# Patient Record
Sex: FEMALE | Race: WHITE | NOT HISPANIC OR LATINO | Employment: OTHER | URBAN - METROPOLITAN AREA
[De-identification: names, ages, dates, MRNs, and addresses within clinical notes are randomized per-mention and may not be internally consistent; named-entity substitution may affect disease eponyms.]

---

## 2017-10-16 ENCOUNTER — OFFICE VISIT (OUTPATIENT)
Dept: FAMILY MEDICINE | Facility: CLINIC | Age: 62
End: 2017-10-16
Payer: COMMERCIAL

## 2017-10-16 VITALS
DIASTOLIC BLOOD PRESSURE: 80 MMHG | HEART RATE: 84 BPM | HEIGHT: 65 IN | BODY MASS INDEX: 39.15 KG/M2 | TEMPERATURE: 98 F | SYSTOLIC BLOOD PRESSURE: 180 MMHG | WEIGHT: 235 LBS

## 2017-10-16 DIAGNOSIS — R13.19 ESOPHAGEAL DYSPHAGIA: ICD-10-CM

## 2017-10-16 DIAGNOSIS — F17.200 TOBACCO USE DISORDER, MODERATE, DEPENDENCE: ICD-10-CM

## 2017-10-16 DIAGNOSIS — R53.83 FATIGUE, UNSPECIFIED TYPE: ICD-10-CM

## 2017-10-16 DIAGNOSIS — E04.9 ENLARGED THYROID GLAND: Primary | ICD-10-CM

## 2017-10-16 DIAGNOSIS — Z13.1 SCREENING FOR DIABETES MELLITUS: ICD-10-CM

## 2017-10-16 DIAGNOSIS — Z13.220 SCREENING, LIPID: ICD-10-CM

## 2017-10-16 DIAGNOSIS — J20.9 ACUTE BRONCHITIS, UNSPECIFIED ORGANISM: ICD-10-CM

## 2017-10-16 DIAGNOSIS — R05.9 COUGH: ICD-10-CM

## 2017-10-16 DIAGNOSIS — R06.2 WHEEZING: ICD-10-CM

## 2017-10-16 DIAGNOSIS — J30.9 ALLERGIC SINUSITIS: ICD-10-CM

## 2017-10-16 DIAGNOSIS — R12 HEARTBURN: ICD-10-CM

## 2017-10-16 DIAGNOSIS — I10 HYPERTENSION, UNSPECIFIED TYPE: ICD-10-CM

## 2017-10-16 PROCEDURE — 99999 PR PBB SHADOW E&M-NEW PATIENT-LVL IV: CPT | Mod: PBBFAC,,, | Performed by: NURSE PRACTITIONER

## 2017-10-16 PROCEDURE — 99205 OFFICE O/P NEW HI 60 MIN: CPT | Mod: S$GLB,,, | Performed by: NURSE PRACTITIONER

## 2017-10-16 RX ORDER — AMLODIPINE AND BENAZEPRIL HYDROCHLORIDE 5; 40 MG/1; MG/1
1 CAPSULE ORAL DAILY
Qty: 30 CAPSULE | Refills: 2 | Status: SHIPPED | OUTPATIENT
Start: 2017-10-16 | End: 2017-11-29 | Stop reason: SDUPTHER

## 2017-10-16 RX ORDER — ESOMEPRAZOLE MAGNESIUM 40 MG/1
40 CAPSULE, DELAYED RELEASE ORAL
Qty: 30 CAPSULE | Refills: 1 | Status: SHIPPED | OUTPATIENT
Start: 2017-10-16 | End: 2018-02-01 | Stop reason: HOSPADM

## 2017-10-16 RX ORDER — AMOXICILLIN AND CLAVULANATE POTASSIUM 875; 125 MG/1; MG/1
1 TABLET, FILM COATED ORAL 2 TIMES DAILY
Qty: 14 TABLET | Refills: 0 | Status: SHIPPED | OUTPATIENT
Start: 2017-10-16 | End: 2017-11-02 | Stop reason: ALTCHOICE

## 2017-10-16 NOTE — PROGRESS NOTES
Subjective:       Patient ID: Indira Chauhan is a 62 y.o. female.    Chief Complaint: Cough and Swallow Problem    HPI   Chief Complaint  Chief Complaint   Patient presents with    Cough    Swallow Problem       HPI  Indira Chauhan is a 62 y.o. female with multiple medical diagnoses as listed in the medical history and problem list, new patient to clinic, that presents to [facility] care and with c/o cough and a swallowing problem.  States 3 weeks of feeling sluggish and tired, swelling to left side of neck that is not painful, and difficulty swallowing, cough productive of light colored phlegm, stuffy nose and post nasal drip. Symptoms are constant with left neck swelling increased this morning.  No aggravating or relieving factors.   Has not tried any OTC medication.  Patient has not been to a doctor/health care provider for 40 years.  Today blood pressure is elevated.  BMI today is 39.11.      PAST MEDICAL HISTORY:  History reviewed. No pertinent past medical history.    PAST SURGICAL HISTORY:  History reviewed. No pertinent surgical history.    SOCIAL HISTORY:  Social History     Social History    Marital status:      Spouse name: N/A    Number of children: N/A    Years of education: N/A     Occupational History    retired      Social History Main Topics    Smoking status: Current Every Day Smoker     Packs/day: 0.50     Years: 40.00    Smokeless tobacco: Never Used    Alcohol use 1.8 oz/week     3 Shots of liquor per week      Comment: social     Drug use: No    Sexual activity: Yes     Partners: Male     Birth control/ protection: Post-menopausal     Other Topics Concern    Not on file     Social History Narrative    Retired hairdresser       FAMILY HISTORY:  Family History   Problem Relation Age of Onset    Heart disease Mother        ALLERGIES AND MEDICATIONS: updated and reviewed.  Review of patient's allergies indicates:  No Known Allergies  Current Outpatient Prescriptions    Medication Sig Dispense Refill    amlodipine-benazepril (LOTREL) 5-40 mg per capsule Take 1 capsule by mouth once daily. 30 capsule 2    amoxicillin-clavulanate 875-125mg (AUGMENTIN) 875-125 mg per tablet Take 1 tablet by mouth 2 (two) times daily. With food 14 tablet 0    esomeprazole (NEXIUM) 40 MG capsule Take 1 capsule (40 mg total) by mouth before breakfast. For heartburn, GERD 30 capsule 1     No current facility-administered medications for this visit.          Review of Systems   Constitutional: Positive for activity change and fatigue. Negative for appetite change, chills, fever and unexpected weight change.        3 weeks of fatigue and less active than normal   HENT: Positive for congestion, hearing loss and trouble swallowing. Negative for ear discharge, rhinorrhea, sinus pain and sore throat.    Eyes: Negative for visual disturbance.        Wears glasses for reading   Respiratory: Positive for cough. Negative for shortness of breath.         Cough productive of light colored phlegm   Cardiovascular: Positive for leg swelling. Negative for chest pain and palpitations.        Occasional swelling to ankles and feet   Gastrointestinal: Negative for abdominal pain, constipation, diarrhea, nausea and vomiting.        Has had heartburn and belching x 2 weeks   Genitourinary: Negative for difficulty urinating, dysuria, frequency and urgency.   Musculoskeletal: Negative for arthralgias and myalgias.   Skin: Negative for rash and wound.   Neurological: Negative for dizziness, tremors, weakness, numbness and headaches.   Hematological: Positive for adenopathy.        Swelling to left neck, not painful   Psychiatric/Behavioral: Positive for sleep disturbance. Negative for dysphoric mood and suicidal ideas. The patient is not nervous/anxious.         Some difficulty sleeping x past few weeks.        Objective:       Vitals:    10/16/17 1426 10/16/17 1431 10/16/17 1445   BP: (!) 190/95 (!) 206/98 (!) 180/80   BP  "Location: Right arm Left arm Right arm   Patient Position: Sitting Sitting Sitting   BP Method: X-Large (Automatic)  Large (Manual)   Pulse: 84     Temp: 98.3 °F (36.8 °C)     TempSrc: Oral     Weight: 106.6 kg (235 lb)     Height: 5' 5" (1.651 m)       Physical Exam   Constitutional: She is oriented to person, place, and time. She appears well-developed. No distress.   HENT:   Head: Normocephalic and atraumatic.   Right Ear: Tympanic membrane, external ear and ear canal normal.   Left Ear: Tympanic membrane, external ear and ear canal normal.   Nose: Mucosal edema present. Right sinus exhibits no maxillary sinus tenderness and no frontal sinus tenderness. Left sinus exhibits no maxillary sinus tenderness and no frontal sinus tenderness.   Mouth/Throat: Oropharynx is clear and moist and mucous membranes are normal.   Nasal mucosa edematous with white secretions noted, unable to visualize throat   Eyes: Conjunctivae and EOM are normal. Pupils are equal, round, and reactive to light. Right conjunctiva is not injected. Left conjunctiva is not injected.   Neck: Normal carotid pulses present. Carotid bruit is not present. Thyromegaly present.       Very enlarged and firm left side of thyroid and isthmus, no distinct nodules palpated, right lobe mildly enlarged, softer texture   Cardiovascular: Normal rate, regular rhythm, S1 normal, S2 normal and normal heart sounds.  Exam reveals no gallop.    No murmur heard.  Pulses:       Carotid pulses are 2+ on the right side, and 2+ on the left side.       Radial pulses are 2+ on the right side, and 2+ on the left side.        Dorsalis pedis pulses are 2+ on the right side, and 2+ on the left side.   No edema   Pulmonary/Chest: Effort normal. No respiratory distress. She has no decreased breath sounds. She has wheezes in the right lower field and the left lower field. She has no rhonchi. She has no rales.   Bilateral basilar expiratory wheezes   Abdominal: Soft. Normal appearance " and bowel sounds are normal. There is no hepatosplenomegaly. There is no tenderness. No hernia.   obese   Musculoskeletal: Normal range of motion.   Lymphadenopathy:     She has no cervical adenopathy.   Neurological: She is alert and oriented to person, place, and time. She has normal strength.   Skin: Skin is warm, dry and intact. She is not diaphoretic. No pallor.   Psychiatric: She has a normal mood and affect. Her speech is normal and behavior is normal. Judgment and thought content normal. Cognition and memory are normal.   Nursing note and vitals reviewed.      Assessment:       1. Enlarged thyroid gland    2. Fatigue, unspecified type    3. Allergic sinusitis    4. Heartburn    5. Cough    6. Hypertension, unspecified type    7. Esophageal dysphagia    8. BMI 39.0-39.9,adult    9. Acute bronchitis, unspecified organism    10. Wheezing    11. Screening, lipid    12. Screening for diabetes mellitus    13. Tobacco use disorder, moderate, dependence        Plan:       Indira was seen today for cough and swallow problem.  Patient has not been to a health care provider in 40 years and needs all health maintenance.  At this appointment, focus was on acute problems, labs were ordered.  Patient will follow up in 2 weeks, following completion of tests and after starting blood pressure medication and I will plan to begin health maintenance testing at that time.  Diagnoses and all orders for this visit:    Enlarged thyroid gland  -     US Soft Tissue Head Neck Thyroid; Future  -     TSH; Future  -     T4, free; Future  -     Thyroid peroxidase antibody; Future  -     T3; Future    Fatigue, unspecified type  -     CBC auto differential; Future  -     Comprehensive metabolic panel; Future  -     TSH; Future  -     T4, free; Future  -     Thyroid peroxidase antibody; Future  -     T3; Future    Allergic sinusitis   Discussed seasonal sinus allergies, recommended OTC antihistamine like claritin 10 mg daily as  needed    Heartburn  -     esomeprazole (NEXIUM) 40 MG capsule; Take 1 capsule (40 mg total) by mouth before breakfast. For heartburn, GERD    Cough   Cough x several weeks, possibly related to GERD/heartburn and treatment with nexium is prescribed.   Cough may be due to cigarette smoking, cessation briefly discussed, patient not interested at this time.    Hypertension, unspecified type  -     amlodipine-benazepril (LOTREL) 5-40 mg per capsule; Take 1 capsule by mouth once daily.  - Patient to check blood pressure at home and record  - Discussed low salt diet, weight loss to lower blood pressure  - Medication side effects reviewed, possible dizziness as blood pressure gets to normal range.  Goal blood pressure <140/<90.  Blood pressure not considered too low until <100/<60  - Return visit in 2 weeks    Esophageal dysphagia  -     US Soft Tissue Head Neck Thyroid; Future    BMI 39.0-39.9,adult  -     Lipid panel; Future  -     Hemoglobin A1c; Future  - Weight loss recommended with well balanced diet and portion controlled meals and increased activity.     Acute bronchitis, unspecified organism  -     amoxicillin-clavulanate 875-125mg (AUGMENTIN) 875-125 mg per tablet; Take 1 tablet by mouth 2 (two) times daily. With food    Wheezing  -     amoxicillin-clavulanate 875-125mg (AUGMENTIN) 875-125 mg per tablet; Take 1 tablet by mouth 2 (two) times daily. With food    Screening, lipid  -     Lipid panel; Future    Screening for diabetes mellitus  -     Hemoglobin A1c; Future    Tobacco use disorder, moderate, dependence    Cessation briefly discussed, patient not interested at this time.    Patient readiness: acceptance and barriers:readiness    During the course of the visit the patient was educated and counseled about the following:     Hypertension:   Medication: begin Lotrel 5/40 mg 1 daily.  Obesity:   General weight loss/lifestyle modification strategies discussed (elicit support from others; identify saboteurs;  non-food rewards, etc).  Informal exercise measures discussed, e.g. taking stairs instead of elevator.    Goals: Hypertension: Reduce Blood Pressure and Obesity: Reduce calorie intake and BMI    Did patient meet goals/outcomes: No    The following self management tools provided: blood pressure log    Patient Instructions (the written plan) was given to the patient/family.     Time spent with patient: 30 minutes

## 2017-10-16 NOTE — PATIENT INSTRUCTIONS
Controlling High Blood Pressure  High blood pressure (hypertension) is often called the silent killer. This is because many people who have it dont know it. High blood pressure is defined as 140/90 mm Hg or higher. Know your blood pressure and remember to check it regularly. Doing so can save your life. Here are some things you can do to help control your blood pressure.    Choose heart-healthy foods  · Select low-salt, low-fat foods. Limit sodium intake to 2,400 mg per day or the amount suggested by your healthcare provider.  · Limit canned, dried, cured, packaged, and fast foods. These can contain a lot of salt.  · Eat 8 to 10 servings of fruits and vegetables every day.  · Choose lean meats, fish, or chicken.  · Eat whole-grain pasta, brown rice, and beans.  · Eat 2 to 3 servings of low-fat or fat-free dairy products.  · Ask your doctor about the DASH eating plan. This plan helps reduce blood pressure.  · When you go to a restaurant, ask that your meal be prepared with no added salt.  Maintain a healthy weight  · Ask your healthcare provider how many calories to eat a day. Then stick to that number.  · Ask your healthcare provider what weight range is healthiest for you. If you are overweight, a weight loss of only 3% to 5% of your body weight can help lower blood pressure. Generally, a good weight loss goal is to lose 10% of your body weight in a year.  · Limit snacks and sweets.  · Get regular exercise.  Get up and get active  · Choose activities you enjoy. Find ones you can do with friends or family. This includes bicycling, dancing, walking, and jogging.  · Park farther away from building entrances.  · Use stairs instead of the elevator.  · When you can, walk or bike instead of driving.  · [place] leaves, garden, or do household repairs.  · Be active at a moderate to vigorous level of physical activity for at least 40 minutes for a minimum of 3 to 4 days a week.   Manage stress  · Make time to relax and enjoy  life. Find time to laugh.  · Communicate your concerns with your loved ones and your healthcare provider.  · Visit with family and friends, and keep up with hobbies.  Limit alcohol and quit smoking  · Men should have no more than 2 drinks per day.  · Women should have no more than 1 drink per day.  · Talk with your healthcare provider about quitting smoking. Smoking significantly increases your risk for heart disease and stroke. Ask your healthcare provider about community smoking cessation programs and other options.  Medicines  If lifestyle changes arent enough, your healthcare provider may prescribe high blood pressure medicine. Take all medicines as prescribed. If you have any questions about your medicines, ask your healthcare provider before stopping or changing them.   Date Last Reviewed: 4/27/2016  © 0390-0809 [employer]. [address] [place] [address], [place] [address]. All rights reserved. This information is not intended as a substitute for professional medical care. Always follow your healthcare professional's instructions.

## 2017-10-20 ENCOUNTER — LAB VISIT (OUTPATIENT)
Dept: LAB | Facility: HOSPITAL | Age: 62
End: 2017-10-20
Attending: NURSE PRACTITIONER
Payer: COMMERCIAL

## 2017-10-20 ENCOUNTER — HOSPITAL ENCOUNTER (OUTPATIENT)
Dept: RADIOLOGY | Facility: CLINIC | Age: 62
Discharge: HOME | End: 2017-10-20
Attending: NURSE PRACTITIONER
Payer: COMMERCIAL

## 2017-10-20 DIAGNOSIS — R53.83 FATIGUE, UNSPECIFIED TYPE: ICD-10-CM

## 2017-10-20 DIAGNOSIS — R13.19 ESOPHAGEAL DYSPHAGIA: ICD-10-CM

## 2017-10-20 DIAGNOSIS — E04.9 ENLARGED THYROID GLAND: ICD-10-CM

## 2017-10-20 DIAGNOSIS — Z13.1 SCREENING FOR DIABETES MELLITUS: ICD-10-CM

## 2017-10-20 DIAGNOSIS — E07.9 THYROID MASS: Primary | ICD-10-CM

## 2017-10-20 DIAGNOSIS — Z13.220 SCREENING, LIPID: ICD-10-CM

## 2017-10-20 LAB
ALBUMIN SERPL BCP-MCNC: 3.8 G/DL
ALP SERPL-CCNC: 97 U/L
ALT SERPL W/O P-5'-P-CCNC: 22 U/L
ANION GAP SERPL CALC-SCNC: 13 MMOL/L
AST SERPL-CCNC: 28 U/L
BASOPHILS # BLD AUTO: 0.08 K/UL
BASOPHILS NFR BLD: 1 %
BILIRUB SERPL-MCNC: 0.6 MG/DL
BUN SERPL-MCNC: 13 MG/DL
CALCIUM SERPL-MCNC: 10 MG/DL
CHLORIDE SERPL-SCNC: 99 MMOL/L
CHOLEST SERPL-MCNC: 237 MG/DL
CHOLEST/HDLC SERPL: 6.8 {RATIO}
CO2 SERPL-SCNC: 28 MMOL/L
CREAT SERPL-MCNC: 0.8 MG/DL
DIFFERENTIAL METHOD: ABNORMAL
EOSINOPHIL # BLD AUTO: 0.4 K/UL
EOSINOPHIL NFR BLD: 5.3 %
ERYTHROCYTE [DISTWIDTH] IN BLOOD BY AUTOMATED COUNT: 12.5 %
EST. GFR  (AFRICAN AMERICAN): >60 ML/MIN/1.73 M^2
EST. GFR  (NON AFRICAN AMERICAN): >60 ML/MIN/1.73 M^2
GLUCOSE SERPL-MCNC: 99 MG/DL
HCT VFR BLD AUTO: 46.2 %
HDLC SERPL-MCNC: 35 MG/DL
HDLC SERPL: 14.8 %
HGB BLD-MCNC: 15.3 G/DL
IMM GRANULOCYTES # BLD AUTO: 0.03 K/UL
IMM GRANULOCYTES NFR BLD AUTO: 0.4 %
LDLC SERPL CALC-MCNC: 142.8 MG/DL
LYMPHOCYTES # BLD AUTO: 1.5 K/UL
LYMPHOCYTES NFR BLD: 18.3 %
MCH RBC QN AUTO: 32 PG
MCHC RBC AUTO-ENTMCNC: 33.1 G/DL
MCV RBC AUTO: 97 FL
MONOCYTES # BLD AUTO: 0.7 K/UL
MONOCYTES NFR BLD: 8.7 %
NEUTROPHILS # BLD AUTO: 5.4 K/UL
NEUTROPHILS NFR BLD: 66.3 %
NONHDLC SERPL-MCNC: 202 MG/DL
NRBC BLD-RTO: 0 /100 WBC
PLATELET # BLD AUTO: 304 K/UL
PMV BLD AUTO: 9.7 FL
POTASSIUM SERPL-SCNC: 4.1 MMOL/L
PROT SERPL-MCNC: 8.2 G/DL
RBC # BLD AUTO: 4.78 M/UL
SODIUM SERPL-SCNC: 140 MMOL/L
T3 SERPL-MCNC: 78 NG/DL
T4 FREE SERPL-MCNC: 0.56 NG/DL
THYROPEROXIDASE IGG SERPL-ACNC: 1245.3 IU/ML
TRIGL SERPL-MCNC: 296 MG/DL
TSH SERPL DL<=0.005 MIU/L-ACNC: 32.45 UIU/ML
WBC # BLD AUTO: 8.13 K/UL

## 2017-10-20 PROCEDURE — 85025 COMPLETE CBC W/AUTO DIFF WBC: CPT

## 2017-10-20 PROCEDURE — 84480 ASSAY TRIIODOTHYRONINE (T3): CPT

## 2017-10-20 PROCEDURE — 84443 ASSAY THYROID STIM HORMONE: CPT

## 2017-10-20 PROCEDURE — 36415 COLL VENOUS BLD VENIPUNCTURE: CPT | Mod: PO

## 2017-10-20 PROCEDURE — 86376 MICROSOMAL ANTIBODY EACH: CPT

## 2017-10-20 PROCEDURE — 76536 US EXAM OF HEAD AND NECK: CPT | Mod: 26,,, | Performed by: RADIOLOGY

## 2017-10-20 PROCEDURE — 76536 US EXAM OF HEAD AND NECK: CPT | Mod: TC,PO

## 2017-10-20 PROCEDURE — 80053 COMPREHEN METABOLIC PANEL: CPT

## 2017-10-20 PROCEDURE — 80061 LIPID PANEL: CPT

## 2017-10-20 PROCEDURE — 84439 ASSAY OF FREE THYROXINE: CPT

## 2017-10-20 PROCEDURE — 83036 HEMOGLOBIN GLYCOSYLATED A1C: CPT

## 2017-10-21 LAB
ESTIMATED AVG GLUCOSE: 114 MG/DL
HBA1C MFR BLD HPLC: 5.6 %

## 2017-10-23 DIAGNOSIS — Z79.899 HIGH RISK MEDICATION USE: ICD-10-CM

## 2017-10-23 DIAGNOSIS — E03.9 ACQUIRED HYPOTHYROIDISM: ICD-10-CM

## 2017-10-23 DIAGNOSIS — E78.2 MIXED HYPERLIPIDEMIA: Primary | ICD-10-CM

## 2017-10-23 RX ORDER — ATORVASTATIN CALCIUM 40 MG/1
40 TABLET, FILM COATED ORAL DAILY
Qty: 30 TABLET | Refills: 3 | Status: SHIPPED | OUTPATIENT
Start: 2017-10-23 | End: 2018-01-16 | Stop reason: SDUPTHER

## 2017-10-23 RX ORDER — LEVOTHYROXINE SODIUM 100 UG/1
100 TABLET ORAL DAILY
Qty: 30 TABLET | Refills: 2 | Status: SHIPPED | OUTPATIENT
Start: 2017-10-23 | End: 2017-12-29 | Stop reason: DRUGHIGH

## 2017-10-31 ENCOUNTER — DOCUMENTATION ONLY (OUTPATIENT)
Dept: FAMILY MEDICINE | Facility: CLINIC | Age: 62
End: 2017-10-31

## 2017-10-31 ENCOUNTER — HOSPITAL ENCOUNTER (OUTPATIENT)
Dept: RADIOLOGY | Facility: HOSPITAL | Age: 62
Discharge: HOME | End: 2017-10-31
Attending: NURSE PRACTITIONER
Payer: COMMERCIAL

## 2017-10-31 DIAGNOSIS — E07.9 THYROID MASS: ICD-10-CM

## 2017-10-31 DIAGNOSIS — E07.9 THYROID MASS: Primary | ICD-10-CM

## 2017-10-31 PROCEDURE — 10022 US FINE NEEDLE ASPIRATION WITH IMAGING: CPT | Mod: ,,, | Performed by: RADIOLOGY

## 2017-10-31 PROCEDURE — 76942 ECHO GUIDE FOR BIOPSY: CPT | Mod: 26,,, | Performed by: RADIOLOGY

## 2017-10-31 PROCEDURE — 76942 ECHO GUIDE FOR BIOPSY: CPT | Mod: TC

## 2017-10-31 PROCEDURE — 88173 CYTOPATH EVAL FNA REPORT: CPT | Performed by: PATHOLOGY

## 2017-10-31 NOTE — PROGRESS NOTES
Pre-Visit Chart Review  For Appointment Scheduled on 11/2/2017    Health Maintenance Due   Topic Date Due    Hepatitis C Screening  1955    TETANUS VACCINE  06/20/1971    Pneumococcal PPSV23 (Medium Risk) (1) 06/20/1971    Pap Smear with HPV Cotest  06/20/1974    Mammogram  06/20/1993    Colonoscopy  06/20/2003    Zoster Vaccine  06/20/2013    Influenza Vaccine  08/01/2017

## 2017-11-02 ENCOUNTER — OFFICE VISIT (OUTPATIENT)
Dept: FAMILY MEDICINE | Facility: CLINIC | Age: 62
End: 2017-11-02
Payer: COMMERCIAL

## 2017-11-02 VITALS
BODY MASS INDEX: 39.15 KG/M2 | HEIGHT: 65 IN | TEMPERATURE: 99 F | OXYGEN SATURATION: 95 % | HEART RATE: 76 BPM | SYSTOLIC BLOOD PRESSURE: 154 MMHG | WEIGHT: 235 LBS | DIASTOLIC BLOOD PRESSURE: 86 MMHG

## 2017-11-02 DIAGNOSIS — Z72.0 TOBACCO USE: ICD-10-CM

## 2017-11-02 DIAGNOSIS — E03.9 ACQUIRED HYPOTHYROIDISM: ICD-10-CM

## 2017-11-02 DIAGNOSIS — E07.9 THYROID MASS: ICD-10-CM

## 2017-11-02 DIAGNOSIS — K30 INDIGESTION: ICD-10-CM

## 2017-11-02 DIAGNOSIS — I10 ESSENTIAL HYPERTENSION: Primary | ICD-10-CM

## 2017-11-02 DIAGNOSIS — E78.2 MIXED HYPERLIPIDEMIA: ICD-10-CM

## 2017-11-02 PROCEDURE — 99999 PR PBB SHADOW E&M-EST. PATIENT-LVL IV: CPT | Mod: PBBFAC,,, | Performed by: NURSE PRACTITIONER

## 2017-11-02 PROCEDURE — 99213 OFFICE O/P EST LOW 20 MIN: CPT | Mod: S$GLB,,, | Performed by: NURSE PRACTITIONER

## 2017-11-02 RX ORDER — HYDROCHLOROTHIAZIDE 25 MG/1
25 TABLET ORAL DAILY
Qty: 30 TABLET | Refills: 2 | Status: SHIPPED | OUTPATIENT
Start: 2017-11-02 | End: 2018-01-16 | Stop reason: SDUPTHER

## 2017-11-02 NOTE — PATIENT INSTRUCTIONS
Controlling High Blood Pressure  High blood pressure (hypertension) is often called the silent killer. This is because many people who have it dont know it. High blood pressure is defined as 140/90 mm Hg or higher. Know your blood pressure and remember to check it regularly. Doing so can save your life. Here are some things you can do to help control your blood pressure.    Choose heart-healthy foods  · Select low-salt, low-fat foods. Limit sodium intake to 2,400 mg per day or the amount suggested by your healthcare provider.  · Limit canned, dried, cured, packaged, and fast foods. These can contain a lot of salt.  · Eat 8 to 10 servings of fruits and vegetables every day.  · Choose lean meats, fish, or chicken.  · Eat whole-grain pasta, brown rice, and beans.  · Eat 2 to 3 servings of low-fat or fat-free dairy products.  · Ask your doctor about the DASH eating plan. This plan helps reduce blood pressure.  · When you go to a restaurant, ask that your meal be prepared with no added salt.  Maintain a healthy weight  · Ask your healthcare provider how many calories to eat a day. Then stick to that number.  · Ask your healthcare provider what weight range is healthiest for you. If you are overweight, a weight loss of only 3% to 5% of your body weight can help lower blood pressure. Generally, a good weight loss goal is to lose 10% of your body weight in a year.  · Limit snacks and sweets.  · Get regular exercise.  Get up and get active  · Choose activities you enjoy. Find ones you can do with friends or family. This includes bicycling, dancing, walking, and jogging.  · Park farther away from building entrances.  · Use stairs instead of the elevator.  · When you can, walk or bike instead of driving.  · [place] leaves, garden, or do household repairs.  · Be active at a moderate to vigorous level of physical activity for at least 40 minutes for a minimum of 3 to 4 days a week.   Manage stress  · Make time to relax and enjoy  life. Find time to laugh.  · Communicate your concerns with your loved ones and your healthcare provider.  · Visit with family and friends, and keep up with hobbies.  Limit alcohol and quit smoking  · Men should have no more than 2 drinks per day.  · Women should have no more than 1 drink per day.  · Talk with your healthcare provider about quitting smoking. Smoking significantly increases your risk for heart disease and stroke. Ask your healthcare provider about community smoking cessation programs and other options.  Medicines  If lifestyle changes arent enough, your healthcare provider may prescribe high blood pressure medicine. Take all medicines as prescribed. If you have any questions about your medicines, ask your healthcare provider before stopping or changing them.   Date Last Reviewed: 4/27/2016  © 7796-5483 [employer], [employer]. [address] [place] [address], [place] [address]. All rights reserved. This information is not intended as a substitute for professional medical care. Always follow your healthcare professional's instructions.
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
## 2017-11-07 ENCOUNTER — HOSPITAL ENCOUNTER (OUTPATIENT)
Dept: RADIOLOGY | Facility: HOSPITAL | Age: 62
Discharge: HOME | End: 2017-11-07
Attending: NURSE PRACTITIONER
Payer: COMMERCIAL

## 2017-11-07 DIAGNOSIS — E07.9 THYROID MASS: ICD-10-CM

## 2017-11-07 PROCEDURE — 25500020 PHARM REV CODE 255

## 2017-11-07 PROCEDURE — 70491 CT SOFT TISSUE NECK W/DYE: CPT | Mod: 26,,, | Performed by: RADIOLOGY

## 2017-11-07 PROCEDURE — 70491 CT SOFT TISSUE NECK W/DYE: CPT | Mod: TC

## 2017-11-07 RX ADMIN — IOHEXOL 75 ML: 350 INJECTION, SOLUTION INTRAVENOUS at 01:11

## 2017-11-08 ENCOUNTER — TELEPHONE (OUTPATIENT)
Dept: FAMILY MEDICINE | Facility: CLINIC | Age: 62
End: 2017-11-08

## 2017-11-08 ENCOUNTER — TELEPHONE (OUTPATIENT)
Dept: SURGERY | Facility: CLINIC | Age: 62
End: 2017-11-08

## 2017-11-08 DIAGNOSIS — E07.9 THYROID MASS: Primary | ICD-10-CM

## 2017-11-08 NOTE — TELEPHONE ENCOUNTER
Patient given ctscan results. Message to Dr. Johnson staff to call patient to make appt for consult. Patient will be leaving to go out of town 11/15/17 and returning 12/2/17. Referral generated.

## 2017-11-08 NOTE — TELEPHONE ENCOUNTER
I received a message back from [facility], Dr. Christ Linder who is part of the endocrine/surgery/oncology department.  They can see the patient tomorrow and have offered her an appointment which she has declined.  Dr. Linder and his staff are willing to do any additional workup that is required prior to surgery to remove the thyroid/mass and then have her see a Dr. Brady when she gets back in the office. I know this is scary and I am certainly willing to call her when I am at work tomorrow to answer any questions she may have.  I know she is going to be going to [place] to visit her daughter but I really think it would be a good idea to start the process as soon as possible.  I'm sure that the actual surgery can be planned around her trip, but getting established with the proper surgeon is most important right now.  I really think she should try to make the appointment at [facility] tomorrow if at al possible.  The  for the appointment tomorrow is Tika Gómez RN and her number is [phone]. They said Friday is even doable for an appointment but a bit more difficult due to the surgery schedule.  The appointment offered is with Puja Isaac Np.    Thanks for helping me with this.  Montse

## 2017-11-08 NOTE — TELEPHONE ENCOUNTER
Spoke to the pt and advised that Dr. Brady is on maternity leave and will come back on 12/4. Advised that I will call her approx 11/20 to have her scheduled to see DR. Brady for after she comes back from out of town. Pt v/u.     Pt placed on wait list.

## 2017-11-08 NOTE — TELEPHONE ENCOUNTER
----- Message from Montse Sarmiento NP sent at 11/7/2017  6:53 PM CST -----  Please call the patient and let her know that I received the results of her CT scan of the neck and thyroid.  Although the fine needle biopsies of the masses were negative for malignancy, the CT scan shows that the mass to the thyroid/neck appears to be a malignant primary thyroid neoplasm, tumor.  There is lymph node involvement.  The mass is encasing the left internal jugular vein. I am going to send a copy of her information to a specialist at [facility] named Dr. Danna Johnson. We will get back to her as soon as possible.   Thanks.   Montse

## 2017-11-08 NOTE — TELEPHONE ENCOUNTER
----- Message from Galilea Isaac NP sent at 11/8/2017  1:50 PM CST -----  Regarding: RE: Patient of [facility] with malignant thyroid tumor  See messages below.  Please get her in tomorrow if possible.    Puja  ----- Message -----  From: Christ Linder MD  Sent: 11/8/2017  12:41 PM  To: Danna Johnson MD, Galilea Isaac NP, #  Subject: RE: Patient of [facility] with malignant#    No problem.      Puja--please get this pt in this week, looking at records it looks like they will need a percutaneous core biopsy and some staging scans.      Danna will be back week of 11/20 and could probably see her after that workup.      ----- Message -----  From: Montse Sarmiento NP  Sent: 11/7/2017   6:53 PM  To: Danna Johnson MD, Montse Sarmiento NP  Subject: Patient of [facility] with malignant thy#    Dear Dr. Johnson/staff/covering physician,   I would appreciate it if you would look at this patient's records, thyroid ultrasound, fine needle biopsy results, and CT scan of the thyroid and neck soft tissues.  The patient needs to have surgery and I was referred to you by Dr. Cassy Warren as someone who would be best suited to help in this patient's care. I am interested in getting her scheduled with someone ASA due to the involvement of the left internal jugular vein.  Please let me know if this patient is appropriate for your service, and if so, how I can go about scheduling her appointment.  Thank you.  Sincerely,   Montse Sarmiento [facility]  [phone]

## 2017-11-08 NOTE — TELEPHONE ENCOUNTER
----- Message from Vj Cordon sent at 11/8/2017 10:10 AM CST -----  Contact: self   [phone]  Patient returning the nurse phone call. Thanks!

## 2017-11-09 ENCOUNTER — CLINICAL SUPPORT (OUTPATIENT)
Dept: FAMILY MEDICINE | Facility: CLINIC | Age: 62
End: 2017-11-09
Payer: COMMERCIAL

## 2017-11-09 ENCOUNTER — TELEPHONE (OUTPATIENT)
Dept: FAMILY MEDICINE | Facility: CLINIC | Age: 62
End: 2017-11-09

## 2017-11-09 VITALS — HEART RATE: 88 BPM | DIASTOLIC BLOOD PRESSURE: 66 MMHG | SYSTOLIC BLOOD PRESSURE: 150 MMHG

## 2017-11-09 DIAGNOSIS — I10 ESSENTIAL HYPERTENSION: Primary | ICD-10-CM

## 2017-11-09 PROCEDURE — 99499 UNLISTED E&M SERVICE: CPT | Mod: S$GLB,,, | Performed by: FAMILY MEDICINE

## 2017-11-13 ENCOUNTER — TELEPHONE (OUTPATIENT)
Dept: FAMILY MEDICINE | Facility: CLINIC | Age: 62
End: 2017-11-13

## 2017-11-13 NOTE — TELEPHONE ENCOUNTER
----- Message from Patricia Pickering sent at 11/13/2017 10:04 AM CST -----  Contact: self [phone]  Please call her regarding the appt that is to be scheduled @ [facility] for a surgeon.  Thank you!

## 2017-11-14 ENCOUNTER — TELEPHONE (OUTPATIENT)
Dept: FAMILY MEDICINE | Facility: CLINIC | Age: 62
End: 2017-11-14

## 2017-11-14 NOTE — TELEPHONE ENCOUNTER
Advised patient [facility] staff will be contacting her after 11/20 to set up office visit.( Provider is out on maternity leave.) Family Medicine Staff and [facility] staff made several attempts to get patient in office due to a cancellation patient refused appointment now patient will have to wait until first available office visit.     ----- Message from Marco Antonio John sent at 11/14/2017 10:09 AM CST -----  Contact: patient  Patient states that she had left a previous message about scheduling a procedure at [facility].  Can you please call the patient back at [phone].  Thank you

## 2017-11-15 NOTE — TELEPHONE ENCOUNTER
Called pt again.  Advised pt that Dr. Brady has booked back in for 11/20/17 and can see the pt on this day. She says she is going on vacay tomorrow and it will come back on 12/4/17.  Says she can see Dr. CHOI after this date. Advised that I will see Dr. CHOI this Monday and will finalize her clinic schedule.      Pt confirmed that I can call her while on vacay on 11/20 to schedule her the wk of 12/4/17.  Will discuss then...

## 2017-11-21 ENCOUNTER — TELEPHONE (OUTPATIENT)
Dept: PLASTIC SURGERY | Facility: CLINIC | Age: 62
End: 2017-11-21

## 2017-11-21 NOTE — TELEPHONE ENCOUNTER
Confirmed the dates that Dr. Brady will be coming back.    Called to schedule the pt on either a Mon or Thurs starting w/ 12/4/17.  She did not answer in which I did LVM w/ direct call back #.

## 2017-11-29 DIAGNOSIS — I10 HYPERTENSION, UNSPECIFIED TYPE: ICD-10-CM

## 2017-11-29 RX ORDER — AMLODIPINE AND BENAZEPRIL HYDROCHLORIDE 5; 40 MG/1; MG/1
1 CAPSULE ORAL DAILY
Qty: 90 CAPSULE | Refills: 0 | Status: SHIPPED | OUTPATIENT
Start: 2017-11-29 | End: 2018-01-16 | Stop reason: SDUPTHER

## 2017-12-01 NOTE — TELEPHONE ENCOUNTER
Called pt again and scheduled the appt w/ the pt. Appt/date/time/loc discussed w/ the pt who v/u.    appt reminder mailed ot her home.

## 2017-12-04 ENCOUNTER — TELEPHONE (OUTPATIENT)
Dept: FAMILY MEDICINE | Facility: CLINIC | Age: 62
End: 2017-12-04

## 2017-12-04 NOTE — TELEPHONE ENCOUNTER
Please call the patient and see how she is doing.  Be sure she is aware of her appointment with the endocrinologist at [facility] [place] on Thursday.  Thanks.  Montse

## 2017-12-07 ENCOUNTER — INITIAL CONSULT (OUTPATIENT)
Dept: SURGERY | Facility: CLINIC | Age: 62
End: 2017-12-07
Attending: SURGERY
Payer: COMMERCIAL

## 2017-12-07 VITALS
HEART RATE: 89 BPM | TEMPERATURE: 98 F | BODY MASS INDEX: 37.44 KG/M2 | SYSTOLIC BLOOD PRESSURE: 147 MMHG | WEIGHT: 224.75 LBS | HEIGHT: 65 IN | DIASTOLIC BLOOD PRESSURE: 71 MMHG

## 2017-12-07 DIAGNOSIS — R59.0 LYMPHADENOPATHY OF HEAD AND NECK REGION: ICD-10-CM

## 2017-12-07 DIAGNOSIS — E07.9 THYROID MASS: Primary | ICD-10-CM

## 2017-12-07 PROCEDURE — 99999 PR PBB SHADOW E&M-EST. PATIENT-LVL III: CPT | Mod: PBBFAC,,, | Performed by: SURGERY

## 2017-12-07 PROCEDURE — 99204 OFFICE O/P NEW MOD 45 MIN: CPT | Mod: S$GLB,,, | Performed by: SURGERY

## 2017-12-07 NOTE — LETTER
Endocrine/General Surgery  [address] [place] [address]  Phone: [phone]  Fax: [phone] December 17, 2017         Montse Sarmiento, CRESENCIO  [address] [address]    Patient: Indira Chauhan   YOB: 1955   Date of Visit: 12/7/2017     Dear Ms. Sarmiento:    I saw Ms. Chauhan in clinic. Attached you will find a copy of my note.    As you know, she is a 62 y.o. female who presented with a thyroid mass that is highly suspicious for thyroid malignancy. I was able to discuss the need for surgery and the question of extent of resection. The current plan includes thyroid surgery once the lateral neck FNA is completed.    If you have any questions or concerns, please don't hesitate to contact my office. Again, thank you kindly for this referral.    Regards,      Danna Johnson MD

## 2017-12-07 NOTE — Clinical Note
December 11, 2017      Montse Sarmiento, CRESENCIO  [address]  [place] [address]           [facility]  [address], [address]  [place] [address]  Phone: [phone]  Fax: [phone]          Patient: Indira Chauhan   MR Number: [id number]   YOB: 1955   Date of Visit: 12/7/2017       Dear Montse Sarmiento:    Thank you for referring Indira Chauhan to me for evaluation. Attached you will find relevant portions of my assessment and plan of care.    If you have questions, please do not hesitate to call me. I look forward to following Indira Chauhan along with you.    Sincerely,    Danna Johnson MD    Enclosure  CC:  No Recipients    If you would like to receive this communication electronically, please contact [email or web address] or [phone] to request more information on [employer] Link access.    For providers and/or their staff who would like to refer a patient to [facility], please contact us through our one-stop-shop provider referral line, [facility], at [phone].    If you feel you have received this communication in error or would no longer like to receive these types of communications, please e-mail [email or web address]

## 2017-12-07 NOTE — PROGRESS NOTES
Consult Note  Endocrine Surgery    Visit Diagnosis: Thyroid mass [E07.9]    SUBJECTIVE:     Indira Chauhan is a 62 y.o. female seen at the request of Montse Sarmiento today in the Endocrine Surgery Clinic for evaluation of thyroid nodule(s). Her history dates back a few months when patient self-identified a thyroid mass prompting evaluation with PCP. Subsequent evaluation included referral to an endocrinologist, neck ultrasound, fine needle aspiration and CT scan. Indira Chauhan complains of palpable neck mass. The patient denies hot/cold intolerance, fatigue, unexplained weight changes and change in voice quality. She does not have a history of head and neck radiation therapy. She is not taking thyroid hormone supplementation. She has not undergone radioactive iodine treatment.      Review of patient's allergies indicates:  No Known Allergies    Current Outpatient Prescriptions   Medication Sig Dispense Refill    amlodipine-benazepril (LOTREL) 5-40 mg per capsule Take 1 capsule by mouth once daily. 90 capsule 0    atorvastatin (LIPITOR) 40 MG tablet Take 1 tablet (40 mg total) by mouth once daily. 30 tablet 3    esomeprazole (NEXIUM) 40 MG capsule Take 1 capsule (40 mg total) by mouth before breakfast. For heartburn, GERD 30 capsule 1    hydroCHLOROthiazide (HYDRODIURIL) 25 MG tablet Take 1 tablet (25 mg total) by mouth once daily. 30 tablet 2    SYNTHROID 100 mcg tablet Take 1 tablet (100 mcg total) by mouth once daily. 30 tablet 2     No current facility-administered medications for this visit.        Past Medical History:   Diagnosis Date    GERD (gastroesophageal reflux disease)     Hyperlipidemia     Hypertension     Hypothyroidism     Thyroid mass 11/02/2017     History reviewed. No pertinent surgical history.  Social History   Substance Use Topics    Smoking status: Current Every Day Smoker     Packs/day: 0.50     Years: 40.00    Smokeless tobacco: Never Used    Alcohol use 1.8 oz/week     3  "Shots of liquor per week      Comment: social           Review of Systems:    Review of Systems      OBJECTIVE:     Vital Signs:  BP (!) 147/71 (BP Location: Right arm, Patient Position: Sitting, BP Method: Large (Automatic))   Pulse 89   Temp 97.9 °F (36.6 °C) (Oral)   Ht 5' 5" (1.651 m)   Wt 101.9 kg (224 lb 12.1 oz)   BMI 37.40 kg/m²    Body mass index is 37.4 kg/m².      Physical Exam:    General:  no distress, see vitals for BMI    Eyes:  conjunctivae/corneas clear   Neck: Trachea is deviated and there is palpable adenopathy.   Thyroid:  thyroid enlarged with large mass noted. It is firm to the touch. There palpable firm, enlarged latera neck nodes noted (left)   Lung: clear to auscultation bilaterally   Heart:  regular rate and rhythm   Abdomen: soft, non-tender; bowel sounds normal; no masses,  no organomegaly   Skin/Extremities: warm and well-perfused   Pulses: 2+ and symmetric   Neuro: normal without focal findings and mental status, speech normal, alert and oriented x3       Laboratory/Radiologic Studies    Studies:  Date:       Results:    Ultrasound: 10/20/2017 The thyroid gland is diffusely heterogeneous and enlarged.  The right lobe measures 5.3 x 2.2 x 2.8 cm with volume of 17 cc.  The left lobe measures approximately 8.7 x 4.5 x 4.3 cm with calculated volume of 89 cc for total thyroid volume of 106 cc.  The isthmus measures 1.2 cm thick.    There is a single solid 1.3 x 1.0 x 1.0 cm nodule in the inferior pole of the right lobe.  This does not meet criteria for biopsy.    There is, however, a large solid heterogeneous hyperemic mass in the left lobe measuring at least 5.2 x 5.1 x 3.8 cm.  There is a 2nd solid large exophytic nodule projecting laterally from the left lobe just superior lateral to the common carotid artery.  This large nodule measures 5.7 x 4.1 x 4.3 cm.  There are no definite microcalcifications but both of these large nodules meet criteria or for fine-needle aspiration/biopsy. "  Additionally, there are numerous left neck lymph nodes.  There are at least 2 index lymph nodes which aren't enlarged and appear morphologically abnormal.  There is a 3 x 1.7 x 2.7 cm lymph node in the inferior neck with abnormal sonographic characteristics with loss of normal fatty hilum.  There is a 2nd 3.1 x 1.7 x 3.2 cm abnormal appearing vascular lymph node in the inferior lateral left neck.   CT Head/Neck:  11/7/2017 The visualized brain parenchyma is unremarkable. The visualized cerebral vasculature is grossly unremarkable. No acute findings involving the orbital soft tissues are identified. There is mild mucosal thickening in the ethmoid sinuses. Paranasal sinuses are otherwise clear. The visualized mastoid air cells are clear.    The pharyngeal mucosal space of the nasopharynx and oropharynx is unremarkable other than the presence of a small possible stone in the right palatine tonsil. The larynx and subglottic airway are deviated toward the right side secondary to the patient's large left thyroid/left neck mass. The epiglottis, aryepiglottic folds, true and false vocal cords show no acute abnormality.    There is a tiny nodule within the right upper lobe measuring 3 mm on axial image 62. This is indeterminate. The lung apices are otherwise clear.    There is upper mediastinal lymphadenopathy with a node measuring 1.3 cm in short axis noted.    There is a large, heterogeneously enhancing mass involving the left thyroid lobe and extending into the lateral soft tissues of the neck. This measures up to approximately 7 x 7.9 cm transaxially and approximately 6.9 cm cephalocaudal. This abuts and likely invades the posterior margin of the left sternocleidomastoid muscle on image 45. There is also lymphadenopathy within the neck involving levels 2, 3, 4 and 5 with the largest level II node measuring 2.3 cm in short axis on axial image 27. Many of these demonstrate low density centers consistent with necrosis.  Left supraclavicular and subclavicular lymphadenopathy is also noted. There is also right jugulodigastric chain lymphadenopathy with a level II node on image 33 measuring 1.1 cm in short axis. Right supraclavicular lymph nodes measuring up to 1.2 cm short axis on image 46 are also noted.    The maxilla is edentulous. Poor dentition within the mandible is noted with multiple missing and carious teeth noted. Multilevel degenerative disc disease and facet arthrosis is seen in the cervical spine.    The left internal jugular vein is narrowed and encased by the patient's left-sided neck mass but appears patent. No additional acute vascular abnormality is seen.   Impression       1.  Large, heterogeneously enhancing left thyroid mass extending into the lateral aspect of the neck and into the superior mediastinum. This appears malignant and most likely represents primary thyroid neoplasm, possibly follicular carcinoma given recent biopsy results (follicular lesion of undetermined significance). The mass invades the posterior margin of the left sternocleidomastoid. There is malignant appearing bilateral lymphadenopathy as well with bilateral jugulodigastric chain, left posterior cervical, bilateral supraclavicular and superior mediastinal pranav involvement. The left-sided neck mass encases the left internal jugular vein with associated narrowing of the same but no thrombosis identified.    2. 3 mm right upper lobe pulmonary nodule which is indeterminate. Attention on followup recommended.        Pathology::  10/31/2017 FINAL PATHOLOGIC DIAGNOSIS  1. Nodule, left thyroid, FNA:  [facility] Thyroid Cytology Category: Follicular Lesion of Undetermined Significance (FLUS).  Colloid present.  2. Nodule, left neck, FNA:  Negative for malignant cells.              Component Value Date   TSH 32.450 (H) 10/20/2017   Free T4 0.56 (L) 10/20/2017   Sodium 140 10/20/2017   Potassium 4.1 10/20/2017   Chloride 99 10/20/2017   CO2 28  10/20/2017   Glucose 99 10/20/2017   BUN, Bld 13 10/20/2017   Creatinine 0.8 10/20/2017   Calcium 10.0 10/20/2017   Anion Gap 13 10/20/2017   eGFR if African American >60.0 10/20/2017   eGFR if non African American >60.0 10/20/2017       ASSESSMENT/PLAN:       Assessment    Ms. Chauhan is a 62 y.o. female who presents with evidence ofThyroid mass [E07.9].    Plan    During this visit, lab and imaging results were reviewed with the patient and her spouse. Thyroid anatomy and physiology were reviewed and she was given a copy of our patient education booklet, Understanding Thyroid Disease. The above testing and examination support the diagnosis of multinodular thyroid goiter very suspicious for malignancy though pathology negative to this point despite discordant findings.     I have recommended that at the very least the patient requires a total thyroidectomy.  Based on imaging a left lateral neck dissection will also be required despite negative imaging.  There is also a question of the necessity of a right lateral dissection. Potential complications of this operation were reviewed with the patient and her .  We will see her back in the clinic soon to determine what else is needed.     I would like to have this biopsied prior to surgery in order to determine the extent of disease and resection necessary.  We will also ensure that the patient is medically optimized prior to procedure.

## 2017-12-12 ENCOUNTER — TELEPHONE (OUTPATIENT)
Dept: SURGERY | Facility: CLINIC | Age: 62
End: 2017-12-12

## 2017-12-13 ENCOUNTER — TELEPHONE (OUTPATIENT)
Dept: ENDOCRINOLOGY | Facility: CLINIC | Age: 62
End: 2017-12-13

## 2017-12-13 DIAGNOSIS — E04.2 MULTINODULAR GOITER: Primary | ICD-10-CM

## 2017-12-13 NOTE — TELEPHONE ENCOUNTER
----- Message from Danna Johnson MD sent at 12/11/2017 12:01 PM CST -----  Morning,    This is the patient that I spoke to you both about this morning.  She needs a thyroid US (lateral neck) and LN bx with TG washout.  Is it possible to get this done ASAP for her.  I think you would have to overbook to make it done.  Let me know.  If not,  I'll try to get her set up with radiology.    Thanks,    aob

## 2017-12-13 NOTE — TELEPHONE ENCOUNTER
Please book this patient for 12/29/17 at 1PM for US thyroid and 1:45 Pm for FNA of LN   I will do both   Puja is aware   Thank you

## 2017-12-13 NOTE — TELEPHONE ENCOUNTER
MD Giuliano Jeong MD; Danna Johnson MD   Cc: MARCELINO Clay Staff             Sure will do that    Previous Messages      ----- Message -----   From: Giuliano Kate MD   Sent: 12/12/2017  10:00 AM   To: Danna Johnson MD, Rose Dalton MD, *     Janice, you will need to coordinate with Samira to see if we can accommodate this Friday.

## 2017-12-13 NOTE — TELEPHONE ENCOUNTER
Left pt a message to give me a call back in regards to the following message below. Call back number was provided.

## 2017-12-18 ENCOUNTER — TELEPHONE (OUTPATIENT)
Dept: ENDOCRINOLOGY | Facility: CLINIC | Age: 62
End: 2017-12-18

## 2017-12-18 NOTE — TELEPHONE ENCOUNTER
Pt called to inform that she will not be able to make the bx appt on 12/29/17 due to transportation issues and also has a lot of family in town.    Pt reports she can for sure make the biopsy appt if done on 1/3 or 1/3/18 bc her  is off on both days.  Informed the pt that I will let Dr. Dalton know.

## 2017-12-22 ENCOUNTER — LAB VISIT (OUTPATIENT)
Dept: LAB | Facility: HOSPITAL | Age: 62
End: 2017-12-22
Attending: NURSE PRACTITIONER
Payer: COMMERCIAL

## 2017-12-22 DIAGNOSIS — E03.9 ACQUIRED HYPOTHYROIDISM: ICD-10-CM

## 2017-12-22 DIAGNOSIS — Z79.899 HIGH RISK MEDICATION USE: ICD-10-CM

## 2017-12-22 DIAGNOSIS — E78.2 MIXED HYPERLIPIDEMIA: ICD-10-CM

## 2017-12-22 LAB
ALBUMIN SERPL BCP-MCNC: 3.6 G/DL
ALP SERPL-CCNC: 103 U/L
ALT SERPL W/O P-5'-P-CCNC: 16 U/L
AST SERPL-CCNC: 17 U/L
BILIRUB DIRECT SERPL-MCNC: 0.2 MG/DL
BILIRUB SERPL-MCNC: 0.5 MG/DL
CHOLEST SERPL-MCNC: 184 MG/DL
CHOLEST/HDLC SERPL: 5.1 {RATIO}
HDLC SERPL-MCNC: 36 MG/DL
HDLC SERPL: 19.6 %
LDLC SERPL CALC-MCNC: 103 MG/DL
NONHDLC SERPL-MCNC: 148 MG/DL
PROT SERPL-MCNC: 7.5 G/DL
T4 FREE SERPL-MCNC: 1 NG/DL
TRIGL SERPL-MCNC: 225 MG/DL
TSH SERPL DL<=0.005 MIU/L-ACNC: 4.82 UIU/ML

## 2017-12-22 PROCEDURE — 84443 ASSAY THYROID STIM HORMONE: CPT

## 2017-12-22 PROCEDURE — 80061 LIPID PANEL: CPT

## 2017-12-22 PROCEDURE — 36415 COLL VENOUS BLD VENIPUNCTURE: CPT | Mod: PO

## 2017-12-22 PROCEDURE — 84439 ASSAY OF FREE THYROXINE: CPT

## 2017-12-22 PROCEDURE — 80076 HEPATIC FUNCTION PANEL: CPT

## 2017-12-28 ENCOUNTER — TELEPHONE (OUTPATIENT)
Dept: SURGERY | Facility: CLINIC | Age: 62
End: 2017-12-28

## 2017-12-28 DIAGNOSIS — E04.1 THYROID NODULE: ICD-10-CM

## 2017-12-28 DIAGNOSIS — R59.1 LYMPHADENOPATHY OF HEAD AND NECK: Primary | ICD-10-CM

## 2017-12-28 NOTE — PROGRESS NOTES
Patient with thyroid mass(FLUS) and abnormal lymph nodes bilaterally.  Need to perform bilateral cervical lymph node FNA and send tissue for thyroglobulin washout.    This was discussed with IR who will perform next week.

## 2017-12-29 RX ORDER — LEVOTHYROXINE SODIUM 150 UG/1
150 TABLET ORAL DAILY
Qty: 90 TABLET | Refills: 1 | Status: SHIPPED | OUTPATIENT
Start: 2017-12-29 | End: 2018-04-30 | Stop reason: SDUPTHER

## 2017-12-29 NOTE — TELEPHONE ENCOUNTER
Called pt to discuss appt for 1/12 no answer.    LVM for pt requesting for a call back.  Provided my direct call back number. Awaiting call...

## 2018-01-10 ENCOUNTER — TELEPHONE (OUTPATIENT)
Dept: SURGERY | Facility: CLINIC | Age: 63
End: 2018-01-10

## 2018-01-10 NOTE — TELEPHONE ENCOUNTER
"Evie from US called and advised that there are no more openings on this coming up Friday and that pt has to choose another day.     Called pt back and advised of the above information.  Asked pt if there was any other way or anyone else at all can take her in which she replied sarcastically and said that If I had anyone else to take me, I would have gone already.    RN Asked the pt if she would like to call the US dept herself and schedule due to her and husbands difficult schedule. She then became agitated and asked why does she have to do it.  Reminded her that we have been trying to schedule this appt for the last month in which has been hard to do bc of the difficult schedule of hers and husbands. She then calmed down and says "yeah."  Provided her w/ Evie in US's number for the pt to schedule herself.   She then schedule's herself on 1/23/18.    Spoke to Evie and asked if this was the first avail opening or did the pt choose this date herself.  Evie says that she chose her own date bc her  was off this wk.     Notified Dr. Brady regarding this matter.        "

## 2018-01-10 NOTE — TELEPHONE ENCOUNTER
PT CALLED to say that she did not receive called from us regarding bx appt.    She says she can have the bx done on this froday but cannot make it at 0900 am bc her  works at  in [place] and unable to come home to [place], pick her up and take her back to [facility].      She reports that her  needs atleast 3 hrs of sleep.     Called US dept and LVM asking if they can sched the pt later in the day. Awaiting call back from them.

## 2018-01-12 DIAGNOSIS — Z01.818 PRE-OP TESTING: Primary | ICD-10-CM

## 2018-01-15 ENCOUNTER — PATIENT MESSAGE (OUTPATIENT)
Dept: SURGERY | Facility: CLINIC | Age: 63
End: 2018-01-15

## 2018-01-15 NOTE — TELEPHONE ENCOUNTER
Colleague called the pt on Friday to schedule her coag labs prior to biopsy but pt did not answer. A message was left but she has not called back yet.    Will send [facility] message.

## 2018-01-16 DIAGNOSIS — I10 ESSENTIAL HYPERTENSION: ICD-10-CM

## 2018-01-16 DIAGNOSIS — I10 HYPERTENSION, UNSPECIFIED TYPE: ICD-10-CM

## 2018-01-16 DIAGNOSIS — E78.2 MIXED HYPERLIPIDEMIA: ICD-10-CM

## 2018-01-16 RX ORDER — AMLODIPINE AND BENAZEPRIL HYDROCHLORIDE 5; 40 MG/1; MG/1
1 CAPSULE ORAL DAILY
Qty: 90 CAPSULE | Refills: 0 | Status: SHIPPED | OUTPATIENT
Start: 2018-01-16 | End: 2018-04-30 | Stop reason: SDUPTHER

## 2018-01-16 RX ORDER — HYDROCHLOROTHIAZIDE 25 MG/1
25 TABLET ORAL DAILY
Qty: 30 TABLET | Refills: 2 | Status: SHIPPED | OUTPATIENT
Start: 2018-01-16 | End: 2018-04-30 | Stop reason: SDUPTHER

## 2018-01-16 RX ORDER — ATORVASTATIN CALCIUM 40 MG/1
40 TABLET, FILM COATED ORAL DAILY
Qty: 30 TABLET | Refills: 3 | Status: SHIPPED | OUTPATIENT
Start: 2018-01-16 | End: 2018-04-30 | Stop reason: SDUPTHER

## 2018-01-16 NOTE — TELEPHONE ENCOUNTER
Pt has not called back and not responded to the [facility] message that was sent to pt.    Called pt back and lvm requesting for her either to call me back or answer to the [facility] message w/ date/ time/ loc she would like her labs to be drawn.

## 2018-01-22 ENCOUNTER — LAB VISIT (OUTPATIENT)
Dept: LAB | Facility: HOSPITAL | Age: 63
End: 2018-01-22
Attending: SURGERY
Payer: COMMERCIAL

## 2018-01-22 DIAGNOSIS — Z01.818 PRE-OP TESTING: ICD-10-CM

## 2018-01-22 LAB
APTT BLDCRRT: 22.7 SEC
INR PPP: 0.9
PROTHROMBIN TIME: 9.6 SEC

## 2018-01-22 PROCEDURE — 85730 THROMBOPLASTIN TIME PARTIAL: CPT

## 2018-01-22 PROCEDURE — 36415 COLL VENOUS BLD VENIPUNCTURE: CPT | Mod: PO

## 2018-01-22 PROCEDURE — 85610 PROTHROMBIN TIME: CPT

## 2018-01-23 ENCOUNTER — HOSPITAL ENCOUNTER (OUTPATIENT)
Dept: PREADMISSION TESTING | Facility: HOSPITAL | Age: 63
Discharge: HOME | End: 2018-01-23
Attending: ANESTHESIOLOGY
Payer: COMMERCIAL

## 2018-01-23 ENCOUNTER — HOSPITAL ENCOUNTER (OUTPATIENT)
Dept: RADIOLOGY | Facility: HOSPITAL | Age: 63
Discharge: HOME | End: 2018-01-23
Attending: SURGERY
Payer: COMMERCIAL

## 2018-01-23 ENCOUNTER — OFFICE VISIT (OUTPATIENT)
Dept: SURGERY | Facility: CLINIC | Age: 63
End: 2018-01-23
Payer: COMMERCIAL

## 2018-01-23 ENCOUNTER — ANESTHESIA EVENT (OUTPATIENT)
Dept: SURGERY | Facility: HOSPITAL | Age: 63
End: 2018-01-23

## 2018-01-23 VITALS
HEIGHT: 65 IN | DIASTOLIC BLOOD PRESSURE: 67 MMHG | BODY MASS INDEX: 36.96 KG/M2 | HEART RATE: 86 BPM | WEIGHT: 221.81 LBS | SYSTOLIC BLOOD PRESSURE: 137 MMHG | TEMPERATURE: 99 F

## 2018-01-23 DIAGNOSIS — E04.1 THYROID NODULE: ICD-10-CM

## 2018-01-23 DIAGNOSIS — E07.9 THYROID MASS: Primary | ICD-10-CM

## 2018-01-23 DIAGNOSIS — R22.1 MASS OF LEFT SIDE OF NECK: Primary | ICD-10-CM

## 2018-01-23 DIAGNOSIS — R59.1 LYMPHADENOPATHY OF HEAD AND NECK: ICD-10-CM

## 2018-01-23 PROCEDURE — 88305 TISSUE EXAM BY PATHOLOGIST: CPT | Mod: 26,,, | Performed by: PATHOLOGY

## 2018-01-23 PROCEDURE — 88189 FLOWCYTOMETRY/READ 16 & >: CPT | Mod: ,,, | Performed by: PATHOLOGY

## 2018-01-23 PROCEDURE — 88342 IMHCHEM/IMCYTCHM 1ST ANTB: CPT | Mod: 26,,, | Performed by: PATHOLOGY

## 2018-01-23 PROCEDURE — 30000890 MAYO MISCELLANEOUS TEST (REFLEX)

## 2018-01-23 PROCEDURE — 38505 NEEDLE BIOPSY LYMPH NODES: CPT | Mod: ,,, | Performed by: RADIOLOGY

## 2018-01-23 PROCEDURE — 27200937 US BIOPSY LYMPH NODES (XPD)

## 2018-01-23 PROCEDURE — 88184 FLOWCYTOMETRY/ TC 1 MARKER: CPT | Performed by: PATHOLOGY

## 2018-01-23 PROCEDURE — 76942 ECHO GUIDE FOR BIOPSY: CPT | Mod: 26,,, | Performed by: RADIOLOGY

## 2018-01-23 PROCEDURE — 99213 OFFICE O/P EST LOW 20 MIN: CPT | Mod: S$GLB,,, | Performed by: SURGERY

## 2018-01-23 PROCEDURE — 88341 IMHCHEM/IMCYTCHM EA ADD ANTB: CPT | Mod: 26,,, | Performed by: PATHOLOGY

## 2018-01-23 PROCEDURE — 88333 PATH CONSLTJ SURG CYTO XM 1: CPT | Mod: 26,,, | Performed by: PATHOLOGY

## 2018-01-23 PROCEDURE — 99999 PR PBB SHADOW E&M-EST. PATIENT-LVL III: CPT | Mod: PBBFAC,,, | Performed by: SURGERY

## 2018-01-23 PROCEDURE — 88173 CYTOPATH EVAL FNA REPORT: CPT | Mod: 26,,, | Performed by: PATHOLOGY

## 2018-01-23 PROCEDURE — 84432 ASSAY OF THYROGLOBULIN: CPT

## 2018-01-23 PROCEDURE — 10022 US FINE NEEDLE ASPIRATION WITH IMAGING: CPT

## 2018-01-23 PROCEDURE — 88172 CYTP DX EVAL FNA 1ST EA SITE: CPT | Performed by: PATHOLOGY

## 2018-01-23 PROCEDURE — 88365 INSITU HYBRIDIZATION (FISH): CPT | Mod: 26,,, | Performed by: PATHOLOGY

## 2018-01-23 PROCEDURE — 3008F BODY MASS INDEX DOCD: CPT | Mod: S$GLB,,, | Performed by: SURGERY

## 2018-01-23 PROCEDURE — 10022 US FINE NEEDLE ASPIRATION WITH IMAGING: CPT | Mod: ,,, | Performed by: RADIOLOGY

## 2018-01-23 PROCEDURE — 88172 CYTP DX EVAL FNA 1ST EA SITE: CPT | Mod: 26,,, | Performed by: PATHOLOGY

## 2018-01-23 PROCEDURE — 88305 TISSUE EXAM BY PATHOLOGIST: CPT | Performed by: PATHOLOGY

## 2018-01-23 PROCEDURE — 88342 IMHCHEM/IMCYTCHM 1ST ANTB: CPT | Performed by: PATHOLOGY

## 2018-01-23 PROCEDURE — 88185 FLOWCYTOMETRY/TC ADD-ON: CPT | Mod: 59 | Performed by: PATHOLOGY

## 2018-01-23 RX ORDER — SODIUM CHLORIDE 9 MG/ML
INJECTION, SOLUTION INTRAVENOUS CONTINUOUS
Status: CANCELLED | OUTPATIENT
Start: 2018-01-23

## 2018-01-23 NOTE — PROCEDURES
Radiology Post-Procedure Note    Pre Op Diagnosis: Neck mass, bilateral cervical lymphadenopathy    Post Op Diagnosis: same    Procedure: neck mass core biopsy/ FNA, left cervical lymph node core biopsy, right cervical ymph node FNA    Procedure performed by: Ele Malhotra MD    Written Informed Consent Obtained: Yes    Specimen Removed: YES     Estimated Blood Loss: Minimal    Findings: Local anesthesia was used.    Core biopsy for evaluation of left neck mass and left abnormal cervical lymph node and FNA of right abnormal cervical lymph node using ultrasound guidance.  Pathology was present during the examination to evaluate adequacy of the specimen which was sent to the laboratory for further analysis.    2 core biopsies and fine needle aspirate of left neck mass, 2 core biopsies of abnormal left cervical lymph node, and 2 fine needle aspirates of abnormal right cervical lymph nodes were obtained.    There were no complications and the patient tolerated the procedure well.  Please see Imaging report for further details.    Tyrel Birch  Radiology PGY-4

## 2018-01-23 NOTE — ANESTHESIA PREPROCEDURE EVALUATION
01/23/2018  Indira Chauhan is a 62 y.o., female with enlarged thyroid secondary to a large malignant mass that invades the SCM and moves into the superior mediastinum.  She otherwise has long history of tobacco abuse, HTN, obesity and peridontal disease.  She is scheduled for thyroidectomy with Dr. Brady  We discussed the need for an awake fiberoptic intubation and the technique involved.  Her questions were answered and she voiced understanding.          Past Medical History:   Diagnosis Date    GERD (gastroesophageal reflux disease)     Hyperlipidemia     Hypertension     Hypothyroidism     Thyroid mass 11/02/2017   Obesity    No past surgical history on file.  CT scan  11/7/17      1.  Large, heterogeneously enhancing left thyroid mass extending into the lateral aspect of the neck and into the superior mediastinum. This appears malignant and most likely represents primary thyroid neoplasm, possibly follicular carcinoma given recent biopsy results (follicular lesion of undetermined significance). The mass invades the posterior margin of the left sternocleidomastoid. There is malignant appearing bilateral lymphadenopathy as well with bilateral jugulodigastric chain, left posterior cervical, bilateral supraclavicular and superior mediastinal pranav involvement. The left-sided neck mass encases the left internal jugular vein with associated narrowing of the same but no thrombosis identified.    2. 3 mm right upper lobe pulmonary nodule which is indeterminate. Attention on followup recommended.        Pre-op Assessment         Review of Systems      Physical Exam  General:  Obesity    Airway/Jaw/Neck:  Airway Findings: Mouth Opening: < 3 cm Tongue: Large  General Airway Assessment: Adult  Mallampati: IV  Improves to IV with phonation.  TM Distance: < 4 cm     Eyes/Ears/Nose:  Eyes/Ears/Nose Findings:     Dental:  Dental Findings: (very loose lower teeth - discussed w pt likelyhood of those teeth being lost with intubation. She voiced understanding) Periodontal disease, Severe   Chest/Lungs:  Chest/Lungs Findings: Clear to auscultation, Normal Respiratory Rate      Abdomen:  Abdomen Findings:

## 2018-01-23 NOTE — H&P
Radiology History & Physical      SUBJECTIVE:     Chief Complaint: neck      History of Present Illness:  Indira Chauhan is a 62 y.o. female with left neck mass and bilateral cervical lymphadenopathy who presents for core biopsy of neck mass/ left cervical lymph node and right cervical lymph node.  Past Medical History:   Diagnosis Date    GERD (gastroesophageal reflux disease)     Hyperlipidemia     Hypertension     Hypothyroidism     Thyroid mass 11/02/2017     No past surgical history on file.    Home Meds:   Prior to Admission medications    Medication Sig Start Date End Date Taking? Authorizing Provider   amlodipine-benazepril (LOTREL) 5-40 mg per capsule Take 1 capsule by mouth once daily. 1/16/18   Montse Sarmiento NP   atorvastatin (LIPITOR) 40 MG tablet Take 1 tablet (40 mg total) by mouth once daily. 1/16/18   Montse Sarmiento NP   esomeprazole (NEXIUM) 40 MG capsule Take 1 capsule (40 mg total) by mouth before breakfast. For heartburn, GERD 10/16/17   Montse Sarmiento NP   hydroCHLOROthiazide (HYDRODIURIL) 25 MG tablet Take 1 tablet (25 mg total) by mouth once daily. 1/16/18 1/16/19  Montse Sarmiento NP   levothyroxine (SYNTHROID) 150 MCG tablet Take 1 tablet (150 mcg total) by mouth once daily. 12/29/17 12/29/18  Montse Sarmiento NP     Anticoagulants/Antiplatelets: no anticoagulation    Allergies: Review of patient's allergies indicates:  No Known Allergies  Sedation History:  no adverse reactions    Review of Systems:   Hematological: no known coagulopathies  Respiratory: no shortness of breath  Cardiovascular: no chest pain  Gastrointestinal: no abdominal pain  Genito-Urinary: no dysuria  Musculoskeletal: negative  Neurological: no TIA or stroke symptoms         OBJECTIVE:     Vital Signs (Most Recent)       Physical Exam:  ASA: 2  Mallampati: n/a    General: no acute distress  Mental Status: alert and oriented to person, place and time  HEENT: normocephalic, atraumatic  Chest:  unlabored breathing  Heart: regular heart rate  Abdomen: nondistended  Extremity: moves all extremities    Laboratory  Lab Results   Component Value Date    INR 0.9 01/22/2018       Lab Results   Component Value Date    WBC 8.54 01/23/2018    HGB 13.1 01/23/2018    HCT 38.6 01/23/2018    MCV 93 01/23/2018     01/23/2018      Lab Results   Component Value Date    GLU 99 10/20/2017     10/20/2017    K 4.1 10/20/2017    CL 99 10/20/2017    CO2 28 10/20/2017    BUN 13 10/20/2017    CREATININE 0.8 10/20/2017    CALCIUM 10.0 10/20/2017    ALT 16 12/22/2017    AST 17 12/22/2017    ALBUMIN 3.6 12/22/2017    BILITOT 0.5 12/22/2017    BILIDIR 0.2 12/22/2017       ASSESSMENT/PLAN:     Sedation Plan: local only  Patient will undergo US guided core biopsy of left neck mass and left cervical lymph node. FNA vs core biopsy of right cervical lymph node. Will send cervical lymph nod samples for thyroglobulin assay.    [facility]  Radiology PGY-4

## 2018-01-24 LAB
FLOW CYTOMETRY ANTIBODIES ANALYZED - LYMPH NODE: NORMAL
FLOW CYTOMETRY COMMENT - LYMPH NODE: NORMAL
FLOW CYTOMETRY INTERPRETATION - LYMPH NODE: NORMAL

## 2018-01-25 LAB — MAYO MISCELLANEOUS RESULT (REF): NORMAL

## 2018-01-26 ENCOUNTER — TELEPHONE (OUTPATIENT)
Dept: SURGERY | Facility: CLINIC | Age: 63
End: 2018-01-26

## 2018-01-26 NOTE — TELEPHONE ENCOUNTER
----- Message from Danna Johnson MD sent at 1/26/2018  4:23 PM CST -----  Regarding: FW: prelim path   Can you let Ms. Jacobson know the path is still pending?    Thanks,    aob  ----- Message -----  From: Therese Xiong MD  Sent: 1/26/2018   8:39 AM  To: Danna Johnson MD, Bandar Malhotra MD, #  Subject: prelim path                                          The left lymph node core biopsy is getting a lymphoma work up     Dr. Perez is taking over the case    Thanks!     gg

## 2018-01-26 NOTE — TELEPHONE ENCOUNTER
LVM for the informing that her path results are still pending. Advised her to call for any questions.

## 2018-01-30 ENCOUNTER — DOCUMENTATION ONLY (OUTPATIENT)
Dept: FAMILY MEDICINE | Facility: CLINIC | Age: 63
End: 2018-01-30

## 2018-01-30 NOTE — PROGRESS NOTES
Pre-Visit Chart Review  For Appointment Scheduled on 02/01/2018      Health Maintenance Due   Topic Date Due    Hepatitis C Screening  1955    TETANUS VACCINE  06/20/1971    Pneumococcal PPSV23 (Medium Risk) (1) 06/20/1971    Pap Smear with HPV Cotest  06/20/1974    Mammogram  06/20/1993    Colonoscopy  06/20/2003    Zoster Vaccine  06/20/2013    Influenza Vaccine  08/01/2017

## 2018-02-01 ENCOUNTER — OFFICE VISIT (OUTPATIENT)
Dept: FAMILY MEDICINE | Facility: CLINIC | Age: 63
End: 2018-02-01
Payer: COMMERCIAL

## 2018-02-01 ENCOUNTER — HOSPITAL ENCOUNTER (OUTPATIENT)
Dept: RADIOLOGY | Facility: CLINIC | Age: 63
Discharge: HOME | End: 2018-02-01
Attending: NURSE PRACTITIONER
Payer: COMMERCIAL

## 2018-02-01 VITALS
BODY MASS INDEX: 36.99 KG/M2 | DIASTOLIC BLOOD PRESSURE: 75 MMHG | SYSTOLIC BLOOD PRESSURE: 130 MMHG | HEART RATE: 87 BPM | TEMPERATURE: 98 F | WEIGHT: 222 LBS | HEIGHT: 65 IN

## 2018-02-01 DIAGNOSIS — E78.2 MIXED HYPERLIPIDEMIA: ICD-10-CM

## 2018-02-01 DIAGNOSIS — I10 ESSENTIAL HYPERTENSION: Primary | ICD-10-CM

## 2018-02-01 DIAGNOSIS — Z01.818 PRE-OP EVALUATION: ICD-10-CM

## 2018-02-01 DIAGNOSIS — R05.9 COUGH: ICD-10-CM

## 2018-02-01 DIAGNOSIS — E66.01 SEVERE OBESITY (BMI 35.0-39.9) WITH COMORBIDITY: ICD-10-CM

## 2018-02-01 DIAGNOSIS — E03.9 ACQUIRED HYPOTHYROIDISM: ICD-10-CM

## 2018-02-01 DIAGNOSIS — E07.9 THYROID MASS: ICD-10-CM

## 2018-02-01 PROCEDURE — 71046 X-RAY EXAM CHEST 2 VIEWS: CPT | Mod: TC,FY,PO

## 2018-02-01 PROCEDURE — 93010 ELECTROCARDIOGRAM REPORT: CPT | Mod: S$GLB,,, | Performed by: INTERNAL MEDICINE

## 2018-02-01 PROCEDURE — 3008F BODY MASS INDEX DOCD: CPT | Mod: S$GLB,,, | Performed by: NURSE PRACTITIONER

## 2018-02-01 PROCEDURE — 93005 ELECTROCARDIOGRAM TRACING: CPT | Mod: S$GLB,,, | Performed by: NURSE PRACTITIONER

## 2018-02-01 PROCEDURE — 99999 PR PBB SHADOW E&M-EST. PATIENT-LVL IV: CPT | Mod: PBBFAC,,, | Performed by: NURSE PRACTITIONER

## 2018-02-01 PROCEDURE — 71046 X-RAY EXAM CHEST 2 VIEWS: CPT | Mod: 26,,, | Performed by: RADIOLOGY

## 2018-02-01 PROCEDURE — 99214 OFFICE O/P EST MOD 30 MIN: CPT | Mod: S$GLB,,, | Performed by: NURSE PRACTITIONER

## 2018-02-01 NOTE — PROGRESS NOTES
Subjective:       Patient ID: Indira Chauhan is a 62 y.o. female.    Chief Complaint: Hypertension    HPI   Chief Complaint  Chief Complaint   Patient presents with    Hypertension       HPI  Indira Chauhan is a 62 y.o. female with medical diagnoses as listed in the medical history and problem list that presents for follow up of hypertension, blood pressure 130/75 today, controlled on current medication.  Patient also treated for hyperlipidemia and was started on atorvastatin in October with  on 12/22/17.  Patient has a large thyroid mass and has been seeing a specialist and is scheduled for a thyroidectomy on 2/21/2018 with Dr. Brady. Most recent biopsy is inconclusive.  Thyroid under active and receiving thyroid hormone replacement. TSH 4.818 on 12/22/17 and medication dose was increased.  BMI today is 36.94 and patient has gained one pound since last visit.  Established patient with last clinic appointment 11/9/2017.        PAST MEDICAL HISTORY:  Past Medical History:   Diagnosis Date    GERD (gastroesophageal reflux disease)     Hyperlipidemia     Hypertension     Hypothyroidism     Thyroid mass 11/02/2017       PAST SURGICAL HISTORY:  History reviewed. No pertinent surgical history.    SOCIAL HISTORY:  Social History     Social History    Marital status:      Spouse name: N/A    Number of children: N/A    Years of education: N/A     Occupational History    retired      Social History Main Topics    Smoking status: Current Every Day Smoker     Packs/day: 0.50     Years: 40.00    Smokeless tobacco: Never Used    Alcohol use 1.8 oz/week     3 Shots of liquor per week      Comment: social     Drug use: No    Sexual activity: Yes     Partners: Male     Birth control/ protection: Post-menopausal     Other Topics Concern    Not on file     Social History Narrative    Retired hairdresser       FAMILY HISTORY:  Family History   Problem Relation Age of Onset    Heart disease Mother      "COPD Mother     No Known Problems Father        ALLERGIES AND MEDICATIONS: updated and reviewed.  Review of patient's allergies indicates:  No Known Allergies  Current Outpatient Prescriptions   Medication Sig Dispense Refill    amlodipine-benazepril (LOTREL) 5-40 mg per capsule Take 1 capsule by mouth once daily. 90 capsule 0    atorvastatin (LIPITOR) 40 MG tablet Take 1 tablet (40 mg total) by mouth once daily. 30 tablet 3    hydroCHLOROthiazide (HYDRODIURIL) 25 MG tablet Take 1 tablet (25 mg total) by mouth once daily. 30 tablet 2    levothyroxine (SYNTHROID) 150 MCG tablet Take 1 tablet (150 mcg total) by mouth once daily. 90 tablet 1     No current facility-administered medications for this visit.      Review of Systems   Constitutional: Negative for appetite change, chills, fatigue and fever.   HENT: Negative for trouble swallowing.    Respiratory: Positive for cough. Negative for shortness of breath and wheezing.         Cough productive clear phlegm, still smoking, trying to quit, < 1/2 PPD   Cardiovascular: Negative for chest pain and palpitations.   Gastrointestinal: Positive for constipation. Negative for abdominal pain, diarrhea, nausea and vomiting.        Constipation that is chronic, takes a stool softener or laxative with relief   Genitourinary: Negative for difficulty urinating.   Musculoskeletal: Negative for arthralgias and myalgias.   Skin: Negative for rash and wound.   Neurological: Negative for headaches.   Hematological: Positive for adenopathy.        Lymphadenopathy to neck   Psychiatric/Behavioral: Negative for dysphoric mood, sleep disturbance and suicidal ideas. The patient is not nervous/anxious.        Objective:       Vitals:    02/01/18 1310   BP: 130/75   BP Location: Right arm   Patient Position: Sitting   BP Method: Large (Automatic)   Pulse: 87   Temp: 98.4 °F (36.9 °C)   TempSrc: Oral   Weight: 100.7 kg (222 lb)   Height: 5' 5" (1.651 m)     Physical Exam   Constitutional: " She is oriented to person, place, and time. Vital signs are normal. She appears well-developed. No distress.   HENT:   Head: Normocephalic and atraumatic.   Right Ear: Tympanic membrane, external ear and ear canal normal.   Left Ear: Tympanic membrane, external ear and ear canal normal.   Nose: Nose normal. No mucosal edema. Right sinus exhibits no maxillary sinus tenderness and no frontal sinus tenderness. Left sinus exhibits no maxillary sinus tenderness and no frontal sinus tenderness.   Mouth/Throat: Oropharynx is clear and moist and mucous membranes are normal.   Eyes: Conjunctivae and lids are normal. Right conjunctiva is not injected. Left conjunctiva is not injected.   Neck: Normal carotid pulses present. Carotid bruit is not present. Thyroid mass and thyromegaly present.   Cardiovascular: Normal rate, regular rhythm, S1 normal, S2 normal, normal heart sounds, intact distal pulses and normal pulses.    No murmur heard.  Pulses:       Carotid pulses are 2+ on the right side, and 2+ on the left side.       Radial pulses are 2+ on the right side, and 2+ on the left side.        Posterior tibial pulses are 2+ on the right side, and 2+ on the left side.   No edema   Pulmonary/Chest: Effort normal and breath sounds normal. No respiratory distress. She has no decreased breath sounds. She has no wheezes. She has no rhonchi. She has no rales.   Musculoskeletal: Normal range of motion.   Lymphadenopathy:     She has cervical adenopathy.   Neurological: She is alert and oriented to person, place, and time. She has normal strength.   Skin: Skin is warm, dry and intact. Capillary refill takes less than 2 seconds. She is not diaphoretic. No pallor.   Psychiatric: She has a normal mood and affect. Her speech is normal and behavior is normal. Judgment and thought content normal. Cognition and memory are normal.   Nursing note and vitals reviewed.      Assessment:       1. Essential hypertension    2. Mixed hyperlipidemia     3. Thyroid mass    4. Pre-op evaluation    5. Acquired hypothyroidism    6. Severe obesity (BMI 35.0-39.9) with comorbidity    7. Cough        Plan:   Indira was seen today for hypertension.  Patient refuses health maintenance at this time pending thyroidectomy.   Diagnoses and all orders for this visit:    Essential hypertension  -     IN OFFICE EKG 12-LEAD (to Muse)  - Blood pressure well controlled today, 130/75, continue current medication, no changes needed    Mixed hyperlipidemia    on 12/22/17, continue lipitor 40 mg daily  Thyroid mass   Thyroidectomy scheduled 2/21/18  Pre-op evaluation  -     X-Ray Chest PA And Lateral; Future  -     IN OFFICE EKG 12-LEAD (to Muse)    Acquired hypothyroidism   TSH 4.818 on 12/22/17 with synthroid dose increased, will plan to recheck and adjust dose following surgery  Severe obesity (BMI 35.0-39.9) with comorbidity   Weight loss recommended with well balanced diet and portion controlled meals and increased activity.   Cough  -     X-Ray Chest PA And Lateral; Future  - Patient is a smoker, cessation encouraged    Follow-up in about 3 months (around 5/1/2018).     Patient readiness: acceptance and barriers:none and readiness    During the course of the visit the patient was educated and counseled about the following:     Hypertension:   Medication: no change.  Dietary sodium restriction.  Regular aerobic exercise.  Obesity:   General weight loss/lifestyle modification strategies discussed (elicit support from others; identify saboteurs; non-food rewards, etc).  Informal exercise measures discussed, e.g. taking stairs instead of elevator.  Regular aerobic exercise program discussed.    Goals: Hypertension: Reduce Blood Pressure and Obesity: Reduce calorie intake and BMI    Did patient meet goals/outcomes: Yes for hypertension, No for obesity    The following self management tools provided: declined    Patient Instructions (the written plan) was given to the  patient/family.     Time spent with patient: 45 minutes    Barriers to medications present (no )    Adverse reactions to current medications (no)    Over the counter medications reviewed (Yes)

## 2018-02-02 ENCOUNTER — TELEPHONE (OUTPATIENT)
Dept: FAMILY MEDICINE | Facility: CLINIC | Age: 63
End: 2018-02-02

## 2018-02-02 DIAGNOSIS — Z01.818 PRE-OP EVALUATION: ICD-10-CM

## 2018-02-02 DIAGNOSIS — R94.31 ABNORMAL EKG: Primary | ICD-10-CM

## 2018-02-02 NOTE — TELEPHONE ENCOUNTER
Please sign referral. Appointment scheduled for 2/5/2018       Patient came to office for follow up appointment. An in office EKG was preform. The cardiologist resulted as the  followed :The EKG done in the office shows normal sinus rhythm, however there is a possibility of an old heart attack based on the EKG tracing.   Appointment scheduled for patient to follow up with cardiology for a consult for pre op.

## 2018-02-05 ENCOUNTER — OFFICE VISIT (OUTPATIENT)
Dept: CARDIOLOGY | Facility: CLINIC | Age: 63
End: 2018-02-05
Payer: COMMERCIAL

## 2018-02-05 VITALS
WEIGHT: 222.69 LBS | DIASTOLIC BLOOD PRESSURE: 74 MMHG | HEIGHT: 65 IN | BODY MASS INDEX: 37.1 KG/M2 | HEART RATE: 94 BPM | SYSTOLIC BLOOD PRESSURE: 137 MMHG

## 2018-02-05 DIAGNOSIS — I10 ESSENTIAL HYPERTENSION: ICD-10-CM

## 2018-02-05 DIAGNOSIS — Z01.810 PREOP CARDIOVASCULAR EXAM: ICD-10-CM

## 2018-02-05 DIAGNOSIS — R94.31 ABNORMAL ECG: ICD-10-CM

## 2018-02-05 DIAGNOSIS — E07.9 THYROID MASS: ICD-10-CM

## 2018-02-05 DIAGNOSIS — R07.9 CHEST PAIN, UNSPECIFIED TYPE: ICD-10-CM

## 2018-02-05 DIAGNOSIS — E78.2 MIXED HYPERLIPIDEMIA: ICD-10-CM

## 2018-02-05 DIAGNOSIS — Z72.0 TOBACCO USE: ICD-10-CM

## 2018-02-05 PROCEDURE — 99999 PR PBB SHADOW E&M-EST. PATIENT-LVL II: CPT | Mod: PBBFAC,,, | Performed by: INTERNAL MEDICINE

## 2018-02-05 PROCEDURE — 3008F BODY MASS INDEX DOCD: CPT | Mod: S$GLB,,, | Performed by: INTERNAL MEDICINE

## 2018-02-05 PROCEDURE — 93000 ELECTROCARDIOGRAM COMPLETE: CPT | Mod: S$GLB,,, | Performed by: INTERNAL MEDICINE

## 2018-02-05 PROCEDURE — 99204 OFFICE O/P NEW MOD 45 MIN: CPT | Mod: S$GLB,,, | Performed by: INTERNAL MEDICINE

## 2018-02-05 NOTE — LETTER
February 5, 2018      Montse Sarmiento, NP  [address] [address]           [facility]  [address] [facility] [address] [place] [address]  Phone: [phone]          Patient: Indira Chauhan   MR Number: [id number]   YOB: 1955   Date of Visit: 2/5/2018       Dear Montse Sarmiento:    Thank you for referring Indira Chauhan to me for evaluation. Attached you will find relevant portions of my assessment and plan of care.    If you have questions, please do not hesitate to call me. I look forward to following Indira Chauhan along with you.    Sincerely,    Rigo Khanna MD    Enclosure  CC:  No Recipients    If you would like to receive this communication electronically, please contact [email or web address] or [phone] to request more information on [facility] Link access.    For providers and/or their staff who would like to refer a patient to [facility], please contact us through our one-stop-shop provider referral line, [facility] , at [phone].    If you feel you have received this communication in error or would no longer like to receive these types of communications, please e-mail [email or web address]

## 2018-02-05 NOTE — PROGRESS NOTES
Subjective:    Patient ID:  Indira Chauhan is a 62 y.o. female who presents for evaluation of Hyperlipidemia (cardoac clearance thyroidectomy- abnormal Ekg); Hypertension; and thyoid nodule      Pt with a large thyroid mass referred for pre-op assessment. She has no previous cardiac issues and reports no symptoms. She denies any chest pain, SOB, PND, orthopnea. She denies any palpitations, dizziness, syncope or pre-syncope. She denies claudication, lower extremity edema. She denies exertional symptoms. She had a pre-op ECG with Q waves in V1, V2 and read as possible old septal infarct.         Review of Systems   Constitution: Negative for weight gain and weight loss.   HENT: Negative.    Eyes: Negative.    Cardiovascular: Negative for chest pain, claudication, cyanosis, dyspnea on exertion, irregular heartbeat, leg swelling, near-syncope, orthopnea (no PND), palpitations and syncope.   Respiratory: Negative for cough, hemoptysis, shortness of breath and snoring.    Endocrine: Negative.    Skin: Negative.    Musculoskeletal: Negative for joint pain, muscle cramps, muscle weakness and myalgias.   Gastrointestinal: Negative for diarrhea, hematemesis, nausea and vomiting.   Genitourinary: Negative.    Neurological: Negative for dizziness, focal weakness, light-headedness, loss of balance, numbness, paresthesias and seizures.   Psychiatric/Behavioral: Negative.         Objective:    Physical Exam   Constitutional: She is oriented to person, place, and time. She appears well-developed and well-nourished.   Eyes: Pupils are equal, round, and reactive to light.   Neck: Normal range of motion. No thyromegaly present.   Cardiovascular: Normal rate, regular rhythm, S1 normal, S2 normal, normal heart sounds, intact distal pulses and normal pulses.   No extrasystoles are present. PMI is not displaced.  Exam reveals no friction rub.    No murmur heard.  Pulmonary/Chest: Effort normal and breath sounds normal. She has no wheezes.  She has no rales. She exhibits no tenderness.   Abdominal: Soft. Bowel sounds are normal. She exhibits no distension and no mass. There is no tenderness.   Musculoskeletal: Normal range of motion. She exhibits no edema.   Neurological: She is alert and oriented to person, place, and time.   Skin: Skin is warm and dry.   Vitals reviewed.      Test(s) Reviewed  I have reviewed the following in detail:  [] Stress test   [] Angiography   [] Echocardiogram   [x] Labs   [x] Other:  ECG       Assessment:       1. Essential hypertension    2. Mixed hyperlipidemia    3. Thyroid mass    4. Tobacco use    5. Preop cardiovascular exam    6. Abnormal ECG         Plan:       By history, previous MI less likely.  Will do Echo  If no wall motion abnormalities seen and LV function normal, see no contraindication to needed thyroidectomy

## 2018-02-08 DIAGNOSIS — R07.9 CHEST PAIN, UNSPECIFIED TYPE: Primary | ICD-10-CM

## 2018-02-09 ENCOUNTER — CLINICAL SUPPORT (OUTPATIENT)
Dept: CARDIOLOGY | Facility: CLINIC | Age: 63
End: 2018-02-09
Attending: INTERNAL MEDICINE
Payer: COMMERCIAL

## 2018-02-09 DIAGNOSIS — Z72.0 TOBACCO USE: ICD-10-CM

## 2018-02-09 DIAGNOSIS — E78.2 MIXED HYPERLIPIDEMIA: ICD-10-CM

## 2018-02-09 DIAGNOSIS — E07.9 THYROID MASS: ICD-10-CM

## 2018-02-09 DIAGNOSIS — I10 ESSENTIAL HYPERTENSION: ICD-10-CM

## 2018-02-09 PROCEDURE — 93306 TTE W/DOPPLER COMPLETE: CPT | Mod: S$GLB,,, | Performed by: INTERNAL MEDICINE

## 2018-02-12 ENCOUNTER — TELEPHONE (OUTPATIENT)
Dept: FAMILY MEDICINE | Facility: CLINIC | Age: 63
End: 2018-02-12

## 2018-02-12 ENCOUNTER — TELEPHONE (OUTPATIENT)
Dept: SURGERY | Facility: CLINIC | Age: 63
End: 2018-02-12

## 2018-02-12 DIAGNOSIS — C83.30 LYMPHOMA, LARGE-CELL, DIFFUSE: Primary | ICD-10-CM

## 2018-02-12 LAB
ESTIMATED PA SYSTOLIC PRESSURE: 23.25
MITRAL VALVE MOBILITY: NORMAL
RETIRED EF AND QEF - SEE NOTES: 60 (ref 55–65)

## 2018-02-12 NOTE — PROGRESS NOTES
The patient presents for follow-up.  She underwent a LN biopsy today core and FNA.  We discussed the likely surgical procedure.  She will likely need a thyroidectomy and possible bilateral MRND depending on the findings.    I would like to have the results back prior to scheduling surgery as there is still a possilibilty that this could represent a thyroid lymphoma.    The risks and benefits of my recommendations, as well as other treatment options were discussed with the patient today. I discussed the nature of the disease process and the risk of surgery including, temporary or permanent hypoparathyroidism resulting in low blood calcium levels that require extensive medication to avoid serious degenerative conditions such as cataracts, brittle bones, muscle weakness and muscle irritability.  Other risks include bleeding, infection, injury to the recurrent laryngeal nerves resulting in hoarseness or impairment of speech and the risks of general anesthetic including MI, CVA, sudden death or even reaction to anesthetic medications.  In addition, the risk specifically related to neck dissection including injury to nerves in the shoulder resulting in numbness, pain, loss of function or weakness, some loss of fullness of the neck, and temporary edema of the the face.  Other risks include Warren's syndrome, loss of movement of the diaphragm with possible breathing impairment, injury to the hypoglossal nerve resulting in decreased sensation or weakness of the tongue, loss or alteration of sense of taste difficulty swallowing, loss of function of lip or cheek, or chyle fistula.    Will await the final results.

## 2018-02-12 NOTE — TELEPHONE ENCOUNTER
Called to discuss pathology results with patient.  Final path reveals lymphoma(see below).    FINAL PATHOLOGIC DIAGNOSIS  1. LEFT LYMPH NODE, CORE BIOPSY- DIFFUSE LARGE B-CELL LYMPHOMA, GERMINAL CENTER ORIGIN.  SEE COMMENT.    The patient should be referred to oncology for evaluation and treatment.  Will likely need port as well.  Patient Family medicine team notified this morning.    Will refer to heme/onc.  Will place port for patient if she desires.

## 2018-02-14 ENCOUNTER — PATIENT MESSAGE (OUTPATIENT)
Dept: CARDIOLOGY | Facility: CLINIC | Age: 63
End: 2018-02-14

## 2018-02-14 ENCOUNTER — TELEPHONE (OUTPATIENT)
Dept: CARDIOLOGY | Facility: CLINIC | Age: 63
End: 2018-02-14

## 2018-02-14 NOTE — TELEPHONE ENCOUNTER
Test(s) Reviewed  I have reviewed the following in detail:  [] Stress test   [] Angiography   [x] Echocardiogram   [] Labs   [] Other:       Call Pt and tell her tests are ok - normal heart function  Some changes seen due to high BP  No cardiac contraindication to needed surgery. Continue all meds leonidas-op.

## 2018-02-16 ENCOUNTER — TELEPHONE (OUTPATIENT)
Dept: SURGERY | Facility: CLINIC | Age: 63
End: 2018-02-16

## 2018-02-16 NOTE — TELEPHONE ENCOUNTER
LVM for pt informing that I wanted to call to check up on her and to make sure she was feeling ok and if there was anything that I can do for her.  Also requested her to call me back.  Provided my direct call back # and clinic office # and requested call back.  Awaiting call back.

## 2018-02-19 NOTE — TELEPHONE ENCOUNTER
"Called pt to f/u on how she was feeling in which pt reports she is "ok." she says that she has not thought about where she would like to have port placement she says that she will know after her appt w/ dr. Garcia and will call me  "

## 2018-02-21 ENCOUNTER — INITIAL CONSULT (OUTPATIENT)
Dept: HEMATOLOGY/ONCOLOGY | Facility: CLINIC | Age: 63
End: 2018-02-21
Payer: COMMERCIAL

## 2018-02-21 ENCOUNTER — TELEPHONE (OUTPATIENT)
Dept: SURGERY | Facility: CLINIC | Age: 63
End: 2018-02-21

## 2018-02-21 ENCOUNTER — ANESTHESIA (OUTPATIENT)
Dept: SURGERY | Facility: HOSPITAL | Age: 63
End: 2018-02-21

## 2018-02-21 VITALS
SYSTOLIC BLOOD PRESSURE: 155 MMHG | WEIGHT: 225.06 LBS | HEART RATE: 103 BPM | TEMPERATURE: 98 F | HEIGHT: 65 IN | RESPIRATION RATE: 18 BRPM | BODY MASS INDEX: 37.5 KG/M2 | DIASTOLIC BLOOD PRESSURE: 72 MMHG

## 2018-02-21 DIAGNOSIS — F43.21 SITUATIONAL DEPRESSION: ICD-10-CM

## 2018-02-21 DIAGNOSIS — C83.31 DIFFUSE LARGE B-CELL LYMPHOMA OF LYMPH NODES OF NECK: Primary | ICD-10-CM

## 2018-02-21 DIAGNOSIS — E01.0 THYROMEGALY: ICD-10-CM

## 2018-02-21 PROCEDURE — 99205 OFFICE O/P NEW HI 60 MIN: CPT | Mod: S$GLB,,, | Performed by: INTERNAL MEDICINE

## 2018-02-21 PROCEDURE — 99999 PR PBB SHADOW E&M-EST. PATIENT-LVL III: CPT | Mod: PBBFAC,,, | Performed by: INTERNAL MEDICINE

## 2018-02-21 PROCEDURE — 3008F BODY MASS INDEX DOCD: CPT | Mod: S$GLB,,, | Performed by: INTERNAL MEDICINE

## 2018-02-21 RX ORDER — ESCITALOPRAM OXALATE 10 MG/1
10 TABLET ORAL DAILY
Qty: 30 TABLET | Refills: 2 | Status: SHIPPED | OUTPATIENT
Start: 2018-02-21 | End: 2018-06-18 | Stop reason: SDUPTHER

## 2018-02-21 NOTE — TELEPHONE ENCOUNTER
I spoke with patient, advised I scheduled her for a consultation appointment on 2-26-18 at 220 PM with Dr Coffman to discuss port placement.  Address given

## 2018-02-21 NOTE — TELEPHONE ENCOUNTER
----- Message from Sade Nolasco RN sent at 2/21/2018  2:09 PM CST -----  Can you please schedule for chest port for chemotherapy. Patient has a surgeon on the  but would rather stay on the [place]. She will have easy access for a ride on 2/26/18 if you can see her for a consult that day. Thanks

## 2018-02-21 NOTE — PROGRESS NOTES
Indira Chauhan is a 62 y.o.  Pt found a lump in her neck and underwent a biopsy of such. Testing revealed lymphoma subtype Diffuse large B cell. SHe has not had fever  , no weight loss, no night sweats     Past Medical History:   Diagnosis Date    GERD (gastroesophageal reflux disease)     Hyperlipidemia     Hypertension     Hypothyroidism     Thyroid mass 11/02/2017     History reviewed. No pertinent surgical history.    Current Outpatient Prescriptions:     amlodipine-benazepril (LOTREL) 5-40 mg per capsule, Take 1 capsule by mouth once daily., Disp: 90 capsule, Rfl: 0    atorvastatin (LIPITOR) 40 MG tablet, Take 1 tablet (40 mg total) by mouth once daily., Disp: 30 tablet, Rfl: 3    hydroCHLOROthiazide (HYDRODIURIL) 25 MG tablet, Take 1 tablet (25 mg total) by mouth once daily., Disp: 30 tablet, Rfl: 2    levothyroxine (SYNTHROID) 150 MCG tablet, Take 1 tablet (150 mcg total) by mouth once daily., Disp: 90 tablet, Rfl: 1  Review of patient's allergies indicates:  No Known Allergies  Social History   Substance Use Topics    Smoking status: Current Every Day Smoker     Packs/day: 0.50     Years: 40.00    Smokeless tobacco: Never Used    Alcohol use 1.8 oz/week     3 Shots of liquor per week      Comment: social      Family History   Problem Relation Age of Onset    Heart disease Mother     COPD Mother     No Known Problems Father      PreOperative Diagnosis  1. Lymphadenopathy.  2. Left neck mass.  PostOperative Diagnosis  1. Pass 1: Atypical lymphoid cells. GG  2. Pass 1: Not diagnostic. GG  2. Pass 2: Not diagnostic. GG  SPECIMEN  1) Left lymph node.  2) Left neck mass.  Supplemental Diagnosis  See attached [facility] report:  ([address] [place] [address])  FISH studies: Normal result for the MYC gene region.  CD20,ASR  (Electronically Signed: 2018-02-06 15:34:01 )  Diagnosed by: Елена Ghosh M.D.  FINAL PATHOLOGIC DIAGNOSIS  1. LEFT LYMPH NODE, CORE BIOPSY- DIFFUSE LARGE B-CELL LYMPHOMA,  "GERMINAL CENTER ORIGIN.  SEE COMMENT.  COMMENT: Fragments of tissue show diffusely infiltrated by large atypical lymphocytes .  Flow cytometric analysis of lymph node shows T lymphocytes that are immunophenotypically unremarkable . B  lymphocytes are essentially absent. The blast gate is not increased.  CD20,ASR  Report continued on next page Page 1 of  CONSTITUTIONAL: No fevers, chills, night sweats, wt. loss, appetite changes  SKIN: no rashes or itching  ENT: No headaches, head trauma, vision changes, or eye pain  CV: No chest pain, palpitations.   RESP: No dyspnea on exertion, cough, wheezing, or hemoptysis  GI: No nausea, emesis, diarrhea, constipation, melena, hematochezia, pain.   : No dysuria, hematuria, urgency, or frequency   HEME: No easy bruising, bleeding problems  PSYCHIATRIC: No depression, anxiety, psychosis, hallucinations.  NEURO: No seizures, memory loss, dizziness or difficulty sleeping  MSK: No arthralgias or joint swelling         BP (!) 155/72   Pulse 103   Temp 98.3 °F (36.8 °C) (Oral)   Resp 18   Ht 5' 5" (1.651 m)   Wt 102.1 kg (225 lb 1.4 oz)   BMI 37.46 kg/m²   Gen: NAD, A and O x3,   Psych: pleasant affect, normal thought process  Eyes: Pupils round and non dilated, EOM intact  Nose: Nares patent  OP clear, mucosa patent  Lungs: CTAB, no wheezes, no use of accessory muscles  CV: S1S2 with RRR, No mrg  Abd: soft, NTND, + BS, No HSM, no ascites  Extr: No CCE, ALDAIR, strength normal, good capillary refill  Neuro: steady gait, CNs grossly intact  Skin: intact, no lesions noted  Rheum: No joint swelling    Lab Results   Component Value Date    WBC 8.54 01/23/2018    HGB 13.1 01/23/2018    HCT 38.6 01/23/2018    MCV 93 01/23/2018     01/23/2018     CMP  Sodium   Date Value Ref Range Status   10/20/2017 140 136 - 145 mmol/L Final     Potassium   Date Value Ref Range Status   10/20/2017 4.1 3.5 - 5.1 mmol/L Final     Chloride   Date Value Ref Range Status   10/20/2017 99 95 - 110 " mmol/L Final     CO2   Date Value Ref Range Status   10/20/2017 28 23 - 29 mmol/L Final     Glucose   Date Value Ref Range Status   10/20/2017 99 70 - 110 mg/dL Final     BUN, Bld   Date Value Ref Range Status   10/20/2017 13 8 - 23 mg/dL Final     Creatinine   Date Value Ref Range Status   10/20/2017 0.8 0.5 - 1.4 mg/dL Final     Calcium   Date Value Ref Range Status   10/20/2017 10.0 8.7 - 10.5 mg/dL Final     Total Protein   Date Value Ref Range Status   12/22/2017 7.5 6.0 - 8.4 g/dL Final     Albumin   Date Value Ref Range Status   12/22/2017 3.6 3.5 - 5.2 g/dL Final     Total Bilirubin   Date Value Ref Range Status   12/22/2017 0.5 0.1 - 1.0 mg/dL Final     Comment:     For infants and newborns, interpretation of results should be based  on gestational age, weight and in agreement with clinical  observations.  Premature Infant recommended reference ranges:  Up to 24 hours.............<8.0 mg/dL  Up to 48 hours............<12.0 mg/dL  3-5 days..................<15.0 mg/dL  6-29 days.................<15.0 mg/dL       Alkaline Phosphatase   Date Value Ref Range Status   12/22/2017 103 55 - 135 U/L Final     AST   Date Value Ref Range Status   12/22/2017 17 10 - 40 U/L Final     ALT   Date Value Ref Range Status   12/22/2017 16 10 - 44 U/L Final     Anion Gap   Date Value Ref Range Status   10/20/2017 13 8 - 16 mmol/L Final     eGFR if    Date Value Ref Range Status   10/20/2017 >60.0 >60 mL/min/1.73 m^2 Final     eGFR if non    Date Value Ref Range Status   10/20/2017 >60.0 >60 mL/min/1.73 m^2 Final     Comment:     Calculation used to obtain the estimated glomerular filtration  rate (eGFR) is the CKD-EPI equation. Since race is unknown   in our information system, the eGFR values for   -American and Non--American patients are given   for each creatinine result.         Diffuse large B-cell lymphoma of lymph nodes of neck  -     HEPATITIS PANEL; Future; Expected date:  02/21/2018  -     2D Echo w/ Color Flow Doppler; Future; Expected date: 02/22/2018  -     Lactate dehydrogenase; Future; Expected date: 02/21/2018  -     URIC ACID; Future; Expected date: 02/21/2018  -     CT Biopsy Bone Deep (xpd); Future; Expected date: 02/21/2018  -     Leukemia/Lymphoma Screen - Bone Marrow Right Posterior Iliac Crest; Future; Expected date: 02/21/2018  -     Chromosome Analysis, Bone Marrow; Future; Expected date: 02/21/2018  -     Iron Stain, Bone Marrow; Future; Expected date: 02/21/2018  -     TSH; Future; Expected date: 02/21/2018  -     THYROGLOBULIN; Future; Expected date: 02/21/2018  -     NM PET CT Routine Skull to Mid Thigh; Future; Expected date: 02/21/2018  -     Ambulatory Referral to General Surgery    Thyromegaly  -     TSH; Future; Expected date: 02/21/2018  -     THYROGLOBULIN; Future; Expected date: 02/21/2018  -     NM PET CT Routine Skull to Mid Thigh; Future; Expected date: 02/21/2018    Situational depression  -     escitalopram oxalate (LEXAPRO) 10 MG tablet; Take 1 tablet (10 mg total) by mouth once daily.  Dispense: 30 tablet; Refill: 2      Needs staging workup for DLBCL  Needs chest port   Needs bone marrow biopsy and aspirate  Labs due before starting therapy   Will be offered [facility]  Echo needed as well   Start lexapro 10 mg daily to help her cope   RTC after the above tests are performed    Given the size of her thyroid mass and findings involving the lymph nodes , I am concerned that she has lymphomatous involvement of her thyroid  Thank you for allowing me to evaluate and participate in the care of this pleasant patient. Please fell free to call me with any questions or concerns.    Samira Mauro MD    This note was dictated with Dragon and briefly proofread. Please excuse any sentences that may be unclear or words misspelled

## 2018-02-22 ENCOUNTER — TELEPHONE (OUTPATIENT)
Dept: HEMATOLOGY/ONCOLOGY | Facility: CLINIC | Age: 63
End: 2018-02-22

## 2018-02-22 NOTE — TELEPHONE ENCOUNTER
----- Message from Stefanie Eckert sent at 2/22/2018  8:38 AM CST -----    Calling   About  Scheduled  [place] ,  Please call  Pt  For  Details//[phone]

## 2018-02-23 ENCOUNTER — TELEPHONE (OUTPATIENT)
Dept: HEMATOLOGY/ONCOLOGY | Facility: CLINIC | Age: 63
End: 2018-02-23

## 2018-02-23 NOTE — TELEPHONE ENCOUNTER
----- Message from Khadijah Cedillo sent at 2/23/2018 11:54 AM CST -----  Per our rad/ RN Danelle Hayes the md Dr. Garcia needs to complete the current H & P in epic by signing it . Any further questions may message the nurse or call her at [phone].    Thanking you in advance,     Khadijah     [phone]

## 2018-02-26 ENCOUNTER — OFFICE VISIT (OUTPATIENT)
Dept: SURGERY | Facility: CLINIC | Age: 63
End: 2018-02-26
Payer: COMMERCIAL

## 2018-02-26 ENCOUNTER — TELEPHONE (OUTPATIENT)
Dept: HEMATOLOGY/ONCOLOGY | Facility: CLINIC | Age: 63
End: 2018-02-26

## 2018-02-26 VITALS
SYSTOLIC BLOOD PRESSURE: 165 MMHG | TEMPERATURE: 98 F | HEART RATE: 97 BPM | WEIGHT: 226 LBS | HEIGHT: 65 IN | BODY MASS INDEX: 37.65 KG/M2 | DIASTOLIC BLOOD PRESSURE: 96 MMHG | RESPIRATION RATE: 16 BRPM

## 2018-02-26 DIAGNOSIS — I87.2 VENOUS INSUFFICIENCY, PERIPHERAL: Primary | ICD-10-CM

## 2018-02-26 PROCEDURE — 99999 PR PBB SHADOW E&M-EST. PATIENT-LVL IV: CPT | Mod: PBBFAC,,, | Performed by: SURGERY

## 2018-02-26 PROCEDURE — 99244 OFF/OP CNSLTJ NEW/EST MOD 40: CPT | Mod: S$GLB,,, | Performed by: SURGERY

## 2018-02-26 RX ORDER — SODIUM CHLORIDE 9 MG/ML
INJECTION, SOLUTION INTRAVENOUS CONTINUOUS
Status: CANCELLED | OUTPATIENT
Start: 2018-03-05

## 2018-02-26 NOTE — LETTER
February 26, 2018      Samira Garcia MD  [address]  [facility] [address]           [facility]  [address]  [place] [address]  Phone: [phone]          Patient: Indira Chauhan   MR Number: [id number]   YOB: 1955   Date of Visit: 2/26/2018       Dear Dr. Samira Garcia:    Thank you for referring Indira Chauhan to me for evaluation. Attached you will find relevant portions of my assessment and plan of care.    If you have questions, please do not hesitate to call me. I look forward to following Indira Chauhan along with you.    Sincerely,    Trevor Coffman MD    Enclosure  CC:  No Recipients    If you would like to receive this communication electronically, please contact [email or web address] or [phone] to request more information on [email or web address] Link access.    For providers and/or their staff who would like to refer a patient to [facility], please contact us through our one-stop-shop provider referral line, [facility], [place], at [phone].    If you feel you have received this communication in error or would no longer like to receive these types of communications, please e-mail [email or web address]

## 2018-02-26 NOTE — NURSING
Contacted patient to go over instructions for CT Bone bx scheduled for Thursday  @ 11:00. Pt instructed to arrive no later than 9:30 am to outpatient registration. Patient is required AM labs and is aware, Instructed patient on what meds to hold and what meds can be taken the morning of the procedure as well as all additional pre-op instructions.Pt verbalized understanding.

## 2018-02-26 NOTE — PROGRESS NOTES
Subjective:       Patient ID: Indira Chauhan is a 62 y.o. female.    Chief Complaint: Consult (needs port placement)    Referred by Dr. Garcia for port placement.    Recently diagnosed with lymphoma and is in need of access for chemotherapy.      Review of Systems   Constitutional: Negative for appetite change, chills, diaphoresis, fatigue, fever and unexpected weight change.   HENT: Negative for hearing loss, sore throat, trouble swallowing and voice change.    Eyes: Negative for visual disturbance.   Respiratory: Positive for cough. Negative for shortness of breath and wheezing.    Cardiovascular: Negative for chest pain, palpitations and leg swelling.   Gastrointestinal: Negative for abdominal distention, abdominal pain, anal bleeding, blood in stool, constipation, diarrhea, nausea, rectal pain and vomiting.   Genitourinary: Negative for difficulty urinating, dysuria, flank pain, frequency, hematuria, menstrual problem and urgency.   Musculoskeletal: Negative for arthralgias, back pain, joint swelling, myalgias and neck pain.   Skin: Negative for pallor and rash.   Neurological: Negative for dizziness, syncope, weakness and headaches.   Hematological: Negative for adenopathy. Does not bruise/bleed easily.   Psychiatric/Behavioral: Negative for suicidal ideas. The patient is not nervous/anxious.        Objective:      Physical Exam   Constitutional: She is oriented to person, place, and time. She appears well-developed and well-nourished. No distress.   HENT:   Head: Normocephalic and atraumatic.   Right Ear: External ear normal.   Left Ear: External ear normal.   Eyes: Conjunctivae are normal. Pupils are equal, round, and reactive to light. Right eye exhibits no discharge. Left eye exhibits no discharge.   Neck: No tracheal deviation present. Thyromegaly present.   Cardiovascular: Normal rate and regular rhythm.    Pulmonary/Chest: Effort normal. No respiratory distress.   Abdominal: Soft. She exhibits no  distension. There is no guarding.   Musculoskeletal: She exhibits no edema or tenderness.   Lymphadenopathy:     She has no cervical adenopathy.   Neurological: She is alert and oriented to person, place, and time. No cranial nerve deficit.   Skin: Skin is warm and dry. No rash noted. She is not diaphoretic. No pallor.   Psychiatric: She has a normal mood and affect. Her behavior is normal. Judgment and thought content normal.   Nursing note and vitals reviewed.      Assessment:       1. Venous insufficiency, peripheral        Plan:       For port placment on 3/5/18.  All aspects of procedure including risks and possible complications were discussed in detail with the patient who agrees to proceed with procedure.  All questions were answered and consent was obtained. Preop orders were written.

## 2018-02-27 ENCOUNTER — TELEPHONE (OUTPATIENT)
Dept: HEMATOLOGY/ONCOLOGY | Facility: CLINIC | Age: 63
End: 2018-02-27

## 2018-02-27 NOTE — TELEPHONE ENCOUNTER
Called patient to schedule chemotherapy class.  A person answered then hung up.  Tried to call back but got no answer.

## 2018-02-28 LAB — GLUCOSE SERPL-MCNC: 110 MG/DL (ref 70–99)

## 2018-03-01 ENCOUNTER — ANESTHESIA EVENT (OUTPATIENT)
Dept: SURGERY | Facility: HOSPITAL | Age: 63
End: 2018-03-01
Payer: COMMERCIAL

## 2018-03-01 ENCOUNTER — HOSPITAL ENCOUNTER (OUTPATIENT)
Dept: RADIOLOGY | Facility: HOSPITAL | Age: 63
Discharge: HOME | End: 2018-03-01
Attending: INTERNAL MEDICINE
Payer: COMMERCIAL

## 2018-03-01 NOTE — OR NURSING
Pt. Stated she wouldn't be able to make it in to pre-op before surgery.  Did phone pre-op.  Vera Triplett at Dr. Coffman's office stated ok to do labs morning of surgery.  Bouchra Villatoro\

## 2018-03-02 ENCOUNTER — PATIENT MESSAGE (OUTPATIENT)
Dept: HEMATOLOGY/ONCOLOGY | Facility: CLINIC | Age: 63
End: 2018-03-02

## 2018-03-05 ENCOUNTER — SURGERY (OUTPATIENT)
Age: 63
End: 2018-03-05

## 2018-03-05 ENCOUNTER — ANESTHESIA (OUTPATIENT)
Dept: SURGERY | Facility: HOSPITAL | Age: 63
End: 2018-03-05
Payer: COMMERCIAL

## 2018-03-05 ENCOUNTER — HOSPITAL ENCOUNTER (OUTPATIENT)
Facility: HOSPITAL | Age: 63
Discharge: HOME | End: 2018-03-05
Attending: SURGERY | Admitting: SURGERY
Payer: COMMERCIAL

## 2018-03-05 VITALS
WEIGHT: 220 LBS | DIASTOLIC BLOOD PRESSURE: 70 MMHG | BODY MASS INDEX: 36.65 KG/M2 | HEART RATE: 84 BPM | TEMPERATURE: 98 F | RESPIRATION RATE: 18 BRPM | SYSTOLIC BLOOD PRESSURE: 137 MMHG | OXYGEN SATURATION: 96 % | HEIGHT: 65 IN

## 2018-03-05 DIAGNOSIS — I87.2 VENOUS INSUFFICIENCY, PERIPHERAL: Primary | ICD-10-CM

## 2018-03-05 LAB
ALBUMIN SERPL BCP-MCNC: 3.7 G/DL
ALP SERPL-CCNC: 99 U/L
ALT SERPL W/O P-5'-P-CCNC: 14 U/L
ANION GAP SERPL CALC-SCNC: 11 MMOL/L
AST SERPL-CCNC: 21 U/L
BASOPHILS # BLD AUTO: 0 K/UL
BASOPHILS NFR BLD: 0 %
BILIRUB SERPL-MCNC: 0.7 MG/DL
BUN SERPL-MCNC: 11 MG/DL
CALCIUM SERPL-MCNC: 9.9 MG/DL
CHLORIDE SERPL-SCNC: 103 MMOL/L
CO2 SERPL-SCNC: 25 MMOL/L
CREAT SERPL-MCNC: 0.7 MG/DL
DIFFERENTIAL METHOD: ABNORMAL
EOSINOPHIL # BLD AUTO: 0.5 K/UL
EOSINOPHIL NFR BLD: 7.1 %
ERYTHROCYTE [DISTWIDTH] IN BLOOD BY AUTOMATED COUNT: 12.8 %
EST. GFR  (AFRICAN AMERICAN): >60 ML/MIN/1.73 M^2
EST. GFR  (NON AFRICAN AMERICAN): >60 ML/MIN/1.73 M^2
GLUCOSE SERPL-MCNC: 108 MG/DL
HCT VFR BLD AUTO: 41.7 %
HGB BLD-MCNC: 14.3 G/DL
LYMPHOCYTES # BLD AUTO: 1.3 K/UL
LYMPHOCYTES NFR BLD: 16.9 %
MCH RBC QN AUTO: 31 PG
MCHC RBC AUTO-ENTMCNC: 34.4 G/DL
MCV RBC AUTO: 90 FL
MONOCYTES # BLD AUTO: 0.7 K/UL
MONOCYTES NFR BLD: 9 %
NEUTROPHILS # BLD AUTO: 5.2 K/UL
NEUTROPHILS NFR BLD: 67 %
PLATELET # BLD AUTO: 358 K/UL
PMV BLD AUTO: 7.3 FL
POTASSIUM SERPL-SCNC: 4.2 MMOL/L
PROT SERPL-MCNC: 7.5 G/DL
RBC # BLD AUTO: 4.63 M/UL
SODIUM SERPL-SCNC: 139 MMOL/L
WBC # BLD AUTO: 7.7 K/UL

## 2018-03-05 PROCEDURE — 71000033 HC RECOVERY, INTIAL HOUR: Performed by: SURGERY

## 2018-03-05 PROCEDURE — 37000008 HC ANESTHESIA 1ST 15 MINUTES: Performed by: SURGERY

## 2018-03-05 PROCEDURE — 63600175 PHARM REV CODE 636 W HCPCS: Performed by: NURSE ANESTHETIST, CERTIFIED REGISTERED

## 2018-03-05 PROCEDURE — 25000003 PHARM REV CODE 250: Performed by: SURGERY

## 2018-03-05 PROCEDURE — 36415 COLL VENOUS BLD VENIPUNCTURE: CPT

## 2018-03-05 PROCEDURE — 37000009 HC ANESTHESIA EA ADD 15 MINS: Performed by: SURGERY

## 2018-03-05 PROCEDURE — 85025 COMPLETE CBC W/AUTO DIFF WBC: CPT

## 2018-03-05 PROCEDURE — 63600175 PHARM REV CODE 636 W HCPCS: Performed by: SURGERY

## 2018-03-05 PROCEDURE — 80053 COMPREHEN METABOLIC PANEL: CPT

## 2018-03-05 PROCEDURE — 99900104 DSU ONLY-NO CHARGE-EA ADD'L HR (STAT): Performed by: SURGERY

## 2018-03-05 PROCEDURE — 36000706: Performed by: SURGERY

## 2018-03-05 PROCEDURE — 25000003 PHARM REV CODE 250: Performed by: NURSE ANESTHETIST, CERTIFIED REGISTERED

## 2018-03-05 PROCEDURE — 71000015 HC POSTOP RECOV 1ST HR: Performed by: SURGERY

## 2018-03-05 PROCEDURE — 77001 FLUOROGUIDE FOR VEIN DEVICE: CPT | Mod: 26,,, | Performed by: SURGERY

## 2018-03-05 PROCEDURE — C1788 PORT, INDWELLING, IMP: HCPCS | Performed by: SURGERY

## 2018-03-05 PROCEDURE — 99900103 DSU ONLY-NO CHARGE-INITIAL HR (STAT): Performed by: SURGERY

## 2018-03-05 PROCEDURE — D9220A PRA ANESTHESIA: Mod: ANES,,, | Performed by: ANESTHESIOLOGY

## 2018-03-05 PROCEDURE — 36000707: Performed by: SURGERY

## 2018-03-05 PROCEDURE — D9220A PRA ANESTHESIA: Mod: CRNA,,, | Performed by: NURSE ANESTHETIST, CERTIFIED REGISTERED

## 2018-03-05 PROCEDURE — 25000003 PHARM REV CODE 250: Performed by: ANESTHESIOLOGY

## 2018-03-05 PROCEDURE — 71000039 HC RECOVERY, EACH ADD'L HOUR: Performed by: SURGERY

## 2018-03-05 PROCEDURE — 36561 INSERT TUNNELED CV CATH: CPT | Mod: RT,,, | Performed by: SURGERY

## 2018-03-05 DEVICE — KIT POWERPORT SINGLE 8FR: Type: IMPLANTABLE DEVICE | Site: SUBCLAVIAN | Status: FUNCTIONAL

## 2018-03-05 RX ORDER — LIDOCAINE HYDROCHLORIDE 10 MG/ML
1 INJECTION, SOLUTION EPIDURAL; INFILTRATION; INTRACAUDAL; PERINEURAL ONCE
Status: COMPLETED | OUTPATIENT
Start: 2018-03-05 | End: 2018-03-05

## 2018-03-05 RX ORDER — GLYCOPYRROLATE 0.2 MG/ML
INJECTION INTRAMUSCULAR; INTRAVENOUS
Status: DISCONTINUED | OUTPATIENT
Start: 2018-03-05 | End: 2018-03-05

## 2018-03-05 RX ORDER — SODIUM CHLORIDE 9 MG/ML
INJECTION, SOLUTION INTRAVENOUS CONTINUOUS
Status: CANCELLED | OUTPATIENT
Start: 2018-03-05

## 2018-03-05 RX ORDER — CEFAZOLIN SODIUM 1 G/3ML
2 INJECTION, POWDER, FOR SOLUTION INTRAMUSCULAR; INTRAVENOUS
Status: COMPLETED | OUTPATIENT
Start: 2018-03-05 | End: 2018-03-05

## 2018-03-05 RX ORDER — KETAMINE HYDROCHLORIDE 100 MG/ML
INJECTION, SOLUTION INTRAMUSCULAR; INTRAVENOUS
Status: DISCONTINUED | OUTPATIENT
Start: 2018-03-05 | End: 2018-03-05

## 2018-03-05 RX ORDER — LIDOCAINE HYDROCHLORIDE 10 MG/ML
INJECTION, SOLUTION EPIDURAL; INFILTRATION; INTRACAUDAL; PERINEURAL
Status: DISCONTINUED | OUTPATIENT
Start: 2018-03-05 | End: 2018-03-05 | Stop reason: HOSPADM

## 2018-03-05 RX ORDER — METOCLOPRAMIDE HYDROCHLORIDE 5 MG/ML
10 INJECTION INTRAMUSCULAR; INTRAVENOUS EVERY 10 MIN PRN
Status: DISCONTINUED | OUTPATIENT
Start: 2018-03-05 | End: 2018-03-05 | Stop reason: HOSPADM

## 2018-03-05 RX ORDER — SODIUM CHLORIDE, SODIUM LACTATE, POTASSIUM CHLORIDE, CALCIUM CHLORIDE 600; 310; 30; 20 MG/100ML; MG/100ML; MG/100ML; MG/100ML
10 INJECTION, SOLUTION INTRAVENOUS CONTINUOUS
Status: DISCONTINUED | OUTPATIENT
Start: 2018-03-06 | End: 2018-03-05 | Stop reason: HOSPADM

## 2018-03-05 RX ORDER — HEPARIN 100 UNIT/ML
SYRINGE INTRAVENOUS
Status: DISCONTINUED | OUTPATIENT
Start: 2018-03-05 | End: 2018-03-05 | Stop reason: HOSPADM

## 2018-03-05 RX ORDER — SODIUM CHLORIDE 0.9 % (FLUSH) 0.9 %
3 SYRINGE (ML) INJECTION EVERY 8 HOURS
Status: DISCONTINUED | OUTPATIENT
Start: 2018-03-05 | End: 2018-03-05 | Stop reason: HOSPADM

## 2018-03-05 RX ORDER — HYDROCODONE BITARTRATE AND ACETAMINOPHEN 5; 325 MG/1; MG/1
1 TABLET ORAL EVERY 4 HOURS PRN
Qty: 12 TABLET | Refills: 0 | Status: SHIPPED | OUTPATIENT
Start: 2018-03-05 | End: 2018-04-30 | Stop reason: ALTCHOICE

## 2018-03-05 RX ORDER — SODIUM CHLORIDE 0.9 % (FLUSH) 0.9 %
3 SYRINGE (ML) INJECTION
Status: DISCONTINUED | OUTPATIENT
Start: 2018-03-05 | End: 2018-03-05 | Stop reason: HOSPADM

## 2018-03-05 RX ORDER — DEXAMETHASONE SODIUM PHOSPHATE 4 MG/ML
INJECTION, SOLUTION INTRA-ARTICULAR; INTRALESIONAL; INTRAMUSCULAR; INTRAVENOUS; SOFT TISSUE
Status: DISCONTINUED | OUTPATIENT
Start: 2018-03-05 | End: 2018-03-05

## 2018-03-05 RX ORDER — OXYCODONE HYDROCHLORIDE 5 MG/1
5 TABLET ORAL
Status: DISCONTINUED | OUTPATIENT
Start: 2018-03-05 | End: 2018-03-05 | Stop reason: HOSPADM

## 2018-03-05 RX ORDER — SODIUM CHLORIDE 9 MG/ML
INJECTION, SOLUTION INTRAVENOUS CONTINUOUS
Status: DISCONTINUED | OUTPATIENT
Start: 2018-03-05 | End: 2018-03-05 | Stop reason: HOSPADM

## 2018-03-05 RX ORDER — PROPOFOL 10 MG/ML
VIAL (ML) INTRAVENOUS
Status: DISCONTINUED | OUTPATIENT
Start: 2018-03-05 | End: 2018-03-05

## 2018-03-05 RX ORDER — MIDAZOLAM HYDROCHLORIDE 1 MG/ML
INJECTION, SOLUTION INTRAMUSCULAR; INTRAVENOUS
Status: DISCONTINUED | OUTPATIENT
Start: 2018-03-05 | End: 2018-03-05

## 2018-03-05 RX ORDER — HYDROMORPHONE HYDROCHLORIDE 2 MG/ML
1 INJECTION, SOLUTION INTRAMUSCULAR; INTRAVENOUS; SUBCUTANEOUS EVERY 4 HOURS PRN
Status: CANCELLED | OUTPATIENT
Start: 2018-03-05

## 2018-03-05 RX ORDER — BUPIVACAINE HYDROCHLORIDE AND EPINEPHRINE 5; 5 MG/ML; UG/ML
INJECTION, SOLUTION EPIDURAL; INTRACAUDAL; PERINEURAL
Status: DISCONTINUED | OUTPATIENT
Start: 2018-03-05 | End: 2018-03-05 | Stop reason: HOSPADM

## 2018-03-05 RX ORDER — LIDOCAINE HYDROCHLORIDE 10 MG/ML
1 INJECTION, SOLUTION EPIDURAL; INFILTRATION; INTRACAUDAL; PERINEURAL ONCE
Status: DISCONTINUED | OUTPATIENT
Start: 2018-03-05 | End: 2018-03-05 | Stop reason: HOSPADM

## 2018-03-05 RX ORDER — ONDANSETRON 2 MG/ML
INJECTION INTRAMUSCULAR; INTRAVENOUS
Status: DISCONTINUED | OUTPATIENT
Start: 2018-03-05 | End: 2018-03-05

## 2018-03-05 RX ORDER — LIDOCAINE HCL/PF 100 MG/5ML
SYRINGE (ML) INTRAVENOUS
Status: DISCONTINUED | OUTPATIENT
Start: 2018-03-05 | End: 2018-03-05

## 2018-03-05 RX ORDER — FENTANYL CITRATE 50 UG/ML
INJECTION, SOLUTION INTRAMUSCULAR; INTRAVENOUS
Status: DISCONTINUED | OUTPATIENT
Start: 2018-03-05 | End: 2018-03-05

## 2018-03-05 RX ADMIN — MIDAZOLAM 2 MG: 1 INJECTION INTRAMUSCULAR; INTRAVENOUS at 01:03

## 2018-03-05 RX ADMIN — PROPOFOL 20 MG: 10 INJECTION, EMULSION INTRAVENOUS at 02:03

## 2018-03-05 RX ADMIN — OXYCODONE HYDROCHLORIDE 5 MG: 5 TABLET ORAL at 02:03

## 2018-03-05 RX ADMIN — PROPOFOL 20 MG: 10 INJECTION, EMULSION INTRAVENOUS at 01:03

## 2018-03-05 RX ADMIN — FENTANYL CITRATE 25 MCG: 50 INJECTION, SOLUTION INTRAMUSCULAR; INTRAVENOUS at 01:03

## 2018-03-05 RX ADMIN — KETAMINE HYDROCHLORIDE 20 MG: 100 INJECTION, SOLUTION, CONCENTRATE INTRAMUSCULAR; INTRAVENOUS at 01:03

## 2018-03-05 RX ADMIN — ONDANSETRON 4 MG: 2 INJECTION, SOLUTION INTRAMUSCULAR; INTRAVENOUS at 01:03

## 2018-03-05 RX ADMIN — HEPARIN SODIUM (PORCINE) LOCK FLUSH IV SOLN 100 UNIT/ML 10 ML: 100 SOLUTION at 02:03

## 2018-03-05 RX ADMIN — BUPIVACAINE HYDROCHLORIDE AND EPINEPHRINE BITARTRATE 30 ML: 5; .0091 INJECTION, SOLUTION EPIDURAL; INTRACAUDAL; PERINEURAL at 02:03

## 2018-03-05 RX ADMIN — CEFAZOLIN 2 G: 1 INJECTION, POWDER, FOR SOLUTION INTRAVENOUS at 01:03

## 2018-03-05 RX ADMIN — DEXAMETHASONE SODIUM PHOSPHATE 8 MG: 4 INJECTION, SOLUTION INTRAMUSCULAR; INTRAVENOUS at 01:03

## 2018-03-05 RX ADMIN — LIDOCAINE HYDROCHLORIDE 10 MG: 10 INJECTION, SOLUTION EPIDURAL; INFILTRATION; INTRACAUDAL; PERINEURAL at 11:03

## 2018-03-05 RX ADMIN — LIDOCAINE HYDROCHLORIDE 50 ML: 10 INJECTION, SOLUTION EPIDURAL; INFILTRATION; INTRACAUDAL; PERINEURAL at 02:03

## 2018-03-05 RX ADMIN — SODIUM CHLORIDE, SODIUM LACTATE, POTASSIUM CHLORIDE, AND CALCIUM CHLORIDE 10 ML/HR: .6; .31; .03; .02 INJECTION, SOLUTION INTRAVENOUS at 11:03

## 2018-03-05 RX ADMIN — LIDOCAINE HYDROCHLORIDE 100 MG: 20 INJECTION, SOLUTION INTRAVENOUS at 01:03

## 2018-03-05 RX ADMIN — GLYCOPYRROLATE 0.2 MG: 0.2 INJECTION, SOLUTION INTRAMUSCULAR; INTRAVENOUS at 01:03

## 2018-03-05 NOTE — PLAN OF CARE
Dr lennon called made aware of x ray report no new orders  Pt ok to be d/c in phase 2 [facility] made aware

## 2018-03-05 NOTE — PLAN OF CARE
Released to phase 2 from dr Lopez pt vs stable no co pain port a cath x ray report no pneumo noted dsg dry and intact on port a cath report to [facility]guire

## 2018-03-05 NOTE — ANESTHESIA PREPROCEDURE EVALUATION
03/05/2018  Indira Chauhan is a 62 y.o., female.    Anesthesia Evaluation    I have reviewed the Patient Summary Reports.    I have reviewed the Nursing Notes.   I have reviewed the Medications.     Review of Systems  Anesthesia Hx:  Denies Family Hx of Anesthesia complications.   Denies Personal Hx of Anesthesia complications.   Hematology/Oncology:        Current/Recent Cancer. bilateral Oncology Comments: Large Lymphoma involving neck and anterior mediastinum with deviated larynx but no stridor   Cardiovascular:   Hypertension    Pulmonary:  Pulmonary Normal    Hepatic/GI:   GERD    Neurological:  Neurology Normal    Endocrine:   Hypothyroidism        Physical Exam  General:  Obesity    Airway/Jaw/Neck:  Airway Findings: Mouth Opening: Normal Tongue: Normal  General Airway Assessment: Adult, Possible difficult intubation, Possible difficult mask airway  Mallampati: IV  Jaw/Neck Findings:  Neck ROM: Extension Decreased, Mild  Neck Findings:  Girth Increased, Deviated Trachea  Large neck mass, deviated larynx, no stridor even when supine.      Dental:  Dental Findings: Upper Dentures, Periodontal disease, Severe    Chest/Lungs:  Chest/Lungs Clear    Heart/Vascular:  Heart Findings: Normal            Anesthesia Plan  Type of Anesthesia, risks & benefits discussed:  Anesthesia Type:  MAC  Patient's Preference:   Intra-op Monitoring Plan: standard ASA monitors  Intra-op Monitoring Plan Comments:   Post Op Pain Control Plan:   Post Op Pain Control Plan Comments:   Induction:   IV  Beta Blocker:  Patient is not currently on a Beta-Blocker (No further documentation required).       Informed Consent: Patient understands risks and agrees with Anesthesia plan.  Questions answered. Anesthesia consent signed with patient.  ASA Score: 3     Day of Surgery Review of History & Physical:    H&P update referred to the  surgeon.     Anesthesia Plan Notes: Conscious sedation with preservation of airway reflexes, motor tone and spontaneous ventilation          Ready For Surgery From Anesthesia Perspective.

## 2018-03-05 NOTE — PLAN OF CARE
Pt is calm, her partner is at her bedside  Pt is calm jasmyne received the test text explained to him that the OR nurse will update him during the surgery.

## 2018-03-05 NOTE — ANESTHESIA POSTPROCEDURE EVALUATION
"Anesthesia Post Evaluation    Patient: Indira Chauhan    Procedure(s) Performed: Procedure(s) (LRB):  Jottzwuvv-Soje-D-Cath (Right)    Final Anesthesia Type: MAC  Patient location during evaluation: PACU  Patient participation: Yes- Able to Participate  Level of consciousness: awake and alert  Post-procedure vital signs: reviewed and stable  Pain management: adequate  Airway patency: patent  PONV status at discharge: No PONV  Anesthetic complications: no      Cardiovascular status: blood pressure returned to baseline  Respiratory status: unassisted  Hydration status: euvolemic  Follow-up not needed.        Visit Vitals  /70   Pulse 84   Temp 36.7 °C (98.1 °F) (Temporal)   Resp 18   Ht 5' 5" (1.651 m)   Wt 99.8 kg (220 lb)   SpO2 96%   Breastfeeding? No   BMI 36.61 kg/m²       Pain/Parish Score: Pain Assessment Performed: Yes (3/5/2018 10:55 AM)  Presence of Pain: denies (3/5/2018  3:15 PM)  Parish Score: 10 (3/5/2018  3:15 PM)      "

## 2018-03-05 NOTE — DISCHARGE INSTRUCTIONS
"Discharge Instructions: After Your Surgery/Procedure  Youve just had surgery. During surgery you were given medicine called anesthesia to keep you relaxed and free of pain. After surgery you may have some pain or nausea. This is common. Here are some tips for feeling better and getting well after surgery.     Stay on schedule with your medication.   Going home  Your doctor or nurse will show you how to take care of yourself when you go home. He or she will also answer your questions. Have an adult family member or friend drive you home.      For your safety we recommend these precaution for the first 24 hours after your procedure:  · Do not drive or use heavy equipment.  · Do not make important decisions or sign legal papers.  · Do not drink alcohol.  · Have someone stay with you, if needed. He or she can watch for problems and help keep you safe.  · Your concentration, balance, coordination, and judgement may be impaired for many hours after anesthesia.  Use caution when ambulating or standing up.     · You may feel weak and "washed out" after anesthesia and surgery.      Subtle residual effects of general anesthesia or sedation with regional / local anesthesia can last more than 24 hours.  Rest for the remainder of the day or longer if your Doctor/Surgeon has advised you to do so.  Although you may feel normal within the first 24 hours, your reflexes and mental ability may be impaired without you realizing it.  You may feel dizzy, lightheaded or sleepy for 24 hours or longer.      Be sure to go to all follow-up visits with your doctor. And rest after your surgery for as long as your doctor tells you to.  Coping with pain  If you have pain after surgery, pain medicine will help you feel better. Take it as told, before pain becomes severe. Also, ask your doctor or pharmacist about other ways to control pain. This might be with heat, ice, or relaxation. And follow any other instructions your surgeon or nurse gives " you.  Tips for taking pain medicine  To get the best relief possible, remember these points:  · Pain medicines can upset your stomach. Taking them with a little food may help.  · Most pain relievers taken by mouth need at least 20 to 30 minutes to start to work.  · Taking medicine on a schedule can help you remember to take it. Try to time your medicine so that you can take it before starting an activity. This might be before you get dressed, go for a walk, or sit down for dinner.  · Constipation is a common side effect of pain medicines. Call your doctor before taking any medicines such as laxatives or stool softeners to help ease constipation. Also ask if you should skip any foods. Drinking lots of fluids and eating foods such as fruits and vegetables that are high in fiber can also help. Remember, do not take laxatives unless your surgeon has prescribed them.  · Drinking alcohol and taking pain medicine can cause dizziness and slow your breathing. It can even be deadly. Do not drink alcohol while taking pain medicine.  · Pain medicine can make you react more slowly to things. Do not drive or run machinery while taking pain medicine.  Your health care provider may tell you to take acetaminophen to help ease your pain. Ask him or her how much you are supposed to take each day. Acetaminophen or other pain relievers may interact with your prescription medicines or other over-the-counter (OTC) drugs. Some prescription medicines have acetaminophen and other ingredients. Using both prescription and OTC acetaminophen for pain can cause you to overdose. Read the labels on your OTC medicines with care. This will help you to clearly know the list of ingredients, how much to take, and any warnings. It may also help you not take too much acetaminophen. If you have questions or do not understand the information, ask your pharmacist or health care provider to explain it to you before you take the OTC medicine.  Managing  nausea  Some people have an upset stomach after surgery. This is often because of anesthesia, pain, or pain medicine, or the stress of surgery. These tips will help you handle nausea and eat healthy foods as you get better. If you were on a special food plan before surgery, ask your doctor if you should follow it while you get better. These tips may help:  · Do not push yourself to eat. Your body will tell you when to eat and how much.  · Start off with clear liquids and soup. They are easier to digest.  · Next try semi-solid foods, such as mashed potatoes, applesauce, and gelatin, as you feel ready.  · Slowly move to solid foods. Dont eat fatty, rich, or spicy foods at first.  · Do not force yourself to have 3 large meals a day. Instead eat smaller amounts more often.  · Take pain medicines with a small amount of solid food, such as crackers or toast, to avoid nausea.     Call your surgeon if  · You still have pain an hour after taking medicine. The medicine may not be strong enough.  · You feel too sleepy, dizzy, or groggy. The medicine may be too strong.  · You have side effects like nausea, vomiting, or skin changes, such as rash, itching, or hives.       If you have obstructive sleep apnea  You were given anesthesia medicine during surgery to keep you comfortable and free of pain. After surgery, you may have more apnea spells because of this medicine and other medicines you were given. The spells may last longer than usual.   At home:  · Keep using the continuous positive airway pressure (CPAP) device when you sleep. Unless your health care provider tells you not to, use it when you sleep, day or night. CPAP is a common device used to treat obstructive sleep apnea.  · Talk with your provider before taking any pain medicine, muscle relaxants, or sedatives. Your provider will tell you about the possible dangers of taking these medicines.  © 5795-6896 [employer]. [address] [place] [address]  [place] [address]. All rights reserved. This information is not intended as a substitute for professional medical care. Always follow your healthcare professional's instructions.    General Information:    1.  Do not drink alcoholic beverages including beer for 24 hours or as long as you are on pain medication..  2.  Do not drive a motor vehicle, operate machinery or power tools, or signs legal papers for 24 hours or as long as you are on pain medication.   3.  You may experience light-headedness, dizziness, and sleepiness following surgery. Please do not stay alone. A responsible adult should be with you for this 24 hour period.  4.  Go home and rest.    5. Progress slowly to a normal diet unless instructed.  Otherwise, begin with liquids such as soft drinks, then soup and crackers working up to solid foods. Drink plenty of nonalcoholic fluids.  6.  Certain anesthetics and pain medications produce nausea and vomiting in certain       individuals. If nausea becomes a problem at home, call you doctor.    7. A nurse will be calling you sometime after surgery. Do not be alarmed. This is our way of finding out how you are doing.    8. Several times every hour while you are awake, take 2-3 deep breaths and cough. If you had stomach surgery hold a pillow or rolled towel firmly against your stomach before you cough. This will help with any pain the cough might cause.  9. Several times every hour while you are awake, pump and flex your feet 5-6 times and do foot circles. This will help prevent blood clots.    10.Call your doctor for severe pain, bleeding, fever, or signs or symptoms of infection (pain, swelling, redness, foul odor, drainage).    Post op instructions for prevention of DVT  What is deep vein thrombosis?  Deep vein thrombosis (DVT) is the medical term for blood clots in the deep veins of the leg.  These blood clots can be dangerous.  A DVT can block a blood vessel and keep blood from getting where it needs to go.   Another problem is that the clot can travel to other parts of the body such as the lungs.  A clot that travels to the lungs is called a pulmonary embolus (PE) and can cause serious problems with breathing which can lead to death.  Am I at risk for DVT/PE?  If you are not very active, you are at risk of DVT.  Anyone confined to bed, sitting for long periods of time, recovering from surgery, etc. increases the risk of DVT.  Other risk factors are cancer diagnosis, certain medications, estrogen replacement in any form,older age, obesity, pregnancy, smoking, history of clotting disorders, and dehydration.  How will I know if I have a DVT?   Swelling in the lower leg   Pain   Warmth, redness, hardness or bulging of the vein  If you have any of these symptoms, call your doctors office right away.  Some people will not have any symptoms until the clot moves to the lungs.  What are the symptoms of a PE?   Panting, shortness of breath, or trouble breathing   Sharp, knife-like chest pain when you breathe   Coughing or coughing up blood   Rapid heartbeat  If you have any of these symptoms or get worse quickly, call 911 for emergency treatment.  How can I prevent a DVT?   Avoid long periods of inactivity and dont cross your legs--get up and walk around every hour or so.   Stay active--walking after surgery is highly encouraged.  This means you should get out of the house and walk in the neighborhood.  Going up and down stairs will not impair healing (unless advised against such activity by your doctor).     Drink plenty of noncaffeinated, nonalcoholic fluids each day to prevent dehydration.   Wear special support stockings, if they have been advised by your doctor.   If you travel, stop at least once an hour and walk around.   Avoid smoking (assistance with stopping is available through your healthcare provider)  Always notify your doctor if you are not able to follow the post operative instructions that are  given to you at the time of discharge.  It may be necessary to prescribe one of the medications available to prevent DVT.    Using Opioids for Pain Management     Your doctor has given instructions for you to take an opioid.  This is a drug for bad pain.  It helps control pain without causing bleeding and kidney problems.  Common opioid names are morphine, hydromorphone, oxycodone, and methadone. These drugs are called narcotics.    There are several safety concerns you need to know.     · It is against the law to give or sell this drug to another person.  You must keep this medicine safely locked.    · You may have side effects from taking this medication.  These include nausea, itching, sweating, sleepiness, a change in your ability to breathe, and depression.  · Do not take alcohol or sleeping pills opioids.    · Long-term opoid use may no longer giver you relief from pain.  It can cause you stomach pain, mental anxiety, and headaches.  Long-term opoid use can potentially lead to unlawful street drug abuse and reduce your ability to stay employed.    · Your body may become opioid tolerant if you need to take more to get relief.    · You must stop taking opioids if you begin having more pain as a result of the medicine.    · Opioid withdrawal occurs when you have to stop taking the drug.  It can cause you to have nausea, vomiting, diarrhea, stomach pain, anxiety, and dilated pupils in your eyes. This condition means you are opioid dependent.    · Addiction is a drug induced brain disease. It means there are changes in how your brain is working.  Children, teens, and young adults under 25 years old are more likely to get addicted to opioids.      · Addiction can happen with repeated opioid use.  It does not happen with short-term use of two weeks or less.       For more information, please speak with your doctor or pharmacist.      Vascular Access Port Implantation   Port implantation is surgery to place (implant)  a port under the skin. For vascular access, it is placed into a vein. The port allows medicines or nutrition to be sent right into your bloodstream. Blood can also be taken or given through the port. During the procedure, a long, thin tube called a catheter is threaded into one of your large veins. The tube is then attached to the port. This usually sits under the skin of your chest and causes a small bump. To use the port, a special needle is passed through your skin and into the port. The needle can stay in your skin for up to 7 days, if needed. A port can stay in place for weeks or months or longer.    Why is a vascular access port needed?  A vascular access port may allow healthcare providers to give you:  · Chemotherapy or other cancer-fighting drugs  · IV treatments, such as antibiotics or nutrition  · Hemodialysis (for kidney failure)  The port may also be used to draw blood.  Before the procedure  Follow any instructions you are given on how to prepare.  Tell your provider about any medicines you are taking. This includes:  · All prescription medicines  · Over-the-counter medicines such as aspirin or ibuprofen  · Herbs, vitamins, and other supplements  Also be sure your provider knows:  · If you are pregnant or think you may be pregnant  · If you are allergic to any medicines or substances, especially local anesthetics or iodine  · Your full medical history, including why you will need the port  · If you plan on doing any contact sports  During the procedure  · Before the procedure, an IV may be put into a vein in your arm or hand. This gives you fluids and medicines. You may be given medicine through the IV to help you relax during the procedure. This is called sedation. But some surgeons place ports using general anesthesia.  · The chest is used most often for the port. In some cases, your belly (abdomen) or arm will be used instead.  · The skin over the insertion area is numbed with local  anesthetic.  · Ultrasound or X-rays are used to help the healthcare provider guide the catheter into the proper location during the procedure.  · A cut (incision) is made in the skin where the port will be placed. A small pocket for the port is formed under the skin.  · A second small incision is made in the skin near the first incision. A tunnel under the skin is created. The catheter is put through the tunnel and into the blood vessel.  · The skin is closed over the port. It is held shut with stitches (sutures) or surgical glue or tape. The second small incision is also closed.  · A chest X-ray may be done to make sure the port is placed properly.  After the procedure  You may be taken to a recovery room where youll recover from the sedation. Nurses will check on you as you rest. If you have pain, nurses can give you medicine. If you are not staying in the hospital overnight, you will be sent home a few hours after the procedure is done. A healthcare provider will tell you when you can go home. An adult family member or friend will need to drive you home.  Recovering at home  · Take pain medicine as directed by your healthcare provider.  · Take it easy for 24 hours after the procedure. Avoid physical activity and heavy lifting until your healthcare provider says its OK.  · Keep the port clean and dry. Ask when you can shower again. You will need to keep the port dry by covering it when you shower.  · Care for the insertion site as you are directed.  · Dont swim, bathe, or do other activities that cause water to cover the insertion site.  · To keep the port from getting blocked with blood clots, flush it as often as directed. You should be shown the proper way to flush the port before you go home. It is important to follow these directions.     Risks and possible complications of implantation  · Bleeding  · Infection of the insertion site  · Damage to a blood vessel  · Nerve injury or irritation  · Collapsed  lung (for chest port placements)  · Skin breakdown over the port  Risks and possible complications of having a port  · Blocked  port or catheter  · Leakage or breakage of the port or catheter  · The port moves out of position  · Blood clot  · Skin or bloodstream infection  · Skin breakdown over the port      When to seek medical care  Call your healthcare provider right away if you have any of the following:  · A fever of 100.4°F (38.0°C) or higher  · You can't access or use the port properly  · You can't flush the port or get a blood return  · The skin near the port is red, warm, swollen, or broken  · You have shoulder pain on the side where the port is located  · You feel a heart flutter or racing heart   · Swollen arm, if the port is placed in your arm   Date Last Reviewed: 7/1/2016  © 8608-5858 The [employer]. [address] [place] [address]. All rights reserved. This information is not intended as a substitute for professional medical care. Always follow your healthcare professional's instructions.          We hope your stay was comfortable as you heal now, mend and rest.    For we have enjoyed taking care of you by giving your our best.    And as you get better, by regaining your health and strength;   We count it as a privilege to have served you and hope your time at [facility] was well spent.      Thank  You!!!

## 2018-03-05 NOTE — OP NOTE
DATE: 3/5/2018    PRE OP DX: Insufficient venous access for chemotherapy    POST OF DX: Same    PROCEDURE: Insertion of right Subclavian PowerPort    SURGEON: Trevor Coffman M.D.    ANESTHESIA: General    PROCEDURE AND FINDINGS:  With the patient in the supine trendelenburg position under satisfactory anesthesia the chest and neck were prepped and draped in the usual manner for port insertion.  The skin and subcutaneous tissue in the right subclavicular area was infiltrated with 0.25% Marcaine w/epinephrine.  Venepuncture was performed into the right subclavian vein and guide wire was advanced under fluoroscopic control into the SVC.    Skin incision was made and pocked created for the Power port.  The catheter was cut to 16 cm in length and connected to the port in the standard manner.  It was flushed with heparinized saline.  The catheter was introduced using the supplied peel-away sheath introducer and advanced to the SVC-RA junction using fluoroscopy..  Good flow was noted in both directions.  The port was sutured to the pectoralis fascia using 2-0 vicryl sutures.  Hemostasis was secure and the subcutaneous tissue was approximated with 3-0 vicryl.  The skin incision was closed with a 4-0 monocryl subcuticular stitch.  Steri strips and sterile dressing was applied.    Patient tolerated the procedure well and was sent to recovery room in satisfactory condition.    EBL: 10 cc's    Complications: none    Drains: none    Specimens: none

## 2018-03-05 NOTE — PLAN OF CARE
Pt appears calm but states that she is very nervous  Dr Coffman did come and speak with the pt  Patient requested that he puts the port in on the left side  I discussed with the pt that Dr Coffman will make that decision and he does try to do what the patient wants will  give the information to the OR nurse during report

## 2018-03-06 NOTE — PLAN OF CARE
Received from PACU alert and in no distress, chest xray confirmed, discharge instructions provided to both the patient and sig. Other, all questions were answered, and both stated full understanding of all discharge instructions. IV removed and prescription provided

## 2018-03-09 ENCOUNTER — OFFICE VISIT (OUTPATIENT)
Dept: HEMATOLOGY/ONCOLOGY | Facility: CLINIC | Age: 63
End: 2018-03-09
Payer: COMMERCIAL

## 2018-03-09 ENCOUNTER — TELEPHONE (OUTPATIENT)
Dept: HEMATOLOGY/ONCOLOGY | Facility: CLINIC | Age: 63
End: 2018-03-09

## 2018-03-09 VITALS
BODY MASS INDEX: 37.47 KG/M2 | RESPIRATION RATE: 20 BRPM | SYSTOLIC BLOOD PRESSURE: 176 MMHG | DIASTOLIC BLOOD PRESSURE: 77 MMHG | TEMPERATURE: 98 F | HEART RATE: 88 BPM | WEIGHT: 224.88 LBS | HEIGHT: 65 IN

## 2018-03-09 DIAGNOSIS — R11.0 CHEMOTHERAPY-INDUCED NAUSEA: ICD-10-CM

## 2018-03-09 DIAGNOSIS — M89.8X5 LYTIC BONE LESION OF HIP: ICD-10-CM

## 2018-03-09 DIAGNOSIS — J39.8 TRACHEAL DEVIATION: ICD-10-CM

## 2018-03-09 DIAGNOSIS — T45.1X5A CHEMOTHERAPY-INDUCED NAUSEA: ICD-10-CM

## 2018-03-09 DIAGNOSIS — C83.38 DIFFUSE LARGE B-CELL LYMPHOMA OF LYMPH NODES OF MULTIPLE REGIONS: Primary | ICD-10-CM

## 2018-03-09 DIAGNOSIS — C83.31 DIFFUSE LARGE B-CELL LYMPHOMA OF LYMPH NODES OF NECK: ICD-10-CM

## 2018-03-09 PROCEDURE — 3078F DIAST BP <80 MM HG: CPT | Mod: S$GLB,,, | Performed by: INTERNAL MEDICINE

## 2018-03-09 PROCEDURE — 99215 OFFICE O/P EST HI 40 MIN: CPT | Mod: S$GLB,,, | Performed by: INTERNAL MEDICINE

## 2018-03-09 PROCEDURE — 3077F SYST BP >= 140 MM HG: CPT | Mod: S$GLB,,, | Performed by: INTERNAL MEDICINE

## 2018-03-09 PROCEDURE — 99999 PR PBB SHADOW E&M-EST. PATIENT-LVL III: CPT | Mod: PBBFAC,,, | Performed by: INTERNAL MEDICINE

## 2018-03-09 RX ORDER — DOXORUBICIN HYDROCHLORIDE 2 MG/ML
50 INJECTION, SOLUTION INTRAVENOUS
Status: CANCELLED | OUTPATIENT
Start: 2018-04-16

## 2018-03-09 RX ORDER — SODIUM CHLORIDE 0.9 % (FLUSH) 0.9 %
10 SYRINGE (ML) INJECTION
Status: CANCELLED | OUTPATIENT
Start: 2018-04-16

## 2018-03-09 RX ORDER — HEPARIN 100 UNIT/ML
500 SYRINGE INTRAVENOUS
Status: CANCELLED | OUTPATIENT
Start: 2018-04-16

## 2018-03-09 RX ORDER — FAMOTIDINE 10 MG/ML
20 INJECTION INTRAVENOUS
Status: CANCELLED | OUTPATIENT
Start: 2018-04-16

## 2018-03-09 RX ORDER — ACETAMINOPHEN 325 MG/1
650 TABLET ORAL
Status: CANCELLED | OUTPATIENT
Start: 2018-04-16

## 2018-03-09 RX ORDER — ONDANSETRON HYDROCHLORIDE 8 MG/1
8 TABLET, FILM COATED ORAL EVERY 12 HOURS PRN
Qty: 30 TABLET | Refills: 2 | Status: SHIPPED | OUTPATIENT
Start: 2018-03-09 | End: 2019-02-25 | Stop reason: CLARIF

## 2018-03-09 RX ORDER — LIDOCAINE AND PRILOCAINE 25; 25 MG/G; MG/G
CREAM TOPICAL
Qty: 5 G | Refills: 2 | Status: SHIPPED | OUTPATIENT
Start: 2018-03-09 | End: 2019-02-25 | Stop reason: CLARIF

## 2018-03-09 NOTE — PROGRESS NOTES
Pt is crying    Indira Chauhan is a 62 y.o.  Pt found a lump in her neck and underwent a biopsy of such. Testing revealed lymphoma subtype Diffuse large B cell. SHe has not had fever  , no weight loss, no night sweats  She has had a chest port placed Monday. She did not start lexapro. She is scared   She refused a bone marrow biopsy      PET CT reviewed  Left sacral area appears to have a lytic lesion as well as disease above and below the diaphragm, trachea shifted to the right due to left adenopathy  No HSM    Echo reviewed    Past Medical History:   Diagnosis Date    GERD (gastroesophageal reflux disease)     Hyperlipidemia     Hypertension     Hypothyroidism     Thyroid mass 11/02/2017         Current Outpatient Prescriptions:     amlodipine-benazepril (LOTREL) 5-40 mg per capsule, Take 1 capsule by mouth once daily., Disp: 90 capsule, Rfl: 0    atorvastatin (LIPITOR) 40 MG tablet, Take 1 tablet (40 mg total) by mouth once daily., Disp: 30 tablet, Rfl: 3    escitalopram oxalate (LEXAPRO) 10 MG tablet, Take 1 tablet (10 mg total) by mouth once daily., Disp: 30 tablet, Rfl: 2    hydroCHLOROthiazide (HYDRODIURIL) 25 MG tablet, Take 1 tablet (25 mg total) by mouth once daily., Disp: 30 tablet, Rfl: 2    hydrocodone-acetaminophen 5-325mg (NORCO) 5-325 mg per tablet, Take 1 tablet by mouth every 4 (four) hours as needed for Pain., Disp: 12 tablet, Rfl: 0    levothyroxine (SYNTHROID) 150 MCG tablet, Take 1 tablet (150 mcg total) by mouth once daily., Disp: 90 tablet, Rfl: 1  Review of patient's allergies indicates:  No Known Allergies  Social History   Substance Use Topics    Smoking status: Current Every Day Smoker     Packs/day: 0.50     Years: 40.00    Smokeless tobacco: Never Used    Alcohol use 1.8 oz/week     3 Shots of liquor per week      Comment: social 3/3/2018       PreOperative Diagnosis  1. Lymphadenopathy.  2. Left neck mass.    SPECIMEN  1) Left lymph node.  2) Left neck mass.    FINAL  "PATHOLOGIC DIAGNOSIS  1. LEFT LYMPH NODE, CORE BIOPSY- DIFFUSE LARGE B-CELL LYMPHOMA, GERMINAL CENTER ORIGIN.  SEE COMMENT.  COMMENT: Fragments of tissue show diffusely infiltrated by large atypical lymphocytes .  Flow cytometric analysis of lymph node shows T lymphocytes that are immunophenotypically unremarkable . B  lymphocytes are essentially absent. The blast gate is not increased.            CONSTITUTIONAL: No fevers, chills, night sweats, wt. loss, appetite changes  SKIN: no rashes or itching  ENT: No headaches, head trauma, vision changes, or eye pain  CV: No chest pain, palpitations.   RESP: No dyspnea on exertion, cough, wheezing, or hemoptysis  GI: No nausea, emesis, diarrhea, constipation, melena, hematochezia, pain.   : No dysuria, hematuria, urgency, or frequency   HEME: No easy bruising, bleeding problems  PSYCHIATRIC: ++ depression,++ anxiety, no psychosis, hallucinations.  NEURO: No seizures, memory loss, dizziness or difficulty sleeping  MSK: No arthralgias or joint swelling         BP (!) 176/77 (BP Location: Left arm, Patient Position: Sitting, BP Method: Medium (Automatic))   Pulse 88   Temp 98.3 °F (36.8 °C) (Oral)   Resp 20   Ht 5' 5" (1.651 m)   Wt 102 kg (224 lb 13.9 oz)   BMI 37.42 kg/m²   Gen: NAD, A and O x3,   Psych: pleasant affect, normal thought process  Eyes: Pupils round and non dilated, EOM intact  Nose: Nares patent  OP clear, mucosa patent  Neck with bilateral nodes palpable some fixed  Lungs: CTAB, no wheezes, no use of accessory muscles  CV: S1S2 with RRR, No mrg  Abd: soft,  no ascites  Extr: No CCE, ALDAIR, strength normal, good capillary refill  Neuro: steady gait, CNs grossly intact  Skin: intact, no lesions noted  Rheum: No joint swelling  CHEST port on right clean site, no erythema, no tenderness, no surrounded swelling    Lab Results   Component Value Date    WBC 7.70 03/05/2018    HGB 14.3 03/05/2018    HCT 41.7 03/05/2018    MCV 90 03/05/2018     (H) " 03/05/2018     CMP  Sodium   Date Value Ref Range Status   03/05/2018 139 136 - 145 mmol/L Final     Potassium   Date Value Ref Range Status   03/05/2018 4.2 3.5 - 5.1 mmol/L Final     Chloride   Date Value Ref Range Status   03/05/2018 103 95 - 110 mmol/L Final     CO2   Date Value Ref Range Status   03/05/2018 25 23 - 29 mmol/L Final     Glucose   Date Value Ref Range Status   03/05/2018 108 70 - 110 mg/dL Final     BUN, Bld   Date Value Ref Range Status   03/05/2018 11 8 - 23 mg/dL Final     Creatinine   Date Value Ref Range Status   03/05/2018 0.7 0.5 - 1.4 mg/dL Final     Calcium   Date Value Ref Range Status   03/05/2018 9.9 8.7 - 10.5 mg/dL Final     Total Protein   Date Value Ref Range Status   03/05/2018 7.5 6.0 - 8.4 g/dL Final     Albumin   Date Value Ref Range Status   03/05/2018 3.7 3.5 - 5.2 g/dL Final     Total Bilirubin   Date Value Ref Range Status   03/05/2018 0.7 0.1 - 1.0 mg/dL Final     Comment:     For infants and newborns, interpretation of results should be based  on gestational age, weight and in agreement with clinical  observations.  Premature Infant recommended reference ranges:  Up to 24 hours.............<8.0 mg/dL  Up to 48 hours............<12.0 mg/dL  3-5 days..................<15.0 mg/dL  6-29 days.................<15.0 mg/dL       Alkaline Phosphatase   Date Value Ref Range Status   03/05/2018 99 55 - 135 U/L Final     AST   Date Value Ref Range Status   03/05/2018 21 10 - 40 U/L Final     ALT   Date Value Ref Range Status   03/05/2018 14 10 - 44 U/L Final     Anion Gap   Date Value Ref Range Status   03/05/2018 11 8 - 16 mmol/L Final     eGFR if    Date Value Ref Range Status   03/05/2018 >60 >60 mL/min/1.73 m^2 Final     eGFR if non    Date Value Ref Range Status   03/05/2018 >60 >60 mL/min/1.73 m^2 Final     Comment:     Calculation used to obtain the estimated glomerular filtration  rate (eGFR) is the CKD-EPI equation.          Diffuse large  B-cell lymphoma of lymph nodes of multiple regions    Chemotherapy-induced nausea  -     ondansetron (ZOFRAN) 8 MG tablet; Take 1 tablet (8 mg total) by mouth every 12 (twelve) hours as needed for Nausea.  Dispense: 30 tablet; Refill: 2  -     lidocaine-prilocaine (EMLA) cream; Apply topically as needed. Before chemo and before labs  Dispense: 5 g; Refill: 2  -     CBC auto differential; Future; Expected date: 03/09/2018  -     CMP; Future; Expected date: 03/09/2018    Tracheal deviation    Lytic bone lesion of hip    Diffuse large B-cell lymphoma of lymph nodes of neck    Other orders  -     acetaminophen tablet 650 mg; Take 2 tablets (650 mg total) by mouth one time.  -     diphenhydrAMINE (BENADRYL) 50 mg in sodium chloride 0.9% 50 mL IVPB; Inject 50 mg into the vein one time.  -     famotidine (PF) injection 20 mg; Inject 2 mLs (20 mg total) into the vein one time.  -     meperidine (PF) injection 25 mg; Inject 0.5 mLs (25 mg total) into the vein as needed (Rigors).  -     riTUXimab (RITUXAN) 375 mg/m2 = 810 mg in sodium chloride 0.9% 810 mL infusion (conc: 1 mg/mL); Inject 810 mLs (810 mg total) into the vein one time.  -     palonosetron (ALOXI) 0.25 mg, dexamethasone (DECADRON) 20 mg in sodium chloride 0.9% 50 mL; Inject into the vein one time.  -     vinCRIStine (ONCOVIN) 2 mg in sodium chloride 0.9% 50 mL chemo infusion; Inject 2 mg into the vein one time.  -     DOXOrubicin chemo injection 108 mg; Inject 54 mLs (108 mg total) into the vein one time.  -     cyclophosphamide (CYTOXAN) 750 mg/m2 = 1,620 mg in sodium chloride 0.9% 250 mL chemo infusion; Inject 1,620 mg into the vein one time.  -     sodium chloride 0.9% flush 10 mL; Inject 10 mLs into the vein as needed for Line Care (Flush lines as needed.).  -     heparin, porcine (PF) 100 unit/mL injection flush 500 Units; Inject 5 mLs (500 Units total) into the vein as needed (Flush lines as needed).  -     alteplase injection 2 mg; 2 mg by Intra-Catheter  route as needed (To restore central venous catheter function).      Needs staging workup for DLBCL with bone marrow eval  She now states she will proceed with such  POrt looks good   Proceed with chemo school  Stage 2  Or if this is a primary thyroid lymphoma then her stage is 3As  In any event the primary treatment is the same chemo upfront as [facility]  Needs bone marrow biopsy and aspirate  Labs due before starting therapy   Will be offered [facility]  Start lexapro 10 mg daily to help her cope   Start chemo   Given the size of her thyroid mass and findings involving the lymph nodes , I am concerned that she has lymphomatous involvement of her thyroid  Thank you for allowing me to evaluate and participate in the care of this pleasant patient. Please fell free to call me with any questions or concerns.    Warmly,  Samira Garcia MD    This note was dictated with Dragon and briefly proofread. Please excuse any sentences that may be unclear or words misspelled

## 2018-03-14 DIAGNOSIS — C83.38 DIFFUSE LARGE B-CELL LYMPHOMA OF LYMPH NODES OF MULTIPLE REGIONS: Primary | ICD-10-CM

## 2018-03-26 ENCOUNTER — TELEPHONE (OUTPATIENT)
Dept: RADIOLOGY | Facility: HOSPITAL | Age: 63
End: 2018-03-26

## 2018-03-26 NOTE — NURSING
Contacted patient to go over instructions for BM biopsy scheduled for Wednesday April 28th  @ 11 am.  Pt instructed to arrive no later than 9:30 am to outpatient registration. Patient WILL require AM labs and is aware, Instructed patient on what meds to hold and what meds can be taken the morning of the procedure as well as all additional pre-op instructions.Pt verbalized understanding.

## 2018-03-27 ENCOUNTER — CLINICAL SUPPORT (OUTPATIENT)
Dept: HEMATOLOGY/ONCOLOGY | Facility: CLINIC | Age: 63
End: 2018-03-27
Payer: COMMERCIAL

## 2018-03-27 ENCOUNTER — DOCUMENTATION ONLY (OUTPATIENT)
Dept: RADIATION ONCOLOGY | Facility: CLINIC | Age: 63
End: 2018-03-27

## 2018-03-27 NOTE — PROGRESS NOTES
NUTRITION NOTE - CHEMO SCHOOL    Indira is a 67 year old female with Lymphoma getting RCHOP.  Weight: 224#.  She denies any weight loss, does have constipation.  Has no difficulty chewing or swallowing.    Plan: Discussed importance of nutrition and fluids during cancer treatment. Discouraged intentional weight loss.  2. Educated on the Fiber One and Senokot-S protocol for diarrhea.  3. Gave eating tips with nausea and diet for nausea.  Gave sample of powdered pedialyte.  4. Discouraged use of herb and vitamin supplements and discussed food safety.  5. Gave eating tips with taste changes.  6. Samples of nutrition supplements given.

## 2018-03-27 NOTE — PROGRESS NOTES
Indira Mattmattisaycookie  [id number]    [facility]    TITLE: PLAN OF CARE FOR THE CHEMOTHERAPY PATIENT / TEACHING PROTOCOL    PURPOSE: To involve the patient / significant other in the plan of care and to provide teaching to the significant other & patient receiving chemotherapy.    LEVEL: Independent.    CONTENT: The Plan of Care for the chemotherapy patient is individualized and appropriate to the patients needs, strengths, limitations, & goals.  Education includes information regarding chemotherapy side effects, the treatment itself, and self-care  Activities.    GOAL / OUTCOME STANDARDS    PHYSIOLOGIC: The client will remain free or experience minimal side effects or toxicities throughout the chemotherapy treatment period.     PSYCHOLOGIC: The client/significant others will demonstrate positive coping mechanisms in relation to chemotherapy and its side effects.      COGINITIVE: The client/significant others will verbalize understanding of self-care measure to avoid/minimize side effects of the chemotherapy regime.    EVALUATION / COMMENT KEY:    V = Audiovisual/Video  S = Successfully meets outcome  N = Needs further instruction  NA = Not applicable to the patient  P = Previous knowledge  U = Unable to comprehend  * = See progress notes          PLAN OF CARE  INFORMATION TO BE DELIVERED / NURSING INTERVENTIONS DATE EVALUATION   1. Assessment of client/caregiver,         knowledge of cancer diagnosis,         and chemotherapy as a treatment. 1a. Evaluate patient/caregiver learning ability    b. Plan teaching sessions with patient/caregiver according to needs and present anxiety level/ability to learn.    c. Provide Chemotherapy Education Packet,        Mouth Care Protocol,         Specific Patient Education Sheets. 03/27/2018 S   2. Individual chemotherapy treatment         plan. 2a. Review of Chemotherapy Education handout from [employer]            03/27/2018   S   3. Knowledge Deficit &  Self-Management of general side effects common to all chemotherapy:  a. Nausea/Vomiting  b.   Diarrhea  c. Mouth Care  d. Dental care  e. Constipation  f. Hair Loss  g. Potential for infection  h. Fatigue   3a. Reinforce that the majority of side effects from chemotherapy are reversible and are  controlled both in the hospital and at home        (blood counts recover, hair grows back).   b.  Refer to the following for reinforcement of         information post-treatment:  1. Mouth Care Protocol.  2. Bowel Protocol for constipation or diarrhea.  3.  Drug Specific Chemotherapy Information Sheets for each medication patient receiving.    03/27/2018     S     PLAN OF CARE  INFORMATION TO BE DELIVERED / NURSING INTERVENTIONS DATE EVALUATION   h. Potential for bleeding         i. Potential anemia/fatigue         j. Potential sunburn         k. Birth control measures  l. Safety measures post treatment 4.  Chemotherapy Home Care Instruction  and Safety Information Sheet.  A. patient/caregivers to thoroughly cook shellfish (shrimp, crab, etc) to decrease the chance of infection.    B.  Use sunscreen and protective clothing while in the sun.   03/27/2018      4. Knowledge deficit & Self Management of Drug Specific  Side Effects.    a. BLADDER EFFECTS        (Hemorrhagic Cystitis)                  Preventable with adequate hydration; occurs 2-3 days or more post treatment.   1.  Instruct patient to:  a.   Void at least every 2 hours; increase intake.  b.   DO NOT hold urine; go when urge is felt.  c.    Empty bladder at bedtime and on         awakening.  d.   Observe for color changes (red to tea           colored), amount and frequency changes.  e.   Notify oncologist of any abnormalities           in urine or voiding or if you cannot               drink adequate fluids.   03/27/2018   S   b.   CHANGES IN URINE        COLOR:      1.   Instruct patient:  a.   Most evident in first 2-3 voidings after            administration.  b. Lasts less than 24 hours.  c. If urine is discolored 2 or more days post- treatment, notify oncologist.      03/27/2018 S   c.    KIDNEY EFFECTS           (Nephrotoxicity)   1.  Instruct patient to:  a.   Drink 8-16 glasses of fluid/day the day   pre-treatment and 3-4 days post-treatment to maintain hydration; the best way to minimize kidney problems.  b.   Notify oncologist immediately if unable to drink fluids or if changes are noted in urinary elimination.     03/27/2018   S   a. PULMONARY TOXICITY    1. Instruct patient to report symptoms such as shortness of breath, chest pain, shallow breathing, or chest wall discomfort to physician.  2. Reinforce preventative measures used by the health care team.  a. Baseline and periodic PFT and chest x-ray.   03/27/2018   S     PLAN OF CARE INFORMATION TO BE DELIVERED / NURSING INTERVENTIONS DATE EVALUATION   b. NERVE & MUSCLE EFFECTS (neurotoxocity; neuropathy, possible visual/hearing changes)        3. Instruct patient to:    a. Report numbness or tingling of the hands/feet, loss of fine motor movement (buttoning shirt, tying shoelaces), or gait changes to your oncologist.  b. If numbness/tingling are present:  1. protect feet with shoes at all times.  2. Use gloves for washing dishes/gardening & potholders in kitchen.       03/27/2018   S   c. CARDIOTOXICITY  Decreased effectiveness of             cardiac function. Effective are                  cumulative and irreversible.                                    CARDIAC ARRYTHMIAS              4   Instruct:  a. Heart function may be tested before treatment and perdiocally during treatment.  b. Notify oncologist of irregular pulse, palpitations, shortness of breath, or swelling in lower extremities/feet.          Taxol and Taxotere can cause arrhythmias on infusion that resolve once infusion discontinued. Instruct nurse if any irregularity felt.    03/27/2018   S   d. EXTRAVASTION  Occurs when vesicants  leak outside of vein and cause damage to the skin and underlying tissues.   1. Reinforce preventive measures used to avoid complications.  a. Fresh IV site or central line monitored continuously with vesicant IVP.  b. Continuous infusion via central line site and blood return monitored periodically around the clock.  2. Instruct to:  a. Notify nurse of any discomfort, burning, stinging, etc. at IV site during chemotherapy administration.  b. Notify oncologist of any redness, pain, or swelling at IV site after discharge from hospital.   03/27/2018   S   e. HYPERSENSITIVITY can happen with any medication.   1. Instruct patient:  a. Nurse is with them during the initial part of treatment and will be close by to monitor.  b. Pre-medication ordered by the oncologist must be taken on time. If doses are missed, treatment will need to be re-scheduled.  c. Skin redness, itching, or hives appearing after discharge should be reported to oncologist. 03/27/2018   S       PLAN OF CARE INFORMATION TO BE DELIVERED / NURSING INTERVENTIONS DATE EVALUATION   f. FLU-LIKE SYNDROME      1. Instruct patient symptoms are hard to prevent and may include fever, shaking chills, muscle and body aches.  a. Taking prescribed medications from physician if needed.  b. Adequate fluids are important.    2. Reinforce the need to call if temperature is         elevated to 100.4 or more  03/27/2018   S   g. HAND-FOOT SYNDROME  causes painful, symmetric swelling and redness of palms and soles                  5. Instruct patient to report any numbness or tingling in the hands or feet.  6. Explain prevention techniques, such as     a. Use heavy moisturizers to lessen skin dryness and itching, but to avoid if skin is cracked or broken  b. Bathe in tepid water, use non-perfumed soap, and wash gently. Baths with oatmeal or diluted baking soda may be soothing.  c. Avoid tight fitting shoes and repetitive actions, such as rubbing hands or applying pressure to  hands/feet.  7. Review measures to take should syndrome occur:  a. Cold compresses and elevation for          edema  b. Pain medications and other measures as ordered by oncologist.   4.   Syndrome resolves few weeks after therapy. 03/27/2018   S   5. DISCHARGE PLANNING /        EDUCATION 1.    Explain importance of compliance with follow- up  tests (CBC, CMP).  2.    Verify patient/caregiver know:  a.    Oncologists office phone number.  b.    Dates of follow-up appointments.  c.    Prescriptions given for nausea  3.   Review side effects to monitor and notify          oncologist about.  4.   Reinforce the need for patient and caregivers to:  a.    Review information given.  b.    Call oncologists office with questions          or symptoms  5.   Provide [facility] [place] Brochure make referrals if needed for financial or .   03/27/2018   S     PROGRESS NOTES: I met with the patient and her  today for chemotherapy education. she will be starting treatment with Rituxan, Cytoxan, Vincristine, Adriamycin, Prednisone, Neulasta . We discussed the mechanism of action, potential side effects of this treatment as well as ways she can manage them at home. Some of these side effects include but or not limited to fever, nausea, vomiting, decreased appetite, fatigue, weakness, cytopenias, myalgia/arthralgia, constipation, diarrhea, bleeding, headache, shortness of breath, nail changes, taste change, hair thinning/loss, mood disturbances, or edema. We also discussed dietary modifications she should make although this will be discussed in more detail with the dietician. she was provided with a copy of all of the information we discussed today as well as our contact information. she will be provided a schedule on his first day of treatment. We will obtain labs as directed by Dr. Garcia's office and the patient will follow-up with the physician for toxicity monitoring throughout treatment. All questions  were answered and an informed consent was obtained. she was reminded to certainly contact Dr. Garcia sooner if needed.

## 2018-03-28 ENCOUNTER — HOSPITAL ENCOUNTER (OUTPATIENT)
Dept: RADIOLOGY | Facility: HOSPITAL | Age: 63
Discharge: HOME | End: 2018-03-28
Attending: INTERNAL MEDICINE
Payer: COMMERCIAL

## 2018-03-28 VITALS
OXYGEN SATURATION: 96 % | HEART RATE: 62 BPM | DIASTOLIC BLOOD PRESSURE: 59 MMHG | WEIGHT: 220 LBS | SYSTOLIC BLOOD PRESSURE: 119 MMHG | HEIGHT: 65 IN | RESPIRATION RATE: 16 BRPM | BODY MASS INDEX: 36.65 KG/M2

## 2018-03-28 DIAGNOSIS — C83.38 DIFFUSE LARGE B-CELL LYMPHOMA OF LYMPH NODES OF MULTIPLE REGIONS: ICD-10-CM

## 2018-03-28 DIAGNOSIS — E04.2 MULTINODULAR GOITER: ICD-10-CM

## 2018-03-28 DIAGNOSIS — C83.31 DIFFUSE LARGE B-CELL LYMPHOMA OF LYMPH NODES OF NECK: Primary | ICD-10-CM

## 2018-03-28 PROCEDURE — 88342 IMHCHEM/IMCYTCHM 1ST ANTB: CPT | Mod: 26,59,, | Performed by: PATHOLOGY

## 2018-03-28 PROCEDURE — 88341 IMHCHEM/IMCYTCHM EA ADD ANTB: CPT | Mod: 26,59,, | Performed by: PATHOLOGY

## 2018-03-28 PROCEDURE — 38222 DX BONE MARROW BX & ASPIR: CPT

## 2018-03-28 PROCEDURE — 99152 MOD SED SAME PHYS/QHP 5/>YRS: CPT

## 2018-03-28 PROCEDURE — 88342 IMHCHEM/IMCYTCHM 1ST ANTB: CPT | Performed by: PATHOLOGY

## 2018-03-28 PROCEDURE — 99153 MOD SED SAME PHYS/QHP EA: CPT

## 2018-03-28 PROCEDURE — 88189 FLOWCYTOMETRY/READ 16 & >: CPT | Mod: ,,, | Performed by: PATHOLOGY

## 2018-03-28 PROCEDURE — 25000003 PHARM REV CODE 250

## 2018-03-28 PROCEDURE — 88311 DECALCIFY TISSUE: CPT | Mod: 26,,, | Performed by: PATHOLOGY

## 2018-03-28 PROCEDURE — 99900104 DSU ONLY-NO CHARGE-EA ADD'L HR (STAT): Performed by: INTERNAL MEDICINE

## 2018-03-28 PROCEDURE — 99900103 DSU ONLY-NO CHARGE-INITIAL HR (STAT): Performed by: INTERNAL MEDICINE

## 2018-03-28 PROCEDURE — 88313 SPECIAL STAINS GROUP 2: CPT | Mod: 26,,, | Performed by: PATHOLOGY

## 2018-03-28 PROCEDURE — 88264 CHROMOSOME ANALYSIS 20-25: CPT

## 2018-03-28 PROCEDURE — 38221 DX BONE MARROW BIOPSIES: CPT | Mod: RT,,, | Performed by: RADIOLOGY

## 2018-03-28 PROCEDURE — 88313 SPECIAL STAINS GROUP 2: CPT

## 2018-03-28 PROCEDURE — 88184 FLOWCYTOMETRY/ TC 1 MARKER: CPT | Performed by: PATHOLOGY

## 2018-03-28 PROCEDURE — 63600175 PHARM REV CODE 636 W HCPCS

## 2018-03-28 PROCEDURE — 85097 BONE MARROW INTERPRETATION: CPT | Mod: ,,, | Performed by: PATHOLOGY

## 2018-03-28 PROCEDURE — 88305 TISSUE EXAM BY PATHOLOGIST: CPT | Performed by: PATHOLOGY

## 2018-03-28 PROCEDURE — 77012 CT SCAN FOR NEEDLE BIOPSY: CPT | Mod: 26,RT,, | Performed by: RADIOLOGY

## 2018-03-28 PROCEDURE — 88185 FLOWCYTOMETRY/TC ADD-ON: CPT | Mod: 59 | Performed by: PATHOLOGY

## 2018-03-28 PROCEDURE — 88237 TISSUE CULTURE BONE MARROW: CPT

## 2018-03-28 PROCEDURE — 88305 TISSUE EXAM BY PATHOLOGIST: CPT | Mod: 26,,, | Performed by: PATHOLOGY

## 2018-03-28 PROCEDURE — 77012 CT SCAN FOR NEEDLE BIOPSY: CPT | Mod: TC

## 2018-03-28 RX ORDER — LIDOCAINE HYDROCHLORIDE 10 MG/ML
INJECTION, SOLUTION EPIDURAL; INFILTRATION; INTRACAUDAL; PERINEURAL
Status: COMPLETED
Start: 2018-03-28 | End: 2018-03-28

## 2018-03-28 RX ORDER — FENTANYL CITRATE 50 UG/ML
25 INJECTION, SOLUTION INTRAMUSCULAR; INTRAVENOUS
Status: DISCONTINUED | OUTPATIENT
Start: 2018-03-28 | End: 2018-03-29 | Stop reason: HOSPADM

## 2018-03-28 RX ORDER — FENTANYL CITRATE 50 UG/ML
INJECTION, SOLUTION INTRAMUSCULAR; INTRAVENOUS
Status: COMPLETED
Start: 2018-03-28 | End: 2018-03-28

## 2018-03-28 RX ORDER — HYDROCODONE BITARTRATE AND ACETAMINOPHEN 5; 325 MG/1; MG/1
1 TABLET ORAL EVERY 4 HOURS PRN
Status: DISCONTINUED | OUTPATIENT
Start: 2018-03-28 | End: 2018-03-29 | Stop reason: HOSPADM

## 2018-03-28 RX ORDER — MIDAZOLAM HYDROCHLORIDE 2 MG/2ML
1 INJECTION, SOLUTION INTRAMUSCULAR; INTRAVENOUS
Status: DISCONTINUED | OUTPATIENT
Start: 2018-03-28 | End: 2018-03-29 | Stop reason: HOSPADM

## 2018-03-28 RX ORDER — MIDAZOLAM HYDROCHLORIDE 1 MG/ML
INJECTION, SOLUTION INTRAMUSCULAR; INTRAVENOUS
Status: COMPLETED
Start: 2018-03-28 | End: 2018-03-28

## 2018-03-28 RX ORDER — LIDOCAINE HYDROCHLORIDE 10 MG/ML
30 INJECTION, SOLUTION EPIDURAL; INFILTRATION; INTRACAUDAL; PERINEURAL ONCE
Status: COMPLETED | OUTPATIENT
Start: 2018-03-28 | End: 2018-03-28

## 2018-03-28 RX ADMIN — LIDOCAINE HYDROCHLORIDE: 10 INJECTION, SOLUTION EPIDURAL; INFILTRATION; INTRACAUDAL; PERINEURAL at 11:03

## 2018-03-28 RX ADMIN — FENTANYL CITRATE 50 MCG: 50 INJECTION INTRAMUSCULAR; INTRAVENOUS at 11:03

## 2018-03-28 RX ADMIN — FENTANYL CITRATE 50 MCG: 50 INJECTION, SOLUTION INTRAMUSCULAR; INTRAVENOUS at 11:03

## 2018-03-28 RX ADMIN — MIDAZOLAM HYDROCHLORIDE 2 MG: 1 INJECTION INTRAMUSCULAR; INTRAVENOUS at 11:03

## 2018-03-28 RX ADMIN — MIDAZOLAM HYDROCHLORIDE 2 MG: 2 INJECTION, SOLUTION INTRAMUSCULAR; INTRAVENOUS at 11:03

## 2018-03-28 NOTE — OR NURSING
Discharge instructions discussed with patient and spouse.  No rx given.  Copy of avs given to spouse.  Verbalized understanding.

## 2018-03-28 NOTE — NURSING
Labs reviewed with Radiologist. BM biopsy performed by Dr Mares.   Versed- 2 mg Fentanyl 50 mcg were administered IV- Vital signs were monitored and remained stable for duration of procedure- Bandaid applied to Right lower back CDI- Pathology [facility] present for collection of specimens. Report called to post op nurse- ARTEM Hurtado. pt transported via stretcher to post op recovery.

## 2018-03-28 NOTE — PLAN OF CARE
Pt is a current smoker.  Lungs with slight inspiratory wheeze.   Oxygen saturation = 96% on room air.  PAC in place to right upper chest with no dressing.  Site WNL.

## 2018-03-28 NOTE — H&P (VIEW-ONLY)
Pt is crying    Indira Chauhan is a 62 y.o.  Pt found a lump in her neck and underwent a biopsy of such. Testing revealed lymphoma subtype Diffuse large B cell. SHe has not had fever  , no weight loss, no night sweats  She has had a chest port placed Monday. She did not start lexapro. She is scared   She refused a bone marrow biopsy      PET CT reviewed  Left sacral area appears to have a lytic lesion as well as disease above and below the diaphragm, trachea shifted to the right due to left adenopathy  No HSM    Echo reviewed    Past Medical History:   Diagnosis Date    GERD (gastroesophageal reflux disease)     Hyperlipidemia     Hypertension     Hypothyroidism     Thyroid mass 11/02/2017         Current Outpatient Prescriptions:     amlodipine-benazepril (LOTREL) 5-40 mg per capsule, Take 1 capsule by mouth once daily., Disp: 90 capsule, Rfl: 0    atorvastatin (LIPITOR) 40 MG tablet, Take 1 tablet (40 mg total) by mouth once daily., Disp: 30 tablet, Rfl: 3    escitalopram oxalate (LEXAPRO) 10 MG tablet, Take 1 tablet (10 mg total) by mouth once daily., Disp: 30 tablet, Rfl: 2    hydroCHLOROthiazide (HYDRODIURIL) 25 MG tablet, Take 1 tablet (25 mg total) by mouth once daily., Disp: 30 tablet, Rfl: 2    hydrocodone-acetaminophen 5-325mg (NORCO) 5-325 mg per tablet, Take 1 tablet by mouth every 4 (four) hours as needed for Pain., Disp: 12 tablet, Rfl: 0    levothyroxine (SYNTHROID) 150 MCG tablet, Take 1 tablet (150 mcg total) by mouth once daily., Disp: 90 tablet, Rfl: 1  Review of patient's allergies indicates:  No Known Allergies  Social History   Substance Use Topics    Smoking status: Current Every Day Smoker     Packs/day: 0.50     Years: 40.00    Smokeless tobacco: Never Used    Alcohol use 1.8 oz/week     3 Shots of liquor per week      Comment: social 3/3/2018       PreOperative Diagnosis  1. Lymphadenopathy.  2. Left neck mass.    SPECIMEN  1) Left lymph node.  2) Left neck mass.    FINAL  "PATHOLOGIC DIAGNOSIS  1. LEFT LYMPH NODE, CORE BIOPSY- DIFFUSE LARGE B-CELL LYMPHOMA, GERMINAL CENTER ORIGIN.  SEE COMMENT.  COMMENT: Fragments of tissue show diffusely infiltrated by large atypical lymphocytes .  Flow cytometric analysis of lymph node shows T lymphocytes that are immunophenotypically unremarkable . B  lymphocytes are essentially absent. The blast gate is not increased.            CONSTITUTIONAL: No fevers, chills, night sweats, wt. loss, appetite changes  SKIN: no rashes or itching  ENT: No headaches, head trauma, vision changes, or eye pain  CV: No chest pain, palpitations.   RESP: No dyspnea on exertion, cough, wheezing, or hemoptysis  GI: No nausea, emesis, diarrhea, constipation, melena, hematochezia, pain.   : No dysuria, hematuria, urgency, or frequency   HEME: No easy bruising, bleeding problems  PSYCHIATRIC: ++ depression,++ anxiety, no psychosis, hallucinations.  NEURO: No seizures, memory loss, dizziness or difficulty sleeping  MSK: No arthralgias or joint swelling         BP (!) 176/77 (BP Location: Left arm, Patient Position: Sitting, BP Method: Medium (Automatic))   Pulse 88   Temp 98.3 °F (36.8 °C) (Oral)   Resp 20   Ht 5' 5" (1.651 m)   Wt 102 kg (224 lb 13.9 oz)   BMI 37.42 kg/m²   Gen: NAD, A and O x3,   Psych: pleasant affect, normal thought process  Eyes: Pupils round and non dilated, EOM intact  Nose: Nares patent  OP clear, mucosa patent  Neck with bilateral nodes palpable some fixed  Lungs: CTAB, no wheezes, no use of accessory muscles  CV: S1S2 with RRR, No mrg  Abd: soft,  no ascites  Extr: No CCE, ALDAIR, strength normal, good capillary refill  Neuro: steady gait, CNs grossly intact  Skin: intact, no lesions noted  Rheum: No joint swelling  CHEST port on right clean site, no erythema, no tenderness, no surrounded swelling    Lab Results   Component Value Date    WBC 7.70 03/05/2018    HGB 14.3 03/05/2018    HCT 41.7 03/05/2018    MCV 90 03/05/2018     (H) " 03/05/2018     CMP  Sodium   Date Value Ref Range Status   03/05/2018 139 136 - 145 mmol/L Final     Potassium   Date Value Ref Range Status   03/05/2018 4.2 3.5 - 5.1 mmol/L Final     Chloride   Date Value Ref Range Status   03/05/2018 103 95 - 110 mmol/L Final     CO2   Date Value Ref Range Status   03/05/2018 25 23 - 29 mmol/L Final     Glucose   Date Value Ref Range Status   03/05/2018 108 70 - 110 mg/dL Final     BUN, Bld   Date Value Ref Range Status   03/05/2018 11 8 - 23 mg/dL Final     Creatinine   Date Value Ref Range Status   03/05/2018 0.7 0.5 - 1.4 mg/dL Final     Calcium   Date Value Ref Range Status   03/05/2018 9.9 8.7 - 10.5 mg/dL Final     Total Protein   Date Value Ref Range Status   03/05/2018 7.5 6.0 - 8.4 g/dL Final     Albumin   Date Value Ref Range Status   03/05/2018 3.7 3.5 - 5.2 g/dL Final     Total Bilirubin   Date Value Ref Range Status   03/05/2018 0.7 0.1 - 1.0 mg/dL Final     Comment:     For infants and newborns, interpretation of results should be based  on gestational age, weight and in agreement with clinical  observations.  Premature Infant recommended reference ranges:  Up to 24 hours.............<8.0 mg/dL  Up to 48 hours............<12.0 mg/dL  3-5 days..................<15.0 mg/dL  6-29 days.................<15.0 mg/dL       Alkaline Phosphatase   Date Value Ref Range Status   03/05/2018 99 55 - 135 U/L Final     AST   Date Value Ref Range Status   03/05/2018 21 10 - 40 U/L Final     ALT   Date Value Ref Range Status   03/05/2018 14 10 - 44 U/L Final     Anion Gap   Date Value Ref Range Status   03/05/2018 11 8 - 16 mmol/L Final     eGFR if    Date Value Ref Range Status   03/05/2018 >60 >60 mL/min/1.73 m^2 Final     eGFR if non    Date Value Ref Range Status   03/05/2018 >60 >60 mL/min/1.73 m^2 Final     Comment:     Calculation used to obtain the estimated glomerular filtration  rate (eGFR) is the CKD-EPI equation.          Diffuse large  B-cell lymphoma of lymph nodes of multiple regions    Chemotherapy-induced nausea  -     ondansetron (ZOFRAN) 8 MG tablet; Take 1 tablet (8 mg total) by mouth every 12 (twelve) hours as needed for Nausea.  Dispense: 30 tablet; Refill: 2  -     lidocaine-prilocaine (EMLA) cream; Apply topically as needed. Before chemo and before labs  Dispense: 5 g; Refill: 2  -     CBC auto differential; Future; Expected date: 03/09/2018  -     CMP; Future; Expected date: 03/09/2018    Tracheal deviation    Lytic bone lesion of hip    Diffuse large B-cell lymphoma of lymph nodes of neck    Other orders  -     acetaminophen tablet 650 mg; Take 2 tablets (650 mg total) by mouth one time.  -     diphenhydrAMINE (BENADRYL) 50 mg in sodium chloride 0.9% 50 mL IVPB; Inject 50 mg into the vein one time.  -     famotidine (PF) injection 20 mg; Inject 2 mLs (20 mg total) into the vein one time.  -     meperidine (PF) injection 25 mg; Inject 0.5 mLs (25 mg total) into the vein as needed (Rigors).  -     riTUXimab (RITUXAN) 375 mg/m2 = 810 mg in sodium chloride 0.9% 810 mL infusion (conc: 1 mg/mL); Inject 810 mLs (810 mg total) into the vein one time.  -     palonosetron (ALOXI) 0.25 mg, dexamethasone (DECADRON) 20 mg in sodium chloride 0.9% 50 mL; Inject into the vein one time.  -     vinCRIStine (ONCOVIN) 2 mg in sodium chloride 0.9% 50 mL chemo infusion; Inject 2 mg into the vein one time.  -     DOXOrubicin chemo injection 108 mg; Inject 54 mLs (108 mg total) into the vein one time.  -     cyclophosphamide (CYTOXAN) 750 mg/m2 = 1,620 mg in sodium chloride 0.9% 250 mL chemo infusion; Inject 1,620 mg into the vein one time.  -     sodium chloride 0.9% flush 10 mL; Inject 10 mLs into the vein as needed for Line Care (Flush lines as needed.).  -     heparin, porcine (PF) 100 unit/mL injection flush 500 Units; Inject 5 mLs (500 Units total) into the vein as needed (Flush lines as needed).  -     alteplase injection 2 mg; 2 mg by Intra-Catheter  route as needed (To restore central venous catheter function).      Needs staging workup for DLBCL with bone marrow eval  She now states she will proceed with such  POrt looks good   Proceed with chemo school  Stage 2  Or if this is a primary thyroid lymphoma then her stage is 3As  In any event the primary treatment is the same chemo upfront as [facility]  Needs bone marrow biopsy and aspirate  Labs due before starting therapy   Will be offered [facility]  Start lexapro 10 mg daily to help her cope   Start chemo   Given the size of her thyroid mass and findings involving the lymph nodes , I am concerned that she has lymphomatous involvement of her thyroid  Thank you for allowing me to evaluate and participate in the care of this pleasant patient. Please fell free to call me with any questions or concerns.    Warmly,  Samira Garcia MD    This note was dictated with Dragon and briefly proofread. Please excuse any sentences that may be unclear or words misspelled

## 2018-03-28 NOTE — DISCHARGE INSTRUCTIONS
General Information:    1.  Do not drink alcoholic beverages including beer for 24 hours or as long as you are on pain medication..  2.  Do not drive a motor vehicle, operate machinery or power tools, or signs legal papers for 24 hours or as long as you are on pain medication.   3.  You may experience light-headedness, dizziness, and sleepiness following surgery. Please do not stay alone. A responsible adult should be with you for this 24 hour period.  4.  Go home and rest.    5. Progress slowly to a normal diet unless instructed.  Otherwise, begin with liquids such as soft drinks, then soup and crackers working up to solid foods. Drink plenty of nonalcoholic fluids.  6.  Certain anesthetics and pain medications produce nausea and vomiting in certain       individuals. If nausea becomes a problem at home, call you doctor.    7. A nurse will be calling you sometime after surgery. Do not be alarmed. This is our way of finding out how you are doing.    8. Several times every hour while you are awake, take 2-3 deep breaths and cough. If you had stomach surgery hold a pillow or rolled towel firmly against your stomach before you cough. This will help with any pain the cough might cause.  9. Several times every hour while you are awake, pump and flex your feet 5-6 times and do foot circles. This will help prevent blood clots.    10.Call your doctor for severe pain, bleeding, fever, or signs or symptoms of infection (pain, swelling, redness, foul odor, drainage).    Discharge Instructions: After Your Surgery/Procedure  Youve just had surgery. During surgery you were given medicine called anesthesia to keep you relaxed and free of pain. After surgery you may have some pain or nausea. This is common. Here are some tips for feeling better and getting well after surgery.     Stay on schedule with your medication.   Going home  Your doctor or nurse will show you how to take care of yourself when you go home. He or she will  "also answer your questions. Have an adult family member or friend drive you home.      For your safety we recommend these precaution for the first 24 hours after your procedure:  · Do not drive or use heavy equipment.  · Do not make important decisions or sign legal papers.  · Do not drink alcohol.  · Have someone stay with you, if needed. He or she can watch for problems and help keep you safe.  · Your concentration, balance, coordination, and judgement may be impaired for many hours after anesthesia.  Use caution when ambulating or standing up.     · You may feel weak and "washed out" after anesthesia and surgery.      Subtle residual effects of general anesthesia or sedation with regional / local anesthesia can last more than 24 hours.  Rest for the remainder of the day or longer if your Doctor/Surgeon has advised you to do so.  Although you may feel normal within the first 24 hours, your reflexes and mental ability may be impaired without you realizing it.  You may feel dizzy, lightheaded or sleepy for 24 hours or longer.      Be sure to go to all follow-up visits with your doctor. And rest after your surgery for as long as your doctor tells you to.  Coping with pain  If you have pain after surgery, pain medicine will help you feel better. Take it as told, before pain becomes severe. Also, ask your doctor or pharmacist about other ways to control pain. This might be with heat, ice, or relaxation. And follow any other instructions your surgeon or nurse gives you.  Tips for taking pain medicine  To get the best relief possible, remember these points:  · Pain medicines can upset your stomach. Taking them with a little food may help.  · Most pain relievers taken by mouth need at least 20 to 30 minutes to start to work.  · Taking medicine on a schedule can help you remember to take it. Try to time your medicine so that you can take it before starting an activity. This might be before you get dressed, go for a walk, " or sit down for dinner.  · Constipation is a common side effect of pain medicines. Call your doctor before taking any medicines such as laxatives or stool softeners to help ease constipation. Also ask if you should skip any foods. Drinking lots of fluids and eating foods such as fruits and vegetables that are high in fiber can also help. Remember, do not take laxatives unless your surgeon has prescribed them.  · Drinking alcohol and taking pain medicine can cause dizziness and slow your breathing. It can even be deadly. Do not drink alcohol while taking pain medicine.  · Pain medicine can make you react more slowly to things. Do not drive or run machinery while taking pain medicine.  Your health care provider may tell you to take acetaminophen to help ease your pain. Ask him or her how much you are supposed to take each day. Acetaminophen or other pain relievers may interact with your prescription medicines or other over-the-counter (OTC) drugs. Some prescription medicines have acetaminophen and other ingredients. Using both prescription and OTC acetaminophen for pain can cause you to overdose. Read the labels on your OTC medicines with care. This will help you to clearly know the list of ingredients, how much to take, and any warnings. It may also help you not take too much acetaminophen. If you have questions or do not understand the information, ask your pharmacist or health care provider to explain it to you before you take the OTC medicine.  Managing nausea  Some people have an upset stomach after surgery. This is often because of anesthesia, pain, or pain medicine, or the stress of surgery. These tips will help you handle nausea and eat healthy foods as you get better. If you were on a special food plan before surgery, ask your doctor if you should follow it while you get better. These tips may help:  · Do not push yourself to eat. Your body will tell you when to eat and how much.  · Start off with clear  liquids and soup. They are easier to digest.  · Next try semi-solid foods, such as mashed potatoes, applesauce, and gelatin, as you feel ready.  · Slowly move to solid foods. Dont eat fatty, rich, or spicy foods at first.  · Do not force yourself to have 3 large meals a day. Instead eat smaller amounts more often.  · Take pain medicines with a small amount of solid food, such as crackers or toast, to avoid nausea.     Call your surgeon if  · You still have pain an hour after taking medicine. The medicine may not be strong enough.  · You feel too sleepy, dizzy, or groggy. The medicine may be too strong.  · You have side effects like nausea, vomiting, or skin changes, such as rash, itching, or hives.       If you have obstructive sleep apnea  You were given anesthesia medicine during surgery to keep you comfortable and free of pain. After surgery, you may have more apnea spells because of this medicine and other medicines you were given. The spells may last longer than usual.   At home:  · Keep using the continuous positive airway pressure (CPAP) device when you sleep. Unless your health care provider tells you not to, use it when you sleep, day or night. CPAP is a common device used to treat obstructive sleep apnea.  · Talk with your provider before taking any pain medicine, muscle relaxants, or sedatives. Your provider will tell you about the possible dangers of taking these medicines.  © 3725-0276 [employer]. [address] [place] [address]. All rights reserved. This information is not intended as a substitute for professional medical care. Always follow your healthcare professional's instructions.  Post op instructions for prevention of DVT  What is deep vein thrombosis?  Deep vein thrombosis (DVT) is the medical term for blood clots in the deep veins of the leg.  These blood clots can be dangerous.  A DVT can block a blood vessel and keep blood from getting where it needs to go.   Another problem is that the clot can travel to other parts of the body such as the lungs.  A clot that travels to the lungs is called a pulmonary embolus (PE) and can cause serious problems with breathing which can lead to death.  Am I at risk for DVT/PE?  If you are not very active, you are at risk of DVT.  Anyone confined to bed, sitting for long periods of time, recovering from surgery, etc. increases the risk of DVT.  Other risk factors are cancer diagnosis, certain medications, estrogen replacement in any form,older age, obesity, pregnancy, smoking, history of clotting disorders, and dehydration.  How will I know if I have a DVT?   Swelling in the lower leg   Pain   Warmth, redness, hardness or bulging of the vein  If you have any of these symptoms, call your doctors office right away.  Some people will not have any symptoms until the clot moves to the lungs.  What are the symptoms of a PE?   Panting, shortness of breath, or trouble breathing   Sharp, knife-like chest pain when you breathe   Coughing or coughing up blood   Rapid heartbeat  If you have any of these symptoms or get worse quickly, call 911 for emergency treatment.  How can I prevent a DVT?   Avoid long periods of inactivity and dont cross your legs--get up and walk around every hour or so.   Stay active--walking after surgery is highly encouraged.  This means you should get out of the house and walk in the neighborhood.  Going up and down stairs will not impair healing (unless advised against such activity by your doctor).     Drink plenty of noncaffeinated, nonalcoholic fluids each day to prevent dehydration.   Wear special support stockings, if they have been advised by your doctor.   If you travel, stop at least once an hour and walk around.   Avoid smoking (assistance with stopping is available through your healthcare provider)  Always notify your doctor if you are not able to follow the post operative instructions that are  given to you at the time of discharge.  It may be necessary to prescribe one of the medications available to prevent DVT.      We hope your stay was comfortable as you heal now, mend and rest.    For we have enjoyed taking care of you by giving your our best.    And as you get better, by regaining your health and strength;   We count it as a privilege to have served you and hope your time at [facility] was well spent.      Thank  You!!!

## 2018-03-28 NOTE — OR NURSING
Patient discharged home per wheelchair per personal vehicle with spouse.  aao x 4. No complaints voiced at discharge.

## 2018-03-28 NOTE — INTERVAL H&P NOTE
The patient has been examined and the H&P has been reviewed:    I concur with the findings and no changes have occurred since H&P was written. plan bone marrow aspirate and biopsy with moderate sedation.         Active Hospital Problems    Diagnosis  POA    Diffuse large B-cell lymphoma of lymph nodes of multiple regions [C83.38]  Yes      Resolved Hospital Problems    Diagnosis Date Resolved POA   No resolved problems to display.

## 2018-03-29 LAB — BONE MARROW IRON STAIN COMMENT: NORMAL

## 2018-04-02 LAB
BODY SITE - BONE MARROW: NORMAL
BONE MARROW WRIGHT STAIN COMMENT: NORMAL
CLINICAL DIAGNOSIS - BONE MARROW: NORMAL
FLOW CYTOMETRY ANTIBODIES ANALYZED - BONE MARROW: NORMAL
FLOW CYTOMETRY COMMENT - BONE MARROW: NORMAL
FLOW CYTOMETRY INTERPRETATION - BONE MARROW: NORMAL

## 2018-04-04 ENCOUNTER — TELEPHONE (OUTPATIENT)
Dept: VASCULAR SURGERY | Facility: CLINIC | Age: 63
End: 2018-04-04

## 2018-04-04 DIAGNOSIS — Z79.899 CHEMOPROPHYLAXIS: Primary | ICD-10-CM

## 2018-04-04 LAB
CHROM BANDING METHOD: NORMAL
CHROMOSOME ANALYSIS BM ADDITIONAL INFORMATION: NORMAL
CHROMOSOME ANALYSIS BM RELEASED BY: NORMAL
CHROMOSOME ANALYSIS BM RESULT SUMMARY: NORMAL
CLINICAL CYTOGENETICIST REVIEW: NORMAL
KARYOTYP MAR: NORMAL
REASON FOR REFERRAL (NARRATIVE): NORMAL
REF LAB TEST METHOD: NORMAL
SPECIMEN SOURCE: NORMAL
SPECIMEN: NORMAL

## 2018-04-04 RX ORDER — PREDNISONE 50 MG/1
50 TABLET ORAL DAILY
Qty: 60 TABLET | Refills: 0 | Status: SHIPPED | OUTPATIENT
Start: 2018-04-04 | End: 2019-02-25 | Stop reason: CLARIF

## 2018-04-04 RX ORDER — PROMETHAZINE HYDROCHLORIDE 25 MG/1
25 SUPPOSITORY RECTAL EVERY 6 HOURS PRN
Qty: 12 SUPPOSITORY | Refills: 1 | Status: SHIPPED | OUTPATIENT
Start: 2018-04-04 | End: 2018-04-30 | Stop reason: ALTCHOICE

## 2018-04-04 NOTE — TELEPHONE ENCOUNTER
----- Message from Yelitza Leon sent at 4/4/2018  4:22 PM CDT -----  Contact: [facility] called regarding patient's compound received, is it equal parts of all 3? Did not state how much of each drug. Please contact [id number] - [facility] - [address] AT [facility] [address] [facility] [address]  [address] [address]  Phone: [phone] Fax: [phone]

## 2018-04-06 ENCOUNTER — TELEPHONE (OUTPATIENT)
Dept: HEMATOLOGY/ONCOLOGY | Facility: CLINIC | Age: 63
End: 2018-04-06

## 2018-04-06 NOTE — TELEPHONE ENCOUNTER
Called patient to rescheduled appointment due to provider being out. Patient told that Dr Garcia will review her labs to see if she is ok for Chemo on Monday. Patient stated the only other day she can come to office for an appointment or chemo is on 4/16/18. Patient stated that she can only come when  is off. I explained to patient that her chemo may fall on a day when her  is working because it will be every 21 days, and that she will still need to come in, she stated that won't be possible. I offered the patient help with transportation on the days her  is working and she stated she only wants to come  especially with first treatment. Dr Garcia and [facility] infusion notified. Chemo start date will be changed to 4/16/18 and assistance for transportation will be offered again.

## 2018-04-16 ENCOUNTER — LAB VISIT (OUTPATIENT)
Dept: LAB | Facility: HOSPITAL | Age: 63
End: 2018-04-16
Attending: INTERNAL MEDICINE
Payer: COMMERCIAL

## 2018-04-16 ENCOUNTER — OFFICE VISIT (OUTPATIENT)
Dept: HEMATOLOGY/ONCOLOGY | Facility: CLINIC | Age: 63
End: 2018-04-16
Payer: COMMERCIAL

## 2018-04-16 VITALS
WEIGHT: 222.44 LBS | TEMPERATURE: 99 F | HEART RATE: 92 BPM | BODY MASS INDEX: 37.06 KG/M2 | HEIGHT: 65 IN | RESPIRATION RATE: 20 BRPM | DIASTOLIC BLOOD PRESSURE: 65 MMHG | SYSTOLIC BLOOD PRESSURE: 146 MMHG

## 2018-04-16 DIAGNOSIS — C83.38 DIFFUSE LARGE B-CELL LYMPHOMA OF LYMPH NODES OF MULTIPLE REGIONS: ICD-10-CM

## 2018-04-16 DIAGNOSIS — C83.31 DIFFUSE LARGE B-CELL LYMPHOMA OF LYMPH NODES OF NECK: Primary | ICD-10-CM

## 2018-04-16 DIAGNOSIS — C85.90 LYMPHOMA, UNSPECIFIED BODY REGION, UNSPECIFIED LYMPHOMA TYPE: Primary | ICD-10-CM

## 2018-04-16 DIAGNOSIS — Z09 CHEMOTHERAPY FOLLOW-UP EXAMINATION: ICD-10-CM

## 2018-04-16 DIAGNOSIS — E03.9 ACQUIRED HYPOTHYROIDISM: ICD-10-CM

## 2018-04-16 DIAGNOSIS — Z72.0 TOBACCO USE: ICD-10-CM

## 2018-04-16 DIAGNOSIS — T45.1X5A CHEMOTHERAPY-INDUCED NAUSEA: ICD-10-CM

## 2018-04-16 DIAGNOSIS — R11.0 CHEMOTHERAPY-INDUCED NAUSEA: ICD-10-CM

## 2018-04-16 LAB
BASOPHILS # BLD AUTO: 0 K/UL
BASOPHILS NFR BLD: 0.6 %
DIFFERENTIAL METHOD: ABNORMAL
EOSINOPHIL # BLD AUTO: 0.5 K/UL
EOSINOPHIL NFR BLD: 7.2 %
ERYTHROCYTE [DISTWIDTH] IN BLOOD BY AUTOMATED COUNT: 13.5 %
HCT VFR BLD AUTO: 43.4 %
HGB BLD-MCNC: 14.7 G/DL
LYMPHOCYTES # BLD AUTO: 1.4 K/UL
LYMPHOCYTES NFR BLD: 18.9 %
MCH RBC QN AUTO: 30.7 PG
MCHC RBC AUTO-ENTMCNC: 34 G/DL
MCV RBC AUTO: 91 FL
MONOCYTES # BLD AUTO: 0.7 K/UL
MONOCYTES NFR BLD: 10.1 %
NEUTROPHILS # BLD AUTO: 4.6 K/UL
NEUTROPHILS NFR BLD: 63.2 %
PLATELET # BLD AUTO: 369 K/UL
PMV BLD AUTO: 7.5 FL
RBC # BLD AUTO: 4.79 M/UL
WBC # BLD AUTO: 7.2 K/UL

## 2018-04-16 PROCEDURE — 99999 PR PBB SHADOW E&M-EST. PATIENT-LVL III: CPT | Mod: PBBFAC,,, | Performed by: INTERNAL MEDICINE

## 2018-04-16 PROCEDURE — 85025 COMPLETE CBC W/AUTO DIFF WBC: CPT

## 2018-04-16 PROCEDURE — 36415 COLL VENOUS BLD VENIPUNCTURE: CPT

## 2018-04-16 PROCEDURE — 99215 OFFICE O/P EST HI 40 MIN: CPT | Mod: S$GLB,,, | Performed by: INTERNAL MEDICINE

## 2018-04-16 RX ORDER — ALLOPURINOL 300 MG/1
300 TABLET ORAL DAILY
Qty: 30 TABLET | Refills: 2 | Status: SHIPPED | OUTPATIENT
Start: 2018-04-16 | End: 2019-02-25 | Stop reason: CLARIF

## 2018-04-16 NOTE — PROGRESS NOTES
Pt is scared    Here for [facility] cycle 1 every 21 days   Prednsione 100 mg po Days 1-5     Indira Chauhan is a 62 y.o.  Pt found a lump in her neck and underwent a biopsy of such. Testing revealed lymphoma subtype Diffuse large B cell. SHe has not had fever  , no weight loss, no night sweats  She has had a chest port placed Monday. She did not start lexapro. She is afraid  She refused a bone marrow biopsy      PET CT reviewed  Left sacral area appears to have a lytic lesion as well as disease above and below the diaphragm, trachea shifted to the right due to left adenopathy  No HSM    Here for marrow results   FINAL PATHOLOGIC DIAGNOSIS  BONE MARROW, RIGHT ILIAC CREST, ASPIRATE, CLOT, AND CORE BIOPSY:  --Hypercellular marrow for age, approximately 70% overall, with trilineage hematopoiesis, see comment  -No definitive morphologic evidence of metastatic lymphoma, see comment  --No increase in blasts  --Increased megakaryocytes  --Stainable iron is focally present    Past Medical History:   Diagnosis Date    Cancer     GERD (gastroesophageal reflux disease)     Hyperlipidemia     Hypertension     Hypothyroidism     Thyroid mass 11/02/2017         Current Outpatient Prescriptions:     amlodipine-benazepril (LOTREL) 5-40 mg per capsule, Take 1 capsule by mouth once daily., Disp: 90 capsule, Rfl: 0    atorvastatin (LIPITOR) 40 MG tablet, Take 1 tablet (40 mg total) by mouth once daily., Disp: 30 tablet, Rfl: 3    diphenhydrAMINE-aluminum-magnesium hydroxide-simethicone-lidocaine HCl 2%, Swish and spit 15 mLs every 4 (four) hours as needed., Disp: 250 mL, Rfl: 1    escitalopram oxalate (LEXAPRO) 10 MG tablet, Take 1 tablet (10 mg total) by mouth once daily., Disp: 30 tablet, Rfl: 2    hydroCHLOROthiazide (HYDRODIURIL) 25 MG tablet, Take 1 tablet (25 mg total) by mouth once daily., Disp: 30 tablet, Rfl: 2    hydrocodone-acetaminophen 5-325mg (NORCO) 5-325 mg per tablet, Take 1 tablet by mouth every 4 (four) hours  as needed for Pain., Disp: 12 tablet, Rfl: 0    levothyroxine (SYNTHROID) 150 MCG tablet, Take 1 tablet (150 mcg total) by mouth once daily., Disp: 90 tablet, Rfl: 1    lidocaine-prilocaine (EMLA) cream, Apply topically as needed. Before chemo and before labs, Disp: 5 g, Rfl: 2    ondansetron (ZOFRAN) 8 MG tablet, Take 1 tablet (8 mg total) by mouth every 12 (twelve) hours as needed for Nausea., Disp: 30 tablet, Rfl: 2    predniSONE (DELTASONE) 50 MG Tab, Take 1 tablet (50 mg total) by mouth once daily. On days 2-54 of chemotherapy treatment., Disp: 60 tablet, Rfl: 0    promethazine (PHENERGAN) 25 MG suppository, Place 1 suppository (25 mg total) rectally every 6 (six) hours as needed for Nausea., Disp: 12 suppository, Rfl: 1  Review of patient's allergies indicates:  No Known Allergies  Social History   Substance Use Topics    Smoking status: Current Every Day Smoker     Packs/day: 0.50     Years: 40.00    Smokeless tobacco: Never Used    Alcohol use 1.8 oz/week     3 Shots of liquor per week      Comment: social 3/3/2018       PreOperative Diagnosis  1. Lymphadenopathy.  2. Left neck mass.    SPECIMEN  1) Left lymph node.  2) Left neck mass.    FINAL PATHOLOGIC DIAGNOSIS  1. LEFT LYMPH NODE, CORE BIOPSY- DIFFUSE LARGE B-CELL LYMPHOMA, GERMINAL CENTER ORIGIN.  SEE COMMENT.  COMMENT: Fragments of tissue show diffusely infiltrated by large atypical lymphocytes .  Flow cytometric analysis of lymph node shows T lymphocytes that are immunophenotypically unremarkable . B  lymphocytes are essentially absent. The blast gate is not increased.      CONSTITUTIONAL: No fevers, chills, night sweats, wt. loss, appetite changes  SKIN: no rashes or itching  ENT: No headaches, head trauma, vision changes, or eye pain  CV: No chest pain, palpitations.   RESP: No dyspnea on exertion, cough, wheezing, or hemoptysis  GI: No nausea, emesis, diarrhea, constipation, melena, hematochezia, pain.   : No dysuria, hematuria, urgency,  "or frequency   HEME: No easy bruising, bleeding problems  PSYCHIATRIC: ++ depression,++ anxiety, no psychosis, hallucinations.  NEURO: No seizures, memory loss, dizziness or difficulty sleeping  MSK: No arthralgias or joint swelling         BP (!) 146/65 (BP Location: Left arm, Patient Position: Sitting, BP Method: Medium (Automatic))   Pulse 92   Temp 98.7 °F (37.1 °C) (Oral)   Resp 20   Ht 5' 5" (1.651 m)   Wt 100.9 kg (222 lb 7.1 oz)   BMI 37.02 kg/m²    Gen: NAD, A and O x3,   Psych: pleasant affect, normal thought process  Eyes: Pupils round and non dilated, EOM intact  Nose: Nares patent  OP clear, mucosa patent  Neck with bilateral nodes palpable some fixed  Lungs: CTAB, no wheezes, no use of accessory muscles  CV: S1S2 with RRR, No mrg  Abd: soft,  no ascites  Extr: No CCE, ALDAIR, strength normal, good capillary refill  Neuro: steady gait, CNs grossly intact  Skin: intact, no lesions noted  Rheum: No joint swelling  CHEST port on right clean site, no erythema, no tenderness, no surrounded swelling    Lab Results   Component Value Date    WBC 8.50 03/28/2018    HGB 14.4 03/28/2018    HCT 42.3 03/28/2018    MCV 92 03/28/2018     03/28/2018     CMP  Sodium   Date Value Ref Range Status   03/28/2018 140 136 - 145 mmol/L Final     Potassium   Date Value Ref Range Status   03/28/2018 4.1 3.5 - 5.1 mmol/L Final     Chloride   Date Value Ref Range Status   03/28/2018 101 95 - 110 mmol/L Final     CO2   Date Value Ref Range Status   03/28/2018 29 23 - 29 mmol/L Final     Glucose   Date Value Ref Range Status   03/28/2018 104 70 - 110 mg/dL Final     BUN, Bld   Date Value Ref Range Status   03/28/2018 11 8 - 23 mg/dL Final     Creatinine   Date Value Ref Range Status   03/28/2018 0.7 0.5 - 1.4 mg/dL Final     Calcium   Date Value Ref Range Status   03/28/2018 9.8 8.7 - 10.5 mg/dL Final     Total Protein   Date Value Ref Range Status   03/28/2018 7.4 6.0 - 8.4 g/dL Final     Albumin   Date Value Ref Range " Status   03/28/2018 3.7 3.5 - 5.2 g/dL Final     Total Bilirubin   Date Value Ref Range Status   03/28/2018 0.6 0.1 - 1.0 mg/dL Final     Comment:     For infants and newborns, interpretation of results should be based  on gestational age, weight and in agreement with clinical  observations.  Premature Infant recommended reference ranges:  Up to 24 hours.............<8.0 mg/dL  Up to 48 hours............<12.0 mg/dL  3-5 days..................<15.0 mg/dL  6-29 days.................<15.0 mg/dL       Alkaline Phosphatase   Date Value Ref Range Status   03/28/2018 101 55 - 135 U/L Final     AST   Date Value Ref Range Status   03/28/2018 18 10 - 40 U/L Final     ALT   Date Value Ref Range Status   03/28/2018 13 10 - 44 U/L Final     Anion Gap   Date Value Ref Range Status   03/28/2018 10 8 - 16 mmol/L Final     eGFR if    Date Value Ref Range Status   03/28/2018 >60 >60 mL/min/1.73 m^2 Final     eGFR if non    Date Value Ref Range Status   03/28/2018 >60 >60 mL/min/1.73 m^2 Final     Comment:     Calculation used to obtain the estimated glomerular filtration  rate (eGFR) is the CKD-EPI equation.          Diffuse large B-cell lymphoma of lymph nodes of neck  -     allopurinol (ZYLOPRIM) 300 MG tablet; Take 1 tablet (300 mg total) by mouth once daily.  Dispense: 30 tablet; Refill: 2    Diffuse large B-cell lymphoma of lymph nodes of multiple regions    Acquired hypothyroidism    Tobacco use    Class 2 obesity due to excess calories with serious comorbidity and body mass index (BMI) of 39.0 to 39.9 in adult    Chemotherapy follow-up examination  -     allopurinol (ZYLOPRIM) 300 MG tablet; Take 1 tablet (300 mg total) by mouth once daily.  Dispense: 30 tablet; Refill: 2          POrt looks good   Osseous mets possible left sacrum  Start xgeva soon   CLEARED for cycle 1 [facility]  No evidence of lymphoma in marrow   Start allopurinol  Take prednisone 100 mg po Days 1-5   Start lexapro 10 mg daily  to help her cope   Reviewed meds and dosages  Thank you for allowing me to evaluate and participate in the care of this pleasant patient. Please fell free to call me with any questions or concerns.    Warmly,  Samira Garcia MD    This note was dictated with Dragon and briefly proofread. Please excuse any sentences that may be unclear or words misspelled

## 2018-04-18 ENCOUNTER — TELEPHONE (OUTPATIENT)
Dept: HEMATOLOGY/ONCOLOGY | Facility: CLINIC | Age: 63
End: 2018-04-18

## 2018-04-18 NOTE — TELEPHONE ENCOUNTER
Patient notified that appointment has been changed and the correct appointment reminders were given to her in Chemo infusion on her side table while she was sleeping. Patient verbalized understanding.

## 2018-04-18 NOTE — TELEPHONE ENCOUNTER
----- Message from Nicole Mayfield sent at 4/18/2018  2:36 PM CDT -----  Reschedule appointment.  Please call patient at [phone].

## 2018-04-26 ENCOUNTER — TELEPHONE (OUTPATIENT)
Dept: HEMATOLOGY/ONCOLOGY | Facility: CLINIC | Age: 63
End: 2018-04-26

## 2018-04-26 NOTE — TELEPHONE ENCOUNTER
Spoke with patient after speaking with Sade MCGILL, paperwork ready and here. Patient coming in to  at office today. Sade MCGILL aware.

## 2018-04-30 ENCOUNTER — OFFICE VISIT (OUTPATIENT)
Dept: FAMILY MEDICINE | Facility: CLINIC | Age: 63
End: 2018-04-30
Payer: COMMERCIAL

## 2018-04-30 DIAGNOSIS — E78.2 MIXED HYPERLIPIDEMIA: ICD-10-CM

## 2018-04-30 DIAGNOSIS — I10 ESSENTIAL HYPERTENSION: Primary | ICD-10-CM

## 2018-04-30 DIAGNOSIS — Z72.0 TOBACCO USE: ICD-10-CM

## 2018-04-30 DIAGNOSIS — Z11.59 NEED FOR HEPATITIS C SCREENING TEST: ICD-10-CM

## 2018-04-30 DIAGNOSIS — E03.9 ACQUIRED HYPOTHYROIDISM: ICD-10-CM

## 2018-04-30 DIAGNOSIS — E66.01 SEVERE OBESITY (BMI 35.0-39.9) WITH COMORBIDITY: ICD-10-CM

## 2018-04-30 DIAGNOSIS — C83.31 DIFFUSE LARGE B-CELL LYMPHOMA OF LYMPH NODES OF NECK: ICD-10-CM

## 2018-04-30 PROCEDURE — 3075F SYST BP GE 130 - 139MM HG: CPT | Mod: CPTII,S$GLB,, | Performed by: NURSE PRACTITIONER

## 2018-04-30 PROCEDURE — 99214 OFFICE O/P EST MOD 30 MIN: CPT | Mod: S$GLB,,, | Performed by: NURSE PRACTITIONER

## 2018-04-30 PROCEDURE — 99999 PR PBB SHADOW E&M-EST. PATIENT-LVL IV: CPT | Mod: PBBFAC,,, | Performed by: NURSE PRACTITIONER

## 2018-04-30 PROCEDURE — 3078F DIAST BP <80 MM HG: CPT | Mod: CPTII,S$GLB,, | Performed by: NURSE PRACTITIONER

## 2018-04-30 RX ORDER — LEVOTHYROXINE SODIUM 150 UG/1
150 TABLET ORAL DAILY
Qty: 90 TABLET | Refills: 3 | Status: SHIPPED | OUTPATIENT
Start: 2018-04-30 | End: 2019-06-01 | Stop reason: SDUPTHER

## 2018-04-30 RX ORDER — AMLODIPINE AND BENAZEPRIL HYDROCHLORIDE 5; 40 MG/1; MG/1
1 CAPSULE ORAL DAILY
Qty: 90 CAPSULE | Refills: 3 | Status: SHIPPED | OUTPATIENT
Start: 2018-04-30 | End: 2019-06-01 | Stop reason: SDUPTHER

## 2018-04-30 RX ORDER — HYDROCHLOROTHIAZIDE 25 MG/1
25 TABLET ORAL DAILY
Qty: 90 TABLET | Refills: 3 | Status: SHIPPED | OUTPATIENT
Start: 2018-04-30 | End: 2019-02-25 | Stop reason: CLARIF

## 2018-04-30 RX ORDER — ATORVASTATIN CALCIUM 40 MG/1
40 TABLET, FILM COATED ORAL DAILY
Qty: 90 TABLET | Refills: 3 | Status: SHIPPED | OUTPATIENT
Start: 2018-04-30 | End: 2019-02-25 | Stop reason: CLARIF

## 2018-04-30 NOTE — PROGRESS NOTES
Subjective:       Patient ID: Indira Chauhan is a 62 y.o. female.    Chief Complaint: Hypertension    HPI   Chief Complaint  Chief Complaint   Patient presents with    Hypertension       HPI  Indira Chauhan is a 62 y.o. female with medical diagnoses as listed in the medical history and problem list that presents for follow up of hypertension, hyperlipidemia, and hypothyroidism.  Blood pressure is elevated today at 151/79, down to 136/68 with recheck at end of visit.  Last TSH on 12/22/17 was 4.818.   on 12/22/17.   Patient is currently under the care of Dr. Garcia oncology for B cell lymphoma. Has had her first chemotherapy treatment and it went well, scheduled for 3 more treatments. Mass to neck has visibly shrunk in size. BMI today is 36.61 and patient has lost 2 pounds since last visit.  Established patient with last clinic appointment 2/1/2018.        PAST MEDICAL HISTORY:  Past Medical History:   Diagnosis Date    Cancer     Diffuse large B cell lymphoma     GERD (gastroesophageal reflux disease)     Hyperlipidemia     Hypertension     Hypothyroidism     Thyroid mass 11/02/2017       PAST SURGICAL HISTORY:  Past Surgical History:   Procedure Laterality Date    PORTACATH PLACEMENT Right 03/2018       SOCIAL HISTORY:  Social History     Social History    Marital status:      Spouse name: N/A    Number of children: N/A    Years of education: N/A     Occupational History    retired      Social History Main Topics    Smoking status: Current Every Day Smoker     Packs/day: 0.50     Years: 40.00    Smokeless tobacco: Never Used    Alcohol use 1.8 oz/week     3 Shots of liquor per week      Comment: social 3/3/2018    Drug use: No    Sexual activity: Yes     Partners: Male     Birth control/ protection: Post-menopausal     Other Topics Concern    Not on file     Social History Narrative    Retired hairdresser       FAMILY HISTORY:  Family History   Problem Relation Age of Onset    Heart  disease Mother     COPD Mother     No Known Problems Father        ALLERGIES AND MEDICATIONS: updated and reviewed.  Review of patient's allergies indicates:  No Known Allergies  Current Outpatient Prescriptions   Medication Sig Dispense Refill    allopurinol (ZYLOPRIM) 300 MG tablet Take 1 tablet (300 mg total) by mouth once daily. 30 tablet 2    amlodipine-benazepril (LOTREL) 5-40 mg per capsule Take 1 capsule by mouth once daily. 90 capsule 3    atorvastatin (LIPITOR) 40 MG tablet Take 1 tablet (40 mg total) by mouth once daily. 90 tablet 3    diphenhydrAMINE-aluminum-magnesium hydroxide-simethicone-lidocaine HCl 2% Swish and spit 15 mLs every 4 (four) hours as needed. 250 mL 1    escitalopram oxalate (LEXAPRO) 10 MG tablet Take 1 tablet (10 mg total) by mouth once daily. 30 tablet 2    hydroCHLOROthiazide (HYDRODIURIL) 25 MG tablet Take 1 tablet (25 mg total) by mouth once daily. 90 tablet 3    levothyroxine (SYNTHROID) 150 MCG tablet Take 1 tablet (150 mcg total) by mouth once daily. 90 tablet 3    lidocaine-prilocaine (EMLA) cream Apply topically as needed. Before chemo and before labs 5 g 2    ondansetron (ZOFRAN) 8 MG tablet Take 1 tablet (8 mg total) by mouth every 12 (twelve) hours as needed for Nausea. 30 tablet 2    predniSONE (DELTASONE) 50 MG Tab Take 1 tablet (50 mg total) by mouth once daily. On days 2-54 of chemotherapy treatment. 60 tablet 0     No current facility-administered medications for this visit.      Review of Systems   Constitutional: Negative for appetite change, chills, fatigue and fever.   HENT: Negative for congestion, ear pain, postnasal drip, rhinorrhea, sinus pain, sinus pressure and sore throat.    Eyes: Negative for visual disturbance.   Respiratory: Negative for cough and shortness of breath.         Currently smoking 1/2 PPD. States coughing less since tumor to neck has shrunk.   Cardiovascular: Negative for chest pain and palpitations.   Gastrointestinal: Negative  "for abdominal pain, constipation, diarrhea, nausea and vomiting.   Genitourinary: Negative for difficulty urinating.   Musculoskeletal: Negative for arthralgias and myalgias.   Skin: Negative for rash and wound.   Neurological: Negative for dizziness, numbness and headaches.   Psychiatric/Behavioral: Negative for dysphoric mood and sleep disturbance. The patient is not nervous/anxious.        Objective:       Vitals:    04/30/18 1110 04/30/18 1153   BP: (!) 151/79 136/68   BP Location: Right arm Right arm   Patient Position: Sitting Sitting   BP Method: Large (Automatic) Large (Manual)   Pulse: 94    Temp: 98.2 °F (36.8 °C)    TempSrc: Oral    Weight: 99.8 kg (220 lb)    Height: 5' 5" (1.651 m)      Physical Exam   Constitutional: She is oriented to person, place, and time. Vital signs are normal. She appears well-developed. No distress.   Blood pressure measurement repeated 136/68   HENT:   Head: Normocephalic and atraumatic.   Eyes: Conjunctivae and lids are normal. Right conjunctiva is not injected. Left conjunctiva is not injected.   Neck: Normal carotid pulses present. Carotid bruit is not present.   Mass to left neck which has shrunk significantly from previous visit.   Cardiovascular: Normal rate, regular rhythm, S1 normal, S2 normal, normal heart sounds, intact distal pulses and normal pulses.    No murmur heard.  Pulses:       Carotid pulses are 2+ on the right side, and 2+ on the left side.       Radial pulses are 2+ on the right side, and 2+ on the left side.        Posterior tibial pulses are 2+ on the right side, and 2+ on the left side.   No edema   Pulmonary/Chest: Effort normal and breath sounds normal. No respiratory distress. She has no decreased breath sounds. She has no wheezes. She has no rhonchi. She has no rales.   Portacath in place to right upper chest, incision healed.   Musculoskeletal: Normal range of motion.   Lymphadenopathy:   Unable to assess for cervical lymphadenopathy due to large " neck mass   Neurological: She is alert and oriented to person, place, and time. She has normal strength.   Skin: Skin is warm, dry and intact. Capillary refill takes less than 2 seconds. She is not diaphoretic. No pallor.   Psychiatric: She has a normal mood and affect. Her speech is normal and behavior is normal. Judgment and thought content normal. Her mood appears not anxious. Cognition and memory are normal. She does not exhibit a depressed mood.   Nursing note and vitals reviewed.      Assessment:       1. Essential hypertension    2. Mixed hyperlipidemia    3. Acquired hypothyroidism    4. Diffuse large B-cell lymphoma of lymph nodes of neck    5. Tobacco use    6. Need for hepatitis C screening test        Plan:   Indira was seen today for hypertension.    Diagnoses and all orders for this visit:    Essential hypertension  -     hydroCHLOROthiazide (HYDRODIURIL) 25 MG tablet; Take 1 tablet (25 mg total) by mouth once daily.  -     amlodipine-benazepril (LOTREL) 5-40 mg per capsule; Take 1 capsule by mouth once daily.  -  Blood pressure initially elevated, down to 136/68 with recheck at end of visit  - Controlled with current medication, continue without change    Mixed hyperlipidemia  -     atorvastatin (LIPITOR) 40 MG tablet; Take 1 tablet (40 mg total) by mouth once daily.  -  on 12/22/17    Acquired hypothyroidism  -     levothyroxine (SYNTHROID) 150 MCG tablet; Take 1 tablet (150 mcg total) by mouth once daily.  -     TSH; Future, to be done Fri 5/4/18  - TSH 4.818 on 12/22/17 and synthroid dose was increased from 100 mcg to 150 mcg daily at that time, still believed that mas to neck was thyroid malignancy with intention to suppress thyroid as much as possible, now that thyroid malignancy has been ruled out, may have over treated hypothyroidism, dose change may be necessary when blood work results available.    Diffuse large B-cell lymphoma of lymph nodes of neck   Continue chemotherapy directed by  oncology  Tobacco use   Smoking cessation strongly encouraged, referral refused  Need for hepatitis C screening test  -     Hepatitis C antibody; Future    Severe obesity (BMI 35.0-39.9) with comorbidity   Obesity with BMI 36.61   Weight loss not addressed at this visit due to current chemotherapy treatment for cancer    Follow-up in about 6 months (around 10/30/2018) for hypertension follow up.     Patient readiness: acceptance and barriers:none and readiness    During the course of the visit the patient was educated and counseled about the following:     Hypertension:   Medication: no change.  Dietary sodium restriction.  Regular aerobic exercise.  Follow up: 6 months and as needed.  Obesity:   Not addressed, active chemo for cancer diagnosis    Goals: Hypertension: Reduce Blood Pressure and Obesity: Reduce calorie intake and BMI    Did patient meet goals/outcomes: Yes    The following self management tools provided: declined    Patient Instructions (the written plan) was given to the patient/family.     Time spent with patient: 45 minutes    Barriers to medications present (no )    Adverse reactions to current medications (no)    Over the counter medications reviewed (Yes)    A total of 45 minutes was spent with patient with more than 50% of time spent on counseling and coordination of care, health maintenance reviewed with appropriate recommendations made, previous lab results discussed, appropriate diet, exercise and lifestyle modification recommendations made.  Patient refuses mammogram, Pap smear, colonoscopy, and all recommended immunizations at this time.

## 2018-05-01 VITALS
HEART RATE: 94 BPM | HEIGHT: 65 IN | BODY MASS INDEX: 36.65 KG/M2 | SYSTOLIC BLOOD PRESSURE: 136 MMHG | WEIGHT: 220 LBS | DIASTOLIC BLOOD PRESSURE: 68 MMHG | TEMPERATURE: 98 F

## 2018-05-04 ENCOUNTER — OFFICE VISIT (OUTPATIENT)
Dept: HEMATOLOGY/ONCOLOGY | Facility: CLINIC | Age: 63
End: 2018-05-04
Payer: COMMERCIAL

## 2018-05-04 ENCOUNTER — LAB VISIT (OUTPATIENT)
Dept: LAB | Facility: HOSPITAL | Age: 63
End: 2018-05-04
Attending: INTERNAL MEDICINE
Payer: COMMERCIAL

## 2018-05-04 VITALS
HEIGHT: 65 IN | SYSTOLIC BLOOD PRESSURE: 141 MMHG | WEIGHT: 221.13 LBS | DIASTOLIC BLOOD PRESSURE: 64 MMHG | RESPIRATION RATE: 20 BRPM | HEART RATE: 105 BPM | BODY MASS INDEX: 36.84 KG/M2 | TEMPERATURE: 99 F

## 2018-05-04 DIAGNOSIS — E03.9 ACQUIRED HYPOTHYROIDISM: ICD-10-CM

## 2018-05-04 DIAGNOSIS — C83.38 DIFFUSE LARGE B-CELL LYMPHOMA OF LYMPH NODES OF MULTIPLE REGIONS: ICD-10-CM

## 2018-05-04 DIAGNOSIS — Z11.59 NEED FOR HEPATITIS C SCREENING TEST: ICD-10-CM

## 2018-05-04 DIAGNOSIS — M89.9 LYTIC BONE LESIONS ON XRAY: ICD-10-CM

## 2018-05-04 DIAGNOSIS — C83.31 DIFFUSE LARGE B-CELL LYMPHOMA OF LYMPH NODES OF NECK: Primary | ICD-10-CM

## 2018-05-04 DIAGNOSIS — Z09 CHEMOTHERAPY FOLLOW-UP EXAMINATION: ICD-10-CM

## 2018-05-04 DIAGNOSIS — C85.90 LYMPHOMA, UNSPECIFIED BODY REGION, UNSPECIFIED LYMPHOMA TYPE: ICD-10-CM

## 2018-05-04 DIAGNOSIS — C79.51 OSSEOUS METASTASIS: ICD-10-CM

## 2018-05-04 PROBLEM — M89.8X9 LYTIC BONE LESIONS ON XRAY: Status: ACTIVE | Noted: 2018-05-04

## 2018-05-04 LAB
ALBUMIN SERPL BCP-MCNC: 3.9 G/DL
ALP SERPL-CCNC: 104 U/L
ALT SERPL W/O P-5'-P-CCNC: 20 U/L
ANION GAP SERPL CALC-SCNC: 11 MMOL/L
AST SERPL-CCNC: 16 U/L
BASOPHILS # BLD AUTO: 0.1 K/UL
BASOPHILS NFR BLD: 1.4 %
BILIRUB SERPL-MCNC: 0.3 MG/DL
BUN SERPL-MCNC: 10 MG/DL
CALCIUM SERPL-MCNC: 9.9 MG/DL
CHLORIDE SERPL-SCNC: 102 MMOL/L
CO2 SERPL-SCNC: 28 MMOL/L
CREAT SERPL-MCNC: 0.7 MG/DL
DIFFERENTIAL METHOD: ABNORMAL
EOSINOPHIL # BLD AUTO: 0.3 K/UL
EOSINOPHIL NFR BLD: 5.2 %
ERYTHROCYTE [DISTWIDTH] IN BLOOD BY AUTOMATED COUNT: 13.9 %
EST. GFR  (AFRICAN AMERICAN): >60 ML/MIN/1.73 M^2
EST. GFR  (NON AFRICAN AMERICAN): >60 ML/MIN/1.73 M^2
GLUCOSE SERPL-MCNC: 99 MG/DL
HCT VFR BLD AUTO: 42.9 %
HGB BLD-MCNC: 14.9 G/DL
LYMPHOCYTES # BLD AUTO: 1.1 K/UL
LYMPHOCYTES NFR BLD: 21.4 %
MAGNESIUM SERPL-MCNC: 2.1 MG/DL
MCH RBC QN AUTO: 30.9 PG
MCHC RBC AUTO-ENTMCNC: 34.8 G/DL
MCV RBC AUTO: 89 FL
MONOCYTES # BLD AUTO: 1 K/UL
MONOCYTES NFR BLD: 19.2 %
NEUTROPHILS # BLD AUTO: 2.8 K/UL
NEUTROPHILS NFR BLD: 52.8 %
PHOSPHATE SERPL-MCNC: 4.3 MG/DL
PLATELET # BLD AUTO: 527 K/UL
PMV BLD AUTO: 7.3 FL
POTASSIUM SERPL-SCNC: 4.2 MMOL/L
PROT SERPL-MCNC: 7.5 G/DL
RBC # BLD AUTO: 4.83 M/UL
SODIUM SERPL-SCNC: 141 MMOL/L
TSH SERPL DL<=0.005 MIU/L-ACNC: 0.54 UIU/ML
URATE SERPL-MCNC: 5 MG/DL
WBC # BLD AUTO: 5.3 K/UL

## 2018-05-04 PROCEDURE — 3077F SYST BP >= 140 MM HG: CPT | Mod: CPTII,S$GLB,, | Performed by: INTERNAL MEDICINE

## 2018-05-04 PROCEDURE — 84550 ASSAY OF BLOOD/URIC ACID: CPT

## 2018-05-04 PROCEDURE — 84100 ASSAY OF PHOSPHORUS: CPT

## 2018-05-04 PROCEDURE — 80053 COMPREHEN METABOLIC PANEL: CPT

## 2018-05-04 PROCEDURE — 83735 ASSAY OF MAGNESIUM: CPT

## 2018-05-04 PROCEDURE — 99999 PR PBB SHADOW E&M-EST. PATIENT-LVL III: CPT | Mod: PBBFAC,,, | Performed by: INTERNAL MEDICINE

## 2018-05-04 PROCEDURE — 99215 OFFICE O/P EST HI 40 MIN: CPT | Mod: S$GLB,,, | Performed by: INTERNAL MEDICINE

## 2018-05-04 PROCEDURE — 3078F DIAST BP <80 MM HG: CPT | Mod: CPTII,S$GLB,, | Performed by: INTERNAL MEDICINE

## 2018-05-04 PROCEDURE — 86803 HEPATITIS C AB TEST: CPT

## 2018-05-04 PROCEDURE — 84443 ASSAY THYROID STIM HORMONE: CPT

## 2018-05-04 PROCEDURE — 3008F BODY MASS INDEX DOCD: CPT | Mod: CPTII,S$GLB,, | Performed by: INTERNAL MEDICINE

## 2018-05-04 PROCEDURE — 36415 COLL VENOUS BLD VENIPUNCTURE: CPT

## 2018-05-04 PROCEDURE — 85025 COMPLETE CBC W/AUTO DIFF WBC: CPT

## 2018-05-04 NOTE — PROGRESS NOTES
Pt is due for cycle 2     Here for HOP cycle 1 every 21 days   Prednsione 100 mg po Days 1-5     Indira Chauhan is a 62 y.o.  Pt found a lump in her neck and underwent a biopsy of such. Testing revealed lymphoma subtype Diffuse large B cell. SHe has not had fever  , no weight loss, no night sweats  She has had a chest port placed    tolerating lexapro, no further crying spells   PET CT reviewed  Left sacral area appears to have a lytic lesion as well as disease above and below the diaphragm, trachea shifted to the right due to left adenopathy  No HSM  Here for Clearance cycle 2    FINAL PATHOLOGIC DIAGNOSIS  BONE MARROW, RIGHT ILIAC CREST, ASPIRATE, CLOT, AND CORE BIOPSY:  --Hypercellular marrow for age, approximately 70% overall, with trilineage hematopoiesis, see comment  -No definitive morphologic evidence of metastatic lymphoma, see comment  --No increase in blasts  --Increased megakaryocytes  --Stainable iron is focally present    No nausea or vomiting     Past Medical History:   Diagnosis Date    Cancer     Diffuse large B cell lymphoma     GERD (gastroesophageal reflux disease)     Hyperlipidemia     Hypertension     Hypothyroidism     Thyroid mass 11/02/2017         Current Outpatient Prescriptions:     allopurinol (ZYLOPRIM) 300 MG tablet, Take 1 tablet (300 mg total) by mouth once daily., Disp: 30 tablet, Rfl: 2    amlodipine-benazepril (LOTREL) 5-40 mg per capsule, Take 1 capsule by mouth once daily., Disp: 90 capsule, Rfl: 3    atorvastatin (LIPITOR) 40 MG tablet, Take 1 tablet (40 mg total) by mouth once daily., Disp: 90 tablet, Rfl: 3    diphenhydrAMINE-aluminum-magnesium hydroxide-simethicone-lidocaine HCl 2%, Swish and spit 15 mLs every 4 (four) hours as needed., Disp: 250 mL, Rfl: 1    escitalopram oxalate (LEXAPRO) 10 MG tablet, Take 1 tablet (10 mg total) by mouth once daily., Disp: 30 tablet, Rfl: 2    hydroCHLOROthiazide (HYDRODIURIL) 25 MG tablet, Take 1 tablet (25 mg total) by  mouth once daily., Disp: 90 tablet, Rfl: 3    levothyroxine (SYNTHROID) 150 MCG tablet, Take 1 tablet (150 mcg total) by mouth once daily., Disp: 90 tablet, Rfl: 3    lidocaine-prilocaine (EMLA) cream, Apply topically as needed. Before chemo and before labs, Disp: 5 g, Rfl: 2    ondansetron (ZOFRAN) 8 MG tablet, Take 1 tablet (8 mg total) by mouth every 12 (twelve) hours as needed for Nausea., Disp: 30 tablet, Rfl: 2    predniSONE (DELTASONE) 50 MG Tab, Take 1 tablet (50 mg total) by mouth once daily. On days 2-54 of chemotherapy treatment., Disp: 60 tablet, Rfl: 0  Review of patient's allergies indicates:  No Known Allergies  Social History   Substance Use Topics    Smoking status: Current Every Day Smoker     Packs/day: 0.50     Years: 40.00    Smokeless tobacco: Never Used    Alcohol use 1.8 oz/week     3 Shots of liquor per week      Comment: social 3/3/2018       PreOperative Diagnosis  1. Lymphadenopathy.  2. Left neck mass.    SPECIMEN  1) Left lymph node.  2) Left neck mass.    FINAL PATHOLOGIC DIAGNOSIS  1. LEFT LYMPH NODE, CORE BIOPSY- DIFFUSE LARGE B-CELL LYMPHOMA, GERMINAL CENTER ORIGIN.  SEE COMMENT.  COMMENT: Fragments of tissue show diffusely infiltrated by large atypical lymphocytes .  Flow cytometric analysis of lymph node shows T lymphocytes that are immunophenotypically unremarkable . B  lymphocytes are essentially absent. The blast gate is not increased.      CONSTITUTIONAL: No fevers, chills, night sweats, wt. loss, appetite changes  SKIN: no rashes or itching  ENT: No headaches, head trauma, vision changes, or eye pain  CV: No chest pain, palpitations.   RESP: No dyspnea on exertion, cough, wheezing, or hemoptysis  GI: No nausea, emesis, diarrhea, constipation, melena, hematochezia, pain.   : No dysuria, hematuria, urgency, or frequency   HEME: No easy bruising, bleeding problems  PSYCHIATRIC: ++ depression,++ anxiety, no psychosis, hallucinations.  NEURO: No seizures, memory loss,  "dizziness or difficulty sleeping  MSK: No arthralgias or joint swelling         BP (!) 141/64   Pulse 105   Temp 98.8 °F (37.1 °C)   Resp 20   Ht 5' 5" (1.651 m)   Wt 100.3 kg (221 lb 1.9 oz)   BMI 36.80 kg/m²   Gen: NAD, A and O x3,   Psych: pleasant affect, normal thought process  Eyes: Pupils round and non dilated, EOM intact  Nose: Nares patent  OP clear, mucosa patent  Neck with bilateral nodes palpable some fixed  Lungs: CTAB, no wheezes, no use of accessory muscles  CV: S1S2 with RRR, No mrg  Abd: soft,  no ascites  Extr: No CCE, ALDAIR, strength normal, good capillary refill  Neuro: steady gait, CNs grossly intact  Skin: intact, no lesions noted  Rheum: No joint swelling  CHEST port on right clean site, no erythema, no tenderness, no surrounded swelling    Lab Results   Component Value Date    WBC 7.20 04/16/2018    HGB 14.7 04/16/2018    HCT 43.4 04/16/2018    MCV 91 04/16/2018     (H) 04/16/2018     CMP  Sodium   Date Value Ref Range Status   03/28/2018 140 136 - 145 mmol/L Final     Potassium   Date Value Ref Range Status   03/28/2018 4.1 3.5 - 5.1 mmol/L Final     Chloride   Date Value Ref Range Status   03/28/2018 101 95 - 110 mmol/L Final     CO2   Date Value Ref Range Status   03/28/2018 29 23 - 29 mmol/L Final     Glucose   Date Value Ref Range Status   03/28/2018 104 70 - 110 mg/dL Final     BUN, Bld   Date Value Ref Range Status   03/28/2018 11 8 - 23 mg/dL Final     Creatinine   Date Value Ref Range Status   03/28/2018 0.7 0.5 - 1.4 mg/dL Final     Calcium   Date Value Ref Range Status   03/28/2018 9.8 8.7 - 10.5 mg/dL Final     Total Protein   Date Value Ref Range Status   03/28/2018 7.4 6.0 - 8.4 g/dL Final     Albumin   Date Value Ref Range Status   03/28/2018 3.7 3.5 - 5.2 g/dL Final     Total Bilirubin   Date Value Ref Range Status   03/28/2018 0.6 0.1 - 1.0 mg/dL Final     Comment:     For infants and newborns, interpretation of results should be based  on gestational age, weight " and in agreement with clinical  observations.  Premature Infant recommended reference ranges:  Up to 24 hours.............<8.0 mg/dL  Up to 48 hours............<12.0 mg/dL  3-5 days..................<15.0 mg/dL  6-29 days.................<15.0 mg/dL       Alkaline Phosphatase   Date Value Ref Range Status   03/28/2018 101 55 - 135 U/L Final     AST   Date Value Ref Range Status   03/28/2018 18 10 - 40 U/L Final     ALT   Date Value Ref Range Status   03/28/2018 13 10 - 44 U/L Final     Anion Gap   Date Value Ref Range Status   03/28/2018 10 8 - 16 mmol/L Final     eGFR if    Date Value Ref Range Status   03/28/2018 >60 >60 mL/min/1.73 m^2 Final     eGFR if non    Date Value Ref Range Status   03/28/2018 >60 >60 mL/min/1.73 m^2 Final     Comment:     Calculation used to obtain the estimated glomerular filtration  rate (eGFR) is the CKD-EPI equation.          Diffuse large B-cell lymphoma of lymph nodes of neck    Lytic bone lesions on xray    Osseous metastasis    Diffuse large B-cell lymphoma of lymph nodes of multiple regions    Chemotherapy follow-up examination    Other orders  -     denosumab (XGEVA) solution 120 mg; Inject 1.7 mLs (120 mg total) into the skin one time.          POrt looks good   Osseous mets possible left sacrum  Start xgeva   CLEARED for cycle 2 RCHOP if labs stable  Will check Monday am for results   No evidence of lymphoma in marrow     Take prednisone 100 mg po Days 1-5   Start lexapro 10 mg daily to help her cope   Reviewed meds and dosages  Start calcium and vitamin D to prevent hypocalcemia while receiving Xgeva  Thank you for allowing me to evaluate and participate in the care of this pleasant patient. Please fell free to call me with any questions or concerns.    Samira Mauro MD    This note was dictated with Dragon and briefly proofread. Please excuse any sentences that may be unclear or words misspelled

## 2018-05-07 LAB — HCV AB SERPL QL IA: NEGATIVE

## 2018-05-07 RX ORDER — HEPARIN 100 UNIT/ML
500 SYRINGE INTRAVENOUS
Status: CANCELLED | OUTPATIENT
Start: 2018-05-29

## 2018-05-07 RX ORDER — FAMOTIDINE 10 MG/ML
20 INJECTION INTRAVENOUS
Status: CANCELLED | OUTPATIENT
Start: 2018-05-07

## 2018-05-07 RX ORDER — SODIUM CHLORIDE 0.9 % (FLUSH) 0.9 %
10 SYRINGE (ML) INJECTION
Status: CANCELLED | OUTPATIENT
Start: 2018-05-07

## 2018-05-07 RX ORDER — ACETAMINOPHEN 325 MG/1
650 TABLET ORAL
Status: CANCELLED | OUTPATIENT
Start: 2018-05-29

## 2018-05-07 RX ORDER — DOXORUBICIN HYDROCHLORIDE 2 MG/ML
50 INJECTION, SOLUTION INTRAVENOUS
Status: CANCELLED | OUTPATIENT
Start: 2018-05-07

## 2018-05-07 RX ORDER — HEPARIN 100 UNIT/ML
500 SYRINGE INTRAVENOUS
Status: CANCELLED | OUTPATIENT
Start: 2018-05-07

## 2018-05-07 RX ORDER — DOXORUBICIN HYDROCHLORIDE 2 MG/ML
50 INJECTION, SOLUTION INTRAVENOUS
Status: CANCELLED | OUTPATIENT
Start: 2018-05-29

## 2018-05-07 RX ORDER — ACETAMINOPHEN 325 MG/1
650 TABLET ORAL
Status: CANCELLED | OUTPATIENT
Start: 2018-05-07

## 2018-05-07 RX ORDER — SODIUM CHLORIDE 0.9 % (FLUSH) 0.9 %
10 SYRINGE (ML) INJECTION
Status: CANCELLED | OUTPATIENT
Start: 2018-05-29

## 2018-05-07 RX ORDER — FAMOTIDINE 10 MG/ML
20 INJECTION INTRAVENOUS
Status: CANCELLED | OUTPATIENT
Start: 2018-05-29

## 2018-05-09 DIAGNOSIS — C83.30 DIFFUSE LARGE B-CELL LYMPHOMA, UNSPECIFIED BODY REGION: Primary | ICD-10-CM

## 2018-05-15 DIAGNOSIS — E03.9 ACQUIRED HYPOTHYROIDISM: ICD-10-CM

## 2018-05-15 DIAGNOSIS — I10 ESSENTIAL HYPERTENSION: ICD-10-CM

## 2018-05-15 NOTE — TELEPHONE ENCOUNTER
Note sent to [facility] that 90 day supply of Amlodipine, Levothyroxine and HCTZ were sent 4/30/18.

## 2018-05-24 ENCOUNTER — OFFICE VISIT (OUTPATIENT)
Dept: HEMATOLOGY/ONCOLOGY | Facility: CLINIC | Age: 63
End: 2018-05-24
Payer: COMMERCIAL

## 2018-05-24 ENCOUNTER — LAB VISIT (OUTPATIENT)
Dept: LAB | Facility: HOSPITAL | Age: 63
End: 2018-05-24
Attending: INTERNAL MEDICINE
Payer: COMMERCIAL

## 2018-05-24 VITALS
RESPIRATION RATE: 20 BRPM | HEART RATE: 95 BPM | TEMPERATURE: 99 F | WEIGHT: 221.56 LBS | SYSTOLIC BLOOD PRESSURE: 148 MMHG | HEIGHT: 65 IN | DIASTOLIC BLOOD PRESSURE: 65 MMHG | BODY MASS INDEX: 36.91 KG/M2

## 2018-05-24 DIAGNOSIS — L65.9 HAIR THINNING: ICD-10-CM

## 2018-05-24 DIAGNOSIS — M89.9 LYTIC BONE LESIONS ON XRAY: ICD-10-CM

## 2018-05-24 DIAGNOSIS — C83.31 DIFFUSE LARGE B-CELL LYMPHOMA OF LYMPH NODES OF NECK: ICD-10-CM

## 2018-05-24 DIAGNOSIS — C79.51 OSSEOUS METASTASIS: ICD-10-CM

## 2018-05-24 DIAGNOSIS — G62.9 NEUROPATHY: ICD-10-CM

## 2018-05-24 DIAGNOSIS — D72.821 MONOCYTOSIS: ICD-10-CM

## 2018-05-24 DIAGNOSIS — M79.10 MYALGIA: Primary | ICD-10-CM

## 2018-05-24 DIAGNOSIS — Z09 CHEMOTHERAPY FOLLOW-UP EXAMINATION: ICD-10-CM

## 2018-05-24 DIAGNOSIS — C83.30 DIFFUSE LARGE B-CELL LYMPHOMA, UNSPECIFIED BODY REGION: ICD-10-CM

## 2018-05-24 LAB
ALBUMIN SERPL BCP-MCNC: 3.9 G/DL
ALP SERPL-CCNC: 94 U/L
ALT SERPL W/O P-5'-P-CCNC: 25 U/L
ANION GAP SERPL CALC-SCNC: 11 MMOL/L
ANISOCYTOSIS BLD QL SMEAR: SLIGHT
AST SERPL-CCNC: 19 U/L
BASOPHILS NFR BLD: 0 %
BILIRUB SERPL-MCNC: 0.4 MG/DL
BUN SERPL-MCNC: 8 MG/DL
CALCIUM SERPL-MCNC: 9.8 MG/DL
CHLORIDE SERPL-SCNC: 100 MMOL/L
CO2 SERPL-SCNC: 28 MMOL/L
CREAT SERPL-MCNC: 0.7 MG/DL
DIFFERENTIAL METHOD: ABNORMAL
EOSINOPHIL NFR BLD: 5 %
ERYTHROCYTE [DISTWIDTH] IN BLOOD BY AUTOMATED COUNT: 14.8 %
EST. GFR  (AFRICAN AMERICAN): >60 ML/MIN/1.73 M^2
EST. GFR  (NON AFRICAN AMERICAN): >60 ML/MIN/1.73 M^2
GLUCOSE SERPL-MCNC: 110 MG/DL
HCT VFR BLD AUTO: 40.3 %
HGB BLD-MCNC: 14.1 G/DL
LYMPHOCYTES NFR BLD: 38 %
MCH RBC QN AUTO: 31.1 PG
MCHC RBC AUTO-ENTMCNC: 34.9 G/DL
MCV RBC AUTO: 89 FL
MONOCYTES NFR BLD: 32 %
NEUTROPHILS NFR BLD: 24 %
NEUTS BAND NFR BLD MANUAL: 1 %
PLATELET # BLD AUTO: 336 K/UL
PLATELET BLD QL SMEAR: ABNORMAL
PMV BLD AUTO: 7.1 FL
POTASSIUM SERPL-SCNC: 4.2 MMOL/L
PROT SERPL-MCNC: 7.3 G/DL
RBC # BLD AUTO: 4.53 M/UL
SODIUM SERPL-SCNC: 139 MMOL/L
WBC # BLD AUTO: 3 K/UL

## 2018-05-24 PROCEDURE — 99215 OFFICE O/P EST HI 40 MIN: CPT | Mod: 25,S$GLB,, | Performed by: INTERNAL MEDICINE

## 2018-05-24 PROCEDURE — 80053 COMPREHEN METABOLIC PANEL: CPT

## 2018-05-24 PROCEDURE — 85027 COMPLETE CBC AUTOMATED: CPT

## 2018-05-24 PROCEDURE — 99999 PR PBB SHADOW E&M-EST. PATIENT-LVL III: CPT | Mod: PBBFAC,,, | Performed by: INTERNAL MEDICINE

## 2018-05-24 PROCEDURE — 36415 COLL VENOUS BLD VENIPUNCTURE: CPT

## 2018-05-24 PROCEDURE — 85007 BL SMEAR W/DIFF WBC COUNT: CPT

## 2018-05-24 PROCEDURE — 3008F BODY MASS INDEX DOCD: CPT | Mod: CPTII,S$GLB,, | Performed by: INTERNAL MEDICINE

## 2018-05-24 RX ORDER — MONTELUKAST SODIUM 10 MG/1
10 TABLET ORAL NIGHTLY
Qty: 30 TABLET | Refills: 2 | Status: SHIPPED | OUTPATIENT
Start: 2018-05-24 | End: 2018-08-18 | Stop reason: SDUPTHER

## 2018-05-24 RX ORDER — ACETAMINOPHEN 325 MG/1
650 TABLET ORAL
Status: CANCELLED | OUTPATIENT
Start: 2018-06-19

## 2018-05-24 RX ORDER — SODIUM CHLORIDE 0.9 % (FLUSH) 0.9 %
10 SYRINGE (ML) INJECTION
Status: CANCELLED | OUTPATIENT
Start: 2018-06-19

## 2018-05-24 RX ORDER — FAMOTIDINE 10 MG/ML
20 INJECTION INTRAVENOUS
Status: CANCELLED | OUTPATIENT
Start: 2018-06-19

## 2018-05-24 RX ORDER — PROMETHAZINE HYDROCHLORIDE 25 MG/1
SUPPOSITORY RECTAL
COMMUNITY
Start: 2018-05-12 | End: 2019-02-25 | Stop reason: CLARIF

## 2018-05-24 RX ORDER — DOXORUBICIN HYDROCHLORIDE 2 MG/ML
50 INJECTION, SOLUTION INTRAVENOUS
Status: CANCELLED | OUTPATIENT
Start: 2018-06-19

## 2018-05-24 RX ORDER — HEPARIN 100 UNIT/ML
500 SYRINGE INTRAVENOUS
Status: CANCELLED | OUTPATIENT
Start: 2018-06-19

## 2018-05-24 NOTE — PROGRESS NOTES
Pt is due for cycle 2     Here for HOP cycle 1 every 21 days   Prednsione 100 mg po Days 1-5     Indira Chauhan is a 62 y.o.  Pt found a lump in her neck and underwent a biopsy of such. Testing revealed lymphoma subtype Diffuse large B cell. SHe has not had fever  , no weight loss, no night sweats  She has had a chest port placed    tolerating lexapro, no further crying spells   PET CT reviewed  Left sacral area appears to have a lytic lesion as well as disease above and below the diaphragm, trachea shifted to the right due to left adenopathy  No HSM  Here for Clearance cycle 2    FINAL PATHOLOGIC DIAGNOSIS  BONE MARROW, RIGHT ILIAC CREST, ASPIRATE, CLOT, AND CORE BIOPSY:  --Hypercellular marrow for age, approximately 70% overall, with trilineage hematopoiesis, see comment  -No definitive morphologic evidence of metastatic lymphoma, see comment  --No increase in blasts  --Increased megakaryocytes  --Stainable iron is focally present    No nausea or vomiting     Past Medical History:   Diagnosis Date    Cancer     Diffuse large B cell lymphoma     GERD (gastroesophageal reflux disease)     Hyperlipidemia     Hypertension     Hypothyroidism     Thyroid mass 11/02/2017         Current Outpatient Prescriptions:     allopurinol (ZYLOPRIM) 300 MG tablet, Take 1 tablet (300 mg total) by mouth once daily., Disp: 30 tablet, Rfl: 2    amlodipine-benazepril (LOTREL) 5-40 mg per capsule, Take 1 capsule by mouth once daily., Disp: 90 capsule, Rfl: 3    atorvastatin (LIPITOR) 40 MG tablet, Take 1 tablet (40 mg total) by mouth once daily., Disp: 90 tablet, Rfl: 3    diphenhydrAMINE-aluminum-magnesium hydroxide-simethicone-lidocaine HCl 2%, Swish and spit 15 mLs every 4 (four) hours as needed., Disp: 250 mL, Rfl: 1    escitalopram oxalate (LEXAPRO) 10 MG tablet, Take 1 tablet (10 mg total) by mouth once daily., Disp: 30 tablet, Rfl: 2    hydroCHLOROthiazide (HYDRODIURIL) 25 MG tablet, Take 1 tablet (25 mg total) by  mouth once daily., Disp: 90 tablet, Rfl: 3    levothyroxine (SYNTHROID) 150 MCG tablet, Take 1 tablet (150 mcg total) by mouth once daily., Disp: 90 tablet, Rfl: 3    lidocaine-prilocaine (EMLA) cream, Apply topically as needed. Before chemo and before labs, Disp: 5 g, Rfl: 2    ondansetron (ZOFRAN) 8 MG tablet, Take 1 tablet (8 mg total) by mouth every 12 (twelve) hours as needed for Nausea., Disp: 30 tablet, Rfl: 2    predniSONE (DELTASONE) 50 MG Tab, Take 1 tablet (50 mg total) by mouth once daily. On days 2-54 of chemotherapy treatment., Disp: 60 tablet, Rfl: 0    promethazine (PHENERGAN) 25 MG suppository, , Disp: , Rfl:   Review of patient's allergies indicates:  No Known Allergies  Social History   Substance Use Topics    Smoking status: Current Every Day Smoker     Packs/day: 0.50     Years: 40.00    Smokeless tobacco: Never Used    Alcohol use 1.8 oz/week     3 Shots of liquor per week      Comment: social 3/3/2018       PreOperative Diagnosis  1. Lymphadenopathy.  2. Left neck mass.    SPECIMEN  1) Left lymph node.  2) Left neck mass.    FINAL PATHOLOGIC DIAGNOSIS  1. LEFT LYMPH NODE, CORE BIOPSY- DIFFUSE LARGE B-CELL LYMPHOMA, GERMINAL CENTER ORIGIN.  SEE COMMENT.  COMMENT: Fragments of tissue show diffusely infiltrated by large atypical lymphocytes .  Flow cytometric analysis of lymph node shows T lymphocytes that are immunophenotypically unremarkable . B  lymphocytes are essentially absent. The blast gate is not increased.      CONSTITUTIONAL: No fevers, chills, night sweats, wt. loss, appetite changes  SKIN: no rashes or itching  ENT: No headaches, head trauma, vision changes, or eye pain  CV: No chest pain, palpitations.   RESP: No dyspnea on exertion, cough, wheezing, or hemoptysis  GI: No nausea, emesis, diarrhea, constipation, melena, hematochezia, pain.   : No dysuria, hematuria, urgency, or frequency   HEME: No easy bruising, bleeding problems  PSYCHIATRIC: ++ depression,++ anxiety, no  "psychosis, hallucinations.  NEURO: No seizures, memory loss, dizziness or difficulty sleeping  MSK: No arthralgias or joint swelling         BP (!) 148/65 (BP Location: Left arm, Patient Position: Sitting, BP Method: Medium (Automatic))   Pulse 95   Temp 98.6 °F (37 °C) (Oral)   Resp 20   Ht 5' 5" (1.651 m)   Wt 100.5 kg (221 lb 9 oz)   BMI 36.87 kg/m²   Gen: NAD, A and O x3,   Psych: pleasant affect, normal thought process  Eyes: Pupils round and non dilated, EOM intact  Nose: Nares patent  OP clear, mucosa patent  Neck with bilateral nodes palpable some fixed  Lungs: CTAB, no wheezes, no use of accessory muscles  CV: S1S2 with RRR, No mrg  Abd: soft,  no ascites  Extr: No CCE, ALDAIR, strength normal, good capillary refill  Neuro: steady gait, CNs grossly intact  Skin: intact, no lesions noted  Rheum: No joint swelling  CHEST port on right clean site, no erythema, no tenderness, no surrounded swelling    Lab Results   Component Value Date    WBC 3.00 (L) 05/24/2018    HGB 14.1 05/24/2018    HCT 40.3 05/24/2018    MCV 89 05/24/2018     05/24/2018     CMP  Sodium   Date Value Ref Range Status   05/24/2018 139 136 - 145 mmol/L Final     Potassium   Date Value Ref Range Status   05/24/2018 4.2 3.5 - 5.1 mmol/L Final     Chloride   Date Value Ref Range Status   05/24/2018 100 95 - 110 mmol/L Final     CO2   Date Value Ref Range Status   05/24/2018 28 23 - 29 mmol/L Final     Glucose   Date Value Ref Range Status   05/24/2018 110 70 - 110 mg/dL Final     BUN, Bld   Date Value Ref Range Status   05/24/2018 8 8 - 23 mg/dL Final     Creatinine   Date Value Ref Range Status   05/24/2018 0.7 0.5 - 1.4 mg/dL Final     Calcium   Date Value Ref Range Status   05/24/2018 9.8 8.7 - 10.5 mg/dL Final     Total Protein   Date Value Ref Range Status   05/24/2018 7.3 6.0 - 8.4 g/dL Final     Albumin   Date Value Ref Range Status   05/24/2018 3.9 3.5 - 5.2 g/dL Final     Total Bilirubin   Date Value Ref Range Status "   05/24/2018 0.4 0.1 - 1.0 mg/dL Final     Comment:     For infants and newborns, interpretation of results should be based  on gestational age, weight and in agreement with clinical  observations.  Premature Infant recommended reference ranges:  Up to 24 hours.............<8.0 mg/dL  Up to 48 hours............<12.0 mg/dL  3-5 days..................<15.0 mg/dL  6-29 days.................<15.0 mg/dL       Alkaline Phosphatase   Date Value Ref Range Status   05/24/2018 94 55 - 135 U/L Final     AST   Date Value Ref Range Status   05/24/2018 19 10 - 40 U/L Final     ALT   Date Value Ref Range Status   05/24/2018 25 10 - 44 U/L Final     Anion Gap   Date Value Ref Range Status   05/24/2018 11 8 - 16 mmol/L Final     eGFR if    Date Value Ref Range Status   05/24/2018 >60 >60 mL/min/1.73 m^2 Final     eGFR if non    Date Value Ref Range Status   05/24/2018 >60 >60 mL/min/1.73 m^2 Final     Comment:     Calculation used to obtain the estimated glomerular filtration  rate (eGFR) is the CKD-EPI equation.          Myalgia    Neuropathy    Lytic bone lesions on xray  -     NM PET CT Routine Skull to Mid Thigh    Diffuse large B-cell lymphoma of lymph nodes of neck  -     NM PET CT Routine Skull to Mid Thigh    Osseous metastasis    Monocytosis  -     montelukast (SINGULAIR) 10 mg tablet; Take 1 tablet (10 mg total) by mouth every evening.  Dispense: 30 tablet; Refill: 2    Chemotherapy follow-up examination    Hair thinning    Other orders  -     acetaminophen tablet 650 mg; Take 2 tablets (650 mg total) by mouth one time.  -     diphenhydrAMINE (BENADRYL) 50 mg in sodium chloride 0.9% 50 mL IVPB; Inject 50 mg into the vein one time.  -     famotidine (PF) injection 20 mg; Inject 2 mLs (20 mg total) into the vein one time.  -     meperidine (PF) injection 25 mg; Inject 0.5 mLs (25 mg total) into the vein as needed (Rigors).  -     riTUXimab (RITUXAN) 375 mg/m2 = 810 mg in sodium chloride 0.9%  810 mL infusion (conc: 1 mg/mL); Inject 810 mLs (810 mg total) into the vein one time.  -     palonosetron (ALOXI) 0.25 mg, dexamethasone (DECADRON) 20 mg in sodium chloride 0.9% 50 mL; Inject into the vein one time.  -     vinCRIStine (ONCOVIN) 2 mg in sodium chloride 0.9% 50 mL chemo infusion; Inject 2 mg into the vein one time.  -     DOXOrubicin chemo injection 108 mg; Inject 54 mLs (108 mg total) into the vein one time.  -     cyclophosphamide (CYTOXAN) 750 mg/m2 = 1,620 mg in sodium chloride 0.9% 250 mL chemo infusion; Inject 1,620 mg into the vein one time.  -     sodium chloride 0.9% flush 10 mL; Inject 10 mLs into the vein as needed for Line Care (Flush lines as needed.).  -     heparin, porcine (PF) 100 unit/mL injection flush 500 Units; Inject 5 mLs (500 Units total) into the vein as needed (Flush lines as needed).  -     alteplase injection 2 mg; 2 mg by Intra-Catheter route as needed (To restore central venous catheter function).    CLEARED for cycle 3 chemo and monthly xgeva for bone mets  Osseous mets possible left sacrum  Continue xgeva   Start magnesium for myalgias and carmping in hands  Increase hydration for neuropathy   CLEARED for cycle 2 RCHOP if labs stable  Recheck PET CT in 2-3 weeks  Explained hair should come back after chemo   After chemo she can add biotin and gelatin to help with such   No evidence of lymphoma in marrow     Take prednisone 100 mg po Days 1-5   Continue lexapro 10 mg daily to help her cope   Diet discussed to help her get through chemo   Spent over 15 minutes discussing such   Increase  Protein intake   START  calcium and vitamin D to prevent hypocalcemia while receiving Xgeva  She forgot to start this medicine   RTC after PET CT     Thank you for allowing me to evaluate and participate in the care of this pleasant patient. Please fell free to call me with any questions or concerns.    [place]armly,  Samira Garcia MD    This note was dictated with Dragon and briefly proofread.  Please excuse any sentences that may be unclear or words misspelled

## 2018-06-18 ENCOUNTER — LAB VISIT (OUTPATIENT)
Dept: LAB | Facility: HOSPITAL | Age: 63
End: 2018-06-18
Attending: INTERNAL MEDICINE
Payer: COMMERCIAL

## 2018-06-18 ENCOUNTER — OFFICE VISIT (OUTPATIENT)
Dept: HEMATOLOGY/ONCOLOGY | Facility: CLINIC | Age: 63
End: 2018-06-18
Payer: COMMERCIAL

## 2018-06-18 VITALS
BODY MASS INDEX: 37.39 KG/M2 | WEIGHT: 224.44 LBS | SYSTOLIC BLOOD PRESSURE: 151 MMHG | TEMPERATURE: 99 F | HEART RATE: 88 BPM | HEIGHT: 65 IN | DIASTOLIC BLOOD PRESSURE: 71 MMHG | RESPIRATION RATE: 20 BRPM

## 2018-06-18 DIAGNOSIS — C83.30 DIFFUSE LARGE B-CELL LYMPHOMA, UNSPECIFIED BODY REGION: ICD-10-CM

## 2018-06-18 DIAGNOSIS — Z79.83 ENCOUNTER FOR MONITORING BISPHOSPHONATE THERAPY: ICD-10-CM

## 2018-06-18 DIAGNOSIS — E03.9 ACQUIRED HYPOTHYROIDISM: ICD-10-CM

## 2018-06-18 DIAGNOSIS — Z09 CHEMOTHERAPY FOLLOW-UP EXAMINATION: ICD-10-CM

## 2018-06-18 DIAGNOSIS — Z51.81 ENCOUNTER FOR MONITORING BISPHOSPHONATE THERAPY: ICD-10-CM

## 2018-06-18 DIAGNOSIS — C79.51 OSSEOUS METASTASIS: ICD-10-CM

## 2018-06-18 DIAGNOSIS — F43.21 SITUATIONAL DEPRESSION: ICD-10-CM

## 2018-06-18 DIAGNOSIS — Z72.0 TOBACCO USE: ICD-10-CM

## 2018-06-18 DIAGNOSIS — C83.31 DIFFUSE LARGE B-CELL LYMPHOMA OF LYMPH NODES OF NECK: Primary | ICD-10-CM

## 2018-06-18 LAB
ALBUMIN SERPL BCP-MCNC: 3.7 G/DL
ALP SERPL-CCNC: 85 U/L
ALT SERPL W/O P-5'-P-CCNC: 19 U/L
ANION GAP SERPL CALC-SCNC: 12 MMOL/L
AST SERPL-CCNC: 17 U/L
BASOPHILS # BLD AUTO: 0 K/UL
BASOPHILS NFR BLD: 0.4 %
BILIRUB SERPL-MCNC: 0.3 MG/DL
BUN SERPL-MCNC: 6 MG/DL
CALCIUM SERPL-MCNC: 9.1 MG/DL
CHLORIDE SERPL-SCNC: 103 MMOL/L
CO2 SERPL-SCNC: 25 MMOL/L
CREAT SERPL-MCNC: 0.7 MG/DL
DIFFERENTIAL METHOD: ABNORMAL
EOSINOPHIL # BLD AUTO: 0.2 K/UL
EOSINOPHIL NFR BLD: 2.8 %
ERYTHROCYTE [DISTWIDTH] IN BLOOD BY AUTOMATED COUNT: 18.3 %
EST. GFR  (AFRICAN AMERICAN): >60 ML/MIN/1.73 M^2
EST. GFR  (NON AFRICAN AMERICAN): >60 ML/MIN/1.73 M^2
GLUCOSE SERPL-MCNC: 120 MG/DL
HCT VFR BLD AUTO: 38 %
HGB BLD-MCNC: 13.4 G/DL
LYMPHOCYTES # BLD AUTO: 1 K/UL
LYMPHOCYTES NFR BLD: 15.7 %
MCH RBC QN AUTO: 32.4 PG
MCHC RBC AUTO-ENTMCNC: 35.3 G/DL
MCV RBC AUTO: 92 FL
MONOCYTES # BLD AUTO: 1 K/UL
MONOCYTES NFR BLD: 15.5 %
NEUTROPHILS # BLD AUTO: 4.2 K/UL
NEUTROPHILS NFR BLD: 65.6 %
PLATELET # BLD AUTO: 428 K/UL
PMV BLD AUTO: 6.7 FL
POTASSIUM SERPL-SCNC: 3.8 MMOL/L
PROT SERPL-MCNC: 7 G/DL
RBC # BLD AUTO: 4.14 M/UL
SODIUM SERPL-SCNC: 140 MMOL/L
WBC # BLD AUTO: 6.4 K/UL

## 2018-06-18 PROCEDURE — 3008F BODY MASS INDEX DOCD: CPT | Mod: CPTII,S$GLB,, | Performed by: INTERNAL MEDICINE

## 2018-06-18 PROCEDURE — 36415 COLL VENOUS BLD VENIPUNCTURE: CPT

## 2018-06-18 PROCEDURE — 99215 OFFICE O/P EST HI 40 MIN: CPT | Mod: S$GLB,,, | Performed by: INTERNAL MEDICINE

## 2018-06-18 PROCEDURE — 85025 COMPLETE CBC W/AUTO DIFF WBC: CPT

## 2018-06-18 PROCEDURE — 80053 COMPREHEN METABOLIC PANEL: CPT

## 2018-06-18 PROCEDURE — 99999 PR PBB SHADOW E&M-EST. PATIENT-LVL III: CPT | Mod: PBBFAC,,, | Performed by: INTERNAL MEDICINE

## 2018-06-18 RX ORDER — ESCITALOPRAM OXALATE 10 MG/1
10 TABLET ORAL DAILY
Qty: 30 TABLET | Refills: 2 | Status: SHIPPED | OUTPATIENT
Start: 2018-06-18 | End: 2018-09-18 | Stop reason: SDUPTHER

## 2018-06-18 NOTE — PROGRESS NOTES
Pt is due for cycle 4    Here for RCHOP cycle 1 every 21 days   Prednsione 100 mg po Days 1-5     Indira Chauhan is a 62 y.o.  Pt found a lump in her neck and underwent a biopsy of such. Testing revealed lymphoma subtype Diffuse large B cell. SHe has not had fever  , no weight loss, no night sweats  She has had a chest port placed    tolerating lexapro, no further crying spells   PET CT reviewed  Left sacral area appears to have a lytic lesion as well as disease above and below the diaphragm, trachea shifted to the right due to left adenopathy  No HSM  Here for Clearance cycle 4 and PET CT results which show marked improvement throughout her body     Procedure:  Exam Date: Reason for Exam:   [facility] UNKNOWN RAD [facility] 06/08/2018 [id number]             RESULTS:  INTEGRATED PET CT WITH IMAGE FUSION     HISTORY:  Diffuse non-Hodgkin's lymphoma, large cell (clinical) left neck  diagnosed 10 2017. Patient has had chemotherapy.     TECHNIQUE: Following the injection of 12.5 mCi of F-18 labeled FDG into a right  antecubital vein, PET CT was performed from the vertex of the skull through the  proximal thighs with an integrated full ring PET CT scanner with image fusion.  The patient's serum glucose at the time of the exam was 119 mg/dL.     COMPARISON: 02/28/2018     FINDINGS: There is marked improvement when compared to the prior study with  moderate decrease in size of the large conglomerate hypermetabolic lymph nodes  in the left neck. There is residual soft tissue density in the left neck at the  level of the hyoid bone and thyroid been deep to the sternocleidomastoid muscle.  This has max SUV of 3.5 and measures approximately 2.8 x 1.6 cm. There is  resolution of the right which is shift of the trachea. There is resolution of  the hypermetabolic retropharyngeal adenopathy and right internal jugular  adenopathy. There is a right IJ node measuring 7 mm which shows no FDG activity.     There is marked decrease in size and  resolution of the FDG activity within the  bilateral level 4 cervical lymph nodes, right IJ node and lymph nodes lateral  to the left pectoralis muscle. There is a 6 mm right level 4 cervical node and  an 8 mm left level 4 cervical node which shows no FDG activity. On the prior  study these measured 15 mm and 23 mm. The lymph node lateral to the left  pectoralis muscle measures 5 mm in short axis and on the prior study measured  10 mm. There is a 13 mm left posterior cervical lymph node with faint  calcification which has a max SUV of 1.9. On the prior study this measured 26  mm with a max SUV of 13.6     There is a right subclavian vein Port-A-Cath. There is no new cervical, hilar,  mediastinal or axillary adenopathy. There is stable 5 mm nodule in the right  middle lobe with no new nodules. There are no intra-axial lesions. There is  mild periventricular low attenuation compatible with small vessel disease.     CT of the abdomen and pelvis demonstrates physiologic activity in the GI tract  and urinary system. There are no thick-walled or dilated bowel loops there is  decrease in size of the mildly prominent lymph nodes in the periportal region  and peripancreatic region. The largest measures 11 mm and shows no FDG activity.  There is no new retroperitoneal or mesenteric adenopathy. There is stable  mildly prominent left external iliac node which shows no FDG activity.  There are degenerative changes of the spine..     Resolution of the FDG activity within the left side the sacrum. There is faint  permeative lucency present.     IMPRESSION: Marked improvement when compared to the prior study with moderate  decrease in size and FDG activity within the adenopathy within the neck and  supraclavicular region.     Mildly prominent lymph node in the gastrohepatic and periportal region which  showed no FDG activity. This is also decrease in size.     No new adenopathy     Resolution of the small bowel wall  thickening.     Resolution of the FDG activity within the left side of the sacrum as well as  the left iliac hypermetabolic adenopathy.     Stable 5 mm nodule in the right middle lobe.     Read and electronically signed by: Olivia Quan MD on 6/8/2018 12:41 PM CDT        OLIVIA QUAN MD                    Signed By:  Olivia Quan MD on 6/8/2018 12:44 PM               FINAL PATHOLOGIC DIAGNOSIS  BONE MARROW, RIGHT ILIAC CREST, ASPIRATE, CLOT, AND CORE BIOPSY:  --Hypercellular marrow for age, approximately 70% overall, with trilineage hematopoiesis, see comment  -No definitive morphologic evidence of metastatic lymphoma, see comment  --No increase in blasts  --Increased megakaryocytes  --Stainable iron is focally present      Past Medical History:   Diagnosis Date    Cancer     Diffuse large B cell lymphoma     GERD (gastroesophageal reflux disease)     Hyperlipidemia     Hypertension     Hypothyroidism     Thyroid mass 11/02/2017         Current Outpatient Prescriptions:     allopurinol (ZYLOPRIM) 300 MG tablet, Take 1 tablet (300 mg total) by mouth once daily., Disp: 30 tablet, Rfl: 2    amlodipine-benazepril (LOTREL) 5-40 mg per capsule, Take 1 capsule by mouth once daily., Disp: 90 capsule, Rfl: 3    atorvastatin (LIPITOR) 40 MG tablet, Take 1 tablet (40 mg total) by mouth once daily., Disp: 90 tablet, Rfl: 3    diphenhydrAMINE-aluminum-magnesium hydroxide-simethicone-lidocaine HCl 2%, Swish and spit 15 mLs every 4 (four) hours as needed., Disp: 250 mL, Rfl: 1    escitalopram oxalate (LEXAPRO) 10 MG tablet, Take 1 tablet (10 mg total) by mouth once daily., Disp: 30 tablet, Rfl: 2    hydroCHLOROthiazide (HYDRODIURIL) 25 MG tablet, Take 1 tablet (25 mg total) by mouth once daily., Disp: 90 tablet, Rfl: 3    levothyroxine (SYNTHROID) 150 MCG tablet, Take 1 tablet (150 mcg total) by mouth once daily., Disp: 90 tablet, Rfl: 3    lidocaine-prilocaine (EMLA) cream, Apply topically as  "needed. Before chemo and before labs, Disp: 5 g, Rfl: 2    montelukast (SINGULAIR) 10 mg tablet, Take 1 tablet (10 mg total) by mouth every evening., Disp: 30 tablet, Rfl: 2    ondansetron (ZOFRAN) 8 MG tablet, Take 1 tablet (8 mg total) by mouth every 12 (twelve) hours as needed for Nausea., Disp: 30 tablet, Rfl: 2    predniSONE (DELTASONE) 50 MG Tab, Take 1 tablet (50 mg total) by mouth once daily. On days 2-54 of chemotherapy treatment., Disp: 60 tablet, Rfl: 0    promethazine (PHENERGAN) 25 MG suppository, , Disp: , Rfl:   Review of patient's allergies indicates:  No Known Allergies  Social History   Substance Use Topics    Smoking status: Current Every Day Smoker     Packs/day: 0.50     Years: 40.00    Smokeless tobacco: Never Used    Alcohol use 1.8 oz/week     3 Shots of liquor per week      Comment: social 3/3/2018       CONSTITUTIONAL: No fevers, chills, night sweats, wt. loss, appetite changes  SKIN: no rashes or itching  ENT: No headaches, head trauma, vision changes, or eye pain  CV: No chest pain, palpitations.   RESP: No dyspnea on exertion, cough, wheezing, or hemoptysis  GI: No nausea, emesis, diarrhea, constipation, melena, hematochezia, pain.   : No dysuria, hematuria, urgency, or frequency   HEME: No easy bruising, bleeding problems  PSYCHIATRIC: ++ depression,++ anxiety, no psychosis, hallucinations.  NEURO: No seizures, memory loss, dizziness or difficulty sleeping  MSK: No arthralgias or joint swelling         BP (!) 151/71   Pulse 88   Temp 98.9 °F (37.2 °C)   Resp 20   Ht 5' 5" (1.651 m)   Wt 101.8 kg (224 lb 6.9 oz)   BMI 37.35 kg/m²   Gen: NAD, A and O x3,   Psych: pleasant affect, normal thought process  Eyes: Pupils round and non dilated, EOM intact  Nose: Nares patent  OP clear, mucosa patent  Neck with bilateral nodes palpable some fixed  Lungs: CTAB, no wheezes, no use of accessory muscles  CV: S1S2 with RRR, No mrg  Abd: soft,  no ascites  Extr: No CCE, ALDAIR, strength " normal, good capillary refill  Neuro: steady gait, CNs grossly intact  Skin: intact, no lesions noted  Rheum: No joint swelling  CHEST port on right clean site, no erythema, no tenderness, no surrounded swelling    Lab Results   Component Value Date    WBC 3.00 (L) 05/24/2018    HGB 14.1 05/24/2018    HCT 40.3 05/24/2018    MCV 89 05/24/2018     05/24/2018     CMP  Sodium   Date Value Ref Range Status   06/18/2018 140 136 - 145 mmol/L Final     Potassium   Date Value Ref Range Status   06/18/2018 3.8 3.5 - 5.1 mmol/L Final     Chloride   Date Value Ref Range Status   06/18/2018 103 95 - 110 mmol/L Final     CO2   Date Value Ref Range Status   06/18/2018 25 23 - 29 mmol/L Final     Glucose   Date Value Ref Range Status   06/18/2018 120 (H) 70 - 110 mg/dL Final     BUN, Bld   Date Value Ref Range Status   06/18/2018 6 (L) 8 - 23 mg/dL Final     Creatinine   Date Value Ref Range Status   06/18/2018 0.7 0.5 - 1.4 mg/dL Final     Calcium   Date Value Ref Range Status   06/18/2018 9.1 8.7 - 10.5 mg/dL Final     Total Protein   Date Value Ref Range Status   06/18/2018 7.0 6.0 - 8.4 g/dL Final     Albumin   Date Value Ref Range Status   06/18/2018 3.7 3.5 - 5.2 g/dL Final     Total Bilirubin   Date Value Ref Range Status   06/18/2018 0.3 0.1 - 1.0 mg/dL Final     Comment:     For infants and newborns, interpretation of results should be based  on gestational age, weight and in agreement with clinical  observations.  Premature Infant recommended reference ranges:  Up to 24 hours.............<8.0 mg/dL  Up to 48 hours............<12.0 mg/dL  3-5 days..................<15.0 mg/dL  6-29 days.................<15.0 mg/dL       Alkaline Phosphatase   Date Value Ref Range Status   06/18/2018 85 55 - 135 U/L Final     AST   Date Value Ref Range Status   06/18/2018 17 10 - 40 U/L Final     ALT   Date Value Ref Range Status   06/18/2018 19 10 - 44 U/L Final     Anion Gap   Date Value Ref Range Status   06/18/2018 12 8 - 16  mmol/L Final     eGFR if    Date Value Ref Range Status   06/18/2018 >60 >60 mL/min/1.73 m^2 Final     eGFR if non    Date Value Ref Range Status   06/18/2018 >60 >60 mL/min/1.73 m^2 Final     Comment:     Calculation used to obtain the estimated glomerular filtration  rate (eGFR) is the CKD-EPI equation.          Diffuse large B-cell lymphoma of lymph nodes of neck    Osseous metastasis  -     CMP; Future; Expected date: 06/18/2018    Chemotherapy follow-up examination    Acquired hypothyroidism    Class 2 obesity due to excess calories with serious comorbidity and body mass index (BMI) of 39.0 to 39.9 in adult    Tobacco use    Situational depression  -     escitalopram oxalate (LEXAPRO) 10 MG tablet; Take 1 tablet (10 mg total) by mouth once daily.  Dispense: 30 tablet; Refill: 2    Encounter for monitoring bisphosphonate therapy  -     CMP; Future; Expected date: 06/18/2018    CLEARED for cycle 4 chemo tomorrow and monthly xgeva for bone mets  Osseous mets  left sacrum  Continue xgeva  Next Tuesday   Double magnesium for myalgias and carmping in hands  Increase hydration for neuropathy   CLEARED for cycle 4    After chemo she can add biotin and gelatin to help with such   No evidence of lymphoma in marrow   Leukopenia still anc good for chemo     Take prednisone 100 mg po Days 1-5   Continue lexapro 10 mg daily to help her cope   Increase  Protein intake   Continue calcium and vitamin D to prevent hypocalcemia while receiving Xgeva  Orders signed for Xgeva and chemo  Will need another cmp before xgeva     Thank you for allowing me to evaluate and participate in the care of this pleasant patient. Please fell free to call me with any questions or concerns.    Samira Mauro MD    This note was dictated with Dragon and briefly proofread. Please excuse any sentences that may be unclear or words misspelled

## 2018-07-16 ENCOUNTER — OFFICE VISIT (OUTPATIENT)
Dept: HEMATOLOGY/ONCOLOGY | Facility: CLINIC | Age: 63
End: 2018-07-16
Payer: COMMERCIAL

## 2018-07-16 ENCOUNTER — LAB VISIT (OUTPATIENT)
Dept: LAB | Facility: HOSPITAL | Age: 63
End: 2018-07-16
Attending: INTERNAL MEDICINE
Payer: COMMERCIAL

## 2018-07-16 VITALS
WEIGHT: 222 LBS | TEMPERATURE: 99 F | BODY MASS INDEX: 36.99 KG/M2 | HEIGHT: 65 IN | HEART RATE: 77 BPM | SYSTOLIC BLOOD PRESSURE: 158 MMHG | RESPIRATION RATE: 19 BRPM | DIASTOLIC BLOOD PRESSURE: 66 MMHG

## 2018-07-16 DIAGNOSIS — C79.51 OSSEOUS METASTASIS: ICD-10-CM

## 2018-07-16 DIAGNOSIS — C83.31 DIFFUSE LARGE B-CELL LYMPHOMA OF LYMPH NODES OF NECK: Primary | ICD-10-CM

## 2018-07-16 DIAGNOSIS — C83.30 DIFFUSE LARGE B-CELL LYMPHOMA, UNSPECIFIED BODY REGION: ICD-10-CM

## 2018-07-16 LAB
ALBUMIN SERPL BCP-MCNC: 4 G/DL
ALP SERPL-CCNC: 80 U/L
ALT SERPL W/O P-5'-P-CCNC: 19 U/L
ANION GAP SERPL CALC-SCNC: 10 MMOL/L
AST SERPL-CCNC: 20 U/L
BASOPHILS # BLD AUTO: 0.1 K/UL
BASOPHILS NFR BLD: 0.5 %
BILIRUB SERPL-MCNC: 0.6 MG/DL
BUN SERPL-MCNC: 9 MG/DL
CALCIUM SERPL-MCNC: 9.8 MG/DL
CHLORIDE SERPL-SCNC: 101 MMOL/L
CO2 SERPL-SCNC: 27 MMOL/L
CREAT SERPL-MCNC: 0.7 MG/DL
DIFFERENTIAL METHOD: ABNORMAL
EOSINOPHIL # BLD AUTO: 0.3 K/UL
EOSINOPHIL NFR BLD: 3.2 %
ERYTHROCYTE [DISTWIDTH] IN BLOOD BY AUTOMATED COUNT: 18.6 %
EST. GFR  (AFRICAN AMERICAN): >60 ML/MIN/1.73 M^2
EST. GFR  (NON AFRICAN AMERICAN): >60 ML/MIN/1.73 M^2
GLUCOSE SERPL-MCNC: 108 MG/DL
HCT VFR BLD AUTO: 42.1 %
HGB BLD-MCNC: 14.1 G/DL
LYMPHOCYTES # BLD AUTO: 1 K/UL
LYMPHOCYTES NFR BLD: 9.6 %
MCH RBC QN AUTO: 32.3 PG
MCHC RBC AUTO-ENTMCNC: 33.5 G/DL
MCV RBC AUTO: 96 FL
MONOCYTES # BLD AUTO: 0.8 K/UL
MONOCYTES NFR BLD: 7.8 %
NEUTROPHILS # BLD AUTO: 7.9 K/UL
NEUTROPHILS NFR BLD: 78.9 %
PLATELET # BLD AUTO: 292 K/UL
PMV BLD AUTO: 7.8 FL
POTASSIUM SERPL-SCNC: 4 MMOL/L
PROT SERPL-MCNC: 7.4 G/DL
RBC # BLD AUTO: 4.37 M/UL
SODIUM SERPL-SCNC: 138 MMOL/L
WBC # BLD AUTO: 10 K/UL

## 2018-07-16 PROCEDURE — 99214 OFFICE O/P EST MOD 30 MIN: CPT | Mod: S$GLB,,, | Performed by: NURSE PRACTITIONER

## 2018-07-16 PROCEDURE — 85025 COMPLETE CBC W/AUTO DIFF WBC: CPT

## 2018-07-16 PROCEDURE — 80053 COMPREHEN METABOLIC PANEL: CPT

## 2018-07-16 PROCEDURE — 99999 PR PBB SHADOW E&M-EST. PATIENT-LVL III: CPT | Mod: PBBFAC,,, | Performed by: NURSE PRACTITIONER

## 2018-07-16 PROCEDURE — 99213 OFFICE O/P EST LOW 20 MIN: CPT | Mod: PBBFAC,PO | Performed by: NURSE PRACTITIONER

## 2018-07-16 PROCEDURE — 36415 COLL VENOUS BLD VENIPUNCTURE: CPT

## 2018-07-16 RX ORDER — FAMOTIDINE 10 MG/ML
20 INJECTION INTRAVENOUS
Status: CANCELLED | OUTPATIENT
Start: 2018-07-17

## 2018-07-16 RX ORDER — DOXORUBICIN HYDROCHLORIDE 2 MG/ML
50 INJECTION, SOLUTION INTRAVENOUS
Status: CANCELLED | OUTPATIENT
Start: 2018-07-17

## 2018-07-16 RX ORDER — HEPARIN 100 UNIT/ML
500 SYRINGE INTRAVENOUS
Status: CANCELLED | OUTPATIENT
Start: 2018-07-17

## 2018-07-16 RX ORDER — SODIUM CHLORIDE 0.9 % (FLUSH) 0.9 %
10 SYRINGE (ML) INJECTION
Status: CANCELLED | OUTPATIENT
Start: 2018-07-17

## 2018-07-16 RX ORDER — ACETAMINOPHEN 325 MG/1
650 TABLET ORAL
Status: CANCELLED | OUTPATIENT
Start: 2018-07-17

## 2018-07-16 NOTE — PROGRESS NOTES
HISTORY OF PRESENT ILLNESS:  This is a 63 year old  female known to   Dr. Garcia for Diffuse Large B Cell Lymphoma that arose in her left neck.  She was   found to have bone involvement.  She is receiving RCHOP & monthly XGEVA.  She   presents to the clinic today with her  for evaluation for Cycle 5 of treatment (due   on July 17th).  She reports mild mouth soreness.  She denies any fevers, chills, drenching   night sweats, unexplained weight loss, abdominal discomfort, nausea, vomiting, diarrhea,   constipation, etc.  No other complaints or pertinent findings on a 14 point review of systems.    RCHOP every 21 days   Prednsione 100 mg po Days 1-5     Past Medical History:   Diagnosis Date    Cancer     Diffuse large B cell lymphoma     GERD (gastroesophageal reflux disease)     Hyperlipidemia     Hypertension     Hypothyroidism     Thyroid mass 11/02/2017       PHYSICAL EXAMINATION:    GENERAL:  Well developed, well nourished, obese white female in NAD.  Alert &   Oriented x 3.  VITAL SIGNS:  Weight:  Loss of 2 1/2 pounds in 4 weeks  Wt Readings from Last 3 Encounters:   07/16/18 100.7 kg (222 lb 0.1 oz)   06/18/18 101.8 kg (224 lb 6.9 oz)   05/24/18 100.5 kg (221 lb 9 oz)     Temp Readings from Last 3 Encounters:   07/16/18 99 °F (37.2 °C) (Oral)   06/18/18 98.9 °F (37.2 °C)   05/24/18 98.6 °F (37 °C) (Oral)     BP Readings from Last 3 Encounters:   07/16/18 (!) 158/66   06/18/18 (!) 151/71   05/24/18 (!) 148/65     Pulse Readings from Last 3 Encounters:   07/16/18 77   06/18/18 88   05/24/18 95     HEENT:  Normocephalic, atraumatic.  Oral mucosa pink and moist.  Lips without   lesions.  Tongue midline.  Oropharynx clear.  Nonicteric sclerae. Alopecia.  NECK:  Supple, no adenopathy.  No carotid bruits, thyromegaly or thyroid nodule.  HEART:  Regular rate and rhythm without murmur, gallop or rub.                LUNGS:  Clear to auscultation bilaterally.  Normal respiratory effort.       ABDOMEN:   Soft, nontender, nondistended with positive normoactive bowel sounds,   no hepatosplenomegaly.   EXTREMITIES:  No cyanosis, clubbing or edema.  Distal pulses are intact.  AXILLAE AND GROIN:  No palpable pathologic lymphadenopathy is appreciated.        SKIN:  Intact/turgor normal. Erythematous, peeling to right foot.  NEUROLOGIC:  Cranial nerves II-XII grossly intact.  Motor:  Good muscle bulk and   tone.  Strength/sensory 5/5 throughout.  Gait stable.    LABORATORY:    Lab Results   Component Value Date    WBC 10.00 07/16/2018    HGB 14.1 07/16/2018    HCT 42.1 07/16/2018    MCV 96 07/16/2018     07/16/2018     Differential remarkable for 78.9% grans, 9.6% lymph  ANC = 7,890    CMP  Sodium   Date Value Ref Range Status   07/16/2018 138 136 - 145 mmol/L Final     Potassium   Date Value Ref Range Status   07/16/2018 4.0 3.5 - 5.1 mmol/L Final     Chloride   Date Value Ref Range Status   07/16/2018 101 95 - 110 mmol/L Final     CO2   Date Value Ref Range Status   07/16/2018 27 23 - 29 mmol/L Final     Glucose   Date Value Ref Range Status   07/16/2018 108 70 - 110 mg/dL Final     BUN, Bld   Date Value Ref Range Status   07/16/2018 9 8 - 23 mg/dL Final     Creatinine   Date Value Ref Range Status   07/16/2018 0.7 0.5 - 1.4 mg/dL Final     Calcium   Date Value Ref Range Status   07/16/2018 9.8 8.7 - 10.5 mg/dL Final     Total Protein   Date Value Ref Range Status   07/16/2018 7.4 6.0 - 8.4 g/dL Final     Albumin   Date Value Ref Range Status   07/16/2018 4.0 3.5 - 5.2 g/dL Final     Total Bilirubin   Date Value Ref Range Status   07/16/2018 0.6 0.1 - 1.0 mg/dL Final     Comment:     For infants and newborns, interpretation of results should be based  on gestational age, weight and in agreement with clinical  observations.  Premature Infant recommended reference ranges:  Up to 24 hours.............<8.0 mg/dL  Up to 48 hours............<12.0 mg/dL  3-5 days..................<15.0 mg/dL  6-29  days.................<15.0 mg/dL       Alkaline Phosphatase   Date Value Ref Range Status   07/16/2018 80 55 - 135 U/L Final     AST   Date Value Ref Range Status   07/16/2018 20 10 - 40 U/L Final     ALT   Date Value Ref Range Status   07/16/2018 19 10 - 44 U/L Final     Anion Gap   Date Value Ref Range Status   07/16/2018 10 8 - 16 mmol/L Final     eGFR if    Date Value Ref Range Status   07/16/2018 >60 >60 mL/min/1.73 m^2 Final     eGFR if non    Date Value Ref Range Status   07/16/2018 >60 >60 mL/min/1.73 m^2 Final     Comment:     Calculation used to obtain the estimated glomerular filtration  rate (eGFR) is the CKD-EPI equation.            Procedure:  Exam Date: Reason for Exam:   [facility] UNKNOWN RAD [facility] 06/08/2018 [id number]             RESULTS:  INTEGRATED PET CT WITH IMAGE FUSION     HISTORY:  Diffuse non-Hodgkin's lymphoma, large cell (clinical) left neck  diagnosed 10 2017. Patient has had chemotherapy.     TECHNIQUE: Following the injection of 12.5 mCi of F-18 labeled FDG into a right  antecubital vein, PET CT was performed from the vertex of the skull through the  proximal thighs with an integrated full ring PET CT scanner with image fusion.  The patient's serum glucose at the time of the exam was 119 mg/dL.     COMPARISON: 02/28/2018     FINDINGS: There is marked improvement when compared to the prior study with  moderate decrease in size of the large conglomerate hypermetabolic lymph nodes  in the left neck. There is residual soft tissue density in the left neck at the  level of the hyoid bone and thyroid been deep to the sternocleidomastoid muscle.  This has max SUV of 3.5 and measures approximately 2.8 x 1.6 cm. There is  resolution of the right which is shift of the trachea. There is resolution of  the hypermetabolic retropharyngeal adenopathy and right internal jugular  adenopathy. There is a right IJ node measuring 7 mm which shows no FDG activity.     There is marked  decrease in size and resolution of the FDG activity within the  bilateral level 4 cervical lymph nodes, right IJ node and lymph nodes lateral  to the left pectoralis muscle. There is a 6 mm right level 4 cervical node and  an 8 mm left level 4 cervical node which shows no FDG activity. On the prior  study these measured 15 mm and 23 mm. The lymph node lateral to the left  pectoralis muscle measures 5 mm in short axis and on the prior study measured  10 mm. There is a 13 mm left posterior cervical lymph node with faint  calcification which has a max SUV of 1.9. On the prior study this measured 26  mm with a max SUV of 13.6     There is a right subclavian vein Port-A-Cath. There is no new cervical, hilar,  mediastinal or axillary adenopathy. There is stable 5 mm nodule in the right  middle lobe with no new nodules. There are no intra-axial lesions. There is  mild periventricular low attenuation compatible with small vessel disease.     CT of the abdomen and pelvis demonstrates physiologic activity in the GI tract  and urinary system. There are no thick-walled or dilated bowel loops there is  decrease in size of the mildly prominent lymph nodes in the periportal region  and peripancreatic region. The largest measures 11 mm and shows no FDG activity.  There is no new retroperitoneal or mesenteric adenopathy. There is stable  mildly prominent left external iliac node which shows no FDG activity.  There are degenerative changes of the spine..     Resolution of the FDG activity within the left side the sacrum. There is faint  permeative lucency present.     IMPRESSION: Marked improvement when compared to the prior study with moderate  decrease in size and FDG activity within the adenopathy within the neck and  supraclavicular region.     Mildly prominent lymph node in the gastrohepatic and periportal region which  showed no FDG activity. This is also decrease in size.     No new adenopathy     Resolution of the small bowel  wall thickening.     Resolution of the FDG activity within the left side of the sacrum as well as  the left iliac hypermetabolic adenopathy.     Stable 5 mm nodule in the right middle lobe.     Read and electronically signed by: Olivia Quan MD on 6/8/2018 12:41 PM CDT        OLIVIA QUAN MD                    Signed By:  Olivia Quan MD on 6/8/2018 12:44 PM               Diffuse large B-cell lymphoma of lymph nodes of neck  -     CBC w/ DIFF; Future; Expected date: 07/16/2018  -     CMP; Future; Expected date: 07/16/2018    Osseous metastasis    1.  CLEARED for Cycle 5 chemo tomorrow (07/17/18) and monthly xgeva for bone mets;  RCHOP:  Rituxan dosed at 375 mg/ m2 (806 mg) with premedications of Tylenol 650, Benadryl 50 mg & Famotidine 20 mg.  Oncovin 2 mg,   Doxorubicin dosed at 50 mg/m2 (108 mg)  Cytoxan dosed at 750 mg/m2 (1615 mg) with premedications of D/P 20/0.25 mg.  2.  Osseous mets  left sacrum- XGEVA 120 mg SQ  3.  Return to clinic in 3 weeks with interval CBC, CMP prior for evaluation for Cycle 6 RCHOP/XGEVA.  4.  Take prednisone 100 mg po Days 1-5   5.  Continue lexapro 10 mg daily.  6.  Increase  Protein intake   7.  Continue calcium and vitamin D to prevent hypocalcemia while receiving Xgeva    Assessment/plan reviewed and approved by Dr. Sierra.

## 2018-08-06 ENCOUNTER — LAB VISIT (OUTPATIENT)
Dept: LAB | Facility: HOSPITAL | Age: 63
End: 2018-08-06
Attending: NURSE PRACTITIONER
Payer: COMMERCIAL

## 2018-08-06 ENCOUNTER — OFFICE VISIT (OUTPATIENT)
Dept: HEMATOLOGY/ONCOLOGY | Facility: CLINIC | Age: 63
End: 2018-08-06
Payer: COMMERCIAL

## 2018-08-06 VITALS
SYSTOLIC BLOOD PRESSURE: 132 MMHG | TEMPERATURE: 99 F | RESPIRATION RATE: 16 BRPM | DIASTOLIC BLOOD PRESSURE: 63 MMHG | BODY MASS INDEX: 37.54 KG/M2 | HEART RATE: 82 BPM | WEIGHT: 225.31 LBS | HEIGHT: 65 IN

## 2018-08-06 DIAGNOSIS — C83.31 DIFFUSE LARGE B-CELL LYMPHOMA OF LYMPH NODES OF NECK: ICD-10-CM

## 2018-08-06 DIAGNOSIS — Z72.0 TOBACCO USE: ICD-10-CM

## 2018-08-06 DIAGNOSIS — R20.2 PARESTHESIAS: ICD-10-CM

## 2018-08-06 DIAGNOSIS — C83.38 DIFFUSE LARGE B-CELL LYMPHOMA OF LYMPH NODES OF MULTIPLE REGIONS: ICD-10-CM

## 2018-08-06 DIAGNOSIS — C79.51 OSSEOUS METASTASIS: Primary | ICD-10-CM

## 2018-08-06 DIAGNOSIS — R25.2 MUSCLE CRAMPING: ICD-10-CM

## 2018-08-06 DIAGNOSIS — E07.9 THYROID MASS: ICD-10-CM

## 2018-08-06 DIAGNOSIS — Z09 CHEMOTHERAPY FOLLOW-UP EXAMINATION: ICD-10-CM

## 2018-08-06 LAB
ALBUMIN SERPL BCP-MCNC: 3.9 G/DL
ALP SERPL-CCNC: 83 U/L
ALT SERPL W/O P-5'-P-CCNC: 22 U/L
ANION GAP SERPL CALC-SCNC: 10 MMOL/L
AST SERPL-CCNC: 18 U/L
BASOPHILS # BLD AUTO: 0.1 K/UL
BASOPHILS NFR BLD: 1.4 %
BILIRUB SERPL-MCNC: 0.3 MG/DL
BUN SERPL-MCNC: 12 MG/DL
CALCIUM SERPL-MCNC: 9.7 MG/DL
CHLORIDE SERPL-SCNC: 102 MMOL/L
CO2 SERPL-SCNC: 27 MMOL/L
CREAT SERPL-MCNC: 0.7 MG/DL
DIFFERENTIAL METHOD: ABNORMAL
EOSINOPHIL # BLD AUTO: 0.2 K/UL
EOSINOPHIL NFR BLD: 2.4 %
ERYTHROCYTE [DISTWIDTH] IN BLOOD BY AUTOMATED COUNT: 15.5 %
EST. GFR  (AFRICAN AMERICAN): >60 ML/MIN/1.73 M^2
EST. GFR  (NON AFRICAN AMERICAN): >60 ML/MIN/1.73 M^2
GLUCOSE SERPL-MCNC: 109 MG/DL
HCT VFR BLD AUTO: 38.9 %
HGB BLD-MCNC: 13.2 G/DL
LYMPHOCYTES # BLD AUTO: 1 K/UL
LYMPHOCYTES NFR BLD: 15.5 %
MCH RBC QN AUTO: 32.9 PG
MCHC RBC AUTO-ENTMCNC: 33.8 G/DL
MCV RBC AUTO: 97 FL
MONOCYTES # BLD AUTO: 1.1 K/UL
MONOCYTES NFR BLD: 16.6 %
NEUTROPHILS # BLD AUTO: 4.2 K/UL
NEUTROPHILS NFR BLD: 64.1 %
PLATELET # BLD AUTO: 491 K/UL
PMV BLD AUTO: 7.4 FL
POTASSIUM SERPL-SCNC: 4.2 MMOL/L
PROT SERPL-MCNC: 7.3 G/DL
RBC # BLD AUTO: 4 M/UL
SODIUM SERPL-SCNC: 139 MMOL/L
WBC # BLD AUTO: 6.6 K/UL

## 2018-08-06 PROCEDURE — 99215 OFFICE O/P EST HI 40 MIN: CPT | Mod: S$GLB,,, | Performed by: INTERNAL MEDICINE

## 2018-08-06 PROCEDURE — 85025 COMPLETE CBC W/AUTO DIFF WBC: CPT

## 2018-08-06 PROCEDURE — 99213 OFFICE O/P EST LOW 20 MIN: CPT | Mod: PBBFAC,PO | Performed by: INTERNAL MEDICINE

## 2018-08-06 PROCEDURE — 99999 PR PBB SHADOW E&M-EST. PATIENT-LVL III: CPT | Mod: PBBFAC,,, | Performed by: INTERNAL MEDICINE

## 2018-08-06 PROCEDURE — 80053 COMPREHEN METABOLIC PANEL: CPT

## 2018-08-06 PROCEDURE — 36415 COLL VENOUS BLD VENIPUNCTURE: CPT

## 2018-08-06 RX ORDER — DOXORUBICIN HYDROCHLORIDE 2 MG/ML
50 INJECTION, SOLUTION INTRAVENOUS
Status: CANCELLED | OUTPATIENT
Start: 2018-08-07

## 2018-08-06 RX ORDER — FAMOTIDINE 10 MG/ML
20 INJECTION INTRAVENOUS
Status: CANCELLED | OUTPATIENT
Start: 2018-08-07

## 2018-08-06 RX ORDER — HEPARIN 100 UNIT/ML
500 SYRINGE INTRAVENOUS
Status: CANCELLED | OUTPATIENT
Start: 2018-08-07

## 2018-08-06 RX ORDER — ACETAMINOPHEN 325 MG/1
650 TABLET ORAL
Status: CANCELLED | OUTPATIENT
Start: 2018-08-07

## 2018-08-06 RX ORDER — SODIUM CHLORIDE 0.9 % (FLUSH) 0.9 %
10 SYRINGE (ML) INJECTION
Status: CANCELLED | OUTPATIENT
Start: 2018-08-07

## 2018-08-06 NOTE — PROGRESS NOTES
Pt is due for cycle 4    Here for RCHOP cycle 1 every 21 days   Prednsione 100 mg po Days 1-5     Indira Chauhan is a 63 y.o.  Pt found a lump in her neck and underwent a biopsy of such. Testing revealed lymphoma subtype Diffuse large B cell. SHe has not had fever  , no weight loss, no night sweats  She has chest port on right with no tenderness or erythema. SHe has completed 5 cycles RCHOP and is here to obtain clearance for her last cycle 6   OP R-CHOP  Treatment Goal: Curative  Status: Active  Start Date: 4/16/2018  End Date: 8/8/2018 (Planned)  Provider: Samira Garcia MD  Chemotherapy: DOXOrubicin chemo injection 108 mg, 50 mg/m2 = 108 mg, Intravenous, Clinic/HOD 1 time, 4 of 6 cycles    vinCRIStine (ONCOVIN) 2 mg in sodium chloride 0.9% 50 mL chemo infusion, 2 mg (100 % of original dose 2 mg), Intravenous, Clinic/HOD 1 time, 4 of 6 cycles  Dose modification: 2 mg (original dose 2 mg, Cycle 1)    cyclophosphamide (CYTOXAN) 750 mg/m2 = 1,620 mg in sodium chloride 0.9% 250 mL chemo infusion, 750 mg/m2 = 1,620 mg, Intravenous, Clinic/HOD 1 time, 4 of 6 cycles    riTUXimab (RITUXAN) 375 mg/m2 = 810 mg in sodium chloride 0.9% 810 mL infusion (conc: 1 mg/mL), 375 mg/m2 = 810 mg, Intravenous, Clinic/HOD 1 time, 4 of 6 cycles     tolerating lexapro, no further crying spells   PET CT reviewed  Left sacral area appears to have a lytic lesion as well as disease above and below the diaphragm, trachea shifted to the right due to left adenopathy  No HSM  SHe has developed increasing  paresthesias in her hands, muscle cramping on and off in her right leg, no associated swelling     Procedure:  Exam Date: Reason for Exam:   [facility] UNKNOWN RAD [facility] 06/08/2018 [id number]             RESULTS:  INTEGRATED PET CT WITH IMAGE FUSION     HISTORY:  Diffuse non-Hodgkin's lymphoma, large cell (clinical) left neck  diagnosed 10 2017. Patient has had chemotherapy.     TECHNIQUE: Following the injection of 12.5 mCi of F-18 labeled FDG into a  right  antecubital vein, PET CT was performed from the vertex of the skull through the  proximal thighs with an integrated full ring PET CT scanner with image fusion.  The patient's serum glucose at the time of the exam was 119 mg/dL.     COMPARISON: 02/28/2018     FINDINGS: There is marked improvement when compared to the prior study with  moderate decrease in size of the large conglomerate hypermetabolic lymph nodes  in the left neck. There is residual soft tissue density in the left neck at the  level of the hyoid bone and thyroid been deep to the sternocleidomastoid muscle.  This has max SUV of 3.5 and measures approximately 2.8 x 1.6 cm. There is  resolution of the right which is shift of the trachea. There is resolution of  the hypermetabolic retropharyngeal adenopathy and right internal jugular  adenopathy. There is a right IJ node measuring 7 mm which shows no FDG activity.     There is marked decrease in size and resolution of the FDG activity within the  bilateral level 4 cervical lymph nodes, right IJ node and lymph nodes lateral  to the left pectoralis muscle. There is a 6 mm right level 4 cervical node and  an 8 mm left level 4 cervical node which shows no FDG activity. On the prior  study these measured 15 mm and 23 mm. The lymph node lateral to the left  pectoralis muscle measures 5 mm in short axis and on the prior study measured  10 mm. There is a 13 mm left posterior cervical lymph node with faint  calcification which has a max SUV of 1.9. On the prior study this measured 26  mm with a max SUV of 13.6     There is a right subclavian vein Port-A-Cath. There is no new cervical, hilar,  mediastinal or axillary adenopathy. There is stable 5 mm nodule in the right  middle lobe with no new nodules. There are no intra-axial lesions. There is  mild periventricular low attenuation compatible with small vessel disease.     CT of the abdomen and pelvis demonstrates physiologic activity in the GI tract  and  urinary system. There are no thick-walled or dilated bowel loops there is  decrease in size of the mildly prominent lymph nodes in the periportal region  and peripancreatic region. The largest measures 11 mm and shows no FDG activity.  There is no new retroperitoneal or mesenteric adenopathy. There is stable  mildly prominent left external iliac node which shows no FDG activity.  There are degenerative changes of the spine..     Resolution of the FDG activity within the left side the sacrum. There is faint  permeative lucency present.     IMPRESSION: Marked improvement when compared to the prior study with moderate  decrease in size and FDG activity within the adenopathy within the neck and  supraclavicular region.     Mildly prominent lymph node in the gastrohepatic and periportal region which  showed no FDG activity. This is also decrease in size.     No new adenopathy     Resolution of the small bowel wall thickening.     Resolution of the FDG activity within the left side of the sacrum as well as  the left iliac hypermetabolic adenopathy.     Stable 5 mm nodule in the right middle lobe.     Read and electronically signed by: Olivia Quan MD on 6/8/2018 12:41 PM CDT        OLIVIA QUAN MD                    Signed By:  Olivia Quan MD on 6/8/2018 12:44 PM               FINAL PATHOLOGIC DIAGNOSIS  BONE MARROW, RIGHT ILIAC CREST, ASPIRATE, CLOT, AND CORE BIOPSY:  --Hypercellular marrow for age, approximately 70% overall, with trilineage hematopoiesis, see comment  -No definitive morphologic evidence of metastatic lymphoma, see comment  --No increase in blasts  --Increased megakaryocytes  --Stainable iron is focally present      Past Medical History:   Diagnosis Date    Cancer     Diffuse large B cell lymphoma     GERD (gastroesophageal reflux disease)     Hyperlipidemia     Hypertension     Hypothyroidism     Thyroid mass 11/02/2017         Current Outpatient Prescriptions:     allopurinol  (ZYLOPRIM) 300 MG tablet, Take 1 tablet (300 mg total) by mouth once daily., Disp: 30 tablet, Rfl: 2    amlodipine-benazepril (LOTREL) 5-40 mg per capsule, Take 1 capsule by mouth once daily., Disp: 90 capsule, Rfl: 3    atorvastatin (LIPITOR) 40 MG tablet, Take 1 tablet (40 mg total) by mouth once daily., Disp: 90 tablet, Rfl: 3    diphenhydrAMINE-aluminum-magnesium hydroxide-simethicone-lidocaine HCl 2%, Swish and spit 15 mLs every 4 (four) hours as needed., Disp: 250 mL, Rfl: 1    escitalopram oxalate (LEXAPRO) 10 MG tablet, Take 1 tablet (10 mg total) by mouth once daily., Disp: 30 tablet, Rfl: 2    hydroCHLOROthiazide (HYDRODIURIL) 25 MG tablet, Take 1 tablet (25 mg total) by mouth once daily., Disp: 90 tablet, Rfl: 3    levothyroxine (SYNTHROID) 150 MCG tablet, Take 1 tablet (150 mcg total) by mouth once daily., Disp: 90 tablet, Rfl: 3    lidocaine-prilocaine (EMLA) cream, Apply topically as needed. Before chemo and before labs, Disp: 5 g, Rfl: 2    ondansetron (ZOFRAN) 8 MG tablet, Take 1 tablet (8 mg total) by mouth every 12 (twelve) hours as needed for Nausea., Disp: 30 tablet, Rfl: 2    predniSONE (DELTASONE) 50 MG Tab, Take 1 tablet (50 mg total) by mouth once daily. On days 2-54 of chemotherapy treatment., Disp: 60 tablet, Rfl: 0    promethazine (PHENERGAN) 25 MG suppository, , Disp: , Rfl:   Review of patient's allergies indicates:  No Known Allergies  Social History   Substance Use Topics    Smoking status: Current Every Day Smoker     Packs/day: 0.50     Years: 40.00    Smokeless tobacco: Never Used    Alcohol use 1.8 oz/week     3 Shots of liquor per week      Comment: social 3/3/2018       CONSTITUTIONAL: No fevers, chills, night sweats, wt. loss, appetite changes  SKIN: no rashes or itching  ENT: No headaches, head trauma, vision changes, or eye pain  CV: No chest pain, palpitations.   RESP: No dyspnea on exertion, cough, wheezing, or hemoptysis  GI: No nausea, emesis, diarrhea,  "constipation, melena, hematochezia, pain.   : No dysuria, hematuria, urgency, or frequency   HEME: No easy bruising, bleeding problems  PSYCHIATRIC: ++ depression,++ anxiety, no psychosis, hallucinations.  NEURO: No seizures, memory loss, dizziness  + paresthesias in her hands only   MSK: cramping in right leg after walking distances         /63   Pulse 82   Temp 98.8 °F (37.1 °C)   Resp 16   Ht 5' 5" (1.651 m)   Wt 102.2 kg (225 lb 5 oz)   BMI 37.49 kg/m²   Gen: NAD, A and O x3,   Psych: pleasant affect, normal thought process  Eyes: Pupils round and non dilated, EOM intact  Nose: Nares patent  OP clear, mucosa patent  Neck with bilateral nodes palpable some fixed  Lungs: CTAB, no wheezes, no use of accessory muscles  CV: S1S2 with RRR, No mrg  Abd: soft,  no ascites  Extr: No CCE, ALDAIR, strength normal, good capillary refill  Neuro: steady gait, CNs grossly intact  Skin: intact, no lesions noted  Rheum: No joint swelling  CHEST port on right clean site, no erythema, no tenderness, no surrounded swelling    Lab Results   Component Value Date    WBC 6.60 08/06/2018    HGB 13.2 08/06/2018    HCT 38.9 08/06/2018    MCV 97 08/06/2018     (H) 08/06/2018     CMP  Sodium   Date Value Ref Range Status   08/06/2018 139 136 - 145 mmol/L Final     Potassium   Date Value Ref Range Status   08/06/2018 4.2 3.5 - 5.1 mmol/L Final     Chloride   Date Value Ref Range Status   08/06/2018 102 95 - 110 mmol/L Final     CO2   Date Value Ref Range Status   08/06/2018 27 23 - 29 mmol/L Final     Glucose   Date Value Ref Range Status   08/06/2018 109 70 - 110 mg/dL Final     BUN, Bld   Date Value Ref Range Status   08/06/2018 12 8 - 23 mg/dL Final     Creatinine   Date Value Ref Range Status   08/06/2018 0.7 0.5 - 1.4 mg/dL Final     Calcium   Date Value Ref Range Status   08/06/2018 9.7 8.7 - 10.5 mg/dL Final     Total Protein   Date Value Ref Range Status   08/06/2018 7.3 6.0 - 8.4 g/dL Final     Albumin   Date " Value Ref Range Status   08/06/2018 3.9 3.5 - 5.2 g/dL Final     Total Bilirubin   Date Value Ref Range Status   08/06/2018 0.3 0.1 - 1.0 mg/dL Final     Comment:     For infants and newborns, interpretation of results should be based  on gestational age, weight and in agreement with clinical  observations.  Premature Infant recommended reference ranges:  Up to 24 hours.............<8.0 mg/dL  Up to 48 hours............<12.0 mg/dL  3-5 days..................<15.0 mg/dL  6-29 days.................<15.0 mg/dL       Alkaline Phosphatase   Date Value Ref Range Status   08/06/2018 83 55 - 135 U/L Final     AST   Date Value Ref Range Status   08/06/2018 18 10 - 40 U/L Final     ALT   Date Value Ref Range Status   08/06/2018 22 10 - 44 U/L Final     Anion Gap   Date Value Ref Range Status   08/06/2018 10 8 - 16 mmol/L Final     eGFR if    Date Value Ref Range Status   08/06/2018 >60 >60 mL/min/1.73 m^2 Final     eGFR if non    Date Value Ref Range Status   08/06/2018 >60 >60 mL/min/1.73 m^2 Final     Comment:     Calculation used to obtain the estimated glomerular filtration  rate (eGFR) is the CKD-EPI equation.      [facility] UNKNOWN RAD [facility]     Signed by     Authorizing Physician: Patient Class: Diagnosis:   Samira Garcia       Procedure:  Exam Date: Reason for Exam:   [facility] UNKNOWN RAD [facility] 06/08/2018 [id number]             RESULTS:  INTEGRATED PET CT WITH IMAGE FUSION     HISTORY:  Diffuse non-Hodgkin's lymphoma, large cell (clinical) left neck  diagnosed 10 2017. Patient has had chemotherapy.     TECHNIQUE: Following the injection of 12.5 mCi of F-18 labeled FDG into a right  antecubital vein, PET CT was performed from the vertex of the skull through the  proximal thighs with an integrated full ring PET CT scanner with image fusion.  The patient's serum glucose at the time of the exam was 119 mg/dL.     COMPARISON: 02/28/2018     FINDINGS: There is marked improvement when compared to the prior  study with  moderate decrease in size of the large conglomerate hypermetabolic lymph nodes  in the left neck. There is residual soft tissue density in the left neck at the  level of the hyoid bone and thyroid been deep to the sternocleidomastoid muscle.  This has max SUV of 3.5 and measures approximately 2.8 x 1.6 cm. There is  resolution of the right which is shift of the trachea. There is resolution of  the hypermetabolic retropharyngeal adenopathy and right internal jugular  adenopathy. There is a right IJ node measuring 7 mm which shows no FDG activity.     There is marked decrease in size and resolution of the FDG activity within the  bilateral level 4 cervical lymph nodes, right IJ node and lymph nodes lateral  to the left pectoralis muscle. There is a 6 mm right level 4 cervical node and  an 8 mm left level 4 cervical node which shows no FDG activity. On the prior  study these measured 15 mm and 23 mm. The lymph node lateral to the left  pectoralis muscle measures 5 mm in short axis and on the prior study measured  10 mm. There is a 13 mm left posterior cervical lymph node with faint  calcification which has a max SUV of 1.9. On the prior study this measured 26  mm with a max SUV of 13.6     There is a right subclavian vein Port-A-Cath. There is no new cervical, hilar,  mediastinal or axillary adenopathy. There is stable 5 mm nodule in the right  middle lobe with no new nodules. There are no intra-axial lesions. There is  mild periventricular low attenuation compatible with small vessel disease.     CT of the abdomen and pelvis demonstrates physiologic activity in the GI tract  and urinary system. There are no thick-walled or dilated bowel loops there is  decrease in size of the mildly prominent lymph nodes in the periportal region  and peripancreatic region. The largest measures 11 mm and shows no FDG activity.  There is no new retroperitoneal or mesenteric adenopathy. There is stable  mildly prominent left  external iliac node which shows no FDG activity.  There are degenerative changes of the spine..     Resolution of the FDG activity within the left side the sacrum. There is faint  permeative lucency present.     IMPRESSION: Marked improvement when compared to the prior study with moderate  decrease in size and FDG activity within the adenopathy within the neck and  supraclavicular region.     Mildly prominent lymph node in the gastrohepatic and periportal region which  showed no FDG activity. This is also decrease in size.     No new adenopathy     Resolution of the small bowel wall thickening.     Resolution of the FDG activity within the left side of the sacrum as well as  the left iliac hypermetabolic adenopathy.     Stable 5 mm nodule in the right middle lobe.     Read and electronically signed by: Olivia Quan MD on 6/8/2018 12:41 PM CDT        OLIVIA QUAN MD                  Osseous metastasis  -     NM PET CT Routine Skull to Mid Thigh    Diffuse large B-cell lymphoma of lymph nodes of multiple regions  -     NM PET CT Routine Skull to Mid Thigh    Chemotherapy follow-up examination  -     NM PET CT Routine Skull to Mid Thigh    Thyroid mass    Class 2 obesity due to excess calories with serious comorbidity and body mass index (BMI) of 39.0 to 39.9 in adult    Tobacco use    Paresthesias    Muscle cramping    Other orders  -     acetaminophen tablet 650 mg; Take 2 tablets (650 mg total) by mouth one time.  -     diphenhydrAMINE (BENADRYL) 50 mg in sodium chloride 0.9% 50 mL IVPB; Inject 50 mg into the vein one time.  -     famotidine (PF) injection 20 mg; Inject 2 mLs (20 mg total) into the vein one time.  -     meperidine (PF) injection 25 mg; Inject 0.5 mLs (25 mg total) into the vein as needed ([place]ors).  -     riTUXimab (RITUXAN) 375 mg/m2 = 810 mg in sodium chloride 0.9% 810 mL infusion (conc: 1 mg/mL); Inject 810 mLs (810 mg total) into the vein one time.  -     palonosetron (ALOXI) 0.25 mg,  dexamethasone (DECADRON) 20 mg in sodium chloride 0.9% 50 mL; Inject into the vein one time.  -     vinCRIStine (ONCOVIN) 2 mg in sodium chloride 0.9% 50 mL chemo infusion; Inject 2 mg into the vein one time.  -     DOXOrubicin chemo injection 108 mg; Inject 54 mLs (108 mg total) into the vein one time.  -     cyclophosphamide (CYTOXAN) 750 mg/m2 = 1,620 mg in sodium chloride 0.9% 250 mL chemo infusion; Inject 1,620 mg into the vein one time.  -     sodium chloride 0.9% flush 10 mL; Inject 10 mLs into the vein as needed for Line Care (Flush lines as needed.).  -     heparin, porcine (PF) 100 unit/mL injection flush 500 Units; Inject 5 mLs (500 Units total) into the vein as needed (Flush lines as needed).  -     alteplase injection 2 mg; 2 mg by Intra-Catheter route as needed (To restore central venous catheter function).    thrombocytosis reactive and stable, no need for intervention at this time  Scan due in about 4-5 weeks for reassessment , no need for bone marrow biopsy as this was not involved    CLEARED for cycle 6  chemo tomorrow and monthly xgeva for bone mets no dose adjustment needed at this time   Osseous mets  left sacrum  Continue xgeva  Next Tuesday   Double magnesium for myalgias and carmping in hands  Increase hydration for neuropathy   Discussed that she can take her antinausea med before bed as well as in the morning to prevent the nauseous feeling   No evidence of lymphoma in marrow   Leukopenia still anc good for chemo   Take prednisone 100 mg po Days 1-5   Continue lexapro 10 mg daily to help her cope   Increase  Protein intake   Continue calcium and vitamin D to prevent hypocalcemia while receiving Xgeva  Orders signed for Xgeva and chemo  RTC after next scan to determine if further chemo is needed  Regardless she will need continual port flushes    Thank you for allowing me to evaluate and participate in the care of this pleasant patient. Please fell free to call me with any questions or  concerns.    Warmly,  Samira Garcia MD    This note was dictated with Dragon and briefly proofread. Please excuse any sentences that may be unclear or words misspelled

## 2018-08-07 ENCOUNTER — TELEPHONE (OUTPATIENT)
Dept: HEMATOLOGY/ONCOLOGY | Facility: CLINIC | Age: 63
End: 2018-08-07

## 2018-08-18 DIAGNOSIS — D72.821 MONOCYTOSIS: ICD-10-CM

## 2018-08-20 RX ORDER — MONTELUKAST SODIUM 10 MG/1
TABLET ORAL
Qty: 30 TABLET | Refills: 2 | Status: SHIPPED | OUTPATIENT
Start: 2018-08-20 | End: 2018-11-26 | Stop reason: SDUPTHER

## 2018-08-23 ENCOUNTER — PATIENT MESSAGE (OUTPATIENT)
Dept: HEMATOLOGY/ONCOLOGY | Facility: CLINIC | Age: 63
End: 2018-08-23

## 2018-08-24 ENCOUNTER — LAB VISIT (OUTPATIENT)
Dept: LAB | Facility: HOSPITAL | Age: 63
End: 2018-08-24
Attending: INTERNAL MEDICINE
Payer: COMMERCIAL

## 2018-08-24 DIAGNOSIS — C83.30 DIFFUSE LARGE B-CELL LYMPHOMA, UNSPECIFIED BODY REGION: ICD-10-CM

## 2018-08-24 LAB
ALBUMIN SERPL BCP-MCNC: 3.9 G/DL
ALP SERPL-CCNC: 72 U/L
ALT SERPL W/O P-5'-P-CCNC: 29 U/L
ANION GAP SERPL CALC-SCNC: 13 MMOL/L
ANISOCYTOSIS BLD QL SMEAR: SLIGHT
AST SERPL-CCNC: 27 U/L
BASOPHILS # BLD AUTO: ABNORMAL K/UL
BASOPHILS NFR BLD: 0 %
BILIRUB SERPL-MCNC: 0.4 MG/DL
BUN SERPL-MCNC: 7 MG/DL
CALCIUM SERPL-MCNC: 9.6 MG/DL
CHLORIDE SERPL-SCNC: 104 MMOL/L
CO2 SERPL-SCNC: 24 MMOL/L
CREAT SERPL-MCNC: 0.7 MG/DL
DIFFERENTIAL METHOD: ABNORMAL
EOSINOPHIL # BLD AUTO: ABNORMAL K/UL
EOSINOPHIL NFR BLD: 8 %
ERYTHROCYTE [DISTWIDTH] IN BLOOD BY AUTOMATED COUNT: 15.3 %
EST. GFR  (AFRICAN AMERICAN): >60 ML/MIN/1.73 M^2
EST. GFR  (NON AFRICAN AMERICAN): >60 ML/MIN/1.73 M^2
GLUCOSE SERPL-MCNC: 104 MG/DL
HCT VFR BLD AUTO: 40.8 %
HGB BLD-MCNC: 13.7 G/DL
LYMPHOCYTES # BLD AUTO: ABNORMAL K/UL
LYMPHOCYTES NFR BLD: 24 %
MCH RBC QN AUTO: 33.1 PG
MCHC RBC AUTO-ENTMCNC: 33.6 G/DL
MCV RBC AUTO: 99 FL
MONOCYTES # BLD AUTO: ABNORMAL K/UL
MONOCYTES NFR BLD: 27 %
NEUTROPHILS NFR BLD: 41 %
PLATELET # BLD AUTO: 391 K/UL
PLATELET BLD QL SMEAR: ABNORMAL
PMV BLD AUTO: 7.5 FL
POLYCHROMASIA BLD QL SMEAR: ABNORMAL
POTASSIUM SERPL-SCNC: 3.9 MMOL/L
PROT SERPL-MCNC: 7.2 G/DL
RBC # BLD AUTO: 4.14 M/UL
SODIUM SERPL-SCNC: 141 MMOL/L
WBC # BLD AUTO: 2.7 K/UL

## 2018-08-24 PROCEDURE — 85027 COMPLETE CBC AUTOMATED: CPT

## 2018-08-24 PROCEDURE — 85007 BL SMEAR W/DIFF WBC COUNT: CPT

## 2018-08-24 PROCEDURE — 36415 COLL VENOUS BLD VENIPUNCTURE: CPT

## 2018-08-24 PROCEDURE — 80053 COMPREHEN METABOLIC PANEL: CPT

## 2018-08-27 PROBLEM — Z09 CHEMOTHERAPY FOLLOW-UP EXAMINATION: Status: RESOLVED | Noted: 2018-05-24 | Resolved: 2018-08-27

## 2018-08-31 ENCOUNTER — OFFICE VISIT (OUTPATIENT)
Dept: HEMATOLOGY/ONCOLOGY | Facility: CLINIC | Age: 63
End: 2018-08-31
Payer: COMMERCIAL

## 2018-08-31 VITALS
BODY MASS INDEX: 37.47 KG/M2 | WEIGHT: 224.88 LBS | SYSTOLIC BLOOD PRESSURE: 144 MMHG | HEART RATE: 90 BPM | RESPIRATION RATE: 20 BRPM | TEMPERATURE: 99 F | DIASTOLIC BLOOD PRESSURE: 71 MMHG | HEIGHT: 65 IN

## 2018-08-31 DIAGNOSIS — Z72.0 TOBACCO USE: ICD-10-CM

## 2018-08-31 DIAGNOSIS — I10 ESSENTIAL HYPERTENSION: ICD-10-CM

## 2018-08-31 DIAGNOSIS — R11.0 NAUSEA: ICD-10-CM

## 2018-08-31 DIAGNOSIS — K30 INDIGESTION: ICD-10-CM

## 2018-08-31 DIAGNOSIS — K29.70 GASTRITIS, PRESENCE OF BLEEDING UNSPECIFIED, UNSPECIFIED CHRONICITY, UNSPECIFIED GASTRITIS TYPE: ICD-10-CM

## 2018-08-31 DIAGNOSIS — C83.31 DIFFUSE LARGE B-CELL LYMPHOMA OF LYMPH NODES OF NECK: Primary | ICD-10-CM

## 2018-08-31 DIAGNOSIS — E78.2 MIXED HYPERLIPIDEMIA: ICD-10-CM

## 2018-08-31 DIAGNOSIS — R94.8 ABNORMAL POSITRON EMISSION TOMOGRAPHY (PET) SCAN: ICD-10-CM

## 2018-08-31 DIAGNOSIS — E03.9 ACQUIRED HYPOTHYROIDISM: ICD-10-CM

## 2018-08-31 DIAGNOSIS — F32.A DEPRESSION, UNSPECIFIED DEPRESSION TYPE: ICD-10-CM

## 2018-08-31 DIAGNOSIS — C79.51 OSSEOUS METASTASIS: ICD-10-CM

## 2018-08-31 PROCEDURE — 3078F DIAST BP <80 MM HG: CPT | Mod: CPTII,S$GLB,, | Performed by: INTERNAL MEDICINE

## 2018-08-31 PROCEDURE — 3077F SYST BP >= 140 MM HG: CPT | Mod: CPTII,S$GLB,, | Performed by: INTERNAL MEDICINE

## 2018-08-31 PROCEDURE — 99215 OFFICE O/P EST HI 40 MIN: CPT | Mod: S$GLB,,, | Performed by: INTERNAL MEDICINE

## 2018-08-31 PROCEDURE — 3008F BODY MASS INDEX DOCD: CPT | Mod: CPTII,S$GLB,, | Performed by: INTERNAL MEDICINE

## 2018-08-31 PROCEDURE — 99999 PR PBB SHADOW E&M-EST. PATIENT-LVL III: CPT | Mod: PBBFAC,,, | Performed by: INTERNAL MEDICINE

## 2018-08-31 RX ORDER — OMEPRAZOLE 40 MG/1
40 CAPSULE, DELAYED RELEASE ORAL DAILY
Qty: 30 CAPSULE | Refills: 1 | Status: SHIPPED | OUTPATIENT
Start: 2018-08-31 | End: 2019-02-25 | Stop reason: CLARIF

## 2018-08-31 NOTE — PROGRESS NOTES
CC I feel ok     Here for PET CT after completing RCHOP cycle 6      Indira Chauhan is a 63 y.o.  Pt found a lump in her neck and underwent a biopsy of such. Testing revealed lymphoma subtype Diffuse large B cell. SHe has not had fever  , no weight loss, no night sweats  She has chest port on right with no tenderness or erythema. SHe has completed 6 cycles RCHOP . PET CT shows mottling of sacrum ? resolving osseous mets, RML nodule in lung  And rught chest port   OP R-CHOP      Treatment Goal:   Curative      Status:   Active      Start Date:   4/16/2018      End Date:   8/8/2018      Provider:   Samira Garcia MD      Chemotherapy:   DOXOrubicin chemo injection 108 mg, 50 mg/m2 = 108 mg, Intravenous, Clinic/HOD 1 time, 6 of 6 cycles        vinCRIStine (ONCOVIN) 2 mg in sodium chloride 0.9% 50 mL chemo infusion, 2 mg (100 % of original dose 2 mg), Intravenous, Clinic/HOD 1 time, 6 of 6 cycles    Dose modification: 2 mg (original dose 2 mg, Cycle 1)        cyclophosphamide (CYTOXAN) 750 mg/m2 = 1,620 mg in sodium chloride 0.9% 250 mL chemo infusion, 750 mg/m2 = 1,620 mg, Intravenous, Clinic/HOD 1 time, 6 of 6 cycles        riTUXimab (RITUXAN) 375 mg/m2 = 810 mg in sodium chloride 0.9% 810 mL infusion (conc: 1 mg/mL), 375 mg/m2 = 810 mg, Intravenous, Clinic/HOD 1 time, 6 of 6 cycles    Nausea meds not working  Every am she feels nauseated , no heartburn, sleeps on one pillow    tolerating lexapro  Paresthesias in fingers only, intermittent , no dropping of cold items, no discoloration  SHe has developed increasing  paresthesias in her hands, muscle cramping on and off in her right leg, no associated swelling       FINAL PATHOLOGIC DIAGNOSIS  BONE MARROW, RIGHT ILIAC CREST, ASPIRATE, CLOT, AND CORE BIOPSY:  --Hypercellular marrow for age, approximately 70% overall, with trilineage hematopoiesis, see comment  -No definitive morphologic evidence of metastatic lymphoma, see comment  --No increase in blasts  --Increased  megakaryocytes  --Stainable iron is focally present      Past Medical History:   Diagnosis Date    Cancer     Diffuse large B cell lymphoma     GERD (gastroesophageal reflux disease)     Hyperlipidemia     Hypertension     Hypothyroidism     Thyroid mass 11/02/2017         Current Outpatient Medications:     allopurinol (ZYLOPRIM) 300 MG tablet, Take 1 tablet (300 mg total) by mouth once daily., Disp: 30 tablet, Rfl: 2    amlodipine-benazepril (LOTREL) 5-40 mg per capsule, Take 1 capsule by mouth once daily., Disp: 90 capsule, Rfl: 3    atorvastatin (LIPITOR) 40 MG tablet, Take 1 tablet (40 mg total) by mouth once daily., Disp: 90 tablet, Rfl: 3    diphenhydrAMINE-aluminum-magnesium hydroxide-simethicone-lidocaine HCl 2%, Swish and spit 15 mLs every 4 (four) hours as needed., Disp: 250 mL, Rfl: 1    escitalopram oxalate (LEXAPRO) 10 MG tablet, Take 1 tablet (10 mg total) by mouth once daily., Disp: 30 tablet, Rfl: 2    hydroCHLOROthiazide (HYDRODIURIL) 25 MG tablet, Take 1 tablet (25 mg total) by mouth once daily., Disp: 90 tablet, Rfl: 3    levothyroxine (SYNTHROID) 150 MCG tablet, Take 1 tablet (150 mcg total) by mouth once daily., Disp: 90 tablet, Rfl: 3    lidocaine-prilocaine (EMLA) cream, Apply topically as needed. Before chemo and before labs, Disp: 5 g, Rfl: 2    montelukast (SINGULAIR) 10 mg tablet, TAKE 1 TABLET BY MOUTH EVERY DAY IN THE EVENING, Disp: 30 tablet, Rfl: 2    ondansetron (ZOFRAN) 8 MG tablet, Take 1 tablet (8 mg total) by mouth every 12 (twelve) hours as needed for Nausea., Disp: 30 tablet, Rfl: 2    predniSONE (DELTASONE) 50 MG Tab, Take 1 tablet (50 mg total) by mouth once daily. On days 2-54 of chemotherapy treatment., Disp: 60 tablet, Rfl: 0    promethazine (PHENERGAN) 25 MG suppository, , Disp: , Rfl:   Review of patient's allergies indicates:  No Known Allergies  Social History     Tobacco Use    Smoking status: Current Every Day Smoker     Packs/day: 0.50     Years:  "40.00     Pack years: 20.00    Smokeless tobacco: Never Used   Substance Use Topics    Alcohol use: Yes     Alcohol/week: 1.8 oz     Types: 3 Shots of liquor per week     Comment: social 3/3/2018    Drug use: No       CONSTITUTIONAL: No fevers, chills, night sweats, wt. loss, appetite changes  SKIN: no rashes or itching  ENT: No headaches, head trauma, vision changes, or eye pain  CV: No chest pain, palpitations.   RESP: No dyspnea on exertion, cough, wheezing, or hemoptysis  GI: No nausea, emesis, diarrhea, constipation, melena, hematochezia, pain.   : No dysuria, hematuria, urgency, or frequency   HEME: No easy bruising, bleeding problems  PSYCHIATRIC: + depression,+ anxiety, no psychosis, hallucinations.  NEURO: No seizures, memory loss, dizziness  + paresthesias in her  Fingers only   MSK: cramping in right leg after walking distances         BP (!) 144/71   Pulse 90   Temp 98.5 °F (36.9 °C)   Resp 20   Ht 5' 5" (1.651 m)   Wt 102 kg (224 lb 13.9 oz)   BMI 37.42 kg/m²   Gen: NAD, A and O x3,   Psych: pleasant affect,+ depression screening   Eyes: Pupils round and non dilated, EOM intact  Nose: Nares patent  OP clear, mucosa patent  Neck with bilateral palpable submental glands , thyroid mass palpable and positive thyromegaly  Lungs: CTAB, no wheezes, no use of accessory muscles  CV: S1S2 with RRR, No mrg  Abd: soft,  no ascites  Extr: No CCE, ALDAIR, strength normal, good capillary refill  Neuro: steady gait, CNs grossly intact  Skn: intact, no lesions noted  Rheum: No joint swelling  CHEST port on right clean site, no erythema, no tenderness, no surrounded swelling    Lab Results   Component Value Date    WBC 2.70 (L) 08/24/2018    HGB 13.7 08/24/2018    HCT 40.8 08/24/2018    MCV 99 (H) 08/24/2018     (H) 08/24/2018     CMP  Sodium   Date Value Ref Range Status   08/24/2018 141 136 - 145 mmol/L Final     Potassium   Date Value Ref Range Status   08/24/2018 3.9 3.5 - 5.1 mmol/L Final "     Chloride   Date Value Ref Range Status   08/24/2018 104 95 - 110 mmol/L Final     CO2   Date Value Ref Range Status   08/24/2018 24 23 - 29 mmol/L Final     Glucose   Date Value Ref Range Status   08/24/2018 104 70 - 110 mg/dL Final     BUN, Bld   Date Value Ref Range Status   08/24/2018 7 (L) 8 - 23 mg/dL Final     Creatinine   Date Value Ref Range Status   08/24/2018 0.7 0.5 - 1.4 mg/dL Final     Calcium   Date Value Ref Range Status   08/24/2018 9.6 8.7 - 10.5 mg/dL Final     Total Protein   Date Value Ref Range Status   08/24/2018 7.2 6.0 - 8.4 g/dL Final     Albumin   Date Value Ref Range Status   08/24/2018 3.9 3.5 - 5.2 g/dL Final     Total Bilirubin   Date Value Ref Range Status   08/24/2018 0.4 0.1 - 1.0 mg/dL Final     Comment:     For infants and newborns, interpretation of results should be based  on gestational age, weight and in agreement with clinical  observations.  Premature Infant recommended reference ranges:  Up to 24 hours.............<8.0 mg/dL  Up to 48 hours............<12.0 mg/dL  3-5 days..................<15.0 mg/dL  6-29 days.................<15.0 mg/dL       Alkaline Phosphatase   Date Value Ref Range Status   08/24/2018 72 55 - 135 U/L Final     AST   Date Value Ref Range Status   08/24/2018 27 10 - 40 U/L Final     ALT   Date Value Ref Range Status   08/24/2018 29 10 - 44 U/L Final     Anion Gap   Date Value Ref Range Status   08/24/2018 13 8 - 16 mmol/L Final     eGFR if    Date Value Ref Range Status   08/24/2018 >60 >60 mL/min/1.73 m^2 Final     eGFR if non    Date Value Ref Range Status   08/24/2018 >60 >60 mL/min/1.73 m^2 Final     Comment:     Calculation used to obtain the estimated glomerular filtration  rate (eGFR) is the CKD-EPI equation.      [facility] UNKNOWN RAD EAP     Signed by     Authorizing Physician: Patient Class: Diagnosis:   Samira Garcia       Procedure:  Exam Date: Reason for Exam:   [facility] UNKNOWN RAD EAP 06/08/2018 [id number]              RESULTS:  INTEGRATED PET CT WITH IMAGE FUSION     HISTORY:  Diffuse non-Hodgkin's lymphoma, large cell (clinical) left neck  diagnosed 10 2017. Patient has had chemotherapy.     TECHNIQUE: Following the injection of 12.5 mCi of F-18 labeled FDG into a right  antecubital vein, PET CT was performed from the vertex of the skull through the  proximal thighs with an integrated full ring PET CT scanner with image fusion.  The patient's serum glucose at the time of the exam was 119 mg/dL.     COMPARISON: 02/28/2018     FINDINGS: There is marked improvement when compared to the prior study with  moderate decrease in size of the large conglomerate hypermetabolic lymph nodes  in the left neck. There is residual soft tissue density in the left neck at the  level of the hyoid bone and thyroid been deep to the sternocleidomastoid muscle.  This has max SUV of 3.5 and measures approximately 2.8 x 1.6 cm. There is  resolution of the right which is shift of the trachea. There is resolution of  the hypermetabolic retropharyngeal adenopathy and right internal jugular  adenopathy. There is a right IJ node measuring 7 mm which shows no FDG activity.     There is marked decrease in size and resolution of the FDG activity within the  bilateral level 4 cervical lymph nodes, right IJ node and lymph nodes lateral  to the left pectoralis muscle. There is a 6 mm right level 4 cervical node and  an 8 mm left level 4 cervical node which shows no FDG activity. On the prior  study these measured 15 mm and 23 mm. The lymph node lateral to the left  pectoralis muscle measures 5 mm in short axis and on the prior study measured  10 mm. There is a 13 mm left posterior cervical lymph node with faint  calcification which has a max SUV of 1.9. On the prior study this measured 26  mm with a max SUV of 13.6     There is a right subclavian vein Port-A-Cath. There is no new cervical, hilar,  mediastinal or axillary adenopathy. There is stable 5 mm  nodule in the right  middle lobe with no new nodules. There are no intra-axial lesions. There is  mild periventricular low attenuation compatible with small vessel disease.     CT of the abdomen and pelvis demonstrates physiologic activity in the GI tract  and urinary system. There are no thick-walled or dilated bowel loops there is  decrease in size of the mildly prominent lymph nodes in the periportal region  and peripancreatic region. The largest measures 11 mm and shows no FDG activity.  There is no new retroperitoneal or mesenteric adenopathy. There is stable  mildly prominent left external iliac node which shows no FDG activity.  There are degenerative changes of the spine..     Resolution of the FDG activity within the left side the sacrum. There is faint  permeative lucency present.     IMPRESSION: Marked improvement when compared to the prior study with moderate  decrease in size and FDG activity within the adenopathy within the neck and  supraclavicular region.     Mildly prominent lymph node in the gastrohepatic and periportal region which  showed no FDG activity. This is also decrease in size.     No new adenopathy     Resolution of the small bowel wall thickening.     Resolution of the FDG activity within the left side of the sacrum as well as  the left iliac hypermetabolic adenopathy.     Stable 5 mm nodule in the right middle lobe.     Read and electronically signed by: Olivia Quan MD on 6/8/2018 12:41 PM CDT        OLIVIA QUAN MD              [facility] UNKNOWN [facility], [facility]   Order: [id number]   Status:  Final result   Visible to patient:  Yes (Patient Portal)   Next appt:  Today at 08:20 AM in Hematology and Oncology (Samira Garcia MD)   Details     Reading Physician Reading Date Result Priority   Jon Noguera MD 8/13/2018       Narrative     CLINICAL INFORMATION:  Female patient, 63 years old, with a history of diffuse large B-cell lymphoma  diagnosed in October 2017. Last chemotherapy  treatment was 2 weeks ago.  Patient presents for restaging and response assessment. Diffuse non-Hodgkin's  lymphoma, large cell (clinical)    COMPARISON:  06/08/2018 and 02/28/2018    TECHNIQUE:  The blood glucose prior to injection was 110 mg/dl. Images were acquired from  the vertex of the skull through the mid thighs. approximately 45-60 minutes  post injection of 13.4 mCi F-18 FDG into the right antecubital fossa. Dilute  oral contrast was given. Axial, coronal and sagittal PET reconstructions with  and without attenuation correction were interpreted.  Corresponding CT images  were acquired and reviewed alongside the PET images.  The low dose unenhanced  CT images were used for attenuation correction and anatomic correlation only.    FINDINGS:  There is a tiny triangular focus of soft tissue attenuation thickening behind  the left sternocleidomastoid muscle with SUV max 1.7. No discrete measurable  FDG avid lymphadenopathy or mass is identified.    There is a 5 mm diameter nodule in the right middle lobe, unchanged. No new  nodules are identified.    There is a mildly heterogeneous mottled attenuation pattern to the sacrum and  left iliac bone with no increased FDG activity in this region favored to  represent resolving osseous metastatic disease.    Additional findings:  - Right sided medical infusion port with tip the level of the SVC.  - Moderate diffuse cerebral and cerebellar atrophy for age with periventricular  deep cerebral white matter low attenuation, nonspecific findings which can be  seen in any diffuse white matter process but most commonly associated with  chronic microvascular ischemic disease. There are scattered atheromatous  calcifications in the intracranial internal carotid arteries.  - There are scattered carotid arterial calcifications seen bilaterally.  - Moderate volume of stool and gas is seen in the colon with a nonobstructive  bowel gas pattern. Consider correlation for history of  constipation.  - Uterus is heterogeneous and slightly enlarged with what appears to be a  dominant fundal fibroid (poorly evaluated on this exam).  - There are degenerative changes seen throughout the spine with no aggressive  appearing lytic or blastic lesion.  - Atheromatous calcifications are seen at the aortic arch and coronary arterial  calcifications are seen (3 vessel disease).    Tracer distribution is otherwise physiologic.    IMPRESSION:  1. No measurable FDG avid lymphadenopathy is seen as outlined above.    2. Heterogeneous mottled attenuation pattern to the sacrum with no focal  increased FDG activity from background.    3. No areas of FDG avid small bowel wall thickening.    4. Unchanged non-FDG avid 5 mm right middle lobe pulmonary nodule.    Read and electronically signed by: Bhavna Kerr MD on 8/13/2018 2:56 PM CDT      BHAVNA KERR MD               Diffuse large B-cell lymphoma of lymph nodes of neck  -     MRI Sacral Plexus W W/O Contrast; Future; Expected date: 08/31/2018  -     omeprazole (PRILOSEC) 40 MG capsule; Take 1 capsule (40 mg total) by mouth once daily.  Dispense: 30 capsule; Refill: 1    Essential hypertension    Mixed hyperlipidemia    Osseous metastasis  -     MRI Sacral Plexus W W/O Contrast; Future; Expected date: 08/31/2018    Acquired hypothyroidism    Class 2 obesity due to excess calories with serious comorbidity and body mass index (BMI) of 39.0 to 39.9 in adult    Indigestion    Tobacco use    Abnormal positron emission tomography (PET) scan    Gastritis, presence of bleeding unspecified, unspecified chronicity, unspecified gastritis type  -     omeprazole (PRILOSEC) 40 MG capsule; Take 1 capsule (40 mg total) by mouth once daily.  Dispense: 30 capsule; Refill: 1    Nausea  -     omeprazole (PRILOSEC) 40 MG capsule; Take 1 capsule (40 mg total) by mouth once daily.  Dispense: 30 capsule; Refill: 1    Depression, unspecified depression type          Osseous mets   left sacrum ? MRI needed for further assesment to decide whether to cont xgeva to eradixcat osseous mets    Increase hydration for neuropathy , may add B 6 and glutamine   Discussed that she can take her antinausea med before bed as well as in the morning to prevent the nauseous feeling   No evidence of lymphoma in marrow   Sleep at a 45 degree angle propped up  Discussed differential for findings on scan in sacrum  Continue lexapro 10 mg daily to help her cope   Continue calcium and vitamin D   she will need continual port flushes  Weight loss is encouregaedd   Thank you for allowing me to evaluate and participate in the care of this pleasant patient. Please fell free to call me with any questions or concerns.    [place],  Samira Garcia MD    This note was dictated with Dragon and briefly proofread. Please excuse any sentences that may be unclear or words misspelled

## 2018-09-06 ENCOUNTER — HOSPITAL ENCOUNTER (OUTPATIENT)
Dept: RADIOLOGY | Facility: HOSPITAL | Age: 63
Discharge: HOME | End: 2018-09-06
Attending: INTERNAL MEDICINE
Payer: COMMERCIAL

## 2018-09-06 DIAGNOSIS — C83.31 DIFFUSE LARGE B-CELL LYMPHOMA OF LYMPH NODES OF NECK: ICD-10-CM

## 2018-09-06 DIAGNOSIS — C79.51 OSSEOUS METASTASIS: ICD-10-CM

## 2018-09-06 PROCEDURE — 72197 MRI PELVIS W/O & W/DYE: CPT | Mod: 26,,, | Performed by: RADIOLOGY

## 2018-09-06 PROCEDURE — A9585 GADOBUTROL INJECTION: HCPCS | Performed by: INTERNAL MEDICINE

## 2018-09-06 PROCEDURE — 72197 MRI PELVIS W/O & W/DYE: CPT | Mod: TC

## 2018-09-06 PROCEDURE — 25500020 PHARM REV CODE 255: Performed by: INTERNAL MEDICINE

## 2018-09-06 RX ORDER — GADOBUTROL 604.72 MG/ML
INJECTION INTRAVENOUS
Status: DISPENSED
Start: 2018-09-06 | End: 2018-09-06

## 2018-09-06 RX ORDER — GADOBUTROL 604.72 MG/ML
10 INJECTION INTRAVENOUS
Status: COMPLETED | OUTPATIENT
Start: 2018-09-06 | End: 2018-09-06

## 2018-09-06 RX ADMIN — GADOBUTROL 10 ML: 604.72 INJECTION INTRAVENOUS at 09:09

## 2018-09-10 ENCOUNTER — PATIENT MESSAGE (OUTPATIENT)
Dept: HEMATOLOGY/ONCOLOGY | Facility: CLINIC | Age: 63
End: 2018-09-10

## 2018-09-18 DIAGNOSIS — F43.21 SITUATIONAL DEPRESSION: ICD-10-CM

## 2018-09-18 RX ORDER — ESCITALOPRAM OXALATE 10 MG/1
TABLET ORAL
Qty: 30 TABLET | Refills: 2 | Status: SHIPPED | OUTPATIENT
Start: 2018-09-18 | End: 2019-02-25 | Stop reason: CLARIF

## 2018-09-19 ENCOUNTER — OFFICE VISIT (OUTPATIENT)
Dept: HEMATOLOGY/ONCOLOGY | Facility: CLINIC | Age: 63
End: 2018-09-19
Payer: COMMERCIAL

## 2018-09-19 VITALS
SYSTOLIC BLOOD PRESSURE: 154 MMHG | RESPIRATION RATE: 17 BRPM | WEIGHT: 222.69 LBS | TEMPERATURE: 99 F | HEIGHT: 65 IN | DIASTOLIC BLOOD PRESSURE: 74 MMHG | BODY MASS INDEX: 37.1 KG/M2 | HEART RATE: 91 BPM

## 2018-09-19 DIAGNOSIS — E03.9 ACQUIRED HYPOTHYROIDISM: ICD-10-CM

## 2018-09-19 DIAGNOSIS — C79.51 OSSEOUS METASTASIS: ICD-10-CM

## 2018-09-19 DIAGNOSIS — C83.31 DIFFUSE LARGE B-CELL LYMPHOMA OF LYMPH NODES OF NECK: Primary | ICD-10-CM

## 2018-09-19 PROCEDURE — 99999 PR PBB SHADOW E&M-EST. PATIENT-LVL IV: CPT | Mod: PBBFAC,,, | Performed by: INTERNAL MEDICINE

## 2018-09-19 PROCEDURE — 99214 OFFICE O/P EST MOD 30 MIN: CPT | Mod: S$GLB,,, | Performed by: INTERNAL MEDICINE

## 2018-09-19 PROCEDURE — 99214 OFFICE O/P EST MOD 30 MIN: CPT | Mod: PBBFAC,PO | Performed by: INTERNAL MEDICINE

## 2018-09-19 NOTE — PROGRESS NOTES
CC I feel worried    Had PET-CT in August 2018 here for follow-up of MRI involving her sacral plexus since there was questionable lipomatous involvement      Indira Chauhan is a 63 y.o.  Pt found a lump in her neck and underwent a biopsy of such. Testing revealed lymphoma subtype Diffuse large B cell. SHe has not had fever  , no weight loss, no night sweats  She has chest port on right with no tenderness or erythema. SHe has completed 6 cycles RCHOP . PET CT shows mottling of sacrum ? resolving osseous mets, RML nodule in lung  And rught chest port      Nausea meds not working  Every am she feels nauseated , no heartburn   tolerating lexapro  Paresthesias in fingers only, intermittent , no dropping of cold items, no discoloration  SHe has developed muscle cramping on and off in her right leg, no associated swelling       FINAL PATHOLOGIC DIAGNOSIS  BONE MARROW, RIGHT ILIAC CREST, ASPIRATE, CLOT, AND CORE BIOPSY:  --Hypercellular marrow for age, approximately 70% overall, with trilineage hematopoiesis, see comment  -No definitive morphologic evidence of metastatic lymphoma, see comment  --No increase in blasts  --Increased megakaryocytes  --Stainable iron is focally present      Past Medical History:   Diagnosis Date    Cancer     Diffuse large B cell lymphoma     GERD (gastroesophageal reflux disease)     Hyperlipidemia     Hypertension     Hypothyroidism     Thyroid mass 11/02/2017         Current Outpatient Medications:     allopurinol (ZYLOPRIM) 300 MG tablet, Take 1 tablet (300 mg total) by mouth once daily., Disp: 30 tablet, Rfl: 2    amlodipine-benazepril (LOTREL) 5-40 mg per capsule, Take 1 capsule by mouth once daily., Disp: 90 capsule, Rfl: 3    atorvastatin (LIPITOR) 40 MG tablet, Take 1 tablet (40 mg total) by mouth once daily., Disp: 90 tablet, Rfl: 3    diphenhydrAMINE-aluminum-magnesium hydroxide-simethicone-lidocaine HCl 2%, Swish and spit 15 mLs every 4 (four) hours as needed., Disp: 250  mL, Rfl: 1    escitalopram oxalate (LEXAPRO) 10 MG tablet, TAKE 1 TABLET BY MOUTH EVERY DAY, Disp: 30 tablet, Rfl: 2    hydroCHLOROthiazide (HYDRODIURIL) 25 MG tablet, Take 1 tablet (25 mg total) by mouth once daily., Disp: 90 tablet, Rfl: 3    levothyroxine (SYNTHROID) 150 MCG tablet, Take 1 tablet (150 mcg total) by mouth once daily., Disp: 90 tablet, Rfl: 3    lidocaine-prilocaine (EMLA) cream, Apply topically as needed. Before chemo and before labs, Disp: 5 g, Rfl: 2    montelukast (SINGULAIR) 10 mg tablet, TAKE 1 TABLET BY MOUTH EVERY DAY IN THE EVENING, Disp: 30 tablet, Rfl: 2    omeprazole (PRILOSEC) 40 MG capsule, Take 1 capsule (40 mg total) by mouth once daily., Disp: 30 capsule, Rfl: 1    ondansetron (ZOFRAN) 8 MG tablet, Take 1 tablet (8 mg total) by mouth every 12 (twelve) hours as needed for Nausea., Disp: 30 tablet, Rfl: 2    predniSONE (DELTASONE) 50 MG Tab, Take 1 tablet (50 mg total) by mouth once daily. On days 2-54 of chemotherapy treatment., Disp: 60 tablet, Rfl: 0    promethazine (PHENERGAN) 25 MG suppository, , Disp: , Rfl:   Review of patient's allergies indicates:  No Known Allergies  Social History     Tobacco Use    Smoking status: Current Every Day Smoker     Packs/day: 0.50     Years: 40.00     Pack years: 20.00    Smokeless tobacco: Never Used   Substance Use Topics    Alcohol use: Yes     Alcohol/week: 1.8 oz     Types: 3 Shots of liquor per week     Comment: social 3/3/2018    Drug use: No       CONSTITUTIONAL: No fevers, chills, night sweats, wt. loss, appetite changes  SKIN: no rashes or itching  ENT: No headaches, head trauma, vision changes, or eye pain  CV: No chest pain, palpitations.   RESP: No dyspnea on exertion, cough, wheezing, or hemoptysis  GI: No nausea, emesis, diarrhea, constipation, melena, hematochezia, pain.   : No dysuria, hematuria, urgency, or frequency   HEME: No easy bruising, bleeding problems  PSYCHIATRIC: + depression,+ anxiety, no psychosis,  "hallucinations.  NEURO: No seizures, memory loss, dizziness  + paresthesias in her   MSK: cramping in right leg after walking distances         BP (!) 154/74   Pulse 91   Temp 98.6 °F (37 °C) (Oral)   Resp 17   Ht 5' 5" (1.651 m)   Wt 101 kg (222 lb 10.6 oz)   BMI 37.05 kg/m²   Gen: NAD, A and O x3,   Psych: pleasant affect,+ depression screening   Eyes: Pupils round and non dilated, EOM intact  Nose: Nares patent  OP clear, mucosa patent  Neck with bilateral palpable submental glands , thyroid mass palpable and positive thyromegaly  Lungs: CTAB, no wheezes, no use of accessory muscles  CV: S1S2 with RRR, No mrg  Abd: soft,  no ascites  Extr: No CCE, ALDAIR, strength normal, good capillary refill  Neuro: steady gait, CNs grossly intact  Skn: intact, no lesions noted  Rheum: No joint swelling  CHEST port on right clean site, no erythema, no tenderness, no surrounded swelling    Lab Results   Component Value Date    WBC 2.70 (L) 08/24/2018    HGB 13.7 08/24/2018    HCT 40.8 08/24/2018    MCV 99 (H) 08/24/2018     (H) 08/24/2018     CMP  Sodium   Date Value Ref Range Status   08/24/2018 141 136 - 145 mmol/L Final     Potassium   Date Value Ref Range Status   08/24/2018 3.9 3.5 - 5.1 mmol/L Final     Chloride   Date Value Ref Range Status   08/24/2018 104 95 - 110 mmol/L Final     CO2   Date Value Ref Range Status   08/24/2018 24 23 - 29 mmol/L Final     Glucose   Date Value Ref Range Status   08/24/2018 104 70 - 110 mg/dL Final     BUN, Bld   Date Value Ref Range Status   08/24/2018 7 (L) 8 - 23 mg/dL Final     Creatinine   Date Value Ref Range Status   08/24/2018 0.7 0.5 - 1.4 mg/dL Final     Calcium   Date Value Ref Range Status   08/24/2018 9.6 8.7 - 10.5 mg/dL Final     Total Protein   Date Value Ref Range Status   08/24/2018 7.2 6.0 - 8.4 g/dL Final     Albumin   Date Value Ref Range Status   08/24/2018 3.9 3.5 - 5.2 g/dL Final     Total Bilirubin   Date Value Ref Range Status   08/24/2018 0.4 0.1 - 1.0 " mg/dL Final     Comment:     For infants and newborns, interpretation of results should be based  on gestational age, weight and in agreement with clinical  observations.  Premature Infant recommended reference ranges:  Up to 24 hours.............<8.0 mg/dL  Up to 48 hours............<12.0 mg/dL  3-5 days..................<15.0 mg/dL  6-29 days.................<15.0 mg/dL       Alkaline Phosphatase   Date Value Ref Range Status   08/24/2018 72 55 - 135 U/L Final     AST   Date Value Ref Range Status   08/24/2018 27 10 - 40 U/L Final     ALT   Date Value Ref Range Status   08/24/2018 29 10 - 44 U/L Final     Anion Gap   Date Value Ref Range Status   08/24/2018 13 8 - 16 mmol/L Final     eGFR if    Date Value Ref Range Status   08/24/2018 >60 >60 mL/min/1.73 m^2 Final     eGFR if non    Date Value Ref Range Status   08/24/2018 >60 >60 mL/min/1.73 m^2 Final     Comment:     Calculation used to obtain the estimated glomerular filtration  rate (eGFR) is the CKD-EPI equation.      [facility] UNKNOWN RAD EAP     Signed by     Authorizing Physician: Patient Class: Diagnosis:   Samira Garcia       Procedure:  Exam Date: Reason for Exam:   [facility] UNKNOWN RAD EAP 06/08/2018 [id number]             RESULTS:  INTEGRATED PET CT WITH IMAGE FUSION     HISTORY:  Diffuse non-Hodgkin's lymphoma, large cell (clinical) left neck  diagnosed 10 2017. Patient has had chemotherapy.     TECHNIQUE: Following the injection of 12.5 mCi of F-18 labeled FDG into a right  antecubital vein, PET CT was performed from the vertex of the skull through the  proximal thighs with an integrated full ring PET CT scanner with image fusion.  The patient's serum glucose at the time of the exam was 119 mg/dL.     COMPARISON: 02/28/2018     FINDINGS: There is marked improvement when compared to the prior study with  moderate decrease in size of the large conglomerate hypermetabolic lymph nodes  in the left neck. There is residual soft  tissue density in the left neck at the  level of the hyoid bone and thyroid been deep to the sternocleidomastoid muscle.  This has max SUV of 3.5 and measures approximately 2.8 x 1.6 cm. There is  resolution of the right which is shift of the trachea. There is resolution of  the hypermetabolic retropharyngeal adenopathy and right internal jugular  adenopathy. There is a right IJ node measuring 7 mm which shows no FDG activity.     There is marked decrease in size and resolution of the FDG activity within the  bilateral level 4 cervical lymph nodes, right IJ node and lymph nodes lateral  to the left pectoralis muscle. There is a 6 mm right level 4 cervical node and  an 8 mm left level 4 cervical node which shows no FDG activity. On the prior  study these measured 15 mm and 23 mm. The lymph node lateral to the left  pectoralis muscle measures 5 mm in short axis and on the prior study measured  10 mm. There is a 13 mm left posterior cervical lymph node with faint  calcification which has a max SUV of 1.9. On the prior study this measured 26  mm with a max SUV of 13.6     There is a right subclavian vein Port-A-Cath. There is no new cervical, hilar,  mediastinal or axillary adenopathy. There is stable 5 mm nodule in the right  middle lobe with no new nodules. There are no intra-axial lesions. There is  mild periventricular low attenuation compatible with small vessel disease.     CT of the abdomen and pelvis demonstrates physiologic activity in the GI tract  and urinary system. There are no thick-walled or dilated bowel loops there is  decrease in size of the mildly prominent lymph nodes in the periportal region  and peripancreatic region. The largest measures 11 mm and shows no FDG activity.  There is no new retroperitoneal or mesenteric adenopathy. There is stable  mildly prominent left external iliac node which shows no FDG activity.  There are degenerative changes of the spine..     Resolution of the FDG activity  within the left side the sacrum. There is faint  permeative lucency present.     IMPRESSION: Marked improvement when compared to the prior study with moderate  decrease in size and FDG activity within the adenopathy within the neck and  supraclavicular region.     Mildly prominent lymph node in the gastrohepatic and periportal region which  showed no FDG activity. This is also decrease in size.     No new adenopathy     Resolution of the small bowel wall thickening.     Resolution of the FDG activity within the left side of the sacrum as well as  the left iliac hypermetabolic adenopathy.     Stable 5 mm nodule in the right middle lobe.     Read and electronically signed by: Olivia Quan MD on 6/8/2018 12:41 PM CDT        OLIVIA QUAN MD              [facility] UNKNOWN RAD [facility]   Order: [id number]   Status:  Final result   Visible to patient:  Yes (Patient Portal)   Next appt:  Today at 08:20 AM in Hematology and Oncology (Samira Garcia MD)   Details     Reading Physician Reading Date Result Priority   Jon Noguera MD 8/13/2018       Narrative     CLINICAL INFORMATION:  Female patient, 63 years old, with a history of diffuse large B-cell lymphoma  diagnosed in October 2017. Last chemotherapy treatment was 2 weeks ago.  Patient presents for restaging and response assessment. Diffuse non-Hodgkin's  lymphoma, large cell (clinical)    COMPARISON:  06/08/2018 and 02/28/2018    TECHNIQUE:  The blood glucose prior to injection was 110 mg/dl. Images were acquired from  the vertex of the skull through the mid thighs. approximately 45-60 minutes  post injection of 13.4 mCi F-18 FDG into the right antecubital fossa. Dilute  oral contrast was given. Axial, coronal and sagittal PET reconstructions with  and without attenuation correction were interpreted.  Corresponding CT images  were acquired and reviewed alongside the PET images.  The low dose unenhanced  CT images were used for attenuation correction and anatomic  correlation only.    FINDINGS:  There is a tiny triangular focus of soft tissue attenuation thickening behind  the left sternocleidomastoid muscle with SUV max 1.7. No discrete measurable  FDG avid lymphadenopathy or mass is identified.    There is a 5 mm diameter nodule in the right middle lobe, unchanged. No new  nodules are identified.    There is a mildly heterogeneous mottled attenuation pattern to the sacrum and  left iliac bone with no increased FDG activity in this region favored to  represent resolving osseous metastatic disease.    Additional findings:  - Right sided medical infusion port with tip the level of the SVC.  - Moderate diffuse cerebral and cerebellar atrophy for age with periventricular  deep cerebral white matter low attenuation, nonspecific findings which can be  seen in any diffuse white matter process but most commonly associated with  chronic microvascular ischemic disease. There are scattered atheromatous  calcifications in the intracranial internal carotid arteries.  - There are scattered carotid arterial calcifications seen bilaterally.  - Moderate volume of stool and gas is seen in the colon with a nonobstructive  bowel gas pattern. Consider correlation for history of constipation.  - Uterus is heterogeneous and slightly enlarged with what appears to be a  dominant fundal fibroid (poorly evaluated on this exam).  - There are degenerative changes seen throughout the spine with no aggressive  appearing lytic or blastic lesion.  - Atheromatous calcifications are seen at the aortic arch and coronary arterial  calcifications are seen (3 vessel disease).    Tracer distribution is otherwise physiologic.    IMPRESSION:  1. No measurable FDG avid lymphadenopathy is seen as outlined above.    2. Heterogeneous mottled attenuation pattern to the sacrum with no focal  increased FDG activity from background.    3. No areas of FDG avid small bowel wall thickening.    4. Unchanged non-FDG avid 5 mm  right middle lobe pulmonary nodule.    Read and electronically signed by: Bhavna Kerr MD on 8/13/2018 2:56 PM CDT      BHAVNA KERR MD           PET-CT August 13, 2018 showed diffuse cerebral and cerebellar atrophy, right-sided chest port, nodule was simulation to the sacrum, 5 mm nodule in the right middle lobe unchanged, scattered carotid arterial calcifications bilaterally, uterus slightly enlarged    MRI sacral plexus September 6, 2018 showed heterogeneous signal possibly related to lymphomatous involvement    Diffuse large B-cell lymphoma of lymph nodes of neck  -     CT Biopsy Bone Deep (xpd); Future; Expected date: 09/19/2018  -     Leukemia/Lymphoma Screen - Bone Marrow Right Posterior Iliac Crest; Future; Expected date: 09/19/2018  -     Chromosome Analysis, Bone Marrow; Future; Expected date: 09/19/2018  -     CBC auto differential; Standing  -     CMP; Future; Expected date: 09/19/2018  -     Beta 2 Microglobulin, Serum; Future; Expected date: 09/19/2018    Osseous metastasis    Acquired hypothyroidism    Class 2 obesity due to excess calories with serious comorbidity and body mass index (BMI) of 39.0 to 39.9 in adult         proceed with bone marrow biopsy after October 5 to discern if she has marrow involvement with lymphoma given mottled appearance of sacral region on imaging studies this is imperative agrees with decide whether she needs further chemotherapy or not.    Cbc and cmp and beta 2 DRAw labs via port     Differential diagnosis discussed  Continue lexapro 10 mg daily   Continue calcium and vitamin D   she will need continual port flushes  No xgeva for now until eval her marrow         Thank you for allowing me to evaluate and participate in the care of this pleasant patient. Please fell free to call me with any questions or concerns.    Warmly,  Samira Garcia MD    This note was dictated with Dragon and briefly proofread. Please excuse any sentences that may be unclear or words  [facility]

## 2018-09-26 ENCOUNTER — TELEPHONE (OUTPATIENT)
Dept: HEMATOLOGY/ONCOLOGY | Facility: CLINIC | Age: 63
End: 2018-09-26

## 2018-09-28 ENCOUNTER — TELEPHONE (OUTPATIENT)
Dept: HEMATOLOGY/ONCOLOGY | Facility: CLINIC | Age: 63
End: 2018-09-28

## 2018-09-28 ENCOUNTER — PATIENT MESSAGE (OUTPATIENT)
Dept: HEMATOLOGY/ONCOLOGY | Facility: CLINIC | Age: 63
End: 2018-09-28

## 2018-10-11 ENCOUNTER — TELEPHONE (OUTPATIENT)
Dept: RADIOLOGY | Facility: HOSPITAL | Age: 63
End: 2018-10-11

## 2018-10-11 DIAGNOSIS — C83.31 DIFFUSE LARGE B-CELL LYMPHOMA OF LYMPH NODES OF NECK: Primary | ICD-10-CM

## 2018-10-12 ENCOUNTER — TELEPHONE (OUTPATIENT)
Dept: RADIOLOGY | Facility: HOSPITAL | Age: 63
End: 2018-10-12

## 2018-10-26 ENCOUNTER — TELEPHONE (OUTPATIENT)
Dept: HEMATOLOGY/ONCOLOGY | Facility: CLINIC | Age: 63
End: 2018-10-26

## 2018-10-31 RX ORDER — HEPARIN 100 UNIT/ML
500 SYRINGE INTRAVENOUS
Status: CANCELLED | OUTPATIENT
Start: 2018-10-31

## 2018-10-31 RX ORDER — SODIUM CHLORIDE 0.9 % (FLUSH) 0.9 %
10 SYRINGE (ML) INJECTION
Status: CANCELLED | OUTPATIENT
Start: 2018-10-31

## 2018-11-08 ENCOUNTER — TELEPHONE (OUTPATIENT)
Dept: HEMATOLOGY/ONCOLOGY | Facility: CLINIC | Age: 63
End: 2018-11-08

## 2018-11-08 NOTE — TELEPHONE ENCOUNTER
Left message with pt regarding missed apt from 11/01/18 with Dr. Garcia.instructed pt to call  to reschedule.

## 2018-11-19 ENCOUNTER — TELEPHONE (OUTPATIENT)
Dept: RADIOLOGY | Facility: HOSPITAL | Age: 63
End: 2018-11-19

## 2018-11-19 DIAGNOSIS — C83.31 DIFFUSE LARGE B-CELL LYMPHOMA OF LYMPH NODES OF NECK: Primary | ICD-10-CM

## 2018-11-26 DIAGNOSIS — D72.821 MONOCYTOSIS: ICD-10-CM

## 2018-11-26 RX ORDER — MONTELUKAST SODIUM 10 MG/1
TABLET ORAL
Qty: 30 TABLET | Refills: 2 | Status: SHIPPED | OUTPATIENT
Start: 2018-11-26 | End: 2019-02-25 | Stop reason: CLARIF

## 2018-11-30 ENCOUNTER — TELEPHONE (OUTPATIENT)
Dept: RADIOLOGY | Facility: HOSPITAL | Age: 63
End: 2018-11-30

## 2018-11-30 NOTE — NURSING
Contacted patient to go over instructions for scheduled CT BM BX Scheduled 12.6 @ 11AM . Spoke to pts spouse Bill- Instructions were given for patient to arrive no later than 9:30 am to outpatient registration for required PT/INR and CBC labs, then upstairs for pre-op and IV insertion. Pt is not currently taking aspirin or any blood thinners. Instructed to have nothing to eat or drink after midnight the night before the procedure except, a sip of water with essential medications the morning of.  Patient instrusted to bathe the night before and morning of procedure with anti bacterial soap or Hibiclens. Arrangements for transportation must be made in advance by a responsible adult. Educated on all additional pre-op instructions per policy. Spouse verbalized understanding.

## 2018-12-04 DIAGNOSIS — F43.21 SITUATIONAL DEPRESSION: ICD-10-CM

## 2018-12-04 RX ORDER — ESCITALOPRAM OXALATE 10 MG/1
10 TABLET ORAL DAILY
Qty: 30 TABLET | Refills: 2 | OUTPATIENT
Start: 2018-12-04

## 2018-12-06 ENCOUNTER — HOSPITAL ENCOUNTER (OUTPATIENT)
Dept: RADIOLOGY | Facility: HOSPITAL | Age: 63
Discharge: HOME | End: 2018-12-06
Attending: INTERNAL MEDICINE
Payer: COMMERCIAL

## 2018-12-06 VITALS
RESPIRATION RATE: 16 BRPM | TEMPERATURE: 98 F | HEART RATE: 76 BPM | SYSTOLIC BLOOD PRESSURE: 140 MMHG | WEIGHT: 220 LBS | OXYGEN SATURATION: 98 % | DIASTOLIC BLOOD PRESSURE: 73 MMHG | HEIGHT: 65 IN | BODY MASS INDEX: 36.65 KG/M2

## 2018-12-06 DIAGNOSIS — C83.31 DIFFUSE LARGE B-CELL LYMPHOMA OF LYMPH NODES OF NECK: ICD-10-CM

## 2018-12-06 DIAGNOSIS — C83.38 DIFFUSE LARGE B-CELL LYMPHOMA OF LYMPH NODES OF MULTIPLE REGIONS: Primary | ICD-10-CM

## 2018-12-06 PROCEDURE — 88305 TISSUE EXAM BY PATHOLOGIST: CPT | Performed by: PATHOLOGY

## 2018-12-06 PROCEDURE — 88313 SPECIAL STAINS GROUP 2: CPT

## 2018-12-06 PROCEDURE — 63600175 PHARM REV CODE 636 W HCPCS: Performed by: INTERNAL MEDICINE

## 2018-12-06 PROCEDURE — 77012 CT SCAN FOR NEEDLE BIOPSY: CPT | Mod: TC

## 2018-12-06 PROCEDURE — 88341 IMHCHEM/IMCYTCHM EA ADD ANTB: CPT | Performed by: PATHOLOGY

## 2018-12-06 PROCEDURE — 88185 FLOWCYTOMETRY/TC ADD-ON: CPT | Performed by: PATHOLOGY

## 2018-12-06 PROCEDURE — 88264 CHROMOSOME ANALYSIS 20-25: CPT

## 2018-12-06 PROCEDURE — 88237 TISSUE CULTURE BONE MARROW: CPT

## 2018-12-06 PROCEDURE — 99152 MOD SED SAME PHYS/QHP 5/>YRS: CPT | Mod: ,,, | Performed by: RADIOLOGY

## 2018-12-06 PROCEDURE — 77012 CT SCAN FOR NEEDLE BIOPSY: CPT | Mod: 26,,, | Performed by: RADIOLOGY

## 2018-12-06 PROCEDURE — 88189 FLOWCYTOMETRY/READ 16 & >: CPT | Mod: ,,, | Performed by: PATHOLOGY

## 2018-12-06 PROCEDURE — 63600175 PHARM REV CODE 636 W HCPCS

## 2018-12-06 PROCEDURE — 38222 DX BONE MARROW BX & ASPIR: CPT | Mod: ,,, | Performed by: RADIOLOGY

## 2018-12-06 PROCEDURE — 88184 FLOWCYTOMETRY/ TC 1 MARKER: CPT | Performed by: PATHOLOGY

## 2018-12-06 PROCEDURE — 88311 DECALCIFY TISSUE: CPT | Performed by: PATHOLOGY

## 2018-12-06 RX ORDER — LIDOCAINE HYDROCHLORIDE 10 MG/ML
INJECTION, SOLUTION EPIDURAL; INFILTRATION; INTRACAUDAL; PERINEURAL
Status: DISPENSED
Start: 2018-12-06 | End: 2018-12-06

## 2018-12-06 RX ORDER — MIDAZOLAM HYDROCHLORIDE 1 MG/ML
1 INJECTION INTRAMUSCULAR; INTRAVENOUS
Status: DISCONTINUED | OUTPATIENT
Start: 2018-12-06 | End: 2018-12-07 | Stop reason: HOSPADM

## 2018-12-06 RX ORDER — LIDOCAINE HYDROCHLORIDE 10 MG/ML
30 INJECTION, SOLUTION EPIDURAL; INFILTRATION; INTRACAUDAL; PERINEURAL ONCE
Status: DISCONTINUED | OUTPATIENT
Start: 2018-12-06 | End: 2018-12-07 | Stop reason: HOSPADM

## 2018-12-06 RX ORDER — FENTANYL CITRATE 50 UG/ML
25 INJECTION, SOLUTION INTRAMUSCULAR; INTRAVENOUS
Status: DISCONTINUED | OUTPATIENT
Start: 2018-12-06 | End: 2018-12-07 | Stop reason: HOSPADM

## 2018-12-06 RX ORDER — FENTANYL CITRATE 50 UG/ML
INJECTION, SOLUTION INTRAMUSCULAR; INTRAVENOUS
Status: COMPLETED
Start: 2018-12-06 | End: 2018-12-06

## 2018-12-06 RX ORDER — MIDAZOLAM HYDROCHLORIDE 1 MG/ML
INJECTION, SOLUTION INTRAMUSCULAR; INTRAVENOUS
Status: COMPLETED
Start: 2018-12-06 | End: 2018-12-06

## 2018-12-06 RX ADMIN — MIDAZOLAM HYDROCHLORIDE 1 MG: 1 INJECTION, SOLUTION INTRAMUSCULAR; INTRAVENOUS at 11:12

## 2018-12-06 RX ADMIN — MIDAZOLAM HYDROCHLORIDE 1 MG: 1 INJECTION INTRAMUSCULAR; INTRAVENOUS at 11:12

## 2018-12-06 RX ADMIN — FENTANYL CITRATE 50 MCG: 50 INJECTION INTRAMUSCULAR; INTRAVENOUS at 11:12

## 2018-12-06 RX ADMIN — FENTANYL CITRATE 50 MCG: 50 INJECTION, SOLUTION INTRAMUSCULAR; INTRAVENOUS at 11:12

## 2018-12-06 NOTE — H&P
64 yo female in NAD. Has lymphoma, for bone marrow for staging.   Mallampati 2, ASA 2.   Resp 12/min, pulse 80/min  Hearts sounds ok, Resp sounds wnl  Skin warm.   Sincere out performed.   Plan: Bone marrow aspiration and core biopsy on left with moderate sedation.

## 2018-12-06 NOTE — NURSING
Patient transferred via stretcher from DSU to CT 2.  Consents obtained by Radiologist   and Time out complete. Bone Marrow biopsy performed by . Versed 2 mg and Fentanyl 50 mcg were administered IV- Pathology Cheryl dodd present, received specimens and verified acceptable sample  Vital signs were monitored and remained stable for duration of procedure- pt tolerated procedure well- Bandaid applied to lower back CDI- Report called to post op nurse Ny. pt transported via stretcher to post op recovery in stable condition.

## 2018-12-06 NOTE — DISCHARGE SUMMARY
Radiology Discharge Summary      Admit date: 12/6/2018  9:06 AM  Discharge date: December 6, 2018    Instructions Given to patient: YesVerbal    Diet: Regular    Activity:NO Restrictions    Medications on discharge (List): Refer to Discharge Medication List    Hospital Course: Pt brought to CT scan and following short PE, informed consent and timeout , received moderate sedation Fentanyl 50 mcg and versed 2 mg IV and was monitored by Dr. Mares and ARTEM Hayes from 11:35 to 11:55 for BP, pulse and pulse oximetry.  Following local prep and anesthesia 11 g Jamshidi needle was advanced into left iliac crest and aspiration performed. The specimen was considered adequate by Path tech.The needle was repositioned and core biopsy performed  X 2 to get good core in formalin. Pressure held for two minutes and bandage placed. Total blood loss 4 ml. . Pt sent to Day Surgery for two hours recovery and DC home.      Description of Condition on Discharge: stable    Discharge Disposition: Home    Discharge Diagnosis: Lymphoma    Patient to Follow up with Dr. Garcia.

## 2018-12-06 NOTE — DISCHARGE INSTRUCTIONS
Bone Marrow Aspiration and Biopsy    Bone marrow is the soft, spongy part inside bones. It makes most of the bodys blood cells. Aspiration and biopsy are procedures done to take a sample of bone marrow out of the body for examination. To perform either procedure, a needle is inserted into one of your bones, usually the back of the hip bone. Then a sample of bone marrow is removed.   If a sample of fluid and cells is taken, it is called bone marrow aspiration. If a solid sample of bone marrow tissue is removed, it is called bone marrow biopsy. In either case, the samples are sent to a lab and studied. The procedures can be done alone, but are most often done together. This sheet tells you more about what to expect.  Why the procedures are done  The procedures may be done for a number of reasons. They can help diagnose certain blood or bone marrow disorders or infections. They may help find certain cancers, such as leukemia. They can show if cancer in other areas of the body has spread to the bone marrow. They can be used during cancer treatment, such as chemotherapy, to monitor treatment progress. And they may be done before certain treatments, such as a stem cell transplant, which require a bone marrow sample. Your healthcare provider will explain why you need the procedure and answer any questions you have.  Preparing for the procedure  Prepare for the procedure as told. In addition:  · Tell your healthcare provider:  ¨ What medicines you take. This includes blood thinners, such as aspirin. This also includes over-the-counter medicines, herbs, and other supplements. You may need to stop taking some or all of them before the procedure.  ¨ If you are allergic to any medicines. Also mention if you have ever had a reaction to medicines used during other tests or procedures in the past.  ¨ If you have a history of bleeding problems.  ¨ If you are pregnant or may be pregnant.  · Follow any directions youre given for  not eating or drinking before the procedure.  The day of the procedure  The procedures can be done at a hospital, clinic, or healthcare providers office. They are performed by a healthcare provider or trained healthcare provider. Whether youre having one or both procedures, plan to be at the facility for 1 to 2 hours. Youll likely go home the same day.  Before the procedure begins  What to expect before the procedure:  · Youll change into a patient gown.  · An IV line may be put into a vein in your arm or hand. This line supplies fluids and medicines.  · Youll be given a sedative to help you relax, if needed. This medicine is given by pill, injection, or through the IV line.  During the procedure  What to expect during the procedure:  · Youll lie on your side or your stomach.  · The site to be used for the bone marrow samples is marked and cleaned. The most common site is the back of the hip bone. Less common sites include the front of the hip bone or breastbone.  · Numbing medicine (local anesthesia) is injected at the site.  · A small cut is made through the numbed skin.  · One or both procedures are then done. Be sure to lie still for each procedure. It is normal to feel some pressure or pain during each procedure.  ¨ For the bone marrow aspiration, a thin needle is put through the cut and into the bone. A syringe is then attached to the needle and used to remove a sample of bone marrow fluid and cells.  ¨ For the bone marrow biopsy, a different needle is put through the same cut and into the bone. A small amount of bone marrow tissue is then removed.  · The samples are sent to a lab to be evaluated.  · When the procedure is complete, pressure is applied to the site for 10 to 15 minutes to help stop bleeding. The site is then bandaged.  After the procedure  Most people can go home after a short period of observation. If you need it, youll be given medicine to manage pain. If you were given a sedative, you  may be taken to a recovery room to rest until the medicine wears off. An adult family member or friend must drive you home afterward.  Recovering at home  Once at home, follow any instructions youre given. Be sure to:  · Take all medicines as directed.  · Care for the procedure site as instructed.  · Check for signs of infection at the procedure site (see below).  · Avoid getting the procedure site wet. Do not bathe or shower until your healthcare providers say it is OK to do so. If you wish, you may wash with a sponge or washcloth.  · Avoid heavy lifting and other strenuous activities as directed.     Call the healthcare provider  Call your healthcare provider if you have any of the following:  · Fever of 100.4 ºF (38 ºC) or higher, or as directed by your healthcare provider  · Signs of infection at the procedure site, such as increased redness or swelling, warmth, worsening pain, bleeding, or foul-smelling drainage   Follow-up  Your healthcare provider will discuss the results with you when they are ready. This is usually within a week after the procedure.     Risks and possible complications of these procedures  These include:  · Severe bleeding or bruising at the procedure site  · Infection at the procedure site  · Bone fracture  · Bone infection   Date Last Reviewed: 10/8/2015  © 1717-1798 [employer]. [address] [place] [address]. All rights reserved. This information is not intended as a substitute for professional medical care. Always follow your healthcare professional's instructions.      Discharge Instructions: After Your Surgery/Procedure  Youve just had surgery. During surgery you were given medicine called anesthesia to keep you relaxed and free of pain. After surgery you may have some pain or nausea. This is common. Here are some tips for feeling better and getting well after surgery.     Stay on schedule with your medication.   Going home  Your doctor or nurse will show  "you how to take care of yourself when you go home. He or she will also answer your questions. Have an adult family member or friend drive you home.      For your safety we recommend these precaution for the first 24 hours after your procedure:  · Do not drive or use heavy equipment.  · Do not make important decisions or sign legal papers.  · Do not drink alcohol.  · Have someone stay with you, if needed. He or she can watch for problems and help keep you safe.  · Your concentration, balance, coordination, and judgement may be impaired for many hours after anesthesia.  Use caution when ambulating or standing up.     · You may feel weak and "washed out" after anesthesia and surgery.      Subtle residual effects of general anesthesia or sedation with regional / local anesthesia can last more than 24 hours.  Rest for the remainder of the day or longer if your Doctor/Surgeon has advised you to do so.  Although you may feel normal within the first 24 hours, your reflexes and mental ability may be impaired without you realizing it.  You may feel dizzy, lightheaded or sleepy for 24 hours or longer.      Be sure to go to all follow-up visits with your doctor. And rest after your surgery for as long as your doctor tells you to.  Coping with pain  If you have pain after surgery, pain medicine will help you feel better. Take it as told, before pain becomes severe. Also, ask your doctor or pharmacist about other ways to control pain. This might be with heat, ice, or relaxation. And follow any other instructions your surgeon or nurse gives you.  Tips for taking pain medicine  To get the best relief possible, remember these points:  · Pain medicines can upset your stomach. Taking them with a little food may help.  · Most pain relievers taken by mouth need at least 20 to 30 minutes to start to work.  · Taking medicine on a schedule can help you remember to take it. Try to time your medicine so that you can take it before starting " an activity. This might be before you get dressed, go for a walk, or sit down for dinner.  · Constipation is a common side effect of pain medicines. Call your doctor before taking any medicines such as laxatives or stool softeners to help ease constipation. Also ask if you should skip any foods. Drinking lots of fluids and eating foods such as fruits and vegetables that are high in fiber can also help. Remember, do not take laxatives unless your surgeon has prescribed them.  · Drinking alcohol and taking pain medicine can cause dizziness and slow your breathing. It can even be deadly. Do not drink alcohol while taking pain medicine.  · Pain medicine can make you react more slowly to things. Do not drive or run machinery while taking pain medicine.  Your health care provider may tell you to take acetaminophen to help ease your pain. Ask him or her how much you are supposed to take each day. Acetaminophen or other pain relievers may interact with your prescription medicines or other over-the-counter (OTC) drugs. Some prescription medicines have acetaminophen and other ingredients. Using both prescription and OTC acetaminophen for pain can cause you to overdose. Read the labels on your OTC medicines with care. This will help you to clearly know the list of ingredients, how much to take, and any warnings. It may also help you not take too much acetaminophen. If you have questions or do not understand the information, ask your pharmacist or health care provider to explain it to you before you take the OTC medicine.  Managing nausea  Some people have an upset stomach after surgery. This is often because of anesthesia, pain, or pain medicine, or the stress of surgery. These tips will help you handle nausea and eat healthy foods as you get better. If you were on a special food plan before surgery, ask your doctor if you should follow it while you get better. These tips may help:  · Do not push yourself to eat. Your body  will tell you when to eat and how much.  · Start off with clear liquids and soup. They are easier to digest.  · Next try semi-solid foods, such as mashed potatoes, applesauce, and gelatin, as you feel ready.  · Slowly move to solid foods. Dont eat fatty, rich, or spicy foods at first.  · Do not force yourself to have 3 large meals a day. Instead eat smaller amounts more often.  · Take pain medicines with a small amount of solid food, such as crackers or toast, to avoid nausea.     Call your surgeon if  · You still have pain an hour after taking medicine. The medicine may not be strong enough.  · You feel too sleepy, dizzy, or groggy. The medicine may be too strong.  · You have side effects like nausea, vomiting, or skin changes, such as rash, itching, or hives.       If you have obstructive sleep apnea  You were given anesthesia medicine during surgery to keep you comfortable and free of pain. After surgery, you may have more apnea spells because of this medicine and other medicines you were given. The spells may last longer than usual.   At home:  · Keep using the continuous positive airway pressure (CPAP) device when you sleep. Unless your health care provider tells you not to, use it when you sleep, day or night. CPAP is a common device used to treat obstructive sleep apnea.  · Talk with your provider before taking any pain medicine, muscle relaxants, or sedatives. Your provider will tell you about the possible dangers of taking these medicines.  © 9767-3887 [employer]. [address] [place] [address], [place] [address]. All rights reserved. This information is not intended as a substitute for professional medical care. Always follow your healthcare professional's instructions.

## 2018-12-06 NOTE — PROGRESS NOTES
Discharge instructions given to pt and spouse who voice understanding.  Taking po fluids without nausea.  Denies pain.  Band aid to lower back dry and intact

## 2018-12-07 LAB
BONE MARROW IRON STAIN COMMENT: NORMAL
BONE MARROW WRIGHT STAIN COMMENT: NORMAL

## 2018-12-10 LAB
BODY SITE - BONE MARROW: NORMAL
CLINICAL DIAGNOSIS - BONE MARROW: NORMAL
FLOW CYTOMETRY ANTIBODIES ANALYZED - BONE MARROW: NORMAL
FLOW CYTOMETRY COMMENT - BONE MARROW: NORMAL
FLOW CYTOMETRY INTERPRETATION - BONE MARROW: NORMAL

## 2019-01-23 RX ORDER — HEPARIN 100 UNIT/ML
500 SYRINGE INTRAVENOUS
Status: CANCELLED | OUTPATIENT
Start: 2019-01-23

## 2019-01-23 RX ORDER — SODIUM CHLORIDE 0.9 % (FLUSH) 0.9 %
10 SYRINGE (ML) INJECTION
Status: CANCELLED | OUTPATIENT
Start: 2019-01-23

## 2019-01-24 ENCOUNTER — TELEPHONE (OUTPATIENT)
Dept: HEMATOLOGY/ONCOLOGY | Facility: CLINIC | Age: 64
End: 2019-01-24

## 2019-01-24 NOTE — TELEPHONE ENCOUNTER
Message left instructing patient to call [phone] to r/s appointment to go over biopsy results.

## 2019-01-29 ENCOUNTER — TELEPHONE (OUTPATIENT)
Dept: ADMINISTRATIVE | Facility: HOSPITAL | Age: 64
End: 2019-01-29

## 2019-01-29 ENCOUNTER — TELEPHONE (OUTPATIENT)
Dept: HEMATOLOGY/ONCOLOGY | Facility: CLINIC | Age: 64
End: 2019-01-29

## 2019-01-29 NOTE — TELEPHONE ENCOUNTER
appointments r/s as requested. Patient concerned about refill of medication and I explained to her that she needs to be seen to get a refill.

## 2019-01-29 NOTE — TELEPHONE ENCOUNTER
----- Message from Samira Garcia MD sent at 1/29/2019  2:47 PM CST -----  No she has not been back , Have y'all called her   ----- Message -----  From: Ruby Hicks RN  Sent: 1/24/2019  10:57 AM  To: Sade Nolasco RN, Samira Garcia MD    Last MD visit in September says xgeva on hold for now until bm resulted, but I don't see where she's RTC. Either No Show or Cancelled appointments.  Just wanted to make sure since she's been having her port flushed.  Thank you

## 2019-01-30 ENCOUNTER — TELEPHONE (OUTPATIENT)
Dept: HEMATOLOGY/ONCOLOGY | Facility: CLINIC | Age: 64
End: 2019-01-30

## 2019-01-31 DIAGNOSIS — C83.31 DIFFUSE LARGE B-CELL LYMPHOMA OF LYMPH NODES OF NECK: ICD-10-CM

## 2019-01-31 DIAGNOSIS — R11.0 NAUSEA: ICD-10-CM

## 2019-01-31 DIAGNOSIS — K29.70 GASTRITIS, PRESENCE OF BLEEDING UNSPECIFIED, UNSPECIFIED CHRONICITY, UNSPECIFIED GASTRITIS TYPE: ICD-10-CM

## 2019-02-05 RX ORDER — OMEPRAZOLE 40 MG/1
CAPSULE, DELAYED RELEASE ORAL
Qty: 30 CAPSULE | Refills: 0 | OUTPATIENT
Start: 2019-02-05

## 2019-02-12 DIAGNOSIS — Z09 CHEMOTHERAPY FOLLOW-UP EXAMINATION: ICD-10-CM

## 2019-02-12 DIAGNOSIS — C83.31 DIFFUSE LARGE B-CELL LYMPHOMA OF LYMPH NODES OF NECK: ICD-10-CM

## 2019-02-14 RX ORDER — ALLOPURINOL 300 MG/1
300 TABLET ORAL DAILY
Qty: 90 TABLET | Refills: 2 | OUTPATIENT
Start: 2019-02-14 | End: 2020-02-14

## 2019-02-15 ENCOUNTER — TELEPHONE (OUTPATIENT)
Dept: HEMATOLOGY/ONCOLOGY | Facility: CLINIC | Age: 64
End: 2019-02-15

## 2019-02-25 ENCOUNTER — HOSPITAL ENCOUNTER (EMERGENCY)
Facility: HOSPITAL | Age: 64
Discharge: HOME | End: 2019-02-25
Attending: EMERGENCY MEDICINE
Payer: COMMERCIAL

## 2019-02-25 VITALS
HEART RATE: 79 BPM | OXYGEN SATURATION: 95 % | BODY MASS INDEX: 36.65 KG/M2 | SYSTOLIC BLOOD PRESSURE: 169 MMHG | TEMPERATURE: 99 F | HEIGHT: 65 IN | WEIGHT: 220 LBS | RESPIRATION RATE: 18 BRPM | DIASTOLIC BLOOD PRESSURE: 81 MMHG

## 2019-02-25 DIAGNOSIS — J40 BRONCHITIS: Primary | ICD-10-CM

## 2019-02-25 DIAGNOSIS — R06.2 WHEEZING: ICD-10-CM

## 2019-02-25 LAB
ALBUMIN SERPL BCP-MCNC: 4.4 G/DL
ALP SERPL-CCNC: 92 U/L
ALT SERPL W/O P-5'-P-CCNC: 30 U/L
ANION GAP SERPL CALC-SCNC: 13 MMOL/L
AST SERPL-CCNC: 24 U/L
BASOPHILS # BLD AUTO: 0 K/UL
BASOPHILS NFR BLD: 0.3 %
BILIRUB SERPL-MCNC: 0.7 MG/DL
BUN SERPL-MCNC: 10 MG/DL
CALCIUM SERPL-MCNC: 10.2 MG/DL
CHLORIDE SERPL-SCNC: 98 MMOL/L
CO2 SERPL-SCNC: 28 MMOL/L
CREAT SERPL-MCNC: 0.8 MG/DL
DIFFERENTIAL METHOD: ABNORMAL
EOSINOPHIL # BLD AUTO: 0.3 K/UL
EOSINOPHIL NFR BLD: 4.2 %
ERYTHROCYTE [DISTWIDTH] IN BLOOD BY AUTOMATED COUNT: 14.1 %
EST. GFR  (AFRICAN AMERICAN): >60 ML/MIN/1.73 M^2
EST. GFR  (NON AFRICAN AMERICAN): >60 ML/MIN/1.73 M^2
GLUCOSE SERPL-MCNC: 106 MG/DL
HCT VFR BLD AUTO: 44.9 %
HGB BLD-MCNC: 15.2 G/DL
LYMPHOCYTES # BLD AUTO: 0.9 K/UL
LYMPHOCYTES NFR BLD: 11.1 %
MCH RBC QN AUTO: 32.8 PG
MCHC RBC AUTO-ENTMCNC: 33.8 G/DL
MCV RBC AUTO: 97 FL
MONOCYTES # BLD AUTO: 0.5 K/UL
MONOCYTES NFR BLD: 6.3 %
NEUTROPHILS # BLD AUTO: 6.2 K/UL
NEUTROPHILS NFR BLD: 78.1 %
PLATELET # BLD AUTO: 341 K/UL
PMV BLD AUTO: 7.5 FL
POTASSIUM SERPL-SCNC: 4 MMOL/L
PROT SERPL-MCNC: 8.2 G/DL
RBC # BLD AUTO: 4.64 M/UL
SODIUM SERPL-SCNC: 139 MMOL/L
WBC # BLD AUTO: 8 K/UL

## 2019-02-25 PROCEDURE — 27000221 HC OXYGEN, UP TO 24 HOURS

## 2019-02-25 PROCEDURE — 36415 COLL VENOUS BLD VENIPUNCTURE: CPT

## 2019-02-25 PROCEDURE — 99284 EMERGENCY DEPT VISIT MOD MDM: CPT

## 2019-02-25 PROCEDURE — 25000242 PHARM REV CODE 250 ALT 637 W/ HCPCS: Performed by: EMERGENCY MEDICINE

## 2019-02-25 PROCEDURE — 80053 COMPREHEN METABOLIC PANEL: CPT

## 2019-02-25 PROCEDURE — 85025 COMPLETE CBC W/AUTO DIFF WBC: CPT

## 2019-02-25 PROCEDURE — 94640 AIRWAY INHALATION TREATMENT: CPT

## 2019-02-25 RX ORDER — DOXYCYCLINE 100 MG/1
100 CAPSULE ORAL 2 TIMES DAILY
Qty: 20 CAPSULE | Refills: 0 | Status: SHIPPED | OUTPATIENT
Start: 2019-02-25 | End: 2019-03-07

## 2019-02-25 RX ORDER — ALBUTEROL SULFATE 90 UG/1
1-2 AEROSOL, METERED RESPIRATORY (INHALATION) EVERY 6 HOURS PRN
Qty: 1 INHALER | Refills: 0 | Status: SHIPPED | OUTPATIENT
Start: 2019-02-25 | End: 2019-08-30 | Stop reason: ALTCHOICE

## 2019-02-25 RX ORDER — IPRATROPIUM BROMIDE AND ALBUTEROL SULFATE 2.5; .5 MG/3ML; MG/3ML
3 SOLUTION RESPIRATORY (INHALATION)
Status: COMPLETED | OUTPATIENT
Start: 2019-02-25 | End: 2019-02-25

## 2019-02-25 RX ADMIN — IPRATROPIUM BROMIDE AND ALBUTEROL SULFATE 3 ML: .5; 3 SOLUTION RESPIRATORY (INHALATION) at 04:02

## 2019-02-25 RX ADMIN — IPRATROPIUM BROMIDE AND ALBUTEROL SULFATE 3 ML: .5; 3 SOLUTION RESPIRATORY (INHALATION) at 03:02

## 2019-02-25 NOTE — ED NOTES
Pt presents to ED with c/o SOB onset today. Reports cough, congestion over the past week. Hypoxia noted. Pt placed on BP, pulse ox, cardiac monitor. VSS. No needs identified. Will monitor prn.

## 2019-02-25 NOTE — ED PROVIDER NOTES
Encounter Date: 2/25/2019    SCRIBE #1 NOTE: IErum, am scribing for, and in the presence of, Dr. Levy.       History     Chief Complaint   Patient presents with    Shortness of Breath     started yesterday     Cough     x 2 weeks      2/25/2019  2:41 PM     The patient is a 63 y.o. female  who presents with shortness of breath. Patient c/o gradual onset of constant shortness of breath which started yesterday. Her sob is worsened with ambulating. Pt states she becomes really short of breath when she takes a few steps. No hx of SOB or pulmonary disease. She reports having cold symptoms for the past 2 weeks. She complains of runny nose, sneezing, productive cough with yellowish green sputum, chills and mild wheezing. Denies any fevers, muscle aches, chest pain, sore throat or leg swelling. Pt has a decreased appetite due to no appetite. Smoker but has not smoked in a couple of days. PMHx of hypothyroidism, HTN, HLD, GERD, thyroid mass, cancer, diffuse large B cell lymphoma. SHx of right portacath placement.      The history is provided by the patient.     Review of patient's allergies indicates:  No Known Allergies  Past Medical History:   Diagnosis Date    Cancer     Diffuse large B cell lymphoma     GERD (gastroesophageal reflux disease)     Hyperlipidemia     Hypertension     Hypothyroidism     Thyroid mass 11/02/2017     Past Surgical History:   Procedure Laterality Date    Ekrvikxmn-Mlzn-T-Cath Right 3/5/2018    Performed [facility] at [facility]    PORTACATH PLACEMENT Right 03/2018     Family History   Problem Relation Age of Onset    Heart disease Mother     COPD Mother     No Known Problems Father      Social History     Tobacco Use    Smoking status: Current Every Day Smoker     Packs/day: 0.50     Years: 40.00     Pack years: 20.00    Smokeless tobacco: Never Used   Substance Use Topics    Alcohol use: Yes     Alcohol/week: 1.8 oz     Types: 3 Shots of liquor per week      Comment: social 3/3/2018    Drug use: No     Review of Systems   Constitutional: Positive for appetite change and chills. Negative for fever.   HENT: Positive for rhinorrhea and sneezing. Negative for sore throat.    Respiratory: Positive for cough and wheezing.    Cardiovascular: Negative for chest pain and leg swelling.   Musculoskeletal: Negative for myalgias.       Physical Exam     Initial Vitals [02/25/19 1431]   BP Pulse Resp Temp SpO2   (!) 169/81 86 (!) 24 98.6 °F (37 °C) 95 %      MAP       --         Physical Exam    Nursing note and vitals reviewed.  Constitutional: She appears well-developed and well-nourished. No distress.   HENT:   Head: Normocephalic and atraumatic.   Mouth/Throat: Mucous membranes are normal.   Eyes: EOM are normal.   Neck: Normal range of motion.   Cardiovascular: Normal rate, regular rhythm, normal heart sounds, intact distal pulses and normal pulses. Exam reveals no gallop and no friction rub.    No murmur heard.  Pulmonary/Chest: No respiratory distress. She has wheezes (diffuse). She has no rhonchi. She has no rales. She exhibits no tenderness.   Abdominal: Soft. She exhibits no distension. There is no tenderness.   Musculoskeletal: Normal range of motion. She exhibits no edema.   Neurological: She is alert and oriented to person, place, and time. She has normal strength.   Skin: Skin is dry. No rash noted.   Psychiatric: She has a normal mood and affect.         ED Course   Procedures  Labs Reviewed   CBC W/ AUTO DIFFERENTIAL - Abnormal; Notable for the following components:       Result Value    MCH 32.8 (*)     MPV 7.5 (*)     Lymph # 0.9 (*)     Gran% 78.1 (*)     Lymph% 11.1 (*)     All other components within normal limits   COMPREHENSIVE METABOLIC PANEL          Imaging Results          X-Ray Chest PA And Lateral (Final result)  Result time 02/25/19 15:48:35    Final result by Neville Urbina MD (02/25/19 15:48:35)                 Impression:      No acute process.   Right Port-A-Cath.      Electronically signed by: Neville Urbina MD  Date:    02/25/2019  Time:    15:48             Narrative:    EXAMINATION:  XR CHEST PA AND LATERAL    CLINICAL HISTORY:  Cough;    TECHNIQUE:  PA and lateral views of the chest were performed.    COMPARISON:  03/05/2018    FINDINGS:  A right chest Port-A-Cath has its tip in the superior vena cava.  The cardiomediastinal silhouette is with normal limits for the lungs are well expanded without consolidation or pleural effusion.                                 Medical Decision Making:   History:   Old Medical Records: I decided to obtain old medical records.  Clinical Tests:   Lab Tests: Reviewed and Ordered  Radiological Study: Reviewed and Ordered            Scribe Attestation:   Scribe #1: I performed the above scribed service and the documentation accurately describes the services I performed. I attest to the accuracy of the note.    I, Dr. Levy, personally performed the services described in this documentation. All medical record entries made by the scribe were at my direction and in my presence.  I have reviewed the chart and agree that the record reflects my personal performance and is accurate and complete.6:15 PM 02/25/2019            ED Course as of Feb 25 1720   Mon Feb 25, 2019   1445 SpO2: 95 % [EF]   1445 Resp: (!) 24 [EF]   1445 Pulse: 86 [EF]   1445 Temp src: Oral [EF]   1445 Temp: 98.6 °F (37 °C) [EF]   1445 BP: (!) 169/81 [EF]   1546 Feels much better after 1st DuoNeb  [EF]   1655 63-year-old presents with 2 weeks of coughing wheezing and now shortness of breath for 2 days.  On exam she is diffusely wheezing.  Patient continues to smoke.  Initially she was somewhat tachypneic.  This has resolved after 2 duo nebs.  No pneumonia on x-ray.  Symptoms for 2 weeks no indication for an influenza swab.  Patient will be discharged home with antibiotics given the duration of her illness and her history of cigarette smoking.  Feels much  better looks much better at this time no indication for admission or further ER workup.  [EF]      ED Course User Index  [EF] Jayro Levy MD     Clinical Impression:       ICD-10-CM ICD-9-CM   1. Bronchitis J40 490   2. Wheezing R06.2 786.07         Disposition:   Disposition: Discharged  Condition: Stable                        Jayro Levy MD  02/25/19 [id number]

## 2019-02-28 ENCOUNTER — DOCUMENTATION ONLY (OUTPATIENT)
Dept: HEMATOLOGY/ONCOLOGY | Facility: CLINIC | Age: 64
End: 2019-02-28

## 2019-02-28 DIAGNOSIS — D72.821 MONOCYTOSIS: ICD-10-CM

## 2019-03-01 RX ORDER — MONTELUKAST SODIUM 10 MG/1
TABLET ORAL
Qty: 30 TABLET | Refills: 0 | OUTPATIENT
Start: 2019-03-01

## 2019-05-29 NOTE — TELEPHONE ENCOUNTER
----- Message from Giuliano Kate MD sent at 12/12/2017 10:00 AM CST -----  Janice, you will need to coordinate with Samira to see if we can accommodate this Friday.  ----- Message -----  From: Danna Johnson MD  Sent: 12/12/2017   9:38 AM  To: Giuliano Kate MD, Rose Dalton MD, #    Thank-you  Both.  Is it possible for it to be done this Friday.  She has some transportation issues.    aob  ----- Message -----  From: Rose Dalton MD  Sent: 12/12/2017   9:20 AM  To: Danna Johnson MD, Giuliano Kate MD, #    Yes. I would love to.     ----- Message -----  From: Giuliano Kate MD  Sent: 12/12/2017   8:25 AM  To: Danna Johnson MD, Rose Dalton MD, #    Do you want to try and do this Janice? You would have to to a diagnostic ultrasound first then do the biopsy to use for ECNU.   ----- Message -----  From: Danna Johnson MD  Sent: 12/11/2017  12:01 PM  To: Giuliano Kate MD, Rose Dalton MD, #    Morning,    This is the patient that I spoke to you both about this morning.  She needs a thyroid US (lateral neck) and LN bx with TG washout.  Is it possible to get this done ASAP for her.  I think you would have to overbook to make it done.  Let me know.  If not,  I'll try to get her set up with radiology.    Thanks,    aob           [Dear  ___] : Dear  [unfilled], [Sincerely,] : Sincerely, [FreeTextEntry3] : Bishop Davis MD, PhD\par Chief, Division of Laryngology\par Department of Otolaryngology\par [facility]\par Pediatric Otolaryngology, [facility]\par  of Otolaryngology\par [facility] [employer]\par \par \par

## 2019-06-01 DIAGNOSIS — I10 ESSENTIAL HYPERTENSION: ICD-10-CM

## 2019-06-01 DIAGNOSIS — E03.9 ACQUIRED HYPOTHYROIDISM: ICD-10-CM

## 2019-06-03 RX ORDER — LEVOTHYROXINE SODIUM 150 UG/1
150 TABLET ORAL DAILY
Qty: 30 TABLET | Refills: 0 | Status: SHIPPED | OUTPATIENT
Start: 2019-06-03 | End: 2019-08-30 | Stop reason: SDUPTHER

## 2019-06-03 RX ORDER — AMLODIPINE AND BENAZEPRIL HYDROCHLORIDE 5; 40 MG/1; MG/1
CAPSULE ORAL
Qty: 30 CAPSULE | Refills: 0 | Status: SHIPPED | OUTPATIENT
Start: 2019-06-03 | End: 2019-08-30 | Stop reason: SDUPTHER

## 2019-06-03 NOTE — TELEPHONE ENCOUNTER
I have not seen the patient in over a year. She needs a visit and she also needs blood work to check her thyroid. Her rx have been filled for 30 days only to [facility] [address]. Please schedule appointment and labs will be done at visit.   Thanks.  [place]

## 2019-06-10 NOTE — TELEPHONE ENCOUNTER
Made several attempts to contact patient by phone and e-mail. No answer will mail appointment letter.

## 2019-07-26 RX ORDER — HEPARIN 100 UNIT/ML
500 SYRINGE INTRAVENOUS
Status: CANCELLED | OUTPATIENT
Start: 2019-08-19

## 2019-07-26 RX ORDER — SODIUM CHLORIDE 0.9 % (FLUSH) 0.9 %
10 SYRINGE (ML) INJECTION
Status: CANCELLED | OUTPATIENT
Start: 2019-08-19

## 2019-08-23 ENCOUNTER — INFUSION (OUTPATIENT)
Dept: INFUSION THERAPY | Facility: HOSPITAL | Age: 64
End: 2019-08-23
Attending: INTERNAL MEDICINE
Payer: COMMERCIAL

## 2019-08-23 VITALS
SYSTOLIC BLOOD PRESSURE: 146 MMHG | BODY MASS INDEX: 34.74 KG/M2 | RESPIRATION RATE: 20 BRPM | DIASTOLIC BLOOD PRESSURE: 70 MMHG | HEART RATE: 86 BPM | TEMPERATURE: 99 F | WEIGHT: 208.75 LBS

## 2019-08-23 DIAGNOSIS — C83.38 DIFFUSE LARGE B-CELL LYMPHOMA OF LYMPH NODES OF MULTIPLE REGIONS: ICD-10-CM

## 2019-08-23 DIAGNOSIS — C79.51 OSSEOUS METASTASIS: ICD-10-CM

## 2019-08-23 DIAGNOSIS — M89.9 LYTIC BONE LESIONS ON XRAY: Primary | ICD-10-CM

## 2019-08-23 PROCEDURE — 25000003 PHARM REV CODE 250: Mod: PO | Performed by: INTERNAL MEDICINE

## 2019-08-23 PROCEDURE — 63600175 PHARM REV CODE 636 W HCPCS: Performed by: INTERNAL MEDICINE

## 2019-08-23 PROCEDURE — A4216 STERILE WATER/SALINE, 10 ML: HCPCS | Mod: PO | Performed by: INTERNAL MEDICINE

## 2019-08-23 RX ORDER — SODIUM CHLORIDE 0.9 % (FLUSH) 0.9 %
10 SYRINGE (ML) INJECTION
Status: CANCELLED | OUTPATIENT
Start: 2019-09-16

## 2019-08-23 RX ORDER — HEPARIN 100 UNIT/ML
500 SYRINGE INTRAVENOUS
Status: CANCELLED | OUTPATIENT
Start: 2019-09-16

## 2019-08-23 RX ORDER — SODIUM CHLORIDE 0.9 % (FLUSH) 0.9 %
10 SYRINGE (ML) INJECTION
Status: COMPLETED | OUTPATIENT
Start: 2019-08-23 | End: 2019-08-23

## 2019-08-23 RX ORDER — HEPARIN 100 UNIT/ML
500 SYRINGE INTRAVENOUS
Status: COMPLETED | OUTPATIENT
Start: 2019-08-23 | End: 2019-08-23

## 2019-08-23 RX ADMIN — Medication 500 UNITS: at 03:08

## 2019-08-23 RX ADMIN — SODIUM CHLORIDE, PRESERVATIVE FREE 10 ML: 5 INJECTION INTRAVENOUS at 03:08

## 2019-08-30 ENCOUNTER — OFFICE VISIT (OUTPATIENT)
Dept: FAMILY MEDICINE | Facility: CLINIC | Age: 64
End: 2019-08-30
Payer: MEDICARE

## 2019-08-30 DIAGNOSIS — E78.2 MIXED HYPERLIPIDEMIA: ICD-10-CM

## 2019-08-30 DIAGNOSIS — Z72.0 TOBACCO USE: ICD-10-CM

## 2019-08-30 DIAGNOSIS — E66.01 SEVERE OBESITY (BMI 35.0-39.9) WITH COMORBIDITY: ICD-10-CM

## 2019-08-30 DIAGNOSIS — Z00.00 ANNUAL PHYSICAL EXAM: Primary | ICD-10-CM

## 2019-08-30 DIAGNOSIS — I73.9 CLAUDICATION IN PERIPHERAL VASCULAR DISEASE: ICD-10-CM

## 2019-08-30 DIAGNOSIS — C83.38 DIFFUSE LARGE B-CELL LYMPHOMA OF LYMPH NODES OF MULTIPLE REGIONS: ICD-10-CM

## 2019-08-30 DIAGNOSIS — Z79.899 HIGH RISK MEDICATION USE: ICD-10-CM

## 2019-08-30 DIAGNOSIS — E03.9 ACQUIRED HYPOTHYROIDISM: ICD-10-CM

## 2019-08-30 DIAGNOSIS — I10 ESSENTIAL HYPERTENSION: ICD-10-CM

## 2019-08-30 DIAGNOSIS — G56.03 BILATERAL CARPAL TUNNEL SYNDROME: ICD-10-CM

## 2019-08-30 PROCEDURE — 99397 PER PM REEVAL EST PAT 65+ YR: CPT | Mod: S$PBB,,, | Performed by: NURSE PRACTITIONER

## 2019-08-30 PROCEDURE — 99214 OFFICE O/P EST MOD 30 MIN: CPT | Mod: PBBFAC,PO | Performed by: NURSE PRACTITIONER

## 2019-08-30 PROCEDURE — 99999 PR PBB SHADOW E&M-EST. PATIENT-LVL IV: ICD-10-PCS | Mod: PBBFAC,,, | Performed by: NURSE PRACTITIONER

## 2019-08-30 PROCEDURE — 99999 PR PBB SHADOW E&M-EST. PATIENT-LVL IV: CPT | Mod: PBBFAC,,, | Performed by: NURSE PRACTITIONER

## 2019-08-30 PROCEDURE — 99397 PR PREVENTIVE VISIT,EST,65 & OVER: ICD-10-PCS | Mod: S$PBB,,, | Performed by: NURSE PRACTITIONER

## 2019-08-30 RX ORDER — AMLODIPINE AND BENAZEPRIL HYDROCHLORIDE 5; 40 MG/1; MG/1
1 CAPSULE ORAL DAILY
Qty: 30 CAPSULE | Refills: 11 | Status: SHIPPED | OUTPATIENT
Start: 2019-08-30 | End: 2021-04-12 | Stop reason: SDUPTHER

## 2019-08-30 RX ORDER — LEVOTHYROXINE SODIUM 150 UG/1
150 TABLET ORAL DAILY
Qty: 30 TABLET | Refills: 11 | Status: SHIPPED | OUTPATIENT
Start: 2019-08-30 | End: 2021-04-12 | Stop reason: SDUPTHER

## 2019-08-30 NOTE — PATIENT INSTRUCTIONS
Walking for Fitness  Fitness walking has something for everyone, even people who are already fit. Walking is one of the safest ways to condition your body aerobically. It can boost energy, help you lose weight, and reduce stress.    Physical benefits  · Walking strengthens your heart and lungs, and tones your muscles.  · When walking, your feet land with less impact than in other sports. This reduces chances of muscle, bone, and joint injury.  · Regular walking improves your cholesterol levels and lowers your risk of heart disease. And it helps you control your blood sugar if you have diabetes.  · Walking is a weight-bearing activity, which helps maintain bone density. This can help prevent osteoporosis.  Personal rewards  · Taking walks can help you relax and manage stress. And fitness walking may make you feel better about yourself.  · Walking can help you sleep better at night and make you less likely to be depressed.  · Regular walking may help maintain your memory as you get older.  · Walking is a great way to spend extra time with friends and family members. Be sure to invite your dog along!  Q&A about fitness walking  Q: Will walking keep me fit?  A: Yes. Regular walking at the right pace gives you all the benefits of other aerobic activities, such as jogging and swimming.  Q: Will walking help me lose weight and keep it off?  A: Yes. Per mile, walking can burn as many calories as jogging. Your health care provider can help work walking into your weight-loss plan.  Q: Is walking safe for my health?  A: Yes. Walking is safe if you have high blood pressure, diabetes, heart disease, or other conditions. Talk to your healthcare provider before you start.  Date Last Reviewed: 4/1/2017 © 2000-2017 [employer]. [address] [place] [address], [place] [address]. All rights reserved. This information is not intended as a substitute for professional medical care. Always follow your healthcare professional's  instructions.        Peripheral Artery Disease (PAD)     Peripheral artery disease (PAD) occurs when the arteries that carry blood to the limbs are narrowed or blocked. This is usually due to a buildup of a fatty substance called plaque in the walls of the arteries.  PAD most often affects the arteries in the legs. When these arteries are narrowed or blocked, blood flow to the legs is reduced. This can cause leg and foot pain and other symptoms. If severe enough, reduced blood flow to the legs can lead to tissue death (gangrene) and the loss of a toe, foot, or leg. Having PAD also makes it more likely that arteries in other body areas are blocked. For instance, arteries that carry blood to the heart or brain may be affected. This raises the chances of heart attack and stroke.  Risk factors  Certain factors can make PAD more likely. They include:  · Smoking  · Diabetes  · High blood pressure  · Unhealthy cholesterol levels  · Obesity  · Inactive lifestyle  · Older age  · Family history of PAD  Symptoms  Many people with PAD have no symptoms. If symptoms do occur, they can include:  · Pain in the muscles of the calves, thighs or hips that gets worse with activity and better with rest (intermittent claudication)  · Achy, tired, or heavy feeling in the legs  · Weakness, numbness, tingling, or loss of feeling in the legs  · Changes in skin color of the legs  · Sores on the legs and feet  · Cold leg, feet, or toes  · Pain the feet or toes even when lying down (rest pain)  Home care  PAD is a chronic (lifelong) condition. Treatment is focused on managing your condition and lowering your health risks. This may include doing the following:  · If you smoke, quit. This helps prevent further damage to your arteries and lowers your health risks. Ask your provider about medicines or products that can help you quit smoking. Also consider joining a stop-smoking program or support group.  · Be more active. This helps you lose weight  and manage problems such as high blood pressure and unhealthy cholesterol levels. Start a walking program if advised to by your provider. Your provider may also help you form a safe exercise program that is right for your needs.  · Make healthy eating changes. This includes eating less fat, salt, and sugar.  · Take medicines for high blood pressure, unhealthy cholesterol levels, and diabetes as directed.  · Have your blood pressure and cholesterol levels checked as often as directed.  · If you have diabetes, try to keep your blood sugar well controlled. Test your blood sugar as directed.  · If you are overweight, talk to your provider about forming a weight-loss plan.  · Watch for cuts, scrapes, or open sores on your feet. Poor blood flow to the feet may slow healing and increase the risk of infection from these problems.   Follow-up care  Follow up with your healthcare provider, or as advised. If imaging tests such as ultrasound were done, they will be reviewed by a doctor. You will be told the results and any new findings that may affect your care.  When to seek medical advice   Call your healthcare provider right away if any of these occur:  · Sudden severe pain in the legs or feet  · Sudden cold, pale or blue color in the legs or feet  · Weakness or numbness in the legs or feet that worsens  · Any sore or wound in the legs or feet that wont heal  · Weak pulse in your legs or feet  Know the Signs of Heart Attack and Stroke  People with PAD are at high risk for heart attack and stroke. Knowing the signs of these problems can help you protect your health and get help when you need it. Call 911 right away if you have any of the following:  Signs of a Heart Attack  · Chest discomfort, such as pain, aching, tightness, or pressure that lasts more than a few minutes, or that comes and goes  · Pain or discomfort in the arms, back, shoulders, neck, or jaw  · Shortness of breath  · Sweating (often a cold, clammy  sweat)  · Nausea  · Lightheadedness  Signs of a Stroke  · Sudden numbness or weakness of the face, arms, or legs, especially on one side  · Sudden confusion or trouble speaking or understanding  · Sudden trouble seeing in one or both eyes  · Sudden trouble walking, dizziness, or loss of balance  · Sudden, severe headache with no known cause   Date Last Reviewed: 9/21/2015  © 6930-5534 [employer]. [address] [place] [address], [place] [address]. All rights reserved. This information is not intended as a substitute for professional medical care. Always follow your healthcare professional's instructions.        Carpal Tunnel Syndrome    Carpal tunnel syndrome is a painful condition of the wrist and arm. It is caused by pressure on the median nerve.  The median nerve is one of the nerves that give feeling and movement to the hand. It passes through a tunnel in the wrist called the carpal tunnel. This tunnel is made up of bones and ligaments. Narrowing of this tunnel or swelling of the tissues inside the tunnel puts pressure on the median nerve. This causes numbness, pins and needles, or electric shooting pains in your hand and forearm. Often the pain is worse at night and may wake you when you are asleep.  Carpal tunnel syndrome may occur during pregnancy and with use of birth control pills. It is more common in workers who must often bend their wrists. It is also common in people who work with power tools that cause strong vibrations.  Home care  · Rest the painful wrist. Avoid repeated bending of the wrist back and forth. This puts pressure on the median nerve. Avoid using power tools with strong vibrations.  · If you were given a splint, wear it at night while you sleep. You may also wear it during the day for comfort.  · Move your fingers and wrists often to avoid stiffness.  · Elevate your arms on pillows when you lie down.  · Try using the unaffected hand more.  · Try not to hold your wrists in a bent,  downward position.  · Sometimes changes in the work place may ease symptoms. If you type most of the day, it may help to change the position of your keyboard or add a wrist support. Your wrist should be in a neutral position and not bent back when typing.  · You may use over-the-counter pain medicine to treat pain and inflammation, unless another medicine was prescribed. Anti-inflammatory pain medicines, such as ibuprofen or naproxen may be more effective than acetaminophen, which treats pain, but not inflammation. If you have chronic liver or kidney disease or ever had a stomach ulcer or GI bleeding, talk with your doctor before using these medicines.  · Opioid pain medicine will only give temporary relief and does not treat the problem. If pain continues, you may need a shot of a steroid drug into your wrist.  · If the above methods fail, you may need surgery. This will open the carpal tunnel and release the pressure on the trapped nerve.  Follow-up care  Follow up with your healthcare provider, or as advised, if the pain doesnt begin to improve within the next week.  If X-rays were taken, you will be notified of any new findings that may affect your care.  When to seek medical advice  Call your healthcare provider right away if any of these occur:  · Pain not improving with the above treatment  · Fingers or hand become cold, blue, numb, or tingly  · Your whole arm becomes swollen or weak  Date Last Reviewed: 11/23/2015  © 1707-6765 [employer], [employer]. [address] [place] [address]. All rights reserved. This information is not intended as a substitute for professional medical care. Always follow your healthcare professional's instructions.        Carpal Tunnel Syndrome Prevention Tips  Some repetitive hand activities put you at higher risk for carpal tunnel syndrome (CTS). But you can reduce your risk. Learn how to change the way you use your hands. Below are tips for at home and on the job. Be  sure to also follow the hand and wrist safety policies at your workplace.      Keep your wrist in a neutral (straight) position when exercising.      Keep your wrist in neutral  Keep a neutral (straight) wrist position as often as you can. Dont use your wrist in a bent (flexed) position for long periods of time. This includes extended or twisted positions.  Watch your   Dont just use your thumb and index finger to grasp or lift. This can put stress on your wrist. When you can, use your whole hand and all its fingers to grasp an object.  Minimize repetition  Dont move your arms or hands or hold an object in the same way for long periods of time. Even simple, light tasks can cause injury this way. Instead, alternate tasks or switch hands.  Rest your hands  Give your hands a break from time to time with a rest. Even a few minutes once an hour can help.  Reduce speed and force  Slow down the speed in which you do a forceful, repetitive motion. This gives your wrist time to recover from the effort. Use power tools to help reduce the force.  Strengthen the muscles  Weak muscles may lead to a poor wrist or arm position. Exercises will make your hand and arm muscles stronger. This can help you keep a better position.  Date Last Reviewed: 9/11/2015  © 4266-1263 [employer]. [address] [place] [address], [place] [address]. All rights reserved. This information is not intended as a substitute for professional medical care. Always follow your healthcare professional's instructions.        Understanding Carpal Tunnel Syndrome    The carpal tunnel is a narrow space inside the wrist. It is ringed by bone and a band of tough tissue called the transverse carpal ligament. A major nerve called the median nerve runs from the forearm into the hand through the carpal tunnel. Tendons also run through the carpal tunnel.  With carpal tunnel syndrome, the tendons or nearby tissues within the carpal tunnel may swell or thicken.  Or the transverse carpal ligament may harden and shorten. This narrows the space in the carpal tunnel and puts pressure on the median nerve. This pressure leads to tingling and numbness of the hand and wrist. In time, the condition can make even simple tasks hard to do.  What causes carpal tunnel syndrome?  Doctors arent entirely clear why the condition occurs. Certain things may make a person more likely to have it. These include:  · Being female  · Being pregnant  · Being overweight  · Having diabetes or rheumatoid arthritis  Symptoms of carpal tunnel syndrome  Symptoms often come and go. At first, symptoms may occur mainly at night. Later, they may be noticed during the day as well. They may get worse with activities such as driving, reading, typing, or holding a phone. Symptoms can include:  · Tingling and numbness in the hand or wrist  · Sharp pain that shoots up the arm or down to the fingers  · Hand stiffness or cramping, especially in the morning  · Trouble making a fist  · Hand weakness and clumsiness  Treatment for carpal tunnel syndrome  Certain treatments help reduce the pressure on the median nerve and relieve symptoms. Choices for treatment may include one or more of the following:  · Wrist splint. This involves wearing a special brace on the wrist and hand. The splint holds the wrist straight, in a neutral position. This helps keep the carpal tunnel as open as possible.  · Cortisone shots. Cortisone is a medicine that helps reduce swelling. It is injected directly into the wrist. It helps shrink tissues inside the carpal tunnel. This relieves symptoms for a time.  · Pain medicines. You may take over-the-counter or prescription medicines to help reduce swelling and relieve symptoms.  · Surgery. If the condition doesnt respond to other treatments and doesnt go away on its own, you may need surgery. During surgery, the surgeon cuts the transverse carpal ligament to relieve pressure on the median  nerve.     When to call your healthcare provider  Call your healthcare provider right away if you have any of these:  · Fever of 100.4°F (38°C) or higher, or as directed  · Symptoms that dont get better, or get worse  · New symptoms   Date Last Reviewed: 3/10/2016  © 7897-1759 [employer]. [address] [place] [address], [place] [address]. All rights reserved. This information is not intended as a substitute for professional medical care. Always follow your healthcare professional's instructions.

## 2019-08-30 NOTE — PROGRESS NOTES
Subjective:       Patient ID: Indira Chauhan is a 64 y.o. female.    Chief Complaint: Annual Exam    Chief Complaint  Chief Complaint   Patient presents with    Annual Exam       HPI  Indira Chauhan is a 64 y.o. female with medical diagnoses as listed in the medical history and problem list that presents for an annual exam.  The patient has been regularly followed for hypertension, hyperlipidemia, hypothyroidism, B-cell lymphoma, and tobacco use.  Her lymphoma has been followed by Oncology, Dr. Garcia, and her last visit with her was in September of 2018 and has an appointment scheduled with her on 9/12/19.  She reports that she has finished all of her chemotherapy appointments. She has been chronically non-compliant with follow-up and with her medications.  She is currently out of all of her medications.  Her blood pressure today is 180/78 initially and 148/90 with recheck at end of visit. At her last appointment for port-a-cath flush on 8/23/19 her blood pressure was 146/70.  BMI is 38.44 and she has gained 11 lb since her last visit on 4/30/2018.      PAST MEDICAL HISTORY:  Past Medical History:   Diagnosis Date    Cancer     Diffuse large B cell lymphoma     GERD (gastroesophageal reflux disease)     Hyperlipidemia     Hypertension     Hypothyroidism        PAST SURGICAL HISTORY:  Past Surgical History:   Procedure Laterality Date    Hdpaaulfy-Ighs-S-Cath Right 3/5/2018    Performed by Trevor Coffman [facility] at [facility]    PORTACATH PLACEMENT Right 03/2018       SOCIAL HISTORY:  Social History     Socioeconomic History    Marital status:      Spouse name: Not on file    Number of children: Not on file    Years of education: Not on file    Highest education level: Not on file   Occupational History    Occupation: retired   Social Needs    Financial resource strain: Not very hard    Food insecurity:     Worry: Never true     Inability: Never true    Transportation needs:     Medical: No      Non-medical: No   Tobacco Use    Smoking status: Current Every Day Smoker     Packs/day: 1.00     Years: 50.00     Pack years: 50.00    Smokeless tobacco: Never Used   Substance and Sexual Activity    Alcohol use: Yes     Alcohol/week: 1.8 oz     Types: 3 Shots of liquor per week     Frequency: 2-3 times a week     Drinks per session: 1 or 2     Binge frequency: Less than monthly     Comment: social 3/3/2018    Drug use: No    Sexual activity: Yes     Partners: Male     Birth control/protection: Post-menopausal   Lifestyle    Physical activity:     Days per week: 0 days     Minutes per session: 10 min    Stress: Only a little   Relationships    Social connections:     Talks on phone: More than three times a week     Gets together: Once a week     Attends Yazdanism service: Not on file     Active member of club or organization: No     Attends meetings of clubs or organizations: Never     Relationship status: Living with partner   Other Topics Concern    Not on file   Social History Narrative    Retired hairdresser       FAMILY HISTORY:  Family History   Problem Relation Age of Onset    Heart disease Mother     COPD Mother     No Known Problems Father        ALLERGIES AND MEDICATIONS: updated and reviewed.  Review of patient's allergies indicates:  No Known Allergies  Current Outpatient Medications   Medication Sig Dispense Refill    amlodipine-benazepril (LOTREL) 5-40 mg per capsule Take 1 capsule by mouth once daily. 30 capsule 11    levothyroxine (SYNTHROID) 150 MCG tablet Take 1 tablet (150 mcg total) by mouth once daily. 30 tablet 11     No current facility-administered medications for this visit.        I have reviewed the patient's medical, family, and social history in detail and updated the computerized patient record.    Review of Systems   Constitutional: Negative for activity change, appetite change, chills, fatigue, fever and unexpected weight change.   HENT: Positive for postnasal drip.  "Negative for congestion, hearing loss, rhinorrhea, sinus pressure, sinus pain and trouble swallowing.         Has some post nasal drip.    Eyes: Negative for pain, discharge, redness and visual disturbance.        Wears reading glasses only   Respiratory: Negative for cough, chest tightness, shortness of breath and wheezing.         Smoking 1/2 PPD   Cardiovascular: Negative for chest pain, palpitations and leg swelling.   Gastrointestinal: Negative for abdominal pain, blood in stool, constipation, diarrhea, nausea and vomiting.   Endocrine: Negative for polydipsia and polyuria.   Genitourinary: Negative for difficulty urinating, dysuria, hematuria and menstrual problem.   Musculoskeletal: Positive for myalgias. Negative for arthralgias, joint swelling and neck pain.        C/O cramp to the right calf with walking a distance that is relieved with rest.    Skin: Negative for rash and wound.   Neurological: Positive for numbness. Negative for dizziness, weakness and headaches.        Numbness to thumb and first two fingers of both hands   Hematological: Does not bruise/bleed easily.   Psychiatric/Behavioral: Positive for sleep disturbance. Negative for confusion and dysphoric mood. The patient is not nervous/anxious.         Patient has bad sleep habits, sleeps few hours, naps during the day.          Objective:      Vitals:    08/30/19 0846 08/30/19 0933   BP: (!) 180/78 (!) 148/90   BP Location: Right arm Right arm   Patient Position: Sitting Sitting   BP Method: Large (Manual) Large (Manual)   Pulse: 80    Temp: 98.4 °F (36.9 °C)    TempSrc: Oral    Weight: 104.8 kg (231 lb)    Height: 5' 5" (1.651 m)      Physical Exam   Constitutional: She is oriented to person, place, and time. Vital signs are normal. She appears well-developed. No distress.   Blood pressure elevated 180/78 initially, 148/90 with recheck at end of visit.    HENT:   Head: Normocephalic and atraumatic.   Right Ear: Hearing, tympanic membrane, " external ear and ear canal normal.   Left Ear: Hearing, tympanic membrane, external ear and ear canal normal.   Nose: Nose normal. No mucosal edema.   Mouth/Throat: Oropharynx is clear and moist and mucous membranes are normal. No oropharyngeal exudate.   Eyes: Pupils are equal, round, and reactive to light. Conjunctivae and lids are normal. Right eye exhibits no discharge. Left eye exhibits no discharge. Right conjunctiva is not injected. Left conjunctiva is not injected.   Neck: Normal range of motion. Neck supple. Normal carotid pulses present. Carotid bruit is not present.   Cardiovascular: Normal rate, regular rhythm, S1 normal, S2 normal, normal heart sounds and intact distal pulses.   No murmur heard.  Pulses:       Carotid pulses are 2+ on the right side, and 2+ on the left side.       Radial pulses are 2+ on the right side, and 2+ on the left side.        Dorsalis pedis pulses are 1+ on the right side, and 1+ on the left side.        Posterior tibial pulses are 0 on the right side, and 1+ on the left side.   No edema. Feet cool to touch, right> left   Pulmonary/Chest: Effort normal. No respiratory distress. She has no decreased breath sounds. She has wheezes in the right upper field, the right middle field, the right lower field, the left upper field, the left middle field and the left lower field. She has no rhonchi. She has no rales.   Expiratory wheezes throughout all lung fields       Abdominal: Soft. Normal appearance and bowel sounds are normal. She exhibits no distension, no abdominal bruit, no pulsatile midline mass and no mass. There is no hepatosplenomegaly. There is no tenderness. No hernia.   Musculoskeletal: Normal range of motion. She exhibits no edema, tenderness or deformity.   Lymphadenopathy:        Head (right side): Submandibular adenopathy present. No submental and no tonsillar adenopathy present.        Head (left side): Submandibular adenopathy present. No submental and no tonsillar  adenopathy present.     She has no cervical adenopathy.        Right: No supraclavicular adenopathy present.        Left: No supraclavicular adenopathy present.   Neurological: She is alert and oriented to person, place, and time. She has normal strength. Gait normal.   Skin: Skin is warm, dry and intact. No rash noted. She is not diaphoretic. No erythema. No pallor.   Dry cracking skin to bilateral heels.   Psychiatric: She has a normal mood and affect. Her speech is normal and behavior is normal. Judgment and thought content normal. Her mood appears not anxious. Cognition and memory are normal. She does not exhibit a depressed mood.   Nursing note and vitals reviewed.        Assessment:       1. Annual physical exam    2. Essential hypertension    3. Acquired hypothyroidism    4. Mixed hyperlipidemia    5. Diffuse large B-cell lymphoma of lymph nodes of multiple regions    6. Claudication in peripheral vascular disease    7. Bilateral carpal tunnel syndrome    8. Tobacco use    9. Severe obesity (BMI 35.0-39.9) with comorbidity    10. High risk medication use          Plan:       Indira was seen today for annual exam.    Diagnoses and all orders for this visit:    Annual physical exam   Discussed age and gender appropriate health maintenance recommendations, healthy diet, regular exercise, necessary labs, age appropriate cancer screening, and routine vaccinations.  After lengthy discussion, patient refuses Dexa bone density, mammogram, colonoscopy/Fit Kit, low dose CT chest to screen for lung cancer, and all immunizations. Recommended blood work ordered and patient will schedule it fasting after her upcoming appointment with Dr. Garcia on 9/12/19.    Essential hypertension  -     amlodipine-benazepril (LOTREL) 5-40 mg per capsule; Take 1 capsule by mouth once daily.  -     CBC auto differential; Future  -     Comprehensive metabolic panel; Future  - Blood pressure elevated initially 180/78, recheck at end of visit  148/90. Patient has been out of her blood pressure medication. Will restart previous dose of Lotrel 5-40 mg daily. Refused to schedule 2 week nurse BP check visit but is seeing Dr. Garcia on 9/12/19 and blood pressure will be measured at that visit.    Acquired hypothyroidism  -     levothyroxine (SYNTHROID) 150 MCG tablet; Take 1 tablet (150 mcg total) by mouth once daily.  -     TSH; Future  -   Lab Results   Component Value Date    TSH 0.535 05/04/2018   - Patient will restart previous dose of levothyroxine, blood work will be done after her visit with Dr. Garcia on 9/12/19    Mixed hyperlipidemia  -     Lipid panel; Future  -   Lab Results   Component Value Date    CHOL 184 12/22/2017    CHOL 237 (H) 10/20/2017     Lab Results   Component Value Date    HDL 36 (L) 12/22/2017    HDL 35 (L) 10/20/2017     Lab Results   Component Value Date    LDLCALC 103.0 12/22/2017    LDLCALC 142.8 10/20/2017     Lab Results   Component Value Date    TRIG 225 (H) 12/22/2017    TRIG 296 (H) 10/20/2017     Lab Results   Component Value Date    CHOLHDL 19.6 (L) 12/22/2017    CHOLHDL 14.8 (L) 10/20/2017    Patient was taking lipitor 40 mg but stopped this medication, will address restarting when new lipid panel results are available    Diffuse large B-cell lymphoma of lymph nodes of multiple regions  -     CBC auto differential; Future  - Maintain follow up with oncology, Dr. Garcia as instructed    Claudication in peripheral vascular disease    Complains of claudication symptoms to the right calf only, ankle brachial inex and ultrasound testing recommended and refused by patient   Educational handouts provided. Peripheral artery disease  Bilateral carpal tunnel syndrome   Patient declines diagnostic testing/treatment   Discussed use of forearm/wrist immobilizers at bedtime   Educational handouts provided.  Carpal Tunnel Syndrome. Understanding Carpal Tunnel Syndrome. Carpal tunnel syndrome prevention tips  Tobacco use   Smoking cessation  strongly encouraged, patient is not interested in quitting at this time, declines referral to smoking cessation program  Severe obesity (BMI 35.0-39.9) with comorbidity   BMI today is 38.44 and patient has gained 11 pounds since her last visit on 4/30/18   Weight loss recommended with well balanced diet and portion controlled meals and increased activity.    Educational handouts provided. Walking for fitness    High risk medication use  -     CBC auto differential; Future  -     Comprehensive metabolic panel; Future      Follow up in about 6 months (around 2/29/2020) for 40 minutes, schedule lab fasting with Dr. Broussard when ordered.      Patient readiness: nonacceptance and barriers:readiness    During the course of the visit the patient was educated and counseled about the following:     Hypertension:   Medication: resume Lotrel 5-40 mg daily.  Dietary sodium restriction.  Regular aerobic exercise.  Follow up: 6 months and as needed.  Obesity:   General weight loss/lifestyle modification strategies discussed (elicit support from others; identify saboteurs; non-food rewards, etc).  Diet interventions: moderate (500 kCal/d) deficit diet and qualitative changes (increase low-fat,  high-fiber foods).  Informal exercise measures discussed, e.g. taking stairs instead of elevator.  Regular aerobic exercise program discussed.    Goals: Hypertension: Reduce Blood Pressure and Obesity: Reduce calorie intake and BMI    Did patient meet goals/outcomes: No    The following self management tools provided: declined    Patient Instructions (the written plan) was given to the patient/family.     Time spent with patient: 45 minutes    Barriers to medications present (no )    Adverse reactions to current medications (no)    Over the counter medications reviewed (Yes)

## 2019-09-02 VITALS
SYSTOLIC BLOOD PRESSURE: 148 MMHG | DIASTOLIC BLOOD PRESSURE: 90 MMHG | BODY MASS INDEX: 38.49 KG/M2 | WEIGHT: 231 LBS | HEIGHT: 65 IN | HEART RATE: 80 BPM | TEMPERATURE: 98 F

## 2019-09-02 PROBLEM — E66.01 SEVERE OBESITY (BMI 35.0-39.9) WITH COMORBIDITY: Status: ACTIVE | Noted: 2017-11-02

## 2019-09-02 PROBLEM — E07.9 THYROID MASS: Status: RESOLVED | Noted: 2017-10-31 | Resolved: 2019-09-02

## 2019-09-02 PROBLEM — I87.2 VENOUS INSUFFICIENCY, PERIPHERAL: Status: RESOLVED | Noted: 2018-03-05 | Resolved: 2019-09-02

## 2019-09-02 PROBLEM — K30 INDIGESTION: Status: RESOLVED | Noted: 2017-11-02 | Resolved: 2019-09-02

## 2019-09-09 ENCOUNTER — TELEPHONE (OUTPATIENT)
Dept: HEMATOLOGY/ONCOLOGY | Facility: CLINIC | Age: 64
End: 2019-09-09

## 2019-09-12 ENCOUNTER — OFFICE VISIT (OUTPATIENT)
Dept: HEMATOLOGY/ONCOLOGY | Facility: CLINIC | Age: 64
End: 2019-09-12
Payer: COMMERCIAL

## 2019-09-12 ENCOUNTER — DOCUMENTATION ONLY (OUTPATIENT)
Dept: HEMATOLOGY/ONCOLOGY | Facility: CLINIC | Age: 64
End: 2019-09-12

## 2019-09-12 VITALS
WEIGHT: 233.25 LBS | RESPIRATION RATE: 20 BRPM | DIASTOLIC BLOOD PRESSURE: 69 MMHG | OXYGEN SATURATION: 97 % | TEMPERATURE: 98 F | HEIGHT: 65 IN | BODY MASS INDEX: 38.86 KG/M2 | SYSTOLIC BLOOD PRESSURE: 121 MMHG | HEART RATE: 86 BPM

## 2019-09-12 DIAGNOSIS — C79.51 OSSEOUS METASTASIS: ICD-10-CM

## 2019-09-12 DIAGNOSIS — C83.31 DIFFUSE LARGE B-CELL LYMPHOMA OF LYMPH NODES OF NECK: Primary | ICD-10-CM

## 2019-09-12 DIAGNOSIS — I10 ESSENTIAL HYPERTENSION: ICD-10-CM

## 2019-09-12 DIAGNOSIS — E03.9 ACQUIRED HYPOTHYROIDISM: ICD-10-CM

## 2019-09-12 PROCEDURE — 99215 PR OFFICE/OUTPT VISIT, EST, LEVL V, 40-54 MIN: ICD-10-PCS | Mod: S$GLB,,, | Performed by: INTERNAL MEDICINE

## 2019-09-12 PROCEDURE — 99215 OFFICE O/P EST HI 40 MIN: CPT | Mod: S$GLB,,, | Performed by: INTERNAL MEDICINE

## 2019-09-12 PROCEDURE — 99999 PR PBB SHADOW E&M-EST. PATIENT-LVL III: CPT | Mod: PBBFAC,,, | Performed by: INTERNAL MEDICINE

## 2019-09-12 PROCEDURE — 99999 PR PBB SHADOW E&M-EST. PATIENT-LVL III: ICD-10-PCS | Mod: PBBFAC,,, | Performed by: INTERNAL MEDICINE

## 2019-09-12 PROCEDURE — 99213 OFFICE O/P EST LOW 20 MIN: CPT | Mod: PBBFAC,PO | Performed by: INTERNAL MEDICINE

## 2019-09-12 NOTE — PROGRESS NOTES
Pt was seen today and discharged from [facility]/ pt will f/u with primary care per Dr. Garcia. JUANS printed and sent in the mail for pt.

## 2019-09-12 NOTE — PROGRESS NOTES
CC I need my chest port flushed    Had PET-CT in August 2018       Indira Chauhan is a 64 y.o.  Pt found a lump in her neck and underwent a biopsy of such. Testing revealed lymphoma subtype Diffuse large B cell. SHe has not had fever  , no weight loss, no night sweats  She has chest port on right with no tenderness or erythema. SHe has completed 6 cycles R[facility]OP . PET CT shows mottling of sacrum ? resolving osseous mets, RML nodule in lung     tolerating lexapro  Paresthesias in fingers only, intermittent , no dropping of cold items, no discoloration    FINAL PATHOLOGIC DIAGNOSIS  BONE MARROW, RIGHT ILIAC CREST, ASPIRATE, CLOT, AND CORE BIOPSY:  --Hypercellular marrow for age, approximately 70% overall, with trilineage hematopoiesis, see comment  -No definitive morphologic evidence of metastatic lymphoma, see comment  --No increase in blasts  --Increased megakaryocytes  --Stainable iron is focally present    Right chest port in place  Had bone marrow biopsy that was negative     Past Medical History:   Diagnosis Date    Cancer     Diffuse large B cell lymphoma     GERD (gastroesophageal reflux disease)     Hyperlipidemia     Hypertension     Hypothyroidism          Current Outpatient Medications:     amlodipine-benazepril (LOTREL) 5-40 mg per capsule, Take 1 capsule by mouth once daily., Disp: 30 capsule, Rfl: 11    levothyroxine (SYNTHROID) 150 MCG tablet, Take 1 tablet (150 mcg total) by mouth once daily., Disp: 30 tablet, Rfl: 11  Review of patient's allergies indicates:  No Known Allergies  Social History     Tobacco Use    Smoking status: Current Every Day Smoker     Packs/day: 1.00     Years: 50.00     Pack years: 50.00    Smokeless tobacco: Never Used   Substance Use Topics    Alcohol use: Yes     Alcohol/week: 1.8 oz     Types: 3 Shots of liquor per week     Frequency: 2-3 times a week     Drinks per session: 1 or 2     Binge frequency: Less than monthly     Comment: social 3/3/2018    Drug use: No  "      CONSTITUTIONAL: No fevers, chills, night sweats, wt. loss, appetite changes  SKIN: no rashes or itching  ENT: No headaches, head trauma, vision changes, or eye pain  CV: No chest pain, palpitations.   RESP: No dyspnea on exertion, cough, wheezing, or hemoptysis  GI: No nausea, emesis, diarrhea, constipation, melena, hematochezia, pain.   : No dysuria, hematuria, urgency, or frequency   HEME: No easy bruising, bleeding problems  PSYCHIATRIC: + depression,+ anxiety, no psychosis, hallucinations.  NEURO: No seizures, memory loss, dizziness  + paresthesias in her   MSK: muscle cramping          /69   Pulse 86   Temp 98.2 °F (36.8 °C)   Resp 20   Ht 5' 5" (1.651 m)   Wt 105.8 kg (233 lb 4 oz)   SpO2 97%   BMI 38.81 kg/m²   Gen: NAD, A and O x3, well groomed   Psych: pleasant affect,+ depression screening   Eyes: Pupils round and non dilated, EOM intact  Nose: Nares patent  OP clear, mucosa patent  Neck with bilateral palpable submental glands , thyroid mass palpable and positive thyromegaly  Lungs: CTAB, no wheezes, no use of accessory muscles  CV: S1S2 with RRR, No mrg  Abd: soft,  no ascites  Extr: No CCE, ALDAIR, strength normal, good capillary refill  Neuro: steady gait, CNs grossly intact  Skn: intact, no lesions noted  Rheum: No joint swelling  CHEST port on right clean site, no erythema, no tenderness, no surrounded swelling    Lab Results   Component Value Date    WBC 8.00 02/25/2019    HGB 15.2 02/25/2019    HCT 44.9 02/25/2019    MCV 97 02/25/2019     02/25/2019     CMP  Sodium   Date Value Ref Range Status   02/25/2019 139 136 - 145 mmol/L Final     Potassium   Date Value Ref Range Status   02/25/2019 4.0 3.5 - 5.1 mmol/L Final     Chloride   Date Value Ref Range Status   02/25/2019 98 95 - 110 mmol/L Final     CO2   Date Value Ref Range Status   02/25/2019 28 23 - 29 mmol/L Final     Glucose   Date Value Ref Range Status   02/25/2019 106 70 - 110 mg/dL Final     BUN, Bld   Date Value " Ref Range Status   02/25/2019 10 8 - 23 mg/dL Final     Creatinine   Date Value Ref Range Status   02/25/2019 0.8 0.5 - 1.4 mg/dL Final     Calcium   Date Value Ref Range Status   02/25/2019 10.2 8.7 - 10.5 mg/dL Final     Total Protein   Date Value Ref Range Status   02/25/2019 8.2 6.0 - 8.4 g/dL Final     Albumin   Date Value Ref Range Status   02/25/2019 4.4 3.5 - 5.2 g/dL Final     Total Bilirubin   Date Value Ref Range Status   02/25/2019 0.7 0.1 - 1.0 mg/dL Final     Comment:     For infants and newborns, interpretation of results should be based  on gestational age, weight and in agreement with clinical  observations.  Premature Infant recommended reference ranges:  Up to 24 hours.............<8.0 mg/dL  Up to 48 hours............<12.0 mg/dL  3-5 days..................<15.0 mg/dL  6-29 days.................<15.0 mg/dL       Alkaline Phosphatase   Date Value Ref Range Status   02/25/2019 92 55 - 135 U/L Final     AST   Date Value Ref Range Status   02/25/2019 24 10 - 40 U/L Final     ALT   Date Value Ref Range Status   02/25/2019 30 10 - 44 U/L Final     Anion Gap   Date Value Ref Range Status   02/25/2019 13 8 - 16 mmol/L Final     eGFR if    Date Value Ref Range Status   02/25/2019 >60 >60 mL/min/1.73 m^2 Final     eGFR if non    Date Value Ref Range Status   02/25/2019 >60 >60 mL/min/1.73 m^2 Final     Comment:     Calculation used to obtain the estimated glomerular filtration  rate (eGFR) is the CKD-EPI equation.      [facility] UNKNOWN RAD EAP     Signed by     Authorizing Physician: Patient Class: Diagnosis:   Samira Murillog       Procedure:  Exam Date: Reason for Exam:   [facility] UNKNOWN RAD EAP 06/08/2018 [id number]             RESULTS:  INTEGRATED PET CT WITH IMAGE FUSION     HISTORY:  Diffuse non-Hodgkin's lymphoma, large cell (clinical) left neck  diagnosed 10 2017. Patient has had chemotherapy.     TECHNIQUE: Following the injection of 12.5 mCi of F-18 labeled FDG into a  right  antecubital vein, PET CT was performed from the vertex of the skull through the  proximal thighs with an integrated full ring PET CT scanner with image fusion.  The patient's serum glucose at the time of the exam was 119 mg/dL.     COMPARISON: 02/28/2018     FINDINGS: There is marked improvement when compared to the prior study with  moderate decrease in size of the large conglomerate hypermetabolic lymph nodes  in the left neck. There is residual soft tissue density in the left neck at the  level of the hyoid bone and thyroid been deep to the sternocleidomastoid muscle.  This has max SUV of 3.5 and measures approximately 2.8 x 1.6 cm. There is  resolution of the right which is shift of the trachea. There is resolution of  the hypermetabolic retropharyngeal adenopathy and right internal jugular  adenopathy. There is a right IJ node measuring 7 mm which shows no FDG activity.     There is marked decrease in size and resolution of the FDG activity within the  bilateral level 4 cervical lymph nodes, right IJ node and lymph nodes lateral  to the left pectoralis muscle. There is a 6 mm right level 4 cervical node and  an 8 mm left level 4 cervical node which shows no FDG activity. On the prior  study these measured 15 mm and 23 mm. The lymph node lateral to the left  pectoralis muscle measures 5 mm in short axis and on the prior study measured  10 mm. There is a 13 mm left posterior cervical lymph node with faint  calcification which has a max SUV of 1.9. On the prior study this measured 26  mm with a max SUV of 13.6     There is a right subclavian vein Port-A-Cath. There is no new cervical, hilar,  mediastinal or axillary adenopathy. There is stable 5 mm nodule in the right  middle lobe with no new nodules. There are no intra-axial lesions. There is  mild periventricular low attenuation compatible with small vessel disease.     CT of the abdomen and pelvis demonstrates physiologic activity in the GI tract  and  urinary system. There are no thick-walled or dilated bowel loops there is  decrease in size of the mildly prominent lymph nodes in the periportal region  and peripancreatic region. The largest measures 11 mm and shows no FDG activity.  There is no new retroperitoneal or mesenteric adenopathy. There is stable  mildly prominent left external iliac node which shows no FDG activity.  There are degenerative changes of the spine..     Resolution of the FDG activity within the left side the sacrum. There is faint  permeative lucency present.     IMPRESSION: Marked improvement when compared to the prior study with moderate  decrease in size and FDG activity within the adenopathy within the neck and  supraclavicular region.     Mildly prominent lymph node in the gastrohepatic and periportal region which  showed no FDG activity. This is also decrease in size.     No new adenopathy     Resolution of the small bowel wall thickening.     Resolution of the FDG activity within the left side of the sacrum as well as  the left iliac hypermetabolic adenopathy.     Stable 5 mm nodule in the right middle lobe.     Read and electronically signed by: Olivia Quan MD on 6/8/2018 12:41 PM CDT        OLIVIA QUAN MD              [facility] UNKNOWN RAD [facility]   Order: [id number]   Status:  Final result   Visible to patient:  Yes (Patient Portal)   Next appt:  Today at 08:20 AM in Hematology and Oncology (Samira Garcia MD)   Details     Reading Physician Reading Date Result Priority   Jon Noguera MD 8/13/2018       Narrative     CLINICAL INFORMATION:  Female patient, 63 years old, with a history of diffuse large B-cell lymphoma  diagnosed in October 2017. Last chemotherapy treatment was 2 weeks ago.  Patient presents for restaging and response assessment. Diffuse non-Hodgkin's  lymphoma, large cell (clinical)    COMPARISON:  06/08/2018 and 02/28/2018    TECHNIQUE:  The blood glucose prior to injection was 110 mg/dl. Images were  acquired from  the vertex of the skull through the mid thighs. approximately 45-60 minutes  post injection of 13.4 mCi F-18 FDG into the right antecubital fossa. Dilute  oral contrast was given. Axial, coronal and sagittal PET reconstructions with  and without attenuation correction were interpreted.  Corresponding CT images  were acquired and reviewed alongside the PET images.  The low dose unenhanced  CT images were used for attenuation correction and anatomic correlation only.    FINDINGS:  There is a tiny triangular focus of soft tissue attenuation thickening behind  the left sternocleidomastoid muscle with SUV max 1.7. No discrete measurable  FDG avid lymphadenopathy or mass is identified.    There is a 5 mm diameter nodule in the right middle lobe, unchanged. No new  nodules are identified.    There is a mildly heterogeneous mottled attenuation pattern to the sacrum and  left iliac bone with no increased FDG activity in this region favored to  represent resolving osseous metastatic disease.    Additional findings:  - Right sided medical infusion port with tip the level of the SVC.  - Moderate diffuse cerebral and cerebellar atrophy for age with periventricular  deep cerebral white matter low attenuation, nonspecific findings which can be  seen in any diffuse white matter process but most commonly associated with  chronic microvascular ischemic disease. There are scattered atheromatous  calcifications in the intracranial internal carotid arteries.  - There are scattered carotid arterial calcifications seen bilaterally.  - Moderate volume of stool and gas is seen in the colon with a nonobstructive  bowel gas pattern. Consider correlation for history of constipation.  - Uterus is heterogeneous and slightly enlarged with what appears to be a  dominant fundal fibroid (poorly evaluated on this exam).  - There are degenerative changes seen throughout the spine with no aggressive  appearing lytic or blastic lesion.  -  Atheromatous calcifications are seen at the aortic arch and coronary arterial  calcifications are seen (3 vessel disease).    Tracer distribution is otherwise physiologic.    IMPRESSION:  1. No measurable FDG avid lymphadenopathy is seen as outlined above.    2. Heterogeneous mottled attenuation pattern to the sacrum with no focal  increased FDG activity from background.    3. No areas of FDG avid small bowel wall thickening.    4. Unchanged non-FDG avid 5 mm right middle lobe pulmonary nodule.    Read and electronically signed by: Bhavna Kerr MD on 8/13/2018 2:56 PM CDT      BHAVNA KERR MD           PET-CT August 13, 2018 showed diffuse cerebral and cerebellar atrophy, right-sided chest port, nodule was simulation to the sacrum, 5 mm nodule in the right middle lobe unchanged, scattered carotid arterial calcifications bilaterally, uterus slightly enlarged    MRI sacral plexus September 6, 2018 showed heterogeneous signal possibly related to lymphomatous involvement    Diffuse large B-cell lymphoma of lymph nodes of neck  -     NM PET CT Routine Skull to Mid Thigh  -     Lactate dehydrogenase; Future; Expected date: 09/12/2019  -     Beta 2 Microglobulin, Serum; Future; Expected date: 09/12/2019  -     CBC auto differential; Future; Expected date: 09/12/2019  -     CMP; Future; Expected date: 09/12/2019        Needs pet ct for surveillance  Survivorship  Labs  Cont exercising   No xgeva for now   Explained recurrence risk  Time spent with patient :50 minutes  Over 50% in counseling  No pain   No falls      Thank you for allowing me to evaluate and participate in the care of this pleasant patient. Please fell free to call me with any questions or concerns.    Warmly,  Samira Garcia MD    This note was dictated with Dragon and briefly proofread. Please excuse any sentences that may be unclear or words misspelled

## 2019-09-20 ENCOUNTER — INFUSION (OUTPATIENT)
Dept: INFUSION THERAPY | Facility: HOSPITAL | Age: 64
End: 2019-09-20
Attending: INTERNAL MEDICINE

## 2019-09-20 ENCOUNTER — TELEPHONE (OUTPATIENT)
Dept: HEMATOLOGY/ONCOLOGY | Facility: CLINIC | Age: 64
End: 2019-09-20

## 2019-09-20 VITALS
DIASTOLIC BLOOD PRESSURE: 82 MMHG | HEIGHT: 65 IN | BODY MASS INDEX: 38.72 KG/M2 | HEART RATE: 94 BPM | WEIGHT: 232.38 LBS | RESPIRATION RATE: 18 BRPM | SYSTOLIC BLOOD PRESSURE: 146 MMHG | TEMPERATURE: 99 F

## 2019-09-20 DIAGNOSIS — M89.9 LYTIC BONE LESIONS ON XRAY: Primary | ICD-10-CM

## 2019-09-20 DIAGNOSIS — C79.51 OSSEOUS METASTASIS: ICD-10-CM

## 2019-09-20 DIAGNOSIS — C83.38 DIFFUSE LARGE B-CELL LYMPHOMA OF LYMPH NODES OF MULTIPLE REGIONS: ICD-10-CM

## 2019-09-20 PROCEDURE — 96523 IRRIG DRUG DELIVERY DEVICE: CPT

## 2019-09-20 PROCEDURE — 25000003 PHARM REV CODE 250: Performed by: INTERNAL MEDICINE

## 2019-09-20 PROCEDURE — 63600175 PHARM REV CODE 636 W HCPCS: Performed by: INTERNAL MEDICINE

## 2019-09-20 PROCEDURE — A4216 STERILE WATER/SALINE, 10 ML: HCPCS | Performed by: INTERNAL MEDICINE

## 2019-09-20 RX ORDER — HEPARIN 100 UNIT/ML
500 SYRINGE INTRAVENOUS
Status: COMPLETED | OUTPATIENT
Start: 2019-09-20 | End: 2019-09-20

## 2019-09-20 RX ORDER — SODIUM CHLORIDE 0.9 % (FLUSH) 0.9 %
10 SYRINGE (ML) INJECTION
Status: COMPLETED | OUTPATIENT
Start: 2019-09-20 | End: 2019-09-20

## 2019-09-20 RX ORDER — HEPARIN 100 UNIT/ML
500 SYRINGE INTRAVENOUS
Status: CANCELLED | OUTPATIENT
Start: 2019-10-14

## 2019-09-20 RX ORDER — SODIUM CHLORIDE 0.9 % (FLUSH) 0.9 %
10 SYRINGE (ML) INJECTION
Status: CANCELLED | OUTPATIENT
Start: 2019-10-14

## 2019-09-20 RX ADMIN — SODIUM CHLORIDE, PRESERVATIVE FREE 10 ML: 5 INJECTION INTRAVENOUS at 04:09

## 2019-09-20 RX ADMIN — HEPARIN 500 UNITS: 100 SYRINGE at 04:09

## 2019-09-20 NOTE — TELEPHONE ENCOUNTER
Left message for pt to f/u with scheduling pet scan. Provided pt with [facility] imaging phone number and also informed her that they attempted to reach her. Instructed pt to call  with any questions.

## 2019-09-20 NOTE — PLAN OF CARE
Problem: Infection  Goal: Infection Symptom Resolution  Outcome: Ongoing (interventions implemented as appropriate)  Sterile technique maintained during port flush

## 2019-10-18 ENCOUNTER — INFUSION (OUTPATIENT)
Dept: INFUSION THERAPY | Facility: HOSPITAL | Age: 64
End: 2019-10-18
Attending: INTERNAL MEDICINE
Payer: COMMERCIAL

## 2019-10-18 VITALS
BODY MASS INDEX: 38.27 KG/M2 | RESPIRATION RATE: 18 BRPM | SYSTOLIC BLOOD PRESSURE: 144 MMHG | HEART RATE: 83 BPM | TEMPERATURE: 98 F | WEIGHT: 230 LBS | DIASTOLIC BLOOD PRESSURE: 78 MMHG

## 2019-10-18 DIAGNOSIS — M89.9 LYTIC BONE LESIONS ON XRAY: Primary | ICD-10-CM

## 2019-10-18 DIAGNOSIS — C83.38 DIFFUSE LARGE B-CELL LYMPHOMA OF LYMPH NODES OF MULTIPLE REGIONS: ICD-10-CM

## 2019-10-18 DIAGNOSIS — C79.51 OSSEOUS METASTASIS: ICD-10-CM

## 2019-10-18 PROCEDURE — 96523 IRRIG DRUG DELIVERY DEVICE: CPT

## 2019-10-18 PROCEDURE — 25000003 PHARM REV CODE 250: Performed by: INTERNAL MEDICINE

## 2019-10-18 PROCEDURE — A4216 STERILE WATER/SALINE, 10 ML: HCPCS | Performed by: INTERNAL MEDICINE

## 2019-10-18 PROCEDURE — 63600175 PHARM REV CODE 636 W HCPCS: Performed by: INTERNAL MEDICINE

## 2019-10-18 RX ORDER — HEPARIN 100 UNIT/ML
500 SYRINGE INTRAVENOUS
Status: COMPLETED | OUTPATIENT
Start: 2019-10-18 | End: 2019-10-18

## 2019-10-18 RX ORDER — SODIUM CHLORIDE 0.9 % (FLUSH) 0.9 %
10 SYRINGE (ML) INJECTION
Status: COMPLETED | OUTPATIENT
Start: 2019-10-18 | End: 2019-10-18

## 2019-10-18 RX ORDER — SODIUM CHLORIDE 0.9 % (FLUSH) 0.9 %
10 SYRINGE (ML) INJECTION
Status: CANCELLED | OUTPATIENT
Start: 2019-10-21

## 2019-10-18 RX ORDER — HEPARIN 100 UNIT/ML
500 SYRINGE INTRAVENOUS
Status: CANCELLED | OUTPATIENT
Start: 2019-10-21

## 2019-10-18 RX ADMIN — SODIUM CHLORIDE, PRESERVATIVE FREE 10 ML: 5 INJECTION INTRAVENOUS at 04:10

## 2019-10-18 RX ADMIN — HEPARIN 500 UNITS: 100 SYRINGE at 04:10

## 2019-12-06 ENCOUNTER — TELEPHONE (OUTPATIENT)
Dept: HEMATOLOGY/ONCOLOGY | Facility: CLINIC | Age: 64
End: 2019-12-06

## 2019-12-06 NOTE — TELEPHONE ENCOUNTER
----- Message from Deepthi Jasbir sent at 12/6/2019  8:39 AM CST -----  Contact: Nurse  Type: Needs Medical Advice    Who Called:      Best Call Back Number:  ext [phone]  Additional Information: Requesting a call back from Nurse, regarding PET SCAN Nurse needs to know if pt still needs this ,please call

## 2019-12-10 ENCOUNTER — TELEPHONE (OUTPATIENT)
Dept: FAMILY MEDICINE | Facility: CLINIC | Age: 64
End: 2019-12-10

## 2019-12-10 ENCOUNTER — PATIENT MESSAGE (OUTPATIENT)
Dept: FAMILY MEDICINE | Facility: CLINIC | Age: 64
End: 2019-12-10

## 2019-12-10 NOTE — TELEPHONE ENCOUNTER
3/16/20 appointment with CRESENCIO Sarmiento needs to be rescheduled. Provider no longer an [facility] employee. No answer. Left message with time and date

## 2019-12-10 NOTE — TELEPHONE ENCOUNTER
Left voicemail informing patient CRESENCIO Sarmiento is no longer with [facility]. Patient needs to reschedule office visit on 3/16/2020

## 2019-12-13 ENCOUNTER — INFUSION (OUTPATIENT)
Dept: INFUSION THERAPY | Facility: HOSPITAL | Age: 64
End: 2019-12-13
Attending: INTERNAL MEDICINE
Payer: COMMERCIAL

## 2019-12-13 VITALS
RESPIRATION RATE: 16 BRPM | TEMPERATURE: 99 F | WEIGHT: 230.5 LBS | DIASTOLIC BLOOD PRESSURE: 82 MMHG | BODY MASS INDEX: 38.4 KG/M2 | HEART RATE: 85 BPM | SYSTOLIC BLOOD PRESSURE: 139 MMHG | HEIGHT: 65 IN

## 2019-12-13 DIAGNOSIS — C79.51 OSSEOUS METASTASIS: ICD-10-CM

## 2019-12-13 DIAGNOSIS — C83.38 DIFFUSE LARGE B-CELL LYMPHOMA OF LYMPH NODES OF MULTIPLE REGIONS: ICD-10-CM

## 2019-12-13 DIAGNOSIS — M89.9 LYTIC BONE LESIONS ON XRAY: Primary | ICD-10-CM

## 2019-12-13 PROCEDURE — 63600175 PHARM REV CODE 636 W HCPCS: Performed by: INTERNAL MEDICINE

## 2019-12-13 PROCEDURE — 25000003 PHARM REV CODE 250: Performed by: INTERNAL MEDICINE

## 2019-12-13 PROCEDURE — 96523 IRRIG DRUG DELIVERY DEVICE: CPT

## 2019-12-13 PROCEDURE — A4216 STERILE WATER/SALINE, 10 ML: HCPCS | Performed by: INTERNAL MEDICINE

## 2019-12-13 RX ORDER — HEPARIN 100 UNIT/ML
500 SYRINGE INTRAVENOUS
Status: COMPLETED | OUTPATIENT
Start: 2019-12-13 | End: 2019-12-13

## 2019-12-13 RX ORDER — HEPARIN 100 UNIT/ML
500 SYRINGE INTRAVENOUS
Status: CANCELLED | OUTPATIENT
Start: 2019-12-16

## 2019-12-13 RX ORDER — SODIUM CHLORIDE 0.9 % (FLUSH) 0.9 %
10 SYRINGE (ML) INJECTION
Status: CANCELLED | OUTPATIENT
Start: 2019-12-16

## 2019-12-13 RX ORDER — SODIUM CHLORIDE 0.9 % (FLUSH) 0.9 %
10 SYRINGE (ML) INJECTION
Status: COMPLETED | OUTPATIENT
Start: 2019-12-13 | End: 2019-12-13

## 2019-12-13 RX ADMIN — SODIUM CHLORIDE, PRESERVATIVE FREE 10 ML: 5 INJECTION INTRAVENOUS at 04:12

## 2019-12-13 RX ADMIN — Medication 500 UNITS: at 04:12

## 2019-12-18 ENCOUNTER — TELEPHONE (OUTPATIENT)
Dept: HEMATOLOGY/ONCOLOGY | Facility: CLINIC | Age: 64
End: 2019-12-18

## 2019-12-18 NOTE — TELEPHONE ENCOUNTER
I was informed by the Radiology Center that they are unable to reach the patient for her scan.  I did see this patient get a port flush December 13th which she is not returning our office calls as well.  I am asking that a certified letter be sent to her address explaining that it is imperative that she seek follow-up for her history of cancer to make sure that she does not have a recurrence.  If she does not want follow-up with our office will be happy to send her records to any provider that she prefers

## 2019-12-18 NOTE — TELEPHONE ENCOUNTER
----- Message from Samira Garcia MD sent at 12/18/2019 12:03 PM CST -----  Hey dear   Can you help me see why she is not getting her scan ? She is not answering her phone, she will not come for an OV but I saw her getting her port flushed around last week :)    I am worried we need to send a certified letter since she noy not communicate with us but they are flusing her port    Les

## 2020-01-10 ENCOUNTER — INFUSION (OUTPATIENT)
Dept: INFUSION THERAPY | Facility: HOSPITAL | Age: 65
End: 2020-01-10
Attending: INTERNAL MEDICINE

## 2020-01-10 DIAGNOSIS — C79.51 OSSEOUS METASTASIS: ICD-10-CM

## 2020-01-10 DIAGNOSIS — C83.38 DIFFUSE LARGE B-CELL LYMPHOMA OF LYMPH NODES OF MULTIPLE REGIONS: ICD-10-CM

## 2020-01-10 DIAGNOSIS — M89.9 LYTIC BONE LESIONS ON XRAY: Primary | ICD-10-CM

## 2020-01-10 PROCEDURE — 63600175 PHARM REV CODE 636 W HCPCS: Performed by: INTERNAL MEDICINE

## 2020-01-10 PROCEDURE — 25000003 PHARM REV CODE 250: Performed by: INTERNAL MEDICINE

## 2020-01-10 PROCEDURE — 96523 IRRIG DRUG DELIVERY DEVICE: CPT

## 2020-01-10 PROCEDURE — A4216 STERILE WATER/SALINE, 10 ML: HCPCS | Performed by: INTERNAL MEDICINE

## 2020-01-10 RX ORDER — SODIUM CHLORIDE 0.9 % (FLUSH) 0.9 %
10 SYRINGE (ML) INJECTION
Status: CANCELLED | OUTPATIENT
Start: 2020-01-13

## 2020-01-10 RX ORDER — SODIUM CHLORIDE 0.9 % (FLUSH) 0.9 %
10 SYRINGE (ML) INJECTION
Status: COMPLETED | OUTPATIENT
Start: 2020-01-10 | End: 2020-01-10

## 2020-01-10 RX ORDER — HEPARIN 100 UNIT/ML
500 SYRINGE INTRAVENOUS
Status: CANCELLED | OUTPATIENT
Start: 2020-01-13

## 2020-01-10 RX ORDER — HEPARIN 100 UNIT/ML
500 SYRINGE INTRAVENOUS
Status: COMPLETED | OUTPATIENT
Start: 2020-01-10 | End: 2020-01-10

## 2020-01-10 RX ADMIN — HEPARIN 500 UNITS: 100 SYRINGE at 03:01

## 2020-01-10 RX ADMIN — SODIUM CHLORIDE, PRESERVATIVE FREE 10 ML: 5 INJECTION INTRAVENOUS at 03:01

## 2020-01-10 NOTE — PLAN OF CARE
Problem: Infection  Goal: Infection Symptom Resolution  Outcome: Ongoing, Progressing  Intervention: Prevent or Manage Infection  Flowsheets (Taken 1/10/2020 8052)  Infection Management: aseptic technique maintained

## 2020-01-23 NOTE — PROGRESS NOTES
Our offices try to reach as patient multiple times.  We reached out to the [facility] team as well to ask them to please help her make a follow-up appointment when they were flushing her port.  We are sending her certified letter stating that she must be seen for a checkup to make sure that her malignancy has not recurred.  If she would like to change providers we would ask that she let us knows that we could get her records to the appropriate person.

## 2020-02-07 ENCOUNTER — INFUSION (OUTPATIENT)
Dept: INFUSION THERAPY | Facility: HOSPITAL | Age: 65
End: 2020-02-07
Attending: INTERNAL MEDICINE

## 2020-02-07 VITALS
OXYGEN SATURATION: 96 % | BODY MASS INDEX: 38.77 KG/M2 | TEMPERATURE: 98 F | RESPIRATION RATE: 18 BRPM | HEART RATE: 88 BPM | WEIGHT: 233 LBS | DIASTOLIC BLOOD PRESSURE: 76 MMHG | SYSTOLIC BLOOD PRESSURE: 150 MMHG

## 2020-02-07 DIAGNOSIS — C79.51 OSSEOUS METASTASIS: ICD-10-CM

## 2020-02-07 DIAGNOSIS — C83.38 DIFFUSE LARGE B-CELL LYMPHOMA OF LYMPH NODES OF MULTIPLE REGIONS: ICD-10-CM

## 2020-02-07 DIAGNOSIS — M89.9 LYTIC BONE LESIONS ON XRAY: Primary | ICD-10-CM

## 2020-02-07 PROCEDURE — 63600175 PHARM REV CODE 636 W HCPCS: Performed by: INTERNAL MEDICINE

## 2020-02-07 PROCEDURE — 25000003 PHARM REV CODE 250: Performed by: INTERNAL MEDICINE

## 2020-02-07 PROCEDURE — 96523 IRRIG DRUG DELIVERY DEVICE: CPT

## 2020-02-07 PROCEDURE — A4216 STERILE WATER/SALINE, 10 ML: HCPCS | Performed by: INTERNAL MEDICINE

## 2020-02-07 RX ORDER — SODIUM CHLORIDE 0.9 % (FLUSH) 0.9 %
10 SYRINGE (ML) INJECTION
Status: CANCELLED | OUTPATIENT
Start: 2020-02-07

## 2020-02-07 RX ORDER — HEPARIN 100 UNIT/ML
500 SYRINGE INTRAVENOUS
Status: CANCELLED | OUTPATIENT
Start: 2020-02-07

## 2020-02-07 RX ORDER — HEPARIN 100 UNIT/ML
500 SYRINGE INTRAVENOUS
Status: COMPLETED | OUTPATIENT
Start: 2020-02-07 | End: 2020-02-07

## 2020-02-07 RX ORDER — SODIUM CHLORIDE 0.9 % (FLUSH) 0.9 %
10 SYRINGE (ML) INJECTION
Status: COMPLETED | OUTPATIENT
Start: 2020-02-07 | End: 2020-02-07

## 2020-02-07 RX ADMIN — SODIUM CHLORIDE 10 ML: 9 INJECTION INTRAMUSCULAR; INTRAVENOUS; SUBCUTANEOUS at 03:02

## 2020-02-07 RX ADMIN — HEPARIN 500 UNITS: 100 SYRINGE at 04:02

## 2020-02-07 NOTE — PLAN OF CARE
Problem: Fatigue  Goal: Improved Activity Tolerance  Outcome: Ongoing, Progressing  Intervention: Promote Energy Conservation  Flowsheets (Taken 2/7/2020 7402)  Fatigue Management: frequent rest breaks encouraged  Sleep/Rest Enhancement: regular sleep/rest pattern promoted  Activity Management: activity encouraged

## 2020-03-05 ENCOUNTER — TELEPHONE (OUTPATIENT)
Dept: HEMATOLOGY/ONCOLOGY | Facility: CLINIC | Age: 65
End: 2020-03-05

## 2020-03-06 ENCOUNTER — INFUSION (OUTPATIENT)
Dept: INFUSION THERAPY | Facility: HOSPITAL | Age: 65
End: 2020-03-06
Attending: INTERNAL MEDICINE
Payer: COMMERCIAL

## 2020-03-06 VITALS
DIASTOLIC BLOOD PRESSURE: 81 MMHG | HEART RATE: 83 BPM | BODY MASS INDEX: 39.07 KG/M2 | RESPIRATION RATE: 17 BRPM | TEMPERATURE: 98 F | WEIGHT: 234.81 LBS | SYSTOLIC BLOOD PRESSURE: 159 MMHG

## 2020-03-06 DIAGNOSIS — C83.38 DIFFUSE LARGE B-CELL LYMPHOMA OF LYMPH NODES OF MULTIPLE REGIONS: ICD-10-CM

## 2020-03-06 DIAGNOSIS — M89.9 LYTIC BONE LESIONS ON XRAY: Primary | ICD-10-CM

## 2020-03-06 DIAGNOSIS — C79.51 OSSEOUS METASTASIS: ICD-10-CM

## 2020-03-06 PROCEDURE — A4216 STERILE WATER/SALINE, 10 ML: HCPCS | Performed by: INTERNAL MEDICINE

## 2020-03-06 PROCEDURE — 25000003 PHARM REV CODE 250: Performed by: INTERNAL MEDICINE

## 2020-03-06 PROCEDURE — 96523 IRRIG DRUG DELIVERY DEVICE: CPT

## 2020-03-06 PROCEDURE — 63600175 PHARM REV CODE 636 W HCPCS: Performed by: INTERNAL MEDICINE

## 2020-03-06 RX ORDER — SODIUM CHLORIDE 0.9 % (FLUSH) 0.9 %
10 SYRINGE (ML) INJECTION
Status: CANCELLED | OUTPATIENT
Start: 2020-03-06

## 2020-03-06 RX ORDER — HEPARIN 100 UNIT/ML
500 SYRINGE INTRAVENOUS
Status: CANCELLED | OUTPATIENT
Start: 2020-03-06

## 2020-03-06 RX ORDER — SODIUM CHLORIDE 0.9 % (FLUSH) 0.9 %
10 SYRINGE (ML) INJECTION
Status: COMPLETED | OUTPATIENT
Start: 2020-03-06 | End: 2020-03-06

## 2020-03-06 RX ORDER — HEPARIN 100 UNIT/ML
500 SYRINGE INTRAVENOUS
Status: COMPLETED | OUTPATIENT
Start: 2020-03-06 | End: 2020-03-06

## 2020-03-06 RX ADMIN — HEPARIN 500 UNITS: 100 SYRINGE at 03:03

## 2020-03-06 RX ADMIN — SODIUM CHLORIDE, PRESERVATIVE FREE 10 ML: 5 INJECTION INTRAVENOUS at 03:03

## 2020-03-16 ENCOUNTER — LAB VISIT (OUTPATIENT)
Dept: LAB | Facility: HOSPITAL | Age: 65
End: 2020-03-16
Attending: INTERNAL MEDICINE
Payer: COMMERCIAL

## 2020-03-16 DIAGNOSIS — C83.31 DIFFUSE LARGE B-CELL LYMPHOMA OF LYMPH NODES OF NECK: ICD-10-CM

## 2020-03-16 LAB
ALBUMIN SERPL BCP-MCNC: 4.1 G/DL (ref 3.5–5.2)
ALP SERPL-CCNC: 118 U/L (ref 55–135)
ALT SERPL W/O P-5'-P-CCNC: 19 U/L (ref 10–44)
ANION GAP SERPL CALC-SCNC: 12 MMOL/L (ref 8–16)
AST SERPL-CCNC: 14 U/L (ref 10–40)
B2 MICROGLOB SERPL-MCNC: <0.3 UG/ML (ref 0–2.5)
BASOPHILS # BLD AUTO: 0.09 K/UL (ref 0–0.2)
BASOPHILS NFR BLD: 1 % (ref 0–1.9)
BILIRUB SERPL-MCNC: 0.3 MG/DL (ref 0.1–1)
BUN SERPL-MCNC: 16 MG/DL (ref 8–23)
CALCIUM SERPL-MCNC: 9.3 MG/DL (ref 8.7–10.5)
CHLORIDE SERPL-SCNC: 103 MMOL/L (ref 95–110)
CO2 SERPL-SCNC: 27 MMOL/L (ref 23–29)
CREAT SERPL-MCNC: 0.8 MG/DL (ref 0.5–1.4)
DIFFERENTIAL METHOD: ABNORMAL
EOSINOPHIL # BLD AUTO: 0.9 K/UL (ref 0–0.5)
EOSINOPHIL NFR BLD: 10.3 % (ref 0–8)
ERYTHROCYTE [DISTWIDTH] IN BLOOD BY AUTOMATED COUNT: 12.6 % (ref 11.5–14.5)
EST. GFR  (AFRICAN AMERICAN): >60 ML/MIN/1.73 M^2
EST. GFR  (NON AFRICAN AMERICAN): >60 ML/MIN/1.73 M^2
GLUCOSE SERPL-MCNC: 108 MG/DL (ref 70–110)
HCT VFR BLD AUTO: 42.1 % (ref 37–48.5)
HGB BLD-MCNC: 13.8 G/DL (ref 12–16)
IMM GRANULOCYTES # BLD AUTO: 0.04 K/UL (ref 0–0.04)
LDH SERPL L TO P-CCNC: 130 U/L (ref 110–260)
LYMPHOCYTES # BLD AUTO: 1.9 K/UL (ref 1–4.8)
LYMPHOCYTES NFR BLD: 22 % (ref 18–48)
MCH RBC QN AUTO: 31.7 PG (ref 27–31)
MCHC RBC AUTO-ENTMCNC: 32.8 G/DL (ref 32–36)
MCV RBC AUTO: 97 FL (ref 82–98)
MONOCYTES # BLD AUTO: 0.8 K/UL (ref 0.3–1)
MONOCYTES NFR BLD: 8.8 % (ref 4–15)
NEUTROPHILS # BLD AUTO: 5.1 K/UL (ref 1.8–7.7)
NEUTROPHILS NFR BLD: 57.4 % (ref 38–73)
NRBC BLD-RTO: 0 /100 WBC
PLATELET # BLD AUTO: 282 K/UL (ref 150–350)
PMV BLD AUTO: 9.8 FL (ref 9.2–12.9)
POTASSIUM SERPL-SCNC: 4.2 MMOL/L (ref 3.5–5.1)
PROT SERPL-MCNC: 7.4 G/DL (ref 6–8.4)
RBC # BLD AUTO: 4.35 M/UL (ref 4–5.4)
SODIUM SERPL-SCNC: 142 MMOL/L (ref 136–145)
WBC # BLD AUTO: 8.8 K/UL (ref 3.9–12.7)

## 2020-03-16 PROCEDURE — 36415 COLL VENOUS BLD VENIPUNCTURE: CPT

## 2020-03-16 PROCEDURE — 83615 LACTATE (LD) (LDH) ENZYME: CPT

## 2020-03-16 PROCEDURE — 85025 COMPLETE CBC W/AUTO DIFF WBC: CPT

## 2020-03-16 PROCEDURE — 82232 ASSAY OF BETA-2 PROTEIN: CPT

## 2020-03-16 PROCEDURE — 80053 COMPREHEN METABOLIC PANEL: CPT

## 2020-03-20 ENCOUNTER — TELEPHONE (OUTPATIENT)
Dept: HEMATOLOGY/ONCOLOGY | Facility: CLINIC | Age: 65
End: 2020-03-20

## 2020-03-20 NOTE — TELEPHONE ENCOUNTER
Call and spoke with patient regarding appointment today. Due to safety concerns for patient's, [facility] is offering virtual visits and or telephone/face time visits. Advised that we will call her back next week to set that up for her.

## 2020-03-31 ENCOUNTER — TELEPHONE (OUTPATIENT)
Dept: INFUSION THERAPY | Facility: HOSPITAL | Age: 65
End: 2020-03-31

## 2020-04-06 ENCOUNTER — TELEPHONE (OUTPATIENT)
Dept: HEMATOLOGY/ONCOLOGY | Facility: CLINIC | Age: 65
End: 2020-04-06

## 2020-04-07 ENCOUNTER — TELEPHONE (OUTPATIENT)
Dept: HEMATOLOGY/ONCOLOGY | Facility: CLINIC | Age: 65
End: 2020-04-07

## 2020-04-08 ENCOUNTER — TELEPHONE (OUTPATIENT)
Dept: HEMATOLOGY/ONCOLOGY | Facility: CLINIC | Age: 65
End: 2020-04-08

## 2020-04-08 NOTE — TELEPHONE ENCOUNTER
LM at both numbers to schedule an appt with Dr. Garcia. Instructed pt to call [phone] to schedule. Sent letter in mail.

## 2020-04-21 DIAGNOSIS — C83.31 DIFFUSE LARGE B-CELL LYMPHOMA OF LYMPH NODES OF NECK: Primary | ICD-10-CM

## 2020-04-29 ENCOUNTER — LAB VISIT (OUTPATIENT)
Dept: INFUSION THERAPY | Facility: HOSPITAL | Age: 65
End: 2020-04-29
Attending: PHYSICIAN ASSISTANT
Payer: COMMERCIAL

## 2020-04-29 DIAGNOSIS — C83.31 DIFFUSE LARGE B-CELL LYMPHOMA OF LYMPH NODES OF NECK: ICD-10-CM

## 2020-04-29 PROCEDURE — U0002 COVID-19 LAB TEST NON-CDC: HCPCS

## 2020-04-30 LAB — SARS-COV-2 RNA RESP QL NAA+PROBE: NOT DETECTED

## 2020-05-01 ENCOUNTER — INFUSION (OUTPATIENT)
Dept: INFUSION THERAPY | Facility: HOSPITAL | Age: 65
End: 2020-05-01
Attending: INTERNAL MEDICINE
Payer: COMMERCIAL

## 2020-05-01 VITALS
DIASTOLIC BLOOD PRESSURE: 74 MMHG | RESPIRATION RATE: 18 BRPM | WEIGHT: 238.5 LBS | TEMPERATURE: 98 F | BODY MASS INDEX: 39.69 KG/M2 | HEART RATE: 100 BPM | SYSTOLIC BLOOD PRESSURE: 132 MMHG

## 2020-05-01 DIAGNOSIS — C79.51 OSSEOUS METASTASIS: ICD-10-CM

## 2020-05-01 DIAGNOSIS — M89.9 LYTIC BONE LESIONS ON XRAY: Primary | ICD-10-CM

## 2020-05-01 DIAGNOSIS — C83.38 DIFFUSE LARGE B-CELL LYMPHOMA OF LYMPH NODES OF MULTIPLE REGIONS: ICD-10-CM

## 2020-05-01 PROCEDURE — 63600175 PHARM REV CODE 636 W HCPCS: Performed by: INTERNAL MEDICINE

## 2020-05-01 PROCEDURE — A4216 STERILE WATER/SALINE, 10 ML: HCPCS | Performed by: INTERNAL MEDICINE

## 2020-05-01 PROCEDURE — 25000003 PHARM REV CODE 250: Performed by: INTERNAL MEDICINE

## 2020-05-01 PROCEDURE — 96523 IRRIG DRUG DELIVERY DEVICE: CPT

## 2020-05-01 RX ORDER — SODIUM CHLORIDE 0.9 % (FLUSH) 0.9 %
10 SYRINGE (ML) INJECTION
Status: COMPLETED | OUTPATIENT
Start: 2020-05-01 | End: 2020-05-01

## 2020-05-01 RX ORDER — SODIUM CHLORIDE 0.9 % (FLUSH) 0.9 %
10 SYRINGE (ML) INJECTION
Status: CANCELLED | OUTPATIENT
Start: 2020-05-01

## 2020-05-01 RX ORDER — HEPARIN 100 UNIT/ML
500 SYRINGE INTRAVENOUS
Status: CANCELLED | OUTPATIENT
Start: 2020-05-01

## 2020-05-01 RX ORDER — HEPARIN 100 UNIT/ML
500 SYRINGE INTRAVENOUS
Status: COMPLETED | OUTPATIENT
Start: 2020-05-01 | End: 2020-05-01

## 2020-05-01 RX ADMIN — HEPARIN 500 UNITS: 100 SYRINGE at 03:05

## 2020-05-01 RX ADMIN — SODIUM CHLORIDE, PRESERVATIVE FREE 10 ML: 5 INJECTION INTRAVENOUS at 03:05

## 2020-06-26 ENCOUNTER — INFUSION (OUTPATIENT)
Dept: INFUSION THERAPY | Facility: HOSPITAL | Age: 65
End: 2020-06-26
Attending: INTERNAL MEDICINE
Payer: COMMERCIAL

## 2020-06-26 VITALS
WEIGHT: 238.19 LBS | BODY MASS INDEX: 39.68 KG/M2 | TEMPERATURE: 98 F | HEART RATE: 98 BPM | DIASTOLIC BLOOD PRESSURE: 85 MMHG | SYSTOLIC BLOOD PRESSURE: 153 MMHG | RESPIRATION RATE: 18 BRPM | HEIGHT: 65 IN

## 2020-06-26 DIAGNOSIS — C83.38 DIFFUSE LARGE B-CELL LYMPHOMA OF LYMPH NODES OF MULTIPLE REGIONS: ICD-10-CM

## 2020-06-26 DIAGNOSIS — M89.9 LYTIC BONE LESIONS ON XRAY: Primary | ICD-10-CM

## 2020-06-26 DIAGNOSIS — C79.51 OSSEOUS METASTASIS: ICD-10-CM

## 2020-06-26 PROCEDURE — 25000003 PHARM REV CODE 250: Performed by: INTERNAL MEDICINE

## 2020-06-26 PROCEDURE — A4216 STERILE WATER/SALINE, 10 ML: HCPCS | Performed by: INTERNAL MEDICINE

## 2020-06-26 PROCEDURE — 63600175 PHARM REV CODE 636 W HCPCS: Performed by: INTERNAL MEDICINE

## 2020-06-26 PROCEDURE — 96523 IRRIG DRUG DELIVERY DEVICE: CPT

## 2020-06-26 RX ORDER — SODIUM CHLORIDE 0.9 % (FLUSH) 0.9 %
10 SYRINGE (ML) INJECTION
Status: COMPLETED | OUTPATIENT
Start: 2020-06-26 | End: 2020-06-26

## 2020-06-26 RX ORDER — HEPARIN 100 UNIT/ML
500 SYRINGE INTRAVENOUS
Status: CANCELLED | OUTPATIENT
Start: 2020-06-26

## 2020-06-26 RX ORDER — SODIUM CHLORIDE 0.9 % (FLUSH) 0.9 %
10 SYRINGE (ML) INJECTION
Status: CANCELLED | OUTPATIENT
Start: 2020-06-26

## 2020-06-26 RX ORDER — HEPARIN 100 UNIT/ML
500 SYRINGE INTRAVENOUS
Status: COMPLETED | OUTPATIENT
Start: 2020-06-26 | End: 2020-06-26

## 2020-06-26 RX ADMIN — SODIUM CHLORIDE, PRESERVATIVE FREE 10 ML: 5 INJECTION INTRAVENOUS at 03:06

## 2020-06-26 RX ADMIN — HEPARIN 500 UNITS: 100 SYRINGE at 03:06

## 2020-09-08 DIAGNOSIS — E03.9 ACQUIRED HYPOTHYROIDISM: ICD-10-CM

## 2020-09-08 DIAGNOSIS — I10 ESSENTIAL HYPERTENSION: ICD-10-CM

## 2020-09-09 RX ORDER — AMLODIPINE AND BENAZEPRIL HYDROCHLORIDE 5; 40 MG/1; MG/1
1 CAPSULE ORAL DAILY
Qty: 30 CAPSULE | Refills: 11 | OUTPATIENT
Start: 2020-09-09

## 2020-09-09 RX ORDER — LEVOTHYROXINE SODIUM 150 UG/1
150 TABLET ORAL DAILY
Qty: 30 TABLET | Refills: 11 | OUTPATIENT
Start: 2020-09-09

## 2020-09-09 NOTE — PROGRESS NOTES
Refill Routing Note   Medication(s) are not appropriate for processing by [facility] for the following reason(s):        - Non-participating provider              Medication reconciliation completed: No   Automatic Epic Generated Protocol Data:        Requested Prescriptions   Pending Prescriptions Disp Refills    amLODIPine-benazepriL (LOTREL) 5-40 mg per capsule 30 capsule 11     Sig: Take 1 capsule by mouth once daily.       ACE Inhibitors Refill Protocol Passed - 9/8/2020 11:08 PM        Passed - Patient is not currently pregnant.        Passed - No positive pregnancy test in past 12 months        Passed - Serum Creatinine less than 1.4 on file in the past 12 months     Lab Results   Component Value Date    CREATININE 0.8 03/16/2020    CREATININE 0.8 02/25/2019    CREATININE 0.7 08/24/2018     Lab Results   Component Value Date    EGFRNONAA >60 03/16/2020    EGFRNONAA >60 02/25/2019    EGFRNONAA >60 08/24/2018               Passed - Visit with authorizing provider in past 12 months or upcoming 90 days         Passed - Serum Potassium less than 5.2 on file in the past 12 months     Lab Results   Component Value Date    K 4.2 03/16/2020    K 4.0 02/25/2019    K 3.9 08/24/2018                  Passed - Blood Pressure under 139/89 on file in past 12 months      BP Readings from Last 3 Encounters:   06/26/20 (!) 153/85   05/01/20 132/74   03/06/20 (!) 159/81               levothyroxine (SYNTHROID) 150 MCG tablet 30 tablet 11     Sig: Take 1 tablet (150 mcg total) by mouth once daily.       Thyroid Hormones Protocol Failed - 9/8/2020 11:08 PM        Failed - Normal TSH within past 12 months      Lab Results   Component Value Date    TSH 0.535 05/04/2018    TSH 4.818 (H) 12/22/2017    TSH 32.450 (H) 10/20/2017              Passed - Patient is not currently pregnant        Passed - No positive pregnancy test in past 12 months         Passed - Visit with authorizing provider in past 12 months or upcoming 90  days        Thyroid Hormones Protocol Failed - 9/8/2020 11:08 PM        Failed - Normal TSH in past 12 months     Lab Results   Component Value Date    TSH 0.535 05/04/2018    TSH 4.818 (H) 12/22/2017    TSH 32.450 (H) 10/20/2017              Failed - Normal Free T4 within past 12 months     Lab Results   Component Value Date    FREET4 1.00 12/22/2017    FREET4 0.56 (L) 10/20/2017             Passed - No positive pregnancy test in past 12 months         Passed - Visit with authorizing provider in past 12 months or upcoming 90 days         Passed - Patient is not currently pregnant              Appointments     Date Provider   Last Visit   9/12/2019 Samira Garcia MD   Next Visit   Visit date not found Samira Garcia MD   ED visits in past 90 days: 0    Note composed:11:08 PM 09/08/2020

## 2021-01-27 ENCOUNTER — TELEPHONE (OUTPATIENT)
Dept: INFUSION THERAPY | Facility: HOSPITAL | Age: 66
End: 2021-01-27

## 2021-02-02 ENCOUNTER — TELEPHONE (OUTPATIENT)
Dept: INFUSION THERAPY | Facility: HOSPITAL | Age: 66
End: 2021-02-02

## 2021-02-05 ENCOUNTER — INFUSION (OUTPATIENT)
Dept: INFUSION THERAPY | Facility: HOSPITAL | Age: 66
End: 2021-02-05
Attending: INTERNAL MEDICINE
Payer: COMMERCIAL

## 2021-02-05 VITALS
SYSTOLIC BLOOD PRESSURE: 134 MMHG | TEMPERATURE: 98 F | WEIGHT: 238.5 LBS | RESPIRATION RATE: 20 BRPM | DIASTOLIC BLOOD PRESSURE: 68 MMHG | HEART RATE: 93 BPM | BODY MASS INDEX: 39.69 KG/M2

## 2021-02-05 DIAGNOSIS — M89.9 LYTIC BONE LESIONS ON XRAY: Primary | ICD-10-CM

## 2021-02-05 DIAGNOSIS — C79.51 OSSEOUS METASTASIS: ICD-10-CM

## 2021-02-05 DIAGNOSIS — C83.38 DIFFUSE LARGE B-CELL LYMPHOMA OF LYMPH NODES OF MULTIPLE REGIONS: ICD-10-CM

## 2021-02-05 PROCEDURE — 25000003 PHARM REV CODE 250: Performed by: INTERNAL MEDICINE

## 2021-02-05 PROCEDURE — A4216 STERILE WATER/SALINE, 10 ML: HCPCS | Performed by: INTERNAL MEDICINE

## 2021-02-05 PROCEDURE — 96523 IRRIG DRUG DELIVERY DEVICE: CPT

## 2021-02-05 PROCEDURE — 63600175 PHARM REV CODE 636 W HCPCS: Performed by: INTERNAL MEDICINE

## 2021-02-05 RX ORDER — HEPARIN 100 UNIT/ML
500 SYRINGE INTRAVENOUS
Status: COMPLETED | OUTPATIENT
Start: 2021-02-05 | End: 2021-02-05

## 2021-02-05 RX ORDER — SODIUM CHLORIDE 0.9 % (FLUSH) 0.9 %
10 SYRINGE (ML) INJECTION
Status: CANCELLED | OUTPATIENT
Start: 2021-02-05

## 2021-02-05 RX ORDER — HEPARIN 100 UNIT/ML
500 SYRINGE INTRAVENOUS
Status: CANCELLED | OUTPATIENT
Start: 2021-02-05

## 2021-02-05 RX ORDER — SODIUM CHLORIDE 0.9 % (FLUSH) 0.9 %
10 SYRINGE (ML) INJECTION
Status: COMPLETED | OUTPATIENT
Start: 2021-02-05 | End: 2021-02-05

## 2021-02-05 RX ADMIN — HEPARIN 500 UNITS: 100 SYRINGE at 02:02

## 2021-02-05 RX ADMIN — SODIUM CHLORIDE, PRESERVATIVE FREE 10 ML: 5 INJECTION INTRAVENOUS at 02:02

## 2021-04-12 ENCOUNTER — OFFICE VISIT (OUTPATIENT)
Dept: HEMATOLOGY/ONCOLOGY | Facility: CLINIC | Age: 66
End: 2021-04-12
Payer: MEDICARE

## 2021-04-12 VITALS
DIASTOLIC BLOOD PRESSURE: 92 MMHG | TEMPERATURE: 99 F | BODY MASS INDEX: 39.85 KG/M2 | WEIGHT: 239.19 LBS | OXYGEN SATURATION: 96 % | RESPIRATION RATE: 18 BRPM | HEART RATE: 86 BPM | HEIGHT: 65 IN | SYSTOLIC BLOOD PRESSURE: 162 MMHG

## 2021-04-12 DIAGNOSIS — I10 ESSENTIAL HYPERTENSION: ICD-10-CM

## 2021-04-12 DIAGNOSIS — E03.9 ACQUIRED HYPOTHYROIDISM: ICD-10-CM

## 2021-04-12 DIAGNOSIS — C83.31 DIFFUSE LARGE B-CELL LYMPHOMA OF LYMPH NODES OF NECK: Primary | ICD-10-CM

## 2021-04-12 DIAGNOSIS — F17.200 SMOKER: ICD-10-CM

## 2021-04-12 DIAGNOSIS — R05.9 COUGH: ICD-10-CM

## 2021-04-12 PROCEDURE — 99999 PR PBB SHADOW E&M-EST. PATIENT-LVL IV: CPT | Mod: PBBFAC,,, | Performed by: INTERNAL MEDICINE

## 2021-04-12 PROCEDURE — 99214 OFFICE O/P EST MOD 30 MIN: CPT | Mod: PBBFAC,PO | Performed by: INTERNAL MEDICINE

## 2021-04-12 PROCEDURE — 99215 OFFICE O/P EST HI 40 MIN: CPT | Mod: S$PBB,,, | Performed by: INTERNAL MEDICINE

## 2021-04-12 PROCEDURE — 99999 PR PBB SHADOW E&M-EST. PATIENT-LVL IV: ICD-10-PCS | Mod: PBBFAC,,, | Performed by: INTERNAL MEDICINE

## 2021-04-12 PROCEDURE — 99215 PR OFFICE/OUTPT VISIT, EST, LEVL V, 40-54 MIN: ICD-10-PCS | Mod: S$PBB,,, | Performed by: INTERNAL MEDICINE

## 2021-04-12 RX ORDER — LEVOTHYROXINE SODIUM 150 UG/1
150 TABLET ORAL DAILY
Qty: 30 TABLET | Refills: 11 | Status: SHIPPED | OUTPATIENT
Start: 2021-04-12 | End: 2022-03-28

## 2021-04-12 RX ORDER — AMLODIPINE AND BENAZEPRIL HYDROCHLORIDE 5; 40 MG/1; MG/1
1 CAPSULE ORAL DAILY
Qty: 30 CAPSULE | Refills: 11 | Status: SHIPPED | OUTPATIENT
Start: 2021-04-12 | End: 2022-03-31

## 2021-04-29 RX ORDER — HEPARIN 100 UNIT/ML
500 SYRINGE INTRAVENOUS
Status: CANCELLED | OUTPATIENT
Start: 2021-04-29

## 2021-04-29 RX ORDER — SODIUM CHLORIDE 0.9 % (FLUSH) 0.9 %
10 SYRINGE (ML) INJECTION
Status: CANCELLED | OUTPATIENT
Start: 2021-04-29

## 2021-06-15 ENCOUNTER — INFUSION (OUTPATIENT)
Dept: INFUSION THERAPY | Facility: HOSPITAL | Age: 66
End: 2021-06-15
Attending: INTERNAL MEDICINE
Payer: COMMERCIAL

## 2021-06-15 VITALS
SYSTOLIC BLOOD PRESSURE: 155 MMHG | WEIGHT: 239.81 LBS | DIASTOLIC BLOOD PRESSURE: 82 MMHG | HEART RATE: 91 BPM | BODY MASS INDEX: 39.9 KG/M2 | OXYGEN SATURATION: 94 % | TEMPERATURE: 98 F | RESPIRATION RATE: 18 BRPM

## 2021-06-15 DIAGNOSIS — C79.51 OSSEOUS METASTASIS: ICD-10-CM

## 2021-06-15 DIAGNOSIS — M89.9 LYTIC BONE LESIONS ON XRAY: Primary | ICD-10-CM

## 2021-06-15 DIAGNOSIS — C83.38 DIFFUSE LARGE B-CELL LYMPHOMA OF LYMPH NODES OF MULTIPLE REGIONS: ICD-10-CM

## 2021-06-15 PROCEDURE — 63600175 PHARM REV CODE 636 W HCPCS: Performed by: INTERNAL MEDICINE

## 2021-06-15 PROCEDURE — A4216 STERILE WATER/SALINE, 10 ML: HCPCS | Performed by: INTERNAL MEDICINE

## 2021-06-15 PROCEDURE — 25000003 PHARM REV CODE 250: Performed by: INTERNAL MEDICINE

## 2021-06-15 PROCEDURE — 96523 IRRIG DRUG DELIVERY DEVICE: CPT

## 2021-06-15 RX ORDER — HEPARIN 100 UNIT/ML
500 SYRINGE INTRAVENOUS
Status: CANCELLED | OUTPATIENT
Start: 2021-06-15

## 2021-06-15 RX ORDER — SODIUM CHLORIDE 0.9 % (FLUSH) 0.9 %
10 SYRINGE (ML) INJECTION
Status: COMPLETED | OUTPATIENT
Start: 2021-06-15 | End: 2021-06-15

## 2021-06-15 RX ORDER — SODIUM CHLORIDE 0.9 % (FLUSH) 0.9 %
10 SYRINGE (ML) INJECTION
Status: CANCELLED | OUTPATIENT
Start: 2021-06-15

## 2021-06-15 RX ORDER — HEPARIN 100 UNIT/ML
500 SYRINGE INTRAVENOUS
Status: COMPLETED | OUTPATIENT
Start: 2021-06-15 | End: 2021-06-15

## 2021-06-15 RX ADMIN — SODIUM CHLORIDE, PRESERVATIVE FREE 10 ML: 5 INJECTION INTRAVENOUS at 09:06

## 2021-06-15 RX ADMIN — HEPARIN 500 UNITS: 100 SYRINGE at 09:06

## 2021-08-06 ENCOUNTER — TELEPHONE (OUTPATIENT)
Dept: HEMATOLOGY/ONCOLOGY | Facility: CLINIC | Age: 66
End: 2021-08-06

## 2021-08-11 ENCOUNTER — IMMUNIZATION (OUTPATIENT)
Dept: PRIMARY CARE CLINIC | Facility: CLINIC | Age: 66
End: 2021-08-11
Payer: COMMERCIAL

## 2021-08-11 ENCOUNTER — TELEPHONE (OUTPATIENT)
Dept: HEMATOLOGY/ONCOLOGY | Facility: CLINIC | Age: 66
End: 2021-08-11

## 2021-08-11 DIAGNOSIS — Z23 NEED FOR VACCINATION: Primary | ICD-10-CM

## 2021-08-11 PROCEDURE — 91300 COVID-19, MRNA, LNP-S, PF, 30 MCG/0.3 ML DOSE VACCINE: CPT | Mod: S$GLB,,, | Performed by: FAMILY MEDICINE

## 2021-08-11 PROCEDURE — 0001A COVID-19, MRNA, LNP-S, PF, 30 MCG/0.3 ML DOSE VACCINE: CPT | Mod: CV19,S$GLB,, | Performed by: FAMILY MEDICINE

## 2021-08-11 PROCEDURE — 91300 COVID-19, MRNA, LNP-S, PF, 30 MCG/0.3 ML DOSE VACCINE: ICD-10-PCS | Mod: S$GLB,,, | Performed by: FAMILY MEDICINE

## 2021-08-11 PROCEDURE — 0001A COVID-19, MRNA, LNP-S, PF, 30 MCG/0.3 ML DOSE VACCINE: ICD-10-PCS | Mod: CV19,S$GLB,, | Performed by: FAMILY MEDICINE

## 2021-09-01 ENCOUNTER — IMMUNIZATION (OUTPATIENT)
Dept: PRIMARY CARE CLINIC | Facility: CLINIC | Age: 66
End: 2021-09-01
Payer: COMMERCIAL

## 2021-09-01 DIAGNOSIS — Z23 NEED FOR VACCINATION: Primary | ICD-10-CM

## 2021-09-01 PROCEDURE — 0002A COVID-19, MRNA, LNP-S, PF, 30 MCG/0.3 ML DOSE VACCINE: CPT | Mod: CV19,S$GLB,, | Performed by: FAMILY MEDICINE

## 2021-09-01 PROCEDURE — 91300 COVID-19, MRNA, LNP-S, PF, 30 MCG/0.3 ML DOSE VACCINE: ICD-10-PCS | Mod: S$GLB,,, | Performed by: FAMILY MEDICINE

## 2021-09-01 PROCEDURE — 91300 COVID-19, MRNA, LNP-S, PF, 30 MCG/0.3 ML DOSE VACCINE: CPT | Mod: S$GLB,,, | Performed by: FAMILY MEDICINE

## 2021-09-01 PROCEDURE — 0002A COVID-19, MRNA, LNP-S, PF, 30 MCG/0.3 ML DOSE VACCINE: ICD-10-PCS | Mod: CV19,S$GLB,, | Performed by: FAMILY MEDICINE

## 2021-10-15 ENCOUNTER — INFUSION (OUTPATIENT)
Dept: INFUSION THERAPY | Facility: HOSPITAL | Age: 66
End: 2021-10-15
Attending: INTERNAL MEDICINE
Payer: MEDICARE

## 2021-10-15 VITALS
HEART RATE: 90 BPM | TEMPERATURE: 98 F | BODY MASS INDEX: 39.17 KG/M2 | RESPIRATION RATE: 18 BRPM | OXYGEN SATURATION: 97 % | DIASTOLIC BLOOD PRESSURE: 75 MMHG | WEIGHT: 235.38 LBS | SYSTOLIC BLOOD PRESSURE: 149 MMHG

## 2021-10-15 DIAGNOSIS — C83.38 DIFFUSE LARGE B-CELL LYMPHOMA OF LYMPH NODES OF MULTIPLE REGIONS: ICD-10-CM

## 2021-10-15 DIAGNOSIS — C79.51 OSSEOUS METASTASIS: ICD-10-CM

## 2021-10-15 DIAGNOSIS — M89.9 LYTIC BONE LESIONS ON XRAY: Primary | ICD-10-CM

## 2021-10-15 PROCEDURE — 25000003 PHARM REV CODE 250: Performed by: INTERNAL MEDICINE

## 2021-10-15 PROCEDURE — 63600175 PHARM REV CODE 636 W HCPCS: Performed by: INTERNAL MEDICINE

## 2021-10-15 PROCEDURE — A4216 STERILE WATER/SALINE, 10 ML: HCPCS | Performed by: INTERNAL MEDICINE

## 2021-10-15 PROCEDURE — 96523 IRRIG DRUG DELIVERY DEVICE: CPT

## 2021-10-15 RX ORDER — SODIUM CHLORIDE 0.9 % (FLUSH) 0.9 %
10 SYRINGE (ML) INJECTION
Status: CANCELLED | OUTPATIENT
Start: 2021-10-15

## 2021-10-15 RX ORDER — HEPARIN 100 UNIT/ML
500 SYRINGE INTRAVENOUS
Status: COMPLETED | OUTPATIENT
Start: 2021-10-15 | End: 2021-10-15

## 2021-10-15 RX ORDER — HEPARIN 100 UNIT/ML
500 SYRINGE INTRAVENOUS
Status: CANCELLED | OUTPATIENT
Start: 2021-10-15

## 2021-10-15 RX ORDER — SODIUM CHLORIDE 0.9 % (FLUSH) 0.9 %
10 SYRINGE (ML) INJECTION
Status: COMPLETED | OUTPATIENT
Start: 2021-10-15 | End: 2021-10-15

## 2021-10-15 RX ADMIN — SODIUM CHLORIDE 10 ML: 9 INJECTION, SOLUTION INTRAMUSCULAR; INTRAVENOUS; SUBCUTANEOUS at 10:10

## 2021-10-15 RX ADMIN — HEPARIN 500 UNITS: 100 SYRINGE at 10:10

## 2022-02-22 DIAGNOSIS — D84.9 IMMUNOSUPPRESSED STATUS: ICD-10-CM

## 2022-03-11 ENCOUNTER — INFUSION (OUTPATIENT)
Dept: INFUSION THERAPY | Facility: HOSPITAL | Age: 67
End: 2022-03-11
Attending: INTERNAL MEDICINE
Payer: COMMERCIAL

## 2022-03-11 VITALS
BODY MASS INDEX: 38.49 KG/M2 | WEIGHT: 231.31 LBS | TEMPERATURE: 98 F | OXYGEN SATURATION: 97 % | DIASTOLIC BLOOD PRESSURE: 75 MMHG | SYSTOLIC BLOOD PRESSURE: 157 MMHG | HEART RATE: 91 BPM | RESPIRATION RATE: 18 BRPM

## 2022-03-11 DIAGNOSIS — C79.51 OSSEOUS METASTASIS: ICD-10-CM

## 2022-03-11 DIAGNOSIS — M89.9 LYTIC BONE LESIONS ON XRAY: Primary | ICD-10-CM

## 2022-03-11 DIAGNOSIS — C83.38 DIFFUSE LARGE B-CELL LYMPHOMA OF LYMPH NODES OF MULTIPLE REGIONS: ICD-10-CM

## 2022-03-11 PROCEDURE — 63600175 PHARM REV CODE 636 W HCPCS: Performed by: INTERNAL MEDICINE

## 2022-03-11 PROCEDURE — A4216 STERILE WATER/SALINE, 10 ML: HCPCS | Performed by: INTERNAL MEDICINE

## 2022-03-11 PROCEDURE — 96523 IRRIG DRUG DELIVERY DEVICE: CPT

## 2022-03-11 PROCEDURE — 25000003 PHARM REV CODE 250: Performed by: INTERNAL MEDICINE

## 2022-03-11 RX ORDER — HEPARIN 100 UNIT/ML
500 SYRINGE INTRAVENOUS
Status: COMPLETED | OUTPATIENT
Start: 2022-03-11 | End: 2022-03-11

## 2022-03-11 RX ORDER — SODIUM CHLORIDE 0.9 % (FLUSH) 0.9 %
10 SYRINGE (ML) INJECTION
Status: CANCELLED | OUTPATIENT
Start: 2022-03-11

## 2022-03-11 RX ORDER — SODIUM CHLORIDE 0.9 % (FLUSH) 0.9 %
10 SYRINGE (ML) INJECTION
Status: COMPLETED | OUTPATIENT
Start: 2022-03-11 | End: 2022-03-11

## 2022-03-11 RX ORDER — HEPARIN 100 UNIT/ML
500 SYRINGE INTRAVENOUS
Status: CANCELLED | OUTPATIENT
Start: 2022-03-11

## 2022-03-11 RX ADMIN — HEPARIN 500 UNITS: 100 SYRINGE at 11:03

## 2022-03-11 RX ADMIN — SODIUM CHLORIDE, PRESERVATIVE FREE 10 ML: 5 INJECTION INTRAVENOUS at 11:03

## 2022-03-11 NOTE — PLAN OF CARE
Problem: Fall Injury Risk  Goal: Absence of Fall and Fall-Related Injury  Outcome: Ongoing, Progressing  Intervention: Identify and Manage Contributors  Flowsheets (Taken 3/11/2022 1059)  Self-Care Promotion: independence encouraged  Medication Review/Management: medications reviewed  Intervention: Promote Injury-Free Environment  Flowsheets (Taken 3/11/2022 1059)  Safety Promotion/Fall Prevention: medications reviewed

## 2022-03-28 ENCOUNTER — TELEPHONE (OUTPATIENT)
Dept: HEMATOLOGY/ONCOLOGY | Facility: CLINIC | Age: 67
End: 2022-03-28
Payer: COMMERCIAL

## 2022-03-28 DIAGNOSIS — E03.9 ACQUIRED HYPOTHYROIDISM: ICD-10-CM

## 2022-03-28 RX ORDER — LEVOTHYROXINE SODIUM 150 UG/1
150 TABLET ORAL DAILY
Qty: 90 TABLET | Refills: 3 | Status: SHIPPED | OUTPATIENT
Start: 2022-03-28

## 2022-03-29 NOTE — TELEPHONE ENCOUNTER
Outgoing call to patient to schedule a lab and follow-up appointment. LVM with my name and callback number.

## 2022-03-30 ENCOUNTER — PATIENT MESSAGE (OUTPATIENT)
Dept: HEMATOLOGY/ONCOLOGY | Facility: CLINIC | Age: 67
End: 2022-03-30
Payer: COMMERCIAL

## 2022-04-05 ENCOUNTER — TELEPHONE (OUTPATIENT)
Dept: HEMATOLOGY/ONCOLOGY | Facility: CLINIC | Age: 67
End: 2022-04-05
Payer: COMMERCIAL

## 2022-04-05 NOTE — TELEPHONE ENCOUNTER
Outgoing call to patient's friend, Stuart, to see if he could let the patient know that we are trying to contact her. LVM with my name and callback number.

## 2022-04-05 NOTE — TELEPHONE ENCOUNTER
Outgoing call to patient to scheduled her an appointment with Dr. Gayle per Dr. Mcnally's request. Patient did not answer the phone and her voicemail box was full.

## 2022-04-22 PROBLEM — Z95.828 PORT-A-CATH IN PLACE: Status: ACTIVE | Noted: 2022-04-22

## 2022-06-02 ENCOUNTER — PATIENT MESSAGE (OUTPATIENT)
Dept: HEMATOLOGY/ONCOLOGY | Facility: CLINIC | Age: 67
End: 2022-06-02
Payer: COMMERCIAL

## 2022-06-02 RX ORDER — SODIUM CHLORIDE 0.9 % (FLUSH) 0.9 %
10 SYRINGE (ML) INJECTION
Status: CANCELLED | OUTPATIENT
Start: 2022-06-02

## 2022-06-02 RX ORDER — HEPARIN 100 UNIT/ML
500 SYRINGE INTRAVENOUS
Status: CANCELLED | OUTPATIENT
Start: 2022-06-02

## 2022-06-03 ENCOUNTER — INFUSION (OUTPATIENT)
Dept: INFUSION THERAPY | Facility: HOSPITAL | Age: 67
End: 2022-06-03
Attending: INTERNAL MEDICINE
Payer: COMMERCIAL

## 2022-06-03 VITALS
DIASTOLIC BLOOD PRESSURE: 92 MMHG | HEART RATE: 77 BPM | SYSTOLIC BLOOD PRESSURE: 166 MMHG | RESPIRATION RATE: 18 BRPM | OXYGEN SATURATION: 95 % | TEMPERATURE: 98 F

## 2022-06-03 DIAGNOSIS — M89.9 LYTIC BONE LESIONS ON XRAY: ICD-10-CM

## 2022-06-03 DIAGNOSIS — Z95.828 PORT-A-CATH IN PLACE: Primary | ICD-10-CM

## 2022-06-03 DIAGNOSIS — C79.51 OSSEOUS METASTASIS: ICD-10-CM

## 2022-06-03 DIAGNOSIS — C83.38 DIFFUSE LARGE B-CELL LYMPHOMA OF LYMPH NODES OF MULTIPLE REGIONS: ICD-10-CM

## 2022-06-03 PROCEDURE — 63600175 PHARM REV CODE 636 W HCPCS: Performed by: INTERNAL MEDICINE

## 2022-06-03 PROCEDURE — 25000003 PHARM REV CODE 250: Performed by: INTERNAL MEDICINE

## 2022-06-03 PROCEDURE — A4216 STERILE WATER/SALINE, 10 ML: HCPCS | Performed by: INTERNAL MEDICINE

## 2022-06-03 PROCEDURE — 96523 IRRIG DRUG DELIVERY DEVICE: CPT

## 2022-06-03 RX ORDER — SODIUM CHLORIDE 0.9 % (FLUSH) 0.9 %
10 SYRINGE (ML) INJECTION
Status: COMPLETED | OUTPATIENT
Start: 2022-06-03 | End: 2022-06-03

## 2022-06-03 RX ORDER — SODIUM CHLORIDE 0.9 % (FLUSH) 0.9 %
10 SYRINGE (ML) INJECTION
Status: CANCELLED | OUTPATIENT
Start: 2022-06-03

## 2022-06-03 RX ORDER — HEPARIN 100 UNIT/ML
500 SYRINGE INTRAVENOUS
Status: CANCELLED | OUTPATIENT
Start: 2022-06-03

## 2022-06-03 RX ORDER — HEPARIN 100 UNIT/ML
500 SYRINGE INTRAVENOUS
Status: COMPLETED | OUTPATIENT
Start: 2022-06-03 | End: 2022-06-03

## 2022-06-03 RX ADMIN — HEPARIN 500 UNITS: 100 SYRINGE at 10:06

## 2022-06-03 RX ADMIN — SODIUM CHLORIDE, PRESERVATIVE FREE 10 ML: 5 INJECTION INTRAVENOUS at 10:06

## 2022-06-07 ENCOUNTER — TELEPHONE (OUTPATIENT)
Dept: HEMATOLOGY/ONCOLOGY | Facility: CLINIC | Age: 67
End: 2022-06-07
Payer: COMMERCIAL

## 2022-06-07 NOTE — TELEPHONE ENCOUNTER
"----- Message from Tip Nogueira sent at 6/3/2022  2:38 PM CDT -----  Regarding: Insurance not accepted here  Hello!  Patient was recently added to Dr Nesbitt's schedule, but her new insurance is not accepted here. The latest information I received from my supervisor on this particular insurance was an email in which was stated:  "  [facility] We can accept the PPO but not the HMO because the PPO will pay [employer] benefits    Unfortunately, the patient has the HMO plan, so her insurance cannot be used here. She can apply for financial assistance or be self-pay, but I am not allowed to contact the patient regarding this information, or make the decision if she can be seen here. So this will have to be handled by medical staff. Let me know if you need information for financial assistance or self-pay, but remember [facility] will no longer be accepting [employer] soon too.  Thank you,  Tip"

## 2022-06-09 NOTE — TELEPHONE ENCOUNTER
Called pt to discuss this information regarding her upcoming appt and insurance/payment options. No answer. LVM.

## 2022-06-14 ENCOUNTER — TELEPHONE (OUTPATIENT)
Dept: HEMATOLOGY/ONCOLOGY | Facility: CLINIC | Age: 67
End: 2022-06-14
Payer: COMMERCIAL

## 2022-06-14 NOTE — TELEPHONE ENCOUNTER
This nurse called pt to notify her that she has an appt scheduled to see Dr Nesbitt tomorrow. Clinic does not take her insurance. We need to know if she will be self-pay or cancelling appt. Explained that another option is for us to refer her to [facility] in [place]. No answer. LVM. Second time calling.

## 2022-06-14 NOTE — TELEPHONE ENCOUNTER
Left message with contact listed in chart to have pt call the clinic regarding appt scheduled for tomorrow

## 2022-06-14 NOTE — TELEPHONE ENCOUNTER
Left voicemail that the pt's [employer] insurance is no longer accepted by the clinic. Pt advised to call back to change to self pay or cancel the appt

## 2023-02-05 NOTE — DISCHARGE SUMMARY
Discharge Summary  General Surgery      Admit Date: 3/5/2018    Discharge Date :3/5/2018    Attending Physician: Trevor Coffman     Discharge Physician: Trevor Coffman    Discharged Condition: good    Discharge Diagnosis: Venous insufficiency, peripheral [I87.2]    Treatments/Procedures: Procedure(s) (LRB):  Kcmtkbbnw-Antf-D-Cath (Right)    Hospital Course: Uneventful; Discharged home from Recovery    Significant Diagnostic Studies: none    Disposition: Home or Self Care    Diet: Regular    Follow up: Office 10-14 days    Activity: No heavy lifting till seen in office.    Patient Instructions:   Current Discharge Medication List      START taking these medications    Details   hydrocodone-acetaminophen 5-325mg (NORCO) 5-325 mg per tablet Take 1 tablet by mouth every 4 (four) hours as needed for Pain.  Qty: 12 tablet, Refills: 0         CONTINUE these medications which have NOT CHANGED    Details   amlodipine-benazepril (LOTREL) 5-40 mg per capsule Take 1 capsule by mouth once daily.  Qty: 90 capsule, Refills: 0    Associated Diagnoses: Hypertension, unspecified type      atorvastatin (LIPITOR) 40 MG tablet Take 1 tablet (40 mg total) by mouth once daily.  Qty: 30 tablet, Refills: 3    Associated Diagnoses: Mixed hyperlipidemia      hydroCHLOROthiazide (HYDRODIURIL) 25 MG tablet Take 1 tablet (25 mg total) by mouth once daily.  Qty: 30 tablet, Refills: 2    Associated Diagnoses: Essential hypertension      levothyroxine (SYNTHROID) 150 MCG tablet Take 1 tablet (150 mcg total) by mouth once daily.  Qty: 90 tablet, Refills: 1      escitalopram oxalate (LEXAPRO) 10 MG tablet Take 1 tablet (10 mg total) by mouth once daily.  Qty: 30 tablet, Refills: 2    Associated Diagnoses: Situational depression               Discharge Procedure Orders  Diet general     Activity as tolerated     Shower on day dressing removed (No bath)     Lifting restrictions     Call MD for:  temperature >100.4     Call MD for:  persistent nausea and  vomiting     Call MD for:  severe uncontrolled pain     Call MD for:  difficulty breathing, headache or visual disturbances     Call MD for:  redness, tenderness, or signs of infection (pain, swelling, redness, odor or green/yellow discharge around incision site)     Call MD for:  hives     Call MD for:  persistent dizziness or light-headedness     Call MD for:  extreme fatigue     Remove dressing in 48 hours          Class IV (difficult) - the soft palate is not visible at all

## 2023-05-05 NOTE — TELEPHONE ENCOUNTER
Hello, I have not seen her since April 2021. She needs a follow up and repeat thyroid function labs. I will send a refill for now. Thanks  Prior Authorization Form:      DEMOGRAPHICS:                     Patient Name:  Valerie Carrillo  Patient :  1947            Insurance:  Payor: Anjelica Ramesh / Plan: Sinan Cherry ESSENTIAL/PLUS / Product Type: *No Product type* /   Insurance ID Number:    Payer/Plan Subscr  Sex Relation Sub. Ins. ID Effective Group Num   1.  [facility] 1947 Male Self [id number] [address] [id number]                                    [id number]         DIAGNOSIS & PROCEDURE:                       Procedure/Operation: colonoscopy           CPT Code: 33241    Diagnosis:  unintentional weight loss    ICD10 Code: d64.9    Location:  [place]    Surgeon:  israel Guardado INFORMATION:                          Date: 2023    Time: 11:00              Anesthesia:  MAC/TIVA                                                       Status:  Outpatient        Special Comments:         Electronically signed by Vivi Santos MA on 2023 at 12:35 PM

## 2023-09-08 ENCOUNTER — TELEPHONE (OUTPATIENT)
Dept: INFUSION THERAPY | Facility: HOSPITAL | Age: 68
End: 2023-09-08

## 2023-12-01 ENCOUNTER — TELEPHONE (OUTPATIENT)
Dept: INFUSION THERAPY | Facility: HOSPITAL | Age: 68
End: 2023-12-01

## 2024-07-09 NOTE — TELEPHONE ENCOUNTER
LVM for pt regarding missed appt today for port flush at 1120. Call back number left in message to reschedule.  
0 (no pain/absence of nonverbal indicators of pain)

## 2025-05-23 ENCOUNTER — HOSPITAL ENCOUNTER (INPATIENT)
Facility: HOSPITAL | Age: 70
LOS: 4 days | Discharge: NURSING, REHAB OR LONG-TERM CARE | DRG: 062 | End: 2025-05-27
Attending: EMERGENCY MEDICINE
Payer: MEDICARE

## 2025-05-23 ENCOUNTER — CLINICAL SUPPORT (OUTPATIENT)
Dept: CARDIOLOGY | Facility: HOSPITAL | Age: 70
DRG: 062 | End: 2025-05-23
Payer: MEDICARE

## 2025-05-23 DIAGNOSIS — I63.9 CVA (CEREBRAL VASCULAR ACCIDENT): ICD-10-CM

## 2025-05-23 DIAGNOSIS — I63.9 CEREBROVASCULAR ACCIDENT (CVA), UNSPECIFIED MECHANISM: Primary | ICD-10-CM

## 2025-05-23 PROBLEM — R29.818 FOCAL NEUROLOGICAL DEFICIT: Status: ACTIVE | Noted: 2025-05-23

## 2025-05-23 LAB
ABSOLUTE EOSINOPHIL (SMH): 0.34 K/UL
ABSOLUTE MONOCYTE (SMH): 0.73 K/UL (ref 0.3–1)
ABSOLUTE NEUTROPHIL COUNT (SMH): 6.8 K/UL (ref 1.8–7.7)
ALBUMIN SERPL-MCNC: 4.2 G/DL (ref 3.5–5.2)
ALP SERPL-CCNC: 70 UNIT/L (ref 55–135)
ALT SERPL-CCNC: 14 UNIT/L (ref 10–44)
ANION GAP (SMH): 10 MMOL/L (ref 8–16)
AORTIC ROOT ANNULUS: 2.3 CM
AORTIC SIZE INDEX: 1.6 CM/M2
APICAL FOUR CHAMBER EJECTION FRACTION: 47 %
ASCENDING AORTA: 3.4 CM
AST SERPL-CCNC: 15 UNIT/L (ref 10–40)
AV INDEX (PROSTH): 0.69
AV MEAN GRADIENT: 5 MMHG
AV PEAK GRADIENT: 9 MMHG
AV VALVE AREA BY VELOCITY RATIO: 2.1 CM²
AV VALVE AREA: 2.2 CM²
AV VELOCITY RATIO: 0.67
BASOPHILS # BLD AUTO: 0.06 K/UL
BASOPHILS NFR BLD AUTO: 0.6 %
BILIRUB SERPL-MCNC: 0.7 MG/DL (ref 0.1–1)
BNP SERPL-MCNC: 149 PG/ML
BUN SERPL-MCNC: 13 MG/DL (ref 8–23)
CALCIUM SERPL-MCNC: 9.1 MG/DL (ref 8.7–10.5)
CHLORIDE SERPL-SCNC: 103 MMOL/L (ref 95–110)
CHOLEST SERPL-MCNC: 232 MG/DL (ref 120–199)
CHOLEST/HDLC SERPL: 4.6 {RATIO} (ref 2–5)
CO2 SERPL-SCNC: 28 MMOL/L (ref 23–29)
CREAT SERPL-MCNC: 0.7 MG/DL (ref 0.5–1.4)
CREAT SERPL-MCNC: 0.7 MG/DL (ref 0.5–1.4)
CV ECHO LV RWT: 0.54 CM
DOP CALC AO PEAK VEL: 1.5 M/S
DOP CALC AO VTI: 30.4 CM
DOP CALC LVOT AREA: 3.1 CM2
DOP CALC LVOT DIAMETER: 2 CM
DOP CALC LVOT PEAK VEL: 1 M/S
DOP CALC LVOT STROKE VOLUME: 66.3 CM3
DOP CALC MV VTI: 29.7 CM
DOP CALCLVOT PEAK VEL VTI: 21.1 CM
E WAVE DECELERATION TIME: 197 MSEC
E/A RATIO: 0.73
E/E' RATIO: 18 M/S
ECHO LV POSTERIOR WALL: 1.1 CM (ref 0.6–1.1)
ERYTHROCYTE [DISTWIDTH] IN BLOOD BY AUTOMATED COUNT: 13.7 % (ref 11.5–14.5)
FRACTIONAL SHORTENING: 31.7 % (ref 28–44)
GFR SERPLBLD CREATININE-BSD FMLA CKD-EPI: >60 ML/MIN/1.73/M2
GLUCOSE SERPL-MCNC: 116 MG/DL (ref 70–110)
HCT VFR BLD AUTO: 45.6 % (ref 37–48.5)
HDLC SERPL-MCNC: 50 MG/DL (ref 40–75)
HDLC SERPL: 21.6 % (ref 20–50)
HGB BLD-MCNC: 15.7 GM/DL (ref 12–16)
IMM GRANULOCYTES # BLD AUTO: 0.03 K/UL (ref 0–0.04)
IMM GRANULOCYTES NFR BLD AUTO: 0.3 % (ref 0–0.5)
INR PPP: 1 (ref 0.8–1.2)
INTERVENTRICULAR SEPTUM: 1.1 CM (ref 0.6–1.1)
IVC DIAMETER: 2.1 CM
LDLC SERPL CALC-MCNC: 144.8 MG/DL (ref 63–159)
LEFT ATRIUM AREA SYSTOLIC (APICAL 4 CHAMBER): 12.1 CM2
LEFT ATRIUM SIZE: 3.1 CM
LEFT INTERNAL DIMENSION IN SYSTOLE: 2.8 CM (ref 2.1–4)
LEFT VENTRICLE DIASTOLIC VOLUME INDEX: 35.24 ML/M2
LEFT VENTRICLE DIASTOLIC VOLUME: 74 ML
LEFT VENTRICLE END DIASTOLIC VOLUME APICAL 4 CHAMBER: 83.6 ML
LEFT VENTRICLE END SYSTOLIC VOLUME APICAL 4 CHAMBER: 21.2 ML
LEFT VENTRICLE MASS INDEX: 72 G/M2
LEFT VENTRICLE SYSTOLIC VOLUME INDEX: 14.3 ML/M2
LEFT VENTRICLE SYSTOLIC VOLUME: 30 ML
LEFT VENTRICULAR INTERNAL DIMENSION IN DIASTOLE: 4.1 CM (ref 3.5–6)
LEFT VENTRICULAR MASS: 151.3 G
LV LATERAL E/E' RATIO: 23 M/S
LV SEPTAL E/E' RATIO: 15.3 M/S
LVED V (TEICH): 74.2 ML
LVES V (TEICH): 29.6 ML
LVOT MG: 2 MMHG
LVOT MV: 0.63 CM/S
LYMPHOCYTES # BLD AUTO: 1.39 K/UL (ref 1–4.8)
MCH RBC QN AUTO: 33.5 PG (ref 27–31)
MCHC RBC AUTO-ENTMCNC: 34.4 G/DL (ref 32–36)
MCV RBC AUTO: 97 FL (ref 82–98)
MV MEAN GRADIENT: 4 MMHG
MV PEAK A VEL: 1.26 M/S
MV PEAK E VEL: 0.92 M/S
MV PEAK GRADIENT: 9 MMHG
MV STENOSIS PRESSURE HALF TIME: 58 MS
MV VALVE AREA BY CONTINUITY EQUATION: 2.23 CM2
MV VALVE AREA P 1/2 METHOD: 3.79 CM2
NONHDLC SERPL-MCNC: 182 MG/DL
NUCLEATED RBC (/100WBC) (SMH): 0 /100 WBC
OHS CV RV/LV RATIO: 0.63 CM
PLATELET # BLD AUTO: 242 K/UL (ref 150–450)
PMV BLD AUTO: 10.2 FL (ref 9.2–12.9)
POCT GLUCOSE: 111 MG/DL (ref 70–110)
POTASSIUM SERPL-SCNC: 3.8 MMOL/L (ref 3.5–5.1)
PROT SERPL-MCNC: 7.1 GM/DL (ref 6–8.4)
PROTHROMBIN TIME: 10.9 SECONDS (ref 9–12.5)
RA PRESSURE ESTIMATED: 3 MMHG
RBC # BLD AUTO: 4.68 M/UL (ref 4–5.4)
RELATIVE EOSINOPHIL (SMH): 3.6 % (ref 0–8)
RELATIVE LYMPHOCYTE (SMH): 14.9 % (ref 18–48)
RELATIVE MONOCYTE (SMH): 7.8 % (ref 4–15)
RELATIVE NEUTROPHIL (SMH): 72.8 % (ref 38–73)
RIGHT VENTRICLE DIASTOLIC BASEL DIMENSION: 2.6 CM
RIGHT VENTRICULAR END-DIASTOLIC DIMENSION: 2.6 CM
RV TISSUE DOPPLER FREE WALL SYSTOLIC VELOCITY 1 (APICAL 4 CHAMBER VIEW): 7.83 CM/S
SAMPLE: NORMAL
SODIUM SERPL-SCNC: 141 MMOL/L (ref 136–145)
T4 FREE SERPL-MCNC: 0.4 NG/DL (ref 0.71–1.51)
TDI LATERAL: 0.04 M/S
TDI SEPTAL: 0.06 M/S
TDI: 0.05 M/S
TRIGL SERPL-MCNC: 186 MG/DL (ref 30–150)
TROPONIN HIGH SENSITIVE (SMH): 13.6 PG/ML
TSH SERPL-ACNC: 46.12 UIU/ML (ref 0.34–5.6)
WBC # BLD AUTO: 9.36 K/UL (ref 3.9–12.7)
Z-SCORE OF LEFT VENTRICULAR DIMENSION IN END DIASTOLE: -4.65
Z-SCORE OF LEFT VENTRICULAR DIMENSION IN END SYSTOLE: -2.8

## 2025-05-23 PROCEDURE — 36415 COLL VENOUS BLD VENIPUNCTURE: CPT | Performed by: EMERGENCY MEDICINE

## 2025-05-23 PROCEDURE — 63600175 PHARM REV CODE 636 W HCPCS: Mod: JW,TB

## 2025-05-23 PROCEDURE — 99406 BEHAV CHNG SMOKING 3-10 MIN: CPT

## 2025-05-23 PROCEDURE — 99900031 HC PATIENT EDUCATION (STAT)

## 2025-05-23 PROCEDURE — 93005 ELECTROCARDIOGRAM TRACING: CPT | Performed by: INTERNAL MEDICINE

## 2025-05-23 PROCEDURE — 83036 HEMOGLOBIN GLYCOSYLATED A1C: CPT

## 2025-05-23 PROCEDURE — 25000003 PHARM REV CODE 250

## 2025-05-23 PROCEDURE — 99285 EMERGENCY DEPT VISIT HI MDM: CPT | Mod: 25

## 2025-05-23 PROCEDURE — 82565 ASSAY OF CREATININE: CPT

## 2025-05-23 PROCEDURE — 63600175 PHARM REV CODE 636 W HCPCS: Performed by: EMERGENCY MEDICINE

## 2025-05-23 PROCEDURE — 99900035 HC TECH TIME PER 15 MIN (STAT)

## 2025-05-23 PROCEDURE — 96374 THER/PROPH/DIAG INJ IV PUSH: CPT

## 2025-05-23 PROCEDURE — 20000000 HC ICU ROOM

## 2025-05-23 PROCEDURE — 84484 ASSAY OF TROPONIN QUANT: CPT | Performed by: EMERGENCY MEDICINE

## 2025-05-23 PROCEDURE — 85610 PROTHROMBIN TIME: CPT | Performed by: EMERGENCY MEDICINE

## 2025-05-23 PROCEDURE — 63600175 PHARM REV CODE 636 W HCPCS

## 2025-05-23 PROCEDURE — 94799 UNLISTED PULMONARY SVC/PX: CPT

## 2025-05-23 PROCEDURE — 83880 ASSAY OF NATRIURETIC PEPTIDE: CPT | Performed by: EMERGENCY MEDICINE

## 2025-05-23 PROCEDURE — 25500020 PHARM REV CODE 255: Performed by: EMERGENCY MEDICINE

## 2025-05-23 PROCEDURE — 85025 COMPLETE CBC W/AUTO DIFF WBC: CPT | Performed by: EMERGENCY MEDICINE

## 2025-05-23 PROCEDURE — 93010 ELECTROCARDIOGRAM REPORT: CPT | Mod: ,,, | Performed by: INTERNAL MEDICINE

## 2025-05-23 PROCEDURE — G0508 CRIT CARE TELEHEA CONSULT 60: HCPCS | Mod: 95,,, | Performed by: STUDENT IN AN ORGANIZED HEALTH CARE EDUCATION/TRAINING PROGRAM

## 2025-05-23 PROCEDURE — 80053 COMPREHEN METABOLIC PANEL: CPT | Performed by: EMERGENCY MEDICINE

## 2025-05-23 PROCEDURE — 84443 ASSAY THYROID STIM HORMONE: CPT

## 2025-05-23 PROCEDURE — 80061 LIPID PANEL: CPT | Performed by: EMERGENCY MEDICINE

## 2025-05-23 PROCEDURE — 93306 TTE W/DOPPLER COMPLETE: CPT

## 2025-05-23 PROCEDURE — 3E03317 INTRODUCTION OF OTHER THROMBOLYTIC INTO PERIPHERAL VEIN, PERCUTANEOUS APPROACH: ICD-10-PCS | Performed by: EMERGENCY MEDICINE

## 2025-05-23 PROCEDURE — 93306 TTE W/DOPPLER COMPLETE: CPT | Mod: 26,,, | Performed by: INTERNAL MEDICINE

## 2025-05-23 PROCEDURE — 82962 GLUCOSE BLOOD TEST: CPT

## 2025-05-23 PROCEDURE — 84439 ASSAY OF FREE THYROXINE: CPT

## 2025-05-23 PROCEDURE — 94761 N-INVAS EAR/PLS OXIMETRY MLT: CPT

## 2025-05-23 PROCEDURE — 36415 COLL VENOUS BLD VENIPUNCTURE: CPT

## 2025-05-23 RX ORDER — PROCHLORPERAZINE EDISYLATE 5 MG/ML
5 INJECTION INTRAMUSCULAR; INTRAVENOUS EVERY 6 HOURS PRN
Status: DISCONTINUED | OUTPATIENT
Start: 2025-05-23 | End: 2025-05-27 | Stop reason: HOSPADM

## 2025-05-23 RX ORDER — SODIUM CHLORIDE 0.9 % (FLUSH) 0.9 %
10 SYRINGE (ML) INJECTION
Status: DISCONTINUED | OUTPATIENT
Start: 2025-05-23 | End: 2025-05-27 | Stop reason: HOSPADM

## 2025-05-23 RX ORDER — ATORVASTATIN CALCIUM 40 MG/1
40 TABLET, FILM COATED ORAL DAILY
Status: DISCONTINUED | OUTPATIENT
Start: 2025-05-23 | End: 2025-05-27 | Stop reason: HOSPADM

## 2025-05-23 RX ORDER — NICARDIPINE HYDROCHLORIDE 0.2 MG/ML
0-15 INJECTION INTRAVENOUS CONTINUOUS
Status: DISCONTINUED | OUTPATIENT
Start: 2025-05-23 | End: 2025-05-25

## 2025-05-23 RX ORDER — NICARDIPINE HYDROCHLORIDE 0.2 MG/ML
INJECTION INTRAVENOUS
Status: COMPLETED
Start: 2025-05-23 | End: 2025-05-23

## 2025-05-23 RX ORDER — BISACODYL 10 MG/1
10 SUPPOSITORY RECTAL DAILY PRN
Status: DISCONTINUED | OUTPATIENT
Start: 2025-05-23 | End: 2025-05-27 | Stop reason: HOSPADM

## 2025-05-23 RX ORDER — LABETALOL HYDROCHLORIDE 5 MG/ML
INJECTION, SOLUTION INTRAVENOUS
Status: COMPLETED
Start: 2025-05-23 | End: 2025-05-23

## 2025-05-23 RX ORDER — MUPIROCIN 20 MG/G
OINTMENT TOPICAL 2 TIMES DAILY
Status: DISCONTINUED | OUTPATIENT
Start: 2025-05-23 | End: 2025-05-27 | Stop reason: HOSPADM

## 2025-05-23 RX ORDER — HYDRALAZINE HYDROCHLORIDE 20 MG/ML
5 INJECTION INTRAMUSCULAR; INTRAVENOUS EVERY 6 HOURS PRN
Status: DISCONTINUED | OUTPATIENT
Start: 2025-05-23 | End: 2025-05-27 | Stop reason: HOSPADM

## 2025-05-23 RX ORDER — LABETALOL HYDROCHLORIDE 5 MG/ML
10 INJECTION, SOLUTION INTRAVENOUS EVERY 4 HOURS PRN
Status: DISCONTINUED | OUTPATIENT
Start: 2025-05-23 | End: 2025-05-27 | Stop reason: HOSPADM

## 2025-05-23 RX ORDER — LABETALOL HYDROCHLORIDE 5 MG/ML
10 INJECTION, SOLUTION INTRAVENOUS
Status: COMPLETED | OUTPATIENT
Start: 2025-05-23 | End: 2025-05-23

## 2025-05-23 RX ADMIN — MUPIROCIN 1 G: 20 OINTMENT TOPICAL at 09:05

## 2025-05-23 RX ADMIN — LABETALOL HYDROCHLORIDE 10 MG: 5 INJECTION, SOLUTION INTRAVENOUS at 12:05

## 2025-05-23 RX ADMIN — HYDRALAZINE HYDROCHLORIDE 5 MG: 20 INJECTION INTRAMUSCULAR; INTRAVENOUS at 06:05

## 2025-05-23 RX ADMIN — LABETALOL HYDROCHLORIDE 10 MG: 5 INJECTION INTRAVENOUS at 06:05

## 2025-05-23 RX ADMIN — IOHEXOL 100 ML: 350 INJECTION, SOLUTION INTRAVENOUS at 12:05

## 2025-05-23 RX ADMIN — TENECTEPLASE 25 MG: KIT at 01:05

## 2025-05-23 RX ADMIN — NICARDIPINE HYDROCHLORIDE 5 MG/HR: 0.2 INJECTION, SOLUTION INTRAVENOUS at 07:05

## 2025-05-23 NOTE — ASSESSMENT & PLAN NOTE
P/w R hemiparesis, suspected distal R MCA stroke with rec for TNK    Study: Head CT and CTA Head & Neck  Study Interpretation: Effacement of cortical sulci at the frontoparietal junction on the left near the vertex. While nonspecific, acute ischemic involvement could give this appearance.      CTA negative for any LVO or MeVO in the pertinent vascular territory of interest - no targets identified for acute or urgent reperfusion therapy.        Patient is s/p TNK, repeat CT in 24h and DAPT thereafter x21 days followed by ASA alone  High intensity statin  Monitor in ICU  PT/OT/SLP  BP goal 180/90(Do not treat unless greater than these parameters)  MRI pending

## 2025-05-23 NOTE — ASSESSMENT & PLAN NOTE
Patient's blood pressure range in the last 24 hours was: BP  Min: 149/79  Max: 212/99.The patient's inpatient anti-hypertensive regimen is listed below:      PRN hydralazine, BP goal as above

## 2025-05-23 NOTE — SUBJECTIVE & OBJECTIVE
Past Medical History:   Diagnosis Date    Cancer     Diffuse large B cell lymphoma     GERD (gastroesophageal reflux disease)     Hyperlipidemia     Hypertension     Hypothyroidism        Past Surgical History:   Procedure Laterality Date    PORTACATH PLACEMENT Right 03/2018       Review of patient's allergies indicates:  No Known Allergies    No current facility-administered medications on file prior to encounter.     Current Outpatient Medications on File Prior to Encounter   Medication Sig    amLODIPine-benazepriL (LOTREL) 5-40 mg per capsule TAKE 1 CAPSULE BY MOUTH EVERY DAY    levothyroxine (SYNTHROID) 150 MCG tablet TAKE 1 TABLET (150 MCG TOTAL) BY MOUTH ONCE DAILY.     Family History       Problem Relation (Age of Onset)    COPD Mother    Heart disease Mother    No Known Problems Father          Tobacco Use    Smoking status: Every Day     Current packs/day: 1.00     Average packs/day: 1 pack/day for 50.0 years (50.0 ttl pk-yrs)     Types: Cigarettes    Smokeless tobacco: Never   Substance and Sexual Activity    Alcohol use: Yes     Alcohol/week: 3.0 standard drinks of alcohol     Types: 3 Shots of liquor per week     Comment: social 3/3/2018    Drug use: No    Sexual activity: Yes     Partners: Male     Birth control/protection: Post-menopausal     Review of Systems   Reason unable to perform ROS: aphasia.     Objective:     Vital Signs (Most Recent):  Pulse: 83 (05/23/25 1500)  Resp: 16 (05/23/25 1500)  BP: (!) 212/99 (05/23/25 1500)  SpO2: 96 % (05/23/25 1500) Vital Signs (24h Range):  Pulse:  [75-95] 83  Resp:  [15-27] 16  SpO2:  [91 %-96 %] 96 %  BP: (149-212)/() 212/99     Weight: 104.3 kg (230 lb)  Body mass index is 38.27 kg/m².     Physical Exam  Constitutional:       General: She is not in acute distress.     Appearance: She is not toxic-appearing.   HENT:      Head: Atraumatic.   Eyes:      Extraocular Movements: Extraocular movements intact.   Cardiovascular:      Rate and Rhythm: Normal  rate and regular rhythm.   Pulmonary:      Effort: Pulmonary effort is normal. No respiratory distress.      Breath sounds: No wheezing or rales.   Abdominal:      General: There is no distension.      Palpations: Abdomen is soft.   Skin:     General: Skin is warm.      Comments: Erythema and flaking of bilateral feet   Neurological:      Mental Status: She is alert.      Comments: Expressive aphasia but following commands, 5/5 LUE, 4/5 R  strength, 0/5 otherwise R upper and lower extremity                Significant Labs: All pertinent labs within the past 24 hours have been reviewed.  BMP:   Recent Labs   Lab 05/23/25  1237   *      K 3.8      CO2 28   BUN 13   CREATININE 0.7   CALCIUM 9.1     CBC:   Recent Labs   Lab 05/23/25  1237   WBC 9.36   HGB 15.7   HCT 45.6          Significant Imaging: I have reviewed all pertinent imaging results/findings within the past 24 hours.

## 2025-05-23 NOTE — ED PROVIDER NOTES
Encounter Date: 5/23/2025       History     Chief Complaint   Patient presents with    Extremity Weakness     This is a 69-year-old female with history of hypertension, hyperlipidemia, GERD, large B-cell lymphoma remotely, hypothyroidism who comes in as an out of hospital stroke activation.  Per EMS patient was at baseline until 11:00 a.m. when friends noted right-sided hemiparesis and expressive aphasia.  Patient here is aphasic and unable to provide any further history.  HPI is limited secondary to that.      Review of patient's allergies indicates:  No Known Allergies  Past Medical History:   Diagnosis Date    Cancer     Diffuse large B cell lymphoma     GERD (gastroesophageal reflux disease)     Hyperlipidemia     Hypertension     Hypothyroidism      Past Surgical History:   Procedure Laterality Date    PORTACATH PLACEMENT Right 03/2018     Family History   Problem Relation Name Age of Onset    Heart disease Mother      COPD Mother      No Known Problems Father       Social History[1]  Review of Systems   Unable to perform ROS: Patient nonverbal       Physical Exam     Initial Vitals [05/23/25 1230]   BP Pulse Resp Temp SpO2   (!) 206/101 95 18 -- 95 %      MAP       --         Physical Exam    Nursing note and vitals reviewed.  Constitutional: Vital signs are normal. She appears well-developed and well-nourished.  Non-toxic appearance. No distress.   HENT:   Head: Normocephalic and atraumatic. Mouth/Throat: Oropharynx is clear and moist.   Eyes: Conjunctivae and EOM are normal. Pupils are equal, round, and reactive to light.   Neck: Neck supple.   Normal range of motion.  Cardiovascular:  Normal rate, regular rhythm and intact distal pulses.           Pulmonary/Chest: Breath sounds normal. She has no wheezes.   Abdominal: Abdomen is soft. Bowel sounds are normal. There is no abdominal tenderness.   Musculoskeletal:         General: No tenderness or edema. Normal range of motion.      Cervical back: Normal range  of motion and neck supple. No rigidity. No muscular tenderness.     Lymphadenopathy:     She has no cervical adenopathy.     She has no axillary adenopathy.   Neurological: She is alert. Gait normal.   Expressive aphasia, right-sided facial droop and hemiparesis.   Skin: Skin is warm, dry and intact.   Psychiatric: She has a normal mood and affect. Her behavior is normal.         ED Course   Procedures  Labs Reviewed   COMPREHENSIVE METABOLIC PANEL - Abnormal       Result Value    Sodium 141      Potassium 3.8      Chloride 103      CO2 28      Glucose 116 (*)     BUN 13      Creatinine 0.7      Calcium 9.1      Protein Total 7.1      Albumin 4.2      Bilirubin Total 0.7      ALP 70      AST 15      ALT 14      Anion Gap 10      eGFR >60     LIPID PANEL - Abnormal    Cholesterol Total 232 (*)     Triglyceride 186 (*)     HDL Cholesterol 50      LDL Cholesterol 144.8      HDL/Cholesterol Ratio 21.6      Cholesterol/HDL Ratio 4.6      Non HDL Cholesterol 182     B-TYPE NATRIURETIC PEPTIDE - Abnormal     (*)    CBC WITH DIFFERENTIAL - Abnormal    WBC 9.36      RBC 4.68      Hgb 15.7      Hct 45.6      MCV 97      MCH 33.5 (*)     MCHC 34.4      RDW 13.7      Platelet Count 242      MPV 10.2      Nucleated RBC 0      Neut % 72.8      Lymph % 14.9 (*)     Mono % 7.8      Eos % 3.6      Basophil % 0.6      Imm Grans % 0.3      Neut # 6.8      Lymph # 1.39      Mono # 0.73      Eos # 0.34      Baso # 0.06      Imm Grans # 0.03     POCT GLUCOSE - Abnormal    POCT Glucose 111 (*)    TROPONIN I HIGH SENSITIVITY - Normal    Troponin High Sensitive 13.6     CBC W/ AUTO DIFFERENTIAL    Narrative:     The following orders were created for panel order CBC W/ AUTO DIFFERENTIAL.  Procedure                               Abnormality         Status                     ---------                               -----------         ------                     CBC with Differential[[id number]]       Abnormal            Final result                  Please view results for these tests on the individual orders.   PROTIME-INR   HEMOGLOBIN A1C   TSH   ISTAT CREATININE    POC Creatinine 0.7      Sample VENOUS     POCT GLUCOSE MONITORING CONTINUOUS     EKG Readings: (Independently Interpreted)   EKG was independently interpreted by me contemporaneously with patient care  Time: 1:36 p.m.  Rate: 83 beats per minute  Sinus rhythm with PACs  Nonspecific T-wave abnormality  Changed from prior     ECG Results              ECG 12 lead (In process)        Collection Time Result Time QRS Duration OHS QTC Calculation    05/23/25 13:36:11 05/23/25 13:46:54 70 474                     In process by Interface, Lab In [facility] (05/23/25 13:47:01)                   Narrative:    Test Reason : R29.818,    Vent. Rate :  83 BPM     Atrial Rate :  83 BPM     P-R Int : 190 ms          QRS Dur :  70 ms      QT Int : 404 ms       P-R-T Axes :  52  59  68 degrees    QTcB Int : 474 ms    Sinus rhythm with Premature atrial complexes  Low voltage QRS  Septal infarct (cited on or before 01-Feb-2018)  Abnormal ECG  When compared with ECG of 05-Feb-2018 12:23,  Premature atrial complexes are now Present    Referred By: AAAREFERRAL SELF           Confirmed By:                                   Imaging Results              CTA Head and Neck (xpd) (Final result)  Result time 05/23/25 13:07:44      Final result by Oneal Voss MD (05/23/25 13:07:44)                   Impression:      1. There is abrupt cut off of an A2 segment of the left anterior cerebral artery, which could be on the basis of high-grade stenosis or intraluminal thrombus formation.  2. Multifocal carotid atherosclerosis.  There is mild luminal diameter narrowing at the ICA origins bilaterally.  Just distal to the ICA origin on the right, vessel tortuosity results in luminal diameter narrowing of 50-60%.  3. Additional observations as above.  Findings were discussed with Dr. Jones at 1306 hours on May 23,  2025      Electronically signed by: Oneal Voss  Date:    05/23/2025  Time:    13:07               Narrative:      CMS MANDATED QUALITY DATA - CAROTID - 195    All measurements and percent stenosis described below were determined using NASCET criteria or criteria similar to NASCET, as defined by the Society of Radiologists in Ultrasound Consensus Conference, Radiology, 2003    EXAMINATION:  CTA HEAD AND NECK (XPD)    CLINICAL HISTORY:  Neuro deficit, acute, stroke suspected;.    TECHNIQUE:  Thin-section axial post infusion CT angiographic images of the brain and neck were obtained, following administration of 100 cc nonionic contrast.  3D multiplanar reconstruction images were obtained, and stored as part of the patient's permanent medical record.    COMPARISON:  Same date CT brain    FINDINGS:  CTA NECK: There is mild atherosclerotic calcification and mural thrombus formation at the aortic arch and great vessel origins.  Mild luminal narrowing of the left subclavian artery of up to 40% is noted.  There is stenosis of 60-70% at the origin of the right subclavian artery.    The right common carotid artery is normal in course and caliber.  There is moderate atherosclerotic calcification at the carotid bulb and ICA origin, where there is mild stenosis of less than 50%.  Just distal to the origin, there is sharp angulation of the internal carotid artery, where luminal diameter narrowing of up to 60% is noted.  The cervical ICA distal to that level on the right is normal.  The right vertebral artery is patent at all levels in the neck, but becomes hypoplastic at the foramen magnum, likely developmental variation.    The left common carotid artery is normal in course and caliber.  There is moderate atherosclerotic calcification at the carotid bulb and ICA origin, but only mild luminal diameter narrowing of less than 50%.  The cervical ICA distal to that level is within normal limits.  The left vertebral artery is  patent and normal in appearance.    CTA BRAIN: There is atherosclerotic calcification of the intracranial portions of the internal carotid arteries, with mild luminal diameter narrowing of the cavernous segments bilaterally.  The basilar artery is within normal limits.    The A1 segment of the right anterior cerebral artery is hypoplastic, likely on a developmental basis.  There is abrupt cut off of an A2 segment of the left anterior cerebral artery (axial images 261-270).  This appearance could be on the basis of high-grade stenosis or thrombus formation.  The distal right anterior cerebral artery is normal.    The bilateral middle cerebral arteries are patent, with mild atheromatous irregularity, but no focal high-grade stenosis or major branch occlusion identified.  The posterior cerebral arteries are patent and within normal limits, with patent posterior communicating artery on the right noted.                                       CT HEAD FOR CODE STROKE (Final result)  Result time 05/23/25 12:43:59      Final result by Oneal Voss MD (05/23/25 12:43:59)                   Impression:      1. Effacement of cortical sulci at the frontoparietal junction on the left near the vertex.  While nonspecific, acute ischemic involvement could give this appearance.  MR would be helpful for more detailed assessment in that regard.  2. No evidence of intracranial hemorrhage.  3. Moderate to severe chronic microvascular white matter disease.  Findings were called to Dr. Valencia at 1239 pm on 5/23/2025      Electronically signed by: Oneal Voss  Date:    05/23/2025  Time:    12:43               Narrative:    EXAMINATION:  CT HEAD FOR CODE STROKE    CLINICAL HISTORY:  Neuro deficit, acute, stroke suspected;.    TECHNIQUE:  Noninfusion images were obtained from the skull base to the vertex.    All CT scans at this facility utilize dose modulation, iterative reconstruction, and/or weight based dosing when appropriate  to reduce radiation dose to as low as reasonably achievable    CMS MANDATED QUALITY DATA - CT RADIATION - 436    COMPARISON:  2018    FINDINGS:  There is no intracranial mass, hemorrhage, or midline shift. Ventricles and sulci are mildly prominent. There are no pathologic extra-axial fluid collections.    Changes of moderate to severe chronic microvascular white matter disease are noted.  At the left frontoparietal junction near the vertex, there is effacement of cortical sulci, which appears more pronounced when compared to the prior CT study.  Early/acute ischemic involvement could give this appearance, and MR would be helpful for further assessment in that regard.  There is no evidence of associated hemorrhage.    The cerebellum and brainstem are unremarkable.  The calvarium is intact.                                       Medications   niCARdipine (CARDENE) 40 mg/200 mL (0.2 mg/mL) infusion (has no administration in time range)   sodium chloride 0.9% flush 10 mL (has no administration in time range)   sodium chloride 0.9% bolus 500 mL 500 mL (has no administration in time range)   bisacodyL suppository 10 mg (has no administration in time range)   atorvastatin tablet 40 mg (has no administration in time range)   prochlorperazine injection Soln 5 mg (has no administration in time range)   hydrALAZINE injection 5 mg (has no administration in time range)   labetaloL injection 10 mg (10 mg Intravenous Given 5/23/25 1258)   iohexoL (OMNIPAQUE 350) injection 100 mL (100 mLs Intravenous Given 5/23/25 1245)   tenecteplase (TNKase) IV KIT 25 mg (25 mg Intravenous Given 5/23/25 1300)     Medical Decision Making  This is a 69-year-old female with history of hypertension, hyperlipidemia, hypothyroidism, remote history of large B-cell lymphoma and GERD comes in as an out of hospital stroke activation.  Patient on evaluation is hypertensive.  On physical exam she is hypertensive.  She has right-sided hemiparesis and  expressive aphasia.    Orders included EKG, CBC, CMP, troponin, BNP, chest x-ray.  CT angio of the head and neck were ordered.  Emergent stroke code was called.    Comorbidities contributing to patient's presentation include history of hypertension, hyperlipidemia, hypothyroidism, remote history of B-cell lymphoma, GERD.  Acute exacerbation of chronic illness: Patient is hypertensive.    I reviewed patient's external medical notes. She was hospitalized at [facility] for gram negative bacteremia in December. She has a remote history of lymphoma.    Differential diagnosis includes intracranial hemorrhage, large vessel occlusion, CVA, TIA, hypertensive emergency, metabolic encephalopathy.    Amount and/or Complexity of Data Reviewed  Independent Historian: EMS     Details: EMS were independent historian the patient's presentation.  External Data Reviewed: labs, radiology, ECG and notes.     Details: As detailed above  Labs: ordered.     Details: Labs were reviewed and were unremarkable.  Radiology: ordered and independent interpretation performed.     Details: Head CT was independently interpreted by me and showed no acute intracranial hemorrhage.  CT angio of the head and neck were reviewed by radiology and showed no large vessel occlusion but did show distal area of thrombosis.  ECG/medicine tests: ordered and independent interpretation performed. Decision-making details documented in ED Course.  Discussion of management or test interpretation with external provider(s): 1258: Case was discussed with Dr. Magaña neurology on-call who evaluated the patient in telestroke capacity and recommended TNK.  1335: Case was discussed with Dr. Dobbins - hospitalist on-call - who will admit patient.    Risk  Prescription drug management.  Decision regarding hospitalization.  Risk Details: MDM continued:  Patient's workup is concerning for acute ischemic stroke.  There is no large vessel occlusion requiring  emergent thrombectomy.  She is in the window for TNK.  She was initially significantly hypertensive but blood pressure improved after 1 dose of labetalol.  She was given TNK.  On re-evaluation neurologically stable.  She will be admitted to the neuro ICU.    Critical Care  Total time providing critical care: 45 minutes                                      Clinical Impression:  Final diagnoses:  [I63.9] Cerebrovascular accident (CVA), unspecified mechanism (Primary)          ED Disposition Condition    Admit                       [1]   Social History  Tobacco Use    Smoking status: Every Day     Current packs/day: 1.00     Average packs/day: 1 pack/day for 50.0 years (50.0 ttl pk-yrs)     Types: Cigarettes    Smokeless tobacco: Never   Substance Use Topics    Alcohol use: Yes     Alcohol/week: 3.0 standard drinks of alcohol     Types: 3 Shots of liquor per week     Comment: social 3/3/2018    Drug use: Gina Sapp MD  05/23/25 [id number]

## 2025-05-23 NOTE — ED NOTES
Dr. Valencia notified of patient's blood pressure. States allow patient permissive htn unless sbp greater than 220.

## 2025-05-23 NOTE — H&P
[facility] - Emergency Dept  Hospital Medicine  History & Physical    Patient Name: Indira Chauhan  MRN: [id number]  Patient Class: OP- Observation  Admission Date: 5/23/2025  Attending Physician: Gina Valencia MD   Primary Care Provider: Alesia Primary Doctor         Patient information was obtained from ER records and ER doctor and RN.     Subjective:     Principal Problem:CVA (cerebral vascular accident)    Chief Complaint:   Chief Complaint   Patient presents with    Extremity Weakness        HPI: This is a 69-year-old lady with comorbid conditions of prior history of lymphoma status post R-CHOP x6, hypertension, hyperlipidemia, chronic tobacco use who presents to emergency department with complaints of right hemiparesis.  Vascular Neurology is consulted and due to patient being within the window of last known well recommendation was made to administer tenecteplase.  Patient is now status post TNK.  On my assessment, unfortunately patient is markedly aphasic and not able to participate verbally.  Per nurse who is bedside her right  strength which is about a 4/5 for me is an improvement from prior.  Patient unable to lift her right arm or leg off of the bed.  Also per nurse patient has been roughly 1 year without any medications and overall poor longitudinal follow up with her primary care provider.  Blood pressure goal 160s to 180s systolic with the instructions to only treat blood pressure if systolic is greater than 180 or diastolic is greater than 90 in his time post TNK.  Admission to the ICU for observation 24 hours post TNK with repeat imaging tomorrow.  MRI still pending.  No large vessel occlusion, however, on independent assessment it appears as that patient has a distal MCA infarct.    Past Medical History:   Diagnosis Date    Cancer     Diffuse large B cell lymphoma     GERD (gastroesophageal reflux disease)     Hyperlipidemia     Hypertension     Hypothyroidism        Past Surgical  History:   Procedure Laterality Date    PORTACATH PLACEMENT Right 03/2018       Review of patient's allergies indicates:  No Known Allergies    No current facility-administered medications on file prior to encounter.     Current Outpatient Medications on File Prior to Encounter   Medication Sig    amLODIPine-benazepriL (LOTREL) 5-40 mg per capsule TAKE 1 CAPSULE BY MOUTH EVERY DAY    levothyroxine (SYNTHROID) 150 MCG tablet TAKE 1 TABLET (150 MCG TOTAL) BY MOUTH ONCE DAILY.     Family History       Problem Relation (Age of Onset)    COPD Mother    Heart disease Mother    No Known Problems Father          Tobacco Use    Smoking status: Every Day     Current packs/day: 1.00     Average packs/day: 1 pack/day for 50.0 years (50.0 ttl pk-yrs)     Types: Cigarettes    Smokeless tobacco: Never   Substance and Sexual Activity    Alcohol use: Yes     Alcohol/week: 3.0 standard drinks of alcohol     Types: 3 Shots of liquor per week     Comment: social 3/3/2018    Drug use: No    Sexual activity: Yes     Partners: Male     Birth control/protection: Post-menopausal     Review of Systems   Reason unable to perform ROS: aphasia.     Objective:     Vital Signs (Most Recent):  Pulse: 83 (05/23/25 1500)  Resp: 16 (05/23/25 1500)  BP: (!) 212/99 (05/23/25 1500)  SpO2: 96 % (05/23/25 1500) Vital Signs (24h Range):  Pulse:  [75-95] 83  Resp:  [15-27] 16  SpO2:  [91 %-96 %] 96 %  BP: (149-212)/() 212/99     Weight: 104.3 kg (230 lb)  Body mass index is 38.27 kg/m².     Physical Exam  Constitutional:       General: She is not in acute distress.     Appearance: She is not toxic-appearing.   HENT:      Head: Atraumatic.   Eyes:      Extraocular Movements: Extraocular movements intact.   Cardiovascular:      Rate and Rhythm: Normal rate and regular rhythm.   Pulmonary:      Effort: Pulmonary effort is normal. No respiratory distress.      Breath sounds: No wheezing or rales.   Abdominal:      General: There is no distension.       Palpations: Abdomen is soft.   Skin:     General: Skin is warm.      Comments: Erythema and flaking of bilateral feet   Neurological:      Mental Status: She is alert.      Comments: Expressive aphasia but following commands, 5/5 LUE, 4/5 R  strength, 0/5 otherwise R upper and lower extremity                Significant Labs: All pertinent labs within the past 24 hours have been reviewed.  BMP:   Recent Labs   Lab 05/23/25  1237   *      K 3.8      CO2 28   BUN 13   CREATININE 0.7   CALCIUM 9.1     CBC:   Recent Labs   Lab 05/23/25  1237   WBC 9.36   HGB 15.7   HCT 45.6          Significant Imaging: I have reviewed all pertinent imaging results/findings within the past 24 hours.  Assessment/Plan:     Assessment & Plan  CVA (cerebral vascular accident)  P/w R hemiparesis, suspected distal R MCA stroke with rec for TNK    Study: Head CT and CTA Head & Neck  Study Interpretation: Effacement of cortical sulci at the frontoparietal junction on the left near the vertex. While nonspecific, acute ischemic involvement could give this appearance.      CTA negative for any LVO or MeVO in the pertinent vascular territory of interest - no targets identified for acute or urgent reperfusion therapy.        Patient is s/p TNK, repeat CT in 24h and DAPT thereafter x21 days followed by ASA alone  High intensity statin  Monitor in ICU  PT/OT/SLP  BP goal 180/90(Do not treat unless greater than these parameters)  MRI pending    Mixed hyperlipidemia  statin    Acquired hypothyroidism  Resume synthroid    Essential hypertension  Patient's blood pressure range in the last 24 hours was: BP  Min: 149/79  Max: 212/99.The patient's inpatient anti-hypertensive regimen is listed below:      PRN hydralazine, BP goal as above  Tobacco use  Counseling at this time difficult due to aphasia    Diffuse large B-cell lymphoma of lymph nodes of neck  She was diagnosed in October 2017. She underwent chemotherapy with RCHOPx 6    Seems to have been lost to ongoing follow up with oncologist    VTE Risk Mitigation (From admission, onward)           Ordered     IP VTE HIGH RISK PATIENT  Once         05/23/25 1330     Place sequential compression device  Until discontinued         05/23/25 1330                       On 05/23/2025, patient should be placed in hospital observation services under my care.             Alyx Dbobins MD  Department of Hospital Medicine  [facility] - Emergency Dept

## 2025-05-23 NOTE — TELEMEDICINE CONSULT
[facility]  Vascular Neurology  Comprehensive Stroke Center  TeleVascular Neurology Acute Consultation Note        Consult Information  Consult to Telemedicine - Acute Stroke  Consult performed by: Keny Magaña MD  Consult ordered by: Gina Valencia MD          Consulting Provider: GINA VALENCIA   Current Providers  No providers found    Patient Location:  [facility] EMERGENCY DEPARTMENT Emergency Department    Spoke hospital nurse at bedside with patient assisting consultant.  Patient information was obtained from primary team.       Stroke Documentation  Acute Stroke Times   Last Known Normal Date: 05/23/25  Last Known Normal Time: 1100  Symptom Onset Date: 05/23/25  Stroke Team Called Date: 05/23/25  Stroke Team Called Time: 1240  Stroke Team Arrival Date: 05/23/25  Stroke Team Arrival Time: 1245  CT Interpretation Time: 1250  Thrombolytic Therapy Recommended: Yes  CTA Interpretation Time: 1250  Thrombectomy Recommended: No  Decision to Treat Time for Tenecteplase: 1255    NIH Scale:  1a. Level of Consciousness: 0-->Alert, keenly responsive  1b. LOC Questions: 2-->Answers neither question correctly  1c. LOC Commands: 1-->Performs one task correctly  2. Best Gaze: 0-->Normal  3. Visual: 0-->No visual loss  4. Facial Palsy: 2-->Partial paralysis (total or near-total paralysis of lower face)  5a. Motor Arm, Left: 0-->No drift, limb holds 90 (or 45) degrees for full 10 secs  5b. Motor Arm, Right: 4-->No movement  6a. Motor Leg, Left: 0-->No drift, leg holds 30 degree position for full 5 secs  6b. Motor Leg, Right: 4-->No movement  7. Limb Ataxia: 0-->Absent  8. Sensory: 1-->Mild-to-moderate sensory loss, patient feels pinprick is less sharp or is dull on the affected side, or there is a loss of superficial pain with pinprick, but patient is aware of being touched  9. Best Language: 3-->Mute, global aphasia, no usable speech or auditory comprehension  10. Dysarthria: 2-->Severe  dysarthria, patients speech is so slurred as to be unintelligible in the absence of or out of proportion to any dysphasia, or is mute/anarthric  11. Extinction and Inattention (formerly Neglect): 0-->No abnormality  Total (NIH Stroke Scale): 19      Modified Phelps Baseline:    Modified Phelps Discharge:    [place] Coma Scale: 11   ABCD2 Score:    [id number] Score:    HAS -BLED Score:    ICH Score:    Hunt & Egan Classification:      Blood pressure (!) 149/79, pulse 84, resp. rate 19, weight 104.3 kg (230 lb), SpO2 (!) 92%.      In my opinion, this was a: Tier 1; VAN Stroke Assessment: Positive     Medical Decision Making  HPI:  69 y.o. female with medical history of HTN, Anxiety / depression - with admission concerns of t focal neurological deficits. And Stroke code / neurology called in for the same.      History from primary team and medical records review. Symptom spectrum of aphasia and right sided weakness, with LKN <4.5 hrs prior to ED arrival. No compressive neuropathy pattern of presentation. No associated complex headaches or clinical seizures. No similar events in the past. No history of strokes, epilepsy or complex migraines.     Exam: awake, mute, following simple commands, expressive aphasia / wo obvious neglect or visual field deficits, right hemiplegia and facial droop     Images personally reviewed and interpreted:  Study: Head CT and CTA Head & Neck  Study Interpretation: Effacement of cortical sulci at the frontoparietal junction on the left near the vertex. While nonspecific, acute ischemic involvement could give this appearance.     CTA negative for any proximal LVO or MeVO in the pertinent vascular territory of interest - no targets identified for acute or urgent reperfusion therapy.     Assessment and plan:  Recs:  Suspect focal cerebral ischemic event involving left MCA vascular territory rather mimic / metabolic triggers. ESUS    Considering TNK / given disabling symptoms. No absolute or  major relative contraindications to TNK as per report. Plan discussed with  primary physician.     No proximal LVO for IR interventional Rx options     Post TNK care - NCC monitoring and management   - Any worsening NIHSS - stat CT head / PRN urgent NSGY; NCC assistance; TNK reversal if concerns for hemorrhagic complication    - Neuro check q 1hr   - SBP goals < 185   - Euglycemia / euthermia / eunatremia     - MRI brain wo contrast    DAPT 24 hrs post TNK   - DAPT X 21 day, ASA 81/Plavix 75, with asa thereafter  - target LDL < 70 / Continue atorvastatin  - euglycemic goals   - permissive HTN x 72 hrs post index event / long term < 140/90  - Echo f/u    - PT/OT recs - discharge planning   - F/u PCP for risk factor modification monitoring  -  on DASH diet; exercise and lifestyle modifications when appropriate   - Provide stroke counseling during the visit - Identification of signs; emergency action and ER attention; Risk factor control, optimization for secondary stroke prevention; Compliance to medications     - Order vascular neurology consult for follow up     Lytics recommendation: Recommend IV Tenecteplase 0.25mg/kg IV push (max dose 25mg); If Tenecteplase is not available use Alteplase 0.9mg/kg IV bolus followed by infusion (max dose 90mg)     BP goal prior to IV thrombolytics - <185/110 - Initiate BP management prior to treatment if not at goal    BP goal post IV thrombolytics - <180/105 for at least 24 hrs. - Continue BP management to maintain goal    Additional Recommendations:   Neurological assessment and vital signs (except temperature) every 15 minutes x 2 hours, then every 30 minutes x 6 hours, then every hour x 16 hours..  Frequency of BP assessments may need to be increased if systolic BP stays >= 180 mm Hg or diastolic BP stays >= 105 mm Hg. Administer antihypertensive meds as ordered  Temperature every 4 hours or as required.  Follow hospital protocol for further orders re: post thrombolytic  therapy patient management.  No antithrombotics or anticoagulants (including but not limited to: heparin, warfarin, aspirin, clopidigrel, or dipyridamole) for 24 hours, then start antithrombotics as ordered by treating physician    Adapted from the American Heart Association/American Stroke Association (AHA/ASA) and American Association of Neuroscience Nurses (AANN) Guidelines.       Clinical Delays to Decision to Treat: None    Thrombectomy recommendation: No; No large vessel occlusion identified on imaging   Placement recommendation: transfer to nearest appropriate facility     Past Medical History:   Diagnosis Date    Cancer     Diffuse large B cell lymphoma     GERD (gastroesophageal reflux disease)     Hyperlipidemia     Hypertension     Hypothyroidism      Past Surgical History:   Procedure Laterality Date    PORTACATH PLACEMENT Right 03/2018     Family History   Problem Relation Name Age of Onset    Heart disease Mother      COPD Mother      No Known Problems Father         Diagnoses  Embolic: (ESUS: Embolic Stroke Unknown Source) Middle Cerebral Artery Left    Keny Magaña MD      Emergent/Acute neurological consultation requested by spoke provider due to critical concerns for possible cerebrovascular event that could result in permanent loss of neurologic/bodily function, severe disability or death of this patient.  Immediate/timely evaluation by a highly prepared expert is paramount for optimal outcomes  High risk for neurological deterioration if not properly diagnosed  High risk for neurological deterioration if not treated promplty/as soon as possible  Complex diagnostic evaluation may be required (advanced imaging)  High risk treatment options (thrombolytics and/or thrombectomy)    Patient care was coordinated with spoke provider, including but not limted to    Discussing likely diagnosis/etiology of symptoms  Making recommendations for further diagnostic studies  Making recommendations for  intravenous thrombolytics or other advanced therapies  Making recommendations for disposition (admission/transfer for higher level of care)      Neurology consultation requested by spoke provider. Audiovisual encounter with the patient performed using a secure connection.  Results and impressions from the visit are documented on this note and were communicated to the consulting provider/team via direct communication. The note has been shared for addition to the patients electronic medical record.

## 2025-05-23 NOTE — HPI
This is a 69-year-old lady with comorbid conditions of prior history of lymphoma status post R-CHOP x6, hypertension, hyperlipidemia, chronic tobacco use who presents to emergency department with complaints of right hemiparesis.  Vascular Neurology is consulted and due to patient being within the window of last known well recommendation was made to administer tenecteplase.  Patient is now status post TNK.  On my assessment, unfortunately patient is markedly aphasic and not able to participate verbally.  Per nurse who is bedside her right  strength which is about a 4/5 for me is an improvement from prior.  Patient unable to lift her right arm or leg off of the bed.  Also per nurse patient has been roughly 1 year without any medications and overall poor longitudinal follow up with her primary care provider.  Blood pressure goal 160s to 180s systolic with the instructions to only treat blood pressure if systolic is greater than 180 or diastolic is greater than 90 in his time post TNK.  Admission to the ICU for observation 24 hours post TNK with repeat imaging tomorrow.  MRI still pending.  No large vessel occlusion, however, on independent assessment it appears as that patient has a distal MCA infarct.

## 2025-05-23 NOTE — SUBJECTIVE & OBJECTIVE
HPI:  69 y.o. female with medical history of HTN, Anxiety / depression - with admission concerns of t focal neurological deficits. And Stroke code / neurology called in for the same.      History from primary team and medical records review. Symptom spectrum of aphasia and right sided weakness, with LKN <4.5 hrs prior to ED arrival. No compressive neuropathy pattern of presentation. No associated complex headaches or clinical seizures. No similar events in the past. No history of strokes, epilepsy or complex migraines.     Exam: awake, mute, following simple commands, expressive aphasia / wo obvious neglect or visual field deficits, right hemiplegia and facial droop     Images personally reviewed and interpreted:  Study: Head CT and CTA Head & Neck  Study Interpretation: Effacement of cortical sulci at the frontoparietal junction on the left near the vertex. While nonspecific, acute ischemic involvement could give this appearance.     CTA negative for any LVO or MeVO in the pertinent vascular territory of interest - no targets identified for acute or urgent reperfusion therapy.     Assessment and plan:  Recs:  Suspect focal cerebral ischemic event involving left MCA vascular territory rather mimic / metabolic triggers. ESUS    Considering TNK / given disabling symptoms. No absolute or major relative contraindications to TNK as per report. Plan discussed with  primary physician.     No proximal LVO for IR interventional Rx options     Post TNK care - NCC monitoring and management   - Any worsening NIHSS - stat CT head / PRN urgent NSGY; NCC assistance; TNK reversal if concerns for hemorrhagic complication    - Neuro check q 1hr   - SBP goals < 185   - Euglycemia / euthermia / eunatremia     - MRI brain wo contrast    DAPT 24 hrs post TNK   - DAPT X 21 day, ASA 81/Plavix 75, with asa thereafter  - target LDL < 70 / Continue atorvastatin  - euglycemic goals   - permissive HTN x 72 hrs post index event / long term <  140/90  - Echo f/u    - PT/OT recs - discharge planning   - F/u PCP for risk factor modification monitoring  -  on DASH diet; exercise and lifestyle modifications when appropriate   - Provide stroke counseling during the visit - Identification of signs; emergency action and ER attention; Risk factor control, optimization for secondary stroke prevention; Compliance to medications     - Order vascular neurology consult for follow up     Lytics recommendation: Recommend IV Tenecteplase 0.25mg/kg IV push (max dose 25mg); If Tenecteplase is not available use Alteplase 0.9mg/kg IV bolus followed by infusion (max dose 90mg)     BP goal prior to IV thrombolytics - <185/110 - Initiate BP management prior to treatment if not at goal    BP goal post IV thrombolytics - <180/105 for at least 24 hrs. - Continue BP management to maintain goal    Additional Recommendations:   Neurological assessment and vital signs (except temperature) every 15 minutes x 2 hours, then every 30 minutes x 6 hours, then every hour x 16 hours..  Frequency of BP assessments may need to be increased if systolic BP stays >= 180 mm Hg or diastolic BP stays >= 105 mm Hg. Administer antihypertensive meds as ordered  Temperature every 4 hours or as required.  Follow hospital protocol for further orders re: post thrombolytic therapy patient management.  No antithrombotics or anticoagulants (including but not limited to: heparin, warfarin, aspirin, clopidigrel, or dipyridamole) for 24 hours, then start antithrombotics as ordered by treating physician    Adapted from the American Heart Association/American Stroke Association (AHA/ASA) and American Association of Neuroscience Nurses (AANN) Guidelines.       Clinical Delays to Decision to Treat: None    Thrombectomy recommendation: No; No large vessel occlusion identified on imaging   Placement recommendation: transfer to nearest appropriate facility

## 2025-05-23 NOTE — Clinical Note
Diagnosis: Acute focal neurological deficit [880286]  Future Attending Provider: ISRAEL RAJAN [[id number]]  Place in Observation: [facility] [[id number]]

## 2025-05-23 NOTE — ASSESSMENT & PLAN NOTE
She was diagnosed in October 2017. She underwent chemotherapy with RCHOPx 6   Seems to have been lost to ongoing follow up with oncologist

## 2025-05-24 LAB
ABSOLUTE EOSINOPHIL (SMH): 0.29 K/UL
ABSOLUTE MONOCYTE (SMH): 0.75 K/UL (ref 0.3–1)
ABSOLUTE NEUTROPHIL COUNT (SMH): 6.9 K/UL (ref 1.8–7.7)
ALBUMIN SERPL-MCNC: 4.2 G/DL (ref 3.5–5.2)
ALP SERPL-CCNC: 67 UNIT/L (ref 55–135)
ALT SERPL-CCNC: 14 UNIT/L (ref 10–44)
ANION GAP (SMH): 9 MMOL/L (ref 8–16)
APTT PPP: 26.4 SECONDS (ref 21–32)
AST SERPL-CCNC: 15 UNIT/L (ref 10–40)
BASOPHILS # BLD AUTO: 0.07 K/UL
BASOPHILS NFR BLD AUTO: 0.7 %
BILIRUB SERPL-MCNC: 0.8 MG/DL (ref 0.1–1)
BUN SERPL-MCNC: 10 MG/DL (ref 8–23)
CALCIUM SERPL-MCNC: 9.4 MG/DL (ref 8.7–10.5)
CHLORIDE SERPL-SCNC: 102 MMOL/L (ref 95–110)
CO2 SERPL-SCNC: 28 MMOL/L (ref 23–29)
CREAT SERPL-MCNC: 0.6 MG/DL (ref 0.5–1.4)
EAG (SMH): 120 MG/DL (ref 68–131)
ERYTHROCYTE [DISTWIDTH] IN BLOOD BY AUTOMATED COUNT: 13.8 % (ref 11.5–14.5)
GFR SERPLBLD CREATININE-BSD FMLA CKD-EPI: >60 ML/MIN/1.73/M2
GLUCOSE SERPL-MCNC: 122 MG/DL (ref 70–110)
HBA1C MFR BLD: 5.8 % (ref 4.5–6.2)
HCT VFR BLD AUTO: 47.7 % (ref 37–48.5)
HGB BLD-MCNC: 16.1 GM/DL (ref 12–16)
IMM GRANULOCYTES # BLD AUTO: 0.04 K/UL (ref 0–0.04)
IMM GRANULOCYTES NFR BLD AUTO: 0.4 % (ref 0–0.5)
INR PPP: 1 (ref 0.8–1.2)
LYMPHOCYTES # BLD AUTO: 1.52 K/UL (ref 1–4.8)
MAGNESIUM SERPL-MCNC: 1.9 MG/DL (ref 1.6–2.6)
MCH RBC QN AUTO: 33.6 PG (ref 27–31)
MCHC RBC AUTO-ENTMCNC: 33.8 G/DL (ref 32–36)
MCV RBC AUTO: 100 FL (ref 82–98)
NUCLEATED RBC (/100WBC) (SMH): 0 /100 WBC
PHOSPHATE SERPL-MCNC: 3.4 MG/DL (ref 2.7–4.5)
PLATELET # BLD AUTO: 224 K/UL (ref 150–450)
PMV BLD AUTO: 9.8 FL (ref 9.2–12.9)
POTASSIUM SERPL-SCNC: 3.4 MMOL/L (ref 3.5–5.1)
PROT SERPL-MCNC: 7.3 GM/DL (ref 6–8.4)
PROTHROMBIN TIME: 11.1 SECONDS (ref 9–12.5)
RBC # BLD AUTO: 4.79 M/UL (ref 4–5.4)
RELATIVE EOSINOPHIL (SMH): 3 % (ref 0–8)
RELATIVE LYMPHOCYTE (SMH): 15.9 % (ref 18–48)
RELATIVE MONOCYTE (SMH): 7.8 % (ref 4–15)
RELATIVE NEUTROPHIL (SMH): 72.2 % (ref 38–73)
SODIUM SERPL-SCNC: 139 MMOL/L (ref 136–145)
WBC # BLD AUTO: 9.58 K/UL (ref 3.9–12.7)

## 2025-05-24 PROCEDURE — 92523 SPEECH SOUND LANG COMPREHEN: CPT

## 2025-05-24 PROCEDURE — 97165 OT EVAL LOW COMPLEX 30 MIN: CPT

## 2025-05-24 PROCEDURE — 92610 EVALUATE SWALLOWING FUNCTION: CPT

## 2025-05-24 PROCEDURE — 63600175 PHARM REV CODE 636 W HCPCS

## 2025-05-24 PROCEDURE — 27000221 HC OXYGEN, UP TO 24 HOURS

## 2025-05-24 PROCEDURE — 82040 ASSAY OF SERUM ALBUMIN: CPT

## 2025-05-24 PROCEDURE — 94761 N-INVAS EAR/PLS OXIMETRY MLT: CPT

## 2025-05-24 PROCEDURE — 92526 ORAL FUNCTION THERAPY: CPT

## 2025-05-24 PROCEDURE — 83735 ASSAY OF MAGNESIUM: CPT

## 2025-05-24 PROCEDURE — 99900031 HC PATIENT EDUCATION (STAT)

## 2025-05-24 PROCEDURE — 92507 TX SP LANG VOICE COMM INDIV: CPT

## 2025-05-24 PROCEDURE — 25000003 PHARM REV CODE 250

## 2025-05-24 PROCEDURE — 85730 THROMBOPLASTIN TIME PARTIAL: CPT

## 2025-05-24 PROCEDURE — 25000003 PHARM REV CODE 250: Performed by: INTERNAL MEDICINE

## 2025-05-24 PROCEDURE — 97161 PT EVAL LOW COMPLEX 20 MIN: CPT

## 2025-05-24 PROCEDURE — 84100 ASSAY OF PHOSPHORUS: CPT

## 2025-05-24 PROCEDURE — 36415 COLL VENOUS BLD VENIPUNCTURE: CPT

## 2025-05-24 PROCEDURE — 85025 COMPLETE CBC W/AUTO DIFF WBC: CPT

## 2025-05-24 PROCEDURE — 99900035 HC TECH TIME PER 15 MIN (STAT)

## 2025-05-24 PROCEDURE — 85610 PROTHROMBIN TIME: CPT

## 2025-05-24 PROCEDURE — 97535 SELF CARE MNGMENT TRAINING: CPT

## 2025-05-24 PROCEDURE — 20000000 HC ICU ROOM

## 2025-05-24 RX ORDER — SODIUM,POTASSIUM PHOSPHATES 280-250MG
2 POWDER IN PACKET (EA) ORAL
Status: DISCONTINUED | OUTPATIENT
Start: 2025-05-24 | End: 2025-05-27 | Stop reason: HOSPADM

## 2025-05-24 RX ORDER — CLOPIDOGREL BISULFATE 75 MG/1
75 TABLET ORAL DAILY
Status: DISCONTINUED | OUTPATIENT
Start: 2025-05-24 | End: 2025-05-27 | Stop reason: HOSPADM

## 2025-05-24 RX ORDER — POTASSIUM CHLORIDE 7.45 MG/ML
80 INJECTION INTRAVENOUS
Status: DISCONTINUED | OUTPATIENT
Start: 2025-05-24 | End: 2025-05-24

## 2025-05-24 RX ORDER — POTASSIUM CHLORIDE 7.45 MG/ML
40 INJECTION INTRAVENOUS
Status: DISCONTINUED | OUTPATIENT
Start: 2025-05-24 | End: 2025-05-24

## 2025-05-24 RX ORDER — ASPIRIN 81 MG/1
81 TABLET ORAL DAILY
Status: DISCONTINUED | OUTPATIENT
Start: 2025-05-24 | End: 2025-05-27 | Stop reason: HOSPADM

## 2025-05-24 RX ORDER — POTASSIUM CHLORIDE 7.45 MG/ML
60 INJECTION INTRAVENOUS
Status: DISCONTINUED | OUTPATIENT
Start: 2025-05-24 | End: 2025-05-24

## 2025-05-24 RX ORDER — CALCIUM GLUCONATE 20 MG/ML
1 INJECTION, SOLUTION INTRAVENOUS
Status: DISCONTINUED | OUTPATIENT
Start: 2025-05-24 | End: 2025-05-24

## 2025-05-24 RX ORDER — MAGNESIUM SULFATE HEPTAHYDRATE 40 MG/ML
2 INJECTION, SOLUTION INTRAVENOUS
Status: DISCONTINUED | OUTPATIENT
Start: 2025-05-24 | End: 2025-05-24

## 2025-05-24 RX ORDER — CALCIUM GLUCONATE 20 MG/ML
3 INJECTION, SOLUTION INTRAVENOUS
Status: DISCONTINUED | OUTPATIENT
Start: 2025-05-24 | End: 2025-05-24

## 2025-05-24 RX ORDER — CALCIUM GLUCONATE 20 MG/ML
2 INJECTION, SOLUTION INTRAVENOUS
Status: DISCONTINUED | OUTPATIENT
Start: 2025-05-24 | End: 2025-05-24

## 2025-05-24 RX ORDER — LANOLIN ALCOHOL/MO/W.PET/CERES
800 CREAM (GRAM) TOPICAL
Status: DISCONTINUED | OUTPATIENT
Start: 2025-05-24 | End: 2025-05-27 | Stop reason: HOSPADM

## 2025-05-24 RX ADMIN — POTASSIUM BICARBONATE 35 MEQ: 391 TABLET, EFFERVESCENT ORAL at 02:05

## 2025-05-24 RX ADMIN — NICARDIPINE HYDROCHLORIDE 1.25 MG/HR: 0.2 INJECTION, SOLUTION INTRAVENOUS at 04:05

## 2025-05-24 RX ADMIN — MUPIROCIN 1 G: 20 OINTMENT TOPICAL at 10:05

## 2025-05-24 RX ADMIN — POTASSIUM BICARBONATE 35 MEQ: 391 TABLET, EFFERVESCENT ORAL at 08:05

## 2025-05-24 RX ADMIN — CLOPIDOGREL 75 MG: 75 TABLET ORAL at 02:05

## 2025-05-24 RX ADMIN — HYDRALAZINE HYDROCHLORIDE 5 MG: 20 INJECTION INTRAMUSCULAR; INTRAVENOUS at 10:05

## 2025-05-24 RX ADMIN — ATORVASTATIN CALCIUM 40 MG: 40 TABLET, FILM COATED ORAL at 10:05

## 2025-05-24 RX ADMIN — ASPIRIN 81 MG: 81 TABLET, COATED ORAL at 02:05

## 2025-05-24 RX ADMIN — MUPIROCIN 1 G: 20 OINTMENT TOPICAL at 08:05

## 2025-05-24 NOTE — PT/OT/SLP EVAL
Speech Language Pathology Evaluation  Cognitive/Bedside Swallow    Patient Name:  Indira Chauhan   MRN:  [id number]  Admitting Diagnosis: CVA (cerebral vascular accident)    Recommendations:                  General Recommendations:  Cognitive-linguistic therapy and f/u for diet tolerance  Diet recommendations:  Regular, Thin   Aspiration Precautions: 1 bite/sip at a time, Feed only when awake/alert, HOB to 90 degrees, Small bites/sips, Standard aspiration precautions, and Wear oxygen during intake   General Precautions: Standard,    Communication strategies:  none    Assessment:     Indira Chauhan is a 69 y.o. female with an CVA (cerebral vascular accident) diagnosis of admitting.  She presents with strong, productive cough and throat clearing prior to and after PO trials. Clinical swallowing evaluation completed. Mld prolonged mastication of solids, however still functional. Inconsistent throat clearing before, during and after po trials, difficulty to determine if from airway compromise or nasal drip that patient reports. No other overt s/s of airway compromise. REC Regular (IDDSI 7) textures with thin liquids. Pt presents with mild cognitive impairment characterized by deficits in the areas of following complex directions, memory, and complex language tasks. Diet recs communicated with patient's nurse, Julio, @ 1020. ST to f/u for treatment of deficit areas and diet tolerance.     History:   PER HPI: This is a 69-year-old lady with comorbid conditions of prior history of lymphoma status post R-CHOP x6, hypertension, hyperlipidemia, chronic tobacco use who presents to emergency department with complaints of right hemiparesis.  Vascular Neurology is consulted and due to patient being within the window of last known well recommendation was made to administer tenecteplase.  Patient is now status post TNK.  On my assessment, unfortunately patient is markedly aphasic and not able to participate verbally.  Per nurse who  is bedside her right  strength which is about a 4/5 for me is an improvement from prior.  Patient unable to lift her right arm or leg off of the bed.  Also per nurse patient has been roughly 1 year without any medications and overall poor longitudinal follow up with her primary care provider.  Blood pressure goal 160s to 180s systolic with the instructions to only treat blood pressure if systolic is greater than 180 or diastolic is greater than 90 in his time post TNK.  Admission to the ICU for observation 24 hours post TNK with repeat imaging tomorrow.  MRI still pending.  No large vessel occlusion, however, on independent assessment it appears as that patient has a distal MCA infarct.   Past Medical History:   Diagnosis Date    Cancer     Diffuse large B cell lymphoma     GERD (gastroesophageal reflux disease)     Hyperlipidemia     Hypertension     Hypothyroidism        Past Surgical History:   Procedure Laterality Date    PORTACATH PLACEMENT Right 03/2018       Social History: Patient lives with her significant other.    Prior Intubation HX:  No intubation this admit    Modified Barium Swallow: none found in epic or reported by pt    Imaging:   Imaging Results               MRI Brain Without Contrast (Final result)  Result time 05/23/25 21:44:46      Final result by Joe Ortiz MD (05/23/25 21:44:46)                   Impression:      1. Acute/subacute lacunar infarction involving the left corona radiata and left frontal subcortical white matter near the apex on the left.  See above comments also.  Follow-up recommended.  2. Involutional changes with chronic microvascular ischemic changes.  3. This report was flagged in Epic as abnormal.      Electronically signed by: Joe Ortiz  Date:    05/23/2025  Time:    21:44               Narrative:    EXAMINATION:  MRI BRAIN WITHOUT CONTRAST    CLINICAL HISTORY:  Stroke, follow up;.    TECHNIQUE:  Multiplanar multisequence MR imaging of the brain was  performed without contrast.    COMPARISON:  05/23/2025    FINDINGS:  Significant motion artifact may diminish the sensitivity.    Intracranial compartment:    No midline shift or acute hydrocephalus.  No extra-axial blood or fluid collections.    Diffusion weighted imaging demonstrates few small foci of restricted diffusion left corona radiata and left frontal subcortical white matter near the apex on the left.  Findings consistent with acute/subacute lacunar infarction.  Possible minimal lacunar infarction involving the left corpus callosum also.    Questionable minimal right frontal subcortical white matter acute/subacute lacunar infarction also.    Involutional changes with chronic microvascular ischemic changes.    No mass lesion, acute hemorrhage, edema or acute major vascular infarct.    Normal vascular flow voids are preserved.    Skull/extracranial contents (limited evaluation): Bone marrow signal intensity is normal.                                       CTA Head and Neck (xpd) (Final result)  Result time 05/23/25 13:07:44      Final result by Oneal Voss MD (05/23/25 13:07:44)                   Impression:      1. There is abrupt cut off of an A2 segment of the left anterior cerebral artery, which could be on the basis of high-grade stenosis or intraluminal thrombus formation.  2. Multifocal carotid atherosclerosis.  There is mild luminal diameter narrowing at the ICA origins bilaterally.  Just distal to the ICA origin on the right, vessel tortuosity results in luminal diameter narrowing of 50-60%.  3. Additional observations as above.  Findings were discussed with Dr. Jones at 1306 hours on May 23, 2025      Electronically signed by: Oneal Voss  Date:    05/23/2025  Time:    13:07               Narrative:      CMS MANDATED QUALITY DATA - CAROTID - 195    All measurements and percent stenosis described below were determined using NASCET criteria or criteria similar to NASCET, as defined  by the Society of Radiologists in Ultrasound Consensus Conference, Radiology, 2003    EXAMINATION:  CTA HEAD AND NECK (XPD)    CLINICAL HISTORY:  Neuro deficit, acute, stroke suspected;.    TECHNIQUE:  Thin-section axial post infusion CT angiographic images of the brain and neck were obtained, following administration of 100 cc nonionic contrast.  3D multiplanar reconstruction images were obtained, and stored as part of the patient's permanent medical record.    COMPARISON:  Same date CT brain    FINDINGS:  CTA NECK: There is mild atherosclerotic calcification and mural thrombus formation at the aortic arch and great vessel origins.  Mild luminal narrowing of the left subclavian artery of up to 40% is noted.  There is stenosis of 60-70% at the origin of the right subclavian artery.    The right common carotid artery is normal in course and caliber.  There is moderate atherosclerotic calcification at the carotid bulb and ICA origin, where there is mild stenosis of less than 50%.  Just distal to the origin, there is sharp angulation of the internal carotid artery, where luminal diameter narrowing of up to 60% is noted.  The cervical ICA distal to that level on the right is normal.  The right vertebral artery is patent at all levels in the neck, but becomes hypoplastic at the foramen magnum, likely developmental variation.    The left common carotid artery is normal in course and caliber.  There is moderate atherosclerotic calcification at the carotid bulb and ICA origin, but only mild luminal diameter narrowing of less than 50%.  The cervical ICA distal to that level is within normal limits.  The left vertebral artery is patent and normal in appearance.    CTA BRAIN: There is atherosclerotic calcification of the intracranial portions of the internal carotid arteries, with mild luminal diameter narrowing of the cavernous segments bilaterally.  The basilar artery is within normal limits.    The A1 segment of the right  anterior cerebral artery is hypoplastic, likely on a developmental basis.  There is abrupt cut off of an A2 segment of the left anterior cerebral artery (axial images 261-270).  This appearance could be on the basis of high-grade stenosis or thrombus formation.  The distal right anterior cerebral artery is normal.    The bilateral middle cerebral arteries are patent, with mild atheromatous irregularity, but no focal high-grade stenosis or major branch occlusion identified.  The posterior cerebral arteries are patent and within normal limits, with patent posterior communicating artery on the right noted.                                       CT HEAD FOR CODE STROKE (Final result)  Result time 05/23/25 12:43:59      Final result by Oneal Voss MD (05/23/25 12:43:59)                   Impression:      1. Effacement of cortical sulci at the frontoparietal junction on the left near the vertex.  While nonspecific, acute ischemic involvement could give this appearance.  MR would be helpful for more detailed assessment in that regard.  2. No evidence of intracranial hemorrhage.  3. Moderate to severe chronic microvascular white matter disease.  Findings were called to Dr. Valencia at 1239 pm on 5/23/2025      Electronically signed by: Oneal Voss  Date:    05/23/2025  Time:    12:43               Narrative:    EXAMINATION:  CT HEAD FOR CODE STROKE    CLINICAL HISTORY:  Neuro deficit, acute, stroke suspected;.    TECHNIQUE:  Noninfusion images were obtained from the skull base to the vertex.    All CT scans at this facility utilize dose modulation, iterative reconstruction, and/or weight based dosing when appropriate to reduce radiation dose to as low as reasonably achievable    CMS MANDATED QUALITY DATA - CT RADIATION - 436    COMPARISON:  2018    FINDINGS:  There is no intracranial mass, hemorrhage, or midline shift. Ventricles and sulci are mildly prominent. There are no pathologic extra-axial fluid  "collections.    Changes of moderate to severe chronic microvascular white matter disease are noted.  At the left frontoparietal junction near the vertex, there is effacement of cortical sulci, which appears more pronounced when compared to the prior CT study.  Early/acute ischemic involvement could give this appearance, and MR would be helpful for further assessment in that regard.  There is no evidence of associated hemorrhage.    The cerebellum and brainstem are unremarkable.  The calvarium is intact.                                      Prior diet: npo at time of evaluation, regular diet at home.    Occupation/hobbies/homemaking: Pt report PLOF as independent with ambulation and ADLS, independent with IADLS. Pt does drive. Level of education is 12th grade.    Subjective     RN cleared pt for ST.   "I have a drip right now"  "My voice is always strained"  Patient goals: none stated     Pain/Comfort:  Pain Rating 1: 0/10  Pain Rating Post-Intervention 1: 0/10    Respiratory Status: Nasal cannula, flow 2 L/min    Objective:   Pt seen for cognitive linguistic evaluation and clinical swallow evaluation. Pt found laying in bed. Agreeable to ST. Oriented x4. Denies hx of dysphagia. Throat clearing and productive cough observed prior to po trials. Pt reports having a nasal drip at this time. Hx of COPD per RN. Strained vocal quality at baseline, "chronic tobacco use".     Cognitive Status:    MoCA (Version 8.1) administered with pt achieving a score of 22/30 (WNL is >/= 26/30) suggesting  mild cognitive-linguistic impairment. Pt earned 2 of 5 points on visuospatial/executive functions, 3 of 3 points on naming, 6 of 6 points for attention, 0 of 3 points for language, 2 of 2 points for abstraction, 2 of 5 points for delayed recall and 6 of 6 points for orientation.      Behavioral Observations: Pleasant, alert & appropriate  Memory; Impaired    Immediate: Pt able to repeat 5/5 words and 5/5 digits   Working: Pt " demonstrated no difficulty applying verbally provided information to answer functional math/time calculations; repetitions of verbal stimuli requested and/or deemed necessary   Short-term: Pt able to recall 2/5 items ind'ly, increasing to 5/5 with cue, following 5 minute filled delay. Pt demonstrates functional recall by providing detailed information such as recent medical history and recent daily events (meals/previous therapies/etc.).    Long-term: Intact; Pt provided detailed biographical and medical history   Orientation: oriented x 4  Attention: Pt demonstrated adequate focused, sustained and alternating attention within MOCA tasks including target detection using tapping, serial subtractions and symbol trails   Problem Solving: Pt provided appropriate responses to judgement/safety situations across 2 of 3 trials. Pt completed time and money word problems with 50% accuracy  Pragmatics: WFL; flat affect     Receptive Language:   Comprehension:   Questions   Complex yes/no WFL  Open ended questions WFL  Commands    One step WFL  multistep basic commands WFL  complex/abstract commands mildly impaired  Object identifications 12 in Fo 12  Conversation WFL    Expressive Language:  Verbal:    Initiation WFL  Repetition Sentences mildly impaired  Naming Confrontation WFL, Convergent WFL, and Divergent responsive impaired  Sentence formulation mildly impaired  Verbal Fluency: Pt generated 6 words ind'ly during letter fluency tasks given one minute time constraint   Conversational speech: Fluent and appropriate at the conversational level without evidence of word retrieval, semantic or syntactic error    Nonverbal: DNT    Motor Speech:  No evidence of Apraxia of Speech or Dysarthria  100% intelligible   WFL    Voice: Quality Rough and Strained; pt reports this is her baseline voicing    Visual-Spatial: WFL  Attending to R and L planes w/out evidence of inattention, neglect or preferential gaze     Reading: Intact oral  reading and reading comprehension @ paragraph level.        Writing: WFL; difficulty writing 2/2 dominant hand weakness (right)      Oral Musculature Evaluation  Oral Musculature: WFL, general weakness  Dentition: other (see comments) (no lower dentition, no dentures)  Secretion Management: adequate  Mucosal Quality: good  Mandibular Strength and Mobility: WFL  Oral Labial Strength and Mobility: WFL  Velar Elevation: WFL  Volitional Cough: strong  Volitional Swallow: palpated  Voice Prior to PO Intake: clear, strained    Bedside Swallow Eval:   Consistencies Assessed:  Thin liquids water via cup, straw; pill whole w/ water via straw   Puree applesauce  Mixed consistencies peaches  Solids asia cracker     Oral Phase:   Mild prolonged mastication, however still functional  WFL    Pharyngeal Phase:   Inconsistent throat clearing before, during and after po trials. Likely 2/2 nasal drip that pt reported. No other overt s/s of aspiration or airway compromise.     Compensatory Strategies  None    Treatment: Pt educated re purpose of evaluation, role of SLP, results of evaluation, and recs. Increased education provided re: aspiration precautions. Pt expressed understanding of recs. Recs shared w/ nurse.      Goals:   Multidisciplinary Problems       SLP Goals          Problem: SLP    Goal Priority Disciplines Outcome   SLP Goal     SLP    Description: 1. Pt will complete tasks with complex directions independently.   2. Pt will accurately answer functional problem solving questions (time, financial, math) with 90% accuracy.   3. Pt will participate in higher level expressive language tasks with 90% accuracy given min cueing.   4. Pt will recall functional list of items following 3 minute delay with min cueing.   4. Pt will tolerate IDDSI 7/0 diet with no overt s/s of aspiration.                        Plan:     Patient to be seen:  3 x/week   Plan of Care expires:  05/31/25  Plan of Care reviewed with:  patient   SLP  Follow-Up:  Yes       Discharge recommendations:  Therapy Intensity Recommendations at Discharge:  (pending)   Barriers to Discharge:  None    Time Tracking:     SLP Treatment Date:   05/24/25  Speech Start Time:  0955  Speech Stop Time:  1041     Speech Total Time (min):  46 min    Billable Minutes: Eval 20 , Speech Therapy Individual 10, Treatment Swallowing Dysfunction 8, Eval Swallow and Oral Function 8, and Total Time 46    05/24/2025

## 2025-05-24 NOTE — ASSESSMENT & PLAN NOTE
P/w R hemiparesis  MRI: Acute/subacute lacunar infarction involving the left corona radiata and left frontal subcortical white matter near the apex on the left.   S/p TNK     CTA negative for any LVO or MeVO in the pertinent vascular territory of interest - no targets identified for acute or urgent reperfusion therapy.     repeat CT in 24h and DAPT thereafter x21 days followed by ASA alone  High intensity statin  Monitor in ICU  PT/OT/SLP  BP goal 180/90(Do not treat unless greater than these parameters)

## 2025-05-24 NOTE — PROGRESS NOTES
[facility] Medicine  Progress Note    Patient Name: Indira Chauhan  MRN: [id number]  Patient Class: IP- Inpatient   Admission Date: 5/23/2025  Length of Stay: 1 days  Attending Physician: Tereso Farah MD  Primary Care Provider: Alesia, Primary Doctor        Subjective     Principal Problem:CVA (cerebral vascular accident)        HPI:  This is a 69-year-old lady with comorbid conditions of prior history of lymphoma status post R-CHOP x6, hypertension, hyperlipidemia, chronic tobacco use who presents to emergency department with complaints of right hemiparesis.  Vascular Neurology is consulted and due to patient being within the window of last known well recommendation was made to administer tenecteplase.  Patient is now status post TNK.  On my assessment, unfortunately patient is markedly aphasic and not able to participate verbally.  Per nurse who is bedside her right  strength which is about a 4/5 for me is an improvement from prior.  Patient unable to lift her right arm or leg off of the bed.  Also per nurse patient has been roughly 1 year without any medications and overall poor longitudinal follow up with her primary care provider.  Blood pressure goal 160s to 180s systolic with the instructions to only treat blood pressure if systolic is greater than 180 or diastolic is greater than 90 in his time post TNK.  Admission to the ICU for observation 24 hours post TNK with repeat imaging tomorrow.  MRI still pending.  No large vessel occlusion, however, on independent assessment it appears as that patient has a distal MCA infarct.    Overview/Hospital Course:  Patient is a 69 year old female with HTN, HLD, hypothyroidism and diffuse B cell Lymphoma, s/p RCHOP was admitted with right sided weakness. Patient was admitted with in the window and TNK was given. MRI showed Acute/subacute lacunar infarction involving the left corona radiata and left frontal subcortical white matter near the  apex on the left. Rpt CT pending post TNK.     Interval History: Patient is feeling much improved she is able to move her right upper and lower extremity. Expressive aphasia significantly improved.     Review of Systems  Objective:     Vital Signs (Most Recent):  Temp: 97.8 °F (36.6 °C) (05/23/25 2301)  Pulse: 91 (05/24/25 0701)  Resp: (!) 28 (05/24/25 0701)  BP: (!) 166/73 (05/24/25 0701)  SpO2: 97 % (05/24/25 0701) Vital Signs (24h Range):  Temp:  [97.8 °F (36.6 °C)-98.2 °F (36.8 °C)] 97.8 °F (36.6 °C)  Pulse:  [75-92] 91  Resp:  [14-31] 28  SpO2:  [81 %-98 %] 97 %  BP: (149-223)/(70-99) 166/73     Weight: 103.7 kg (228 lb 9.9 oz)  Body mass index is 38.04 kg/m².    Intake/Output Summary (Last 24 hours) at 5/24/2025 1242  Last data filed at 5/24/2025 1057  Gross per 24 hour   Intake 202.81 ml   Output 700 ml   Net -497.19 ml         Physical Exam  Vitals and nursing note reviewed.   Constitutional:       General: She is not in acute distress.     Appearance: She is not toxic-appearing.   HENT:      Head: Atraumatic.      Mouth/Throat:      Mouth: Mucous membranes are moist.      Pharynx: Oropharynx is clear.   Eyes:      General: No scleral icterus.     Conjunctiva/sclera: Conjunctivae normal.      Pupils: Pupils are equal, round, and reactive to light.   Cardiovascular:      Rate and Rhythm: Normal rate and regular rhythm.      Heart sounds: No murmur heard.  Pulmonary:      Effort: No respiratory distress.      Breath sounds: No wheezing, rhonchi or rales.   Abdominal:      General: Abdomen is flat. Bowel sounds are normal.      Palpations: Abdomen is soft.   Musculoskeletal:         General: No swelling or deformity.      Cervical back: No rigidity or tenderness.   Skin:     Coloration: Skin is not jaundiced or pale.      Findings: No bruising, erythema or rash.   Neurological:      General: No focal deficit present.      Mental Status: She is alert and oriented to person, place, and time.      Cranial Nerves:  No cranial nerve deficit.      Sensory: No sensory deficit.      Motor: Weakness (right side upper and lower extremity 4/5) present.   Psychiatric:         Mood and Affect: Mood normal.         Behavior: Behavior normal.               Significant Labs: All pertinent labs within the past 24 hours have been reviewed.  CBC:   Recent Labs   Lab 05/23/25  1237 05/24/25  0255   WBC 9.36 9.58   HGB 15.7 16.1*   HCT 45.6 47.7    224     CMP:   Recent Labs   Lab 05/23/25  1237 05/24/25  0255    139   K 3.8 3.4*    102   CO2 28 28   * 122*   BUN 13 10   CREATININE 0.7 0.6   CALCIUM 9.1 9.4   PROT 7.1 7.3   ALBUMIN 4.2 4.2   BILITOT 0.7 0.8   ALKPHOS 70 67   AST 15 15   ALT 14 14   ANIONGAP 10 9       Significant Imaging: I have reviewed all pertinent imaging results/findings within the past 24 hours.      Assessment & Plan  CVA (cerebral vascular accident)  P/w R hemiparesis  MRI: Acute/subacute lacunar infarction involving the left corona radiata and left frontal subcortical white matter near the apex on the left.   S/p TNK     CTA negative for any LVO or MeVO in the pertinent vascular territory of interest - no targets identified for acute or urgent reperfusion therapy.     repeat CT in 24h and DAPT thereafter x21 days followed by ASA alone  High intensity statin  Monitor in ICU  PT/OT/SLP  BP goal 180/90(Do not treat unless greater than these parameters)      Mixed hyperlipidemia  statin    Acquired hypothyroidism  Resume synthroid    Essential hypertension  Patient's blood pressure range in the last 24 hours was: BP  Min: 149/79  Max: 223/98.The patient's inpatient anti-hypertensive regimen is listed below:      PRN hydralazine, BP goal 180/105  Tobacco use  Counseling at this time difficult due to aphasia    Diffuse large B-cell lymphoma of lymph nodes of neck  She was diagnosed in October 2017. She underwent chemotherapy with RCHOPx 6   Seems to have been lost to ongoing follow up with  oncologist    VTE Risk Mitigation (From admission, onward)           Ordered     IP VTE HIGH RISK PATIENT  Once         05/23/25 1330     Place sequential compression device  Until discontinued         05/23/25 1330                    Discharge Planning   BYRON:      Code Status: Full Code   Medical Readiness for Discharge Date:                      Tereso Farah MD  Department of Hospital Medicine   [facility]

## 2025-05-24 NOTE — PLAN OF CARE
LVEF 20% by ventriculogram  Impella placed in Cath Lab  Per cardiology     Continue to follow hemodynamics  Dobutamine infusion and nitroglycerin initiated per cardiology   [facility]  Initial Discharge Assessment       Primary Care Provider: No, Primary Doctor    Admission Diagnosis: Acute focal neurological deficit [R29.818]  Acute focal neurological deficit [R29.818]  Cerebrovascular accident (CVA), unspecified mechanism [I63.9]    Admission Date: 5/23/2025  Expected Discharge Date: 5/27/2025    Pt is a 69-year-old male who arrived from home with. Information verified as correct on facesheet. The assessment was completed at the patient's bedside.  Pt lives with Olszewski,Bill (Friend) [phone] (Mobile). Pt does not have a Living Will or Advance Directive. The Pt is oriented to person, places, and times. PCP last seen 5/24.  Pt denies Coumadin, dialysis, DME, HH, and has not been readmitted into the hospital in the last thirty days.  Pt is capable of performing ADLs without assistance. Pt friend drives her to and from her medical appointments. Pt reports she takes her medication as prescribed; [facility] [address].  Pt verbalized plans to discharge home via family transport. Pt has no other needs to be addressed at this time.         Payor: MEDICARE / Plan: MEDICARE PART A & B / Product Type: Government /     Extended Emergency Contact Information  Primary Emergency Contact: Olszewski,Bill  Address: [address]           [place] [address] [facility]  Home Phone: [phone]  Mobile Phone: [phone]  Relation: Friend  Preferred language: English   needed? No  Secondary Emergency Contact: Manuel Gomez  Home Phone: [phone]  Relation: Brother    Discharge Plan A: Home with family  Discharge Plan B: Home with family      [id number] [place] - [address]  [address]  [place] [address]  Phone: [phone] Fax: [phone]      Initial Assessment (most recent)       Adult Discharge Assessment - 05/24/25 1635          Discharge Assessment    Assessment Type Discharge Planning Assessment      Confirmed/corrected address, phone number and insurance Yes     Confirmed Demographics Correct on Facesheet     Source of Information family;patient     People in Home friend(s)     Name(s) of People in Home Olszewski,Bill (Friend)  [phone] (Mobile)     Do you expect to return to your current living situation? Yes     Do you have help at home or someone to help you manage your care at home? Yes     Who are your caregiver(s) and their phone number(s)? Olszewski,Bill (Friend)  [phone] (Mobile)     Walking or Climbing Stairs Difficulty no     Dressing/Bathing Difficulty no     Home Accessibility not wheelchair accessible     Home Layout Able to live on 1st floor     Equipment Currently Used at Home none     Readmission within 30 days? No     Patient currently being followed by outpatient case management? No     Do you currently have service(s) that help you manage your care at home? No     Do you take prescription medications? Yes     Do you have prescription coverage? Yes     Do you have any problems affording any of your prescribed medications? No     Is the patient taking medications as prescribed? yes     Who is going to help you get home at discharge? Olszewski,Bill (Friend)  [phone] (Mobile)     How do you get to doctors appointments? family or friend will provide     Are you on dialysis? No     Do you take coumadin? No     Discharge Plan A Home with family     Discharge Plan B Home with family     DME Needed Upon Discharge  none

## 2025-05-24 NOTE — HOSPITAL COURSE
Patient is a 69 year old female with HTN, HLD, hypothyroidism and diffuse B cell Lymphoma, s/p RCHOP was admitted with right sided weakness. Patient was admitted with in the window and TNK was given. MRI showed Acute/subacute lacunar infarction involving the left corona radiata and left frontal subcortical white matter near the apex on the left. Rpt CT  post TNK no bleeding. Home antihypertensives started. Norvasc 5 mg and Losartan 50 mg added and later changed back to her home medications and discharged to skilled nursing facility in stable condition for rehabilitation

## 2025-05-24 NOTE — CARE UPDATE
Wean oxygen    05/24/25 0701   Patient Assessment/Suction   Level of Consciousness (AVPU) alert   All Lung Fields Breath Sounds Anterior:;Lateral:;diminished   Rhythm/Pattern, Respiratory depth regular;pattern regular;unlabored   PRE-TX-O2   Device (Oxygen Therapy) nasal cannula   Flow (L/min) (Oxygen Therapy) 2   Pulse Oximetry Type Continuous   $ Pulse Oximetry - Multiple Charge Pulse Oximetry - Multiple   Education   $ Education Oxygen;15 min

## 2025-05-24 NOTE — CONSULTS
[facility]  Adult Nutrition  Education Short Note    Admit Date: 5/23/2025   Total duration of encounter: 1 day   Patient Age: 69 y.o.    Nutrition Education    Previous education: no    Diet at home: regular    Written information provided and explained on Heart-Healthy Nutrition Therapy  diet.     Comments:  Educated pt on heart healthy diet. Education focused on including healthy fats- gave examples and lowering LDL levels by excluding saturated/trans fat in the diet. Went over types of foods that have saturated/trans fat. Encouraged cooking with olive oil instead of butter. Choosing whole grains over refined grains, choosing canned foods with no salt added and plan frozen veggies instead of veggies cooked in butter and cream sauces. Encouraged patient to choose leaner cuts of meat and decreasing high fat meats such as martinez, sausage, hot dogs, etc. Educated pt on decreasing sodium in diet. To try not to cook with salt and use herbs and spices to make food more flavorful. To limit eating out-if patient chooses to eat out, informed patient to discuss with  to cook meats and veggies with no salt and olive oil instead of butter. Provided handouts on all of these topics for pt to use in the future.   No issues with appetite or po intake. No recent weight changes.     Discussed with: patient & spouse    Educational Need? yes    Barriers: none identified    Interventions: Meals and Snacks    Patient and/or family comprehend instructions: yes    Outcome: Verbalizes understanding     Thanks for the consult!    Chiquita Macias RD 05/24/2025 11:27 AM

## 2025-05-24 NOTE — CARE UPDATE
I was notified of suboptimally controlled blood pressure in spite of IV p.r.n..  Order placed for Cardene drip with instructions to titrate to systolic between 160 and 180.

## 2025-05-24 NOTE — PT/OT/SLP EVAL
Occupational Therapy   Evaluation    Name: Indira Chauhan  MRN: [id number]  Admitting Diagnosis: CVA (cerebral vascular accident)  Recent Surgery: * No surgery found *      Recommendations:     Discharge Recommendations: High Intensity Therapy  Discharge Equipment Recommendations:   (TBD)  Barriers to discharge:   (increased physical assistance with ADLs and functional mobility.)    Assessment:     Indira Chauhan is a 69 y.o. female with a medical diagnosis of CVA (cerebral vascular accident).  She presents with right sided weakness, leans to right in sitting and mildly confused. Performance deficits affecting function: weakness, impaired endurance, impaired self care skills, impaired functional mobility, gait instability, impaired balance, decreased safety awareness, decreased lower extremity function, decreased upper extremity function.      Rehab Prognosis: Good; patient would benefit from acute skilled OT services to address these deficits and reach maximum level of function.       Plan:     Patient to be seen 6 x/week to address the above listed problems via self-care/home management, therapeutic activities, therapeutic exercises  Plan of Care Expires: 06/24/25  Plan of Care Reviewed with: patient, significant other    Subjective     Chief Complaint: Right sided weakness  Patient/Family Comments/goals: Increased functional mobility and ADL independence.     Occupational Profile:  Living Environment: lives with boyfriend in a 2 story home, 1 step to enter. All amenities located on 1st floor  Previous level of function: performed household mobility, ADLs and IADLs without assistance. Uses electric carts at stores.   Roles and Routines: limited homemaker, sedentary.  Equipment Used at Home: none  Assistance upon Discharge: Boyfriend    Pain/Comfort:  Pain Rating 1: 0/10  Pain Rating Post-Intervention 1: 0/10    Patients cultural, spiritual, Hoahaoism conflicts given the current situation: no    Objective:      Communicated with: nurse prior to session.  Patient found HOB elevated with telemetry, peripheral IV, pulse ox (continuous), oxygen, PureWick upon OT entry to room.    General Precautions: Standard, fall, aphasia  Orthopedic Precautions: N/A  Braces: N/A  Respiratory Status: Nasal cannula, flow 2 L/min    Occupational Performance:    Bed Mobility:    Patient completed Scooting/Bridging with moderate assistance  Patient completed Supine to Sit with moderate assistance  Performed unsupported sitting EOB with minimal assistance.     Functional Mobility/Transfers:  Patient completed Sit <> Stand x1 Trial with moderate assistance using hand-held assist and rolling walker     Activities of Daily Living:  Lower Body Dressing: maximal assistance to don socks sitting EOB.     Cognitive/Visual Perceptual:  Cognitive/Psychosocial Skills:     -       Oriented to: Person and Place   -       Follows Commands/attention:Follows one-step commands  -       Communication: clear/fluent and mildly confused, delayed processing  -       Memory: Impaired STM and Poor immediate recall  -       Safety awareness/insight to disability: impaired   -       Mood/Affect/Coping skills/emotional control: Flat affect  Visual/Perceptual:      -Intact Acuity    Physical Exam:  Balance:    -       Sitting/Standing: Minimal Assist  Upper Extremity Range of Motion:     -       Right Upper Extremity: WFL  -       Left Upper Extremity: WFL  Upper Extremity Strength:    -       Right Upper Extremity: 4/5  -       Left Upper Extremity: 5/5   Strength:    -       Right Upper Extremity: fair  -       Left Upper Extremity: fair  Fine Motor Coordination:    -       Impaired  right hand due to slight weakness    AMPAC 6 Click ADL:  AMPAC Total Score:      Treatment & Education:  Patient and boyfriend educated on the purpose of Occupational Therapy and the importance of getting OOB.     Patient left sitting edge of bed with Physical Therapy present handoff  from OT.     GOALS:   Multidisciplinary Problems       Occupational Therapy Goals          Problem: Occupational Therapy    Goal Priority Disciplines Outcome Interventions   Occupational Therapy Goal     OT, PT/OT     Description: Goals to be met by: 6/24/2025     Patient will increase functional independence with ADLs by performing:    UE Dressing with Supervision.  LE Dressing with Supervision.  Grooming while seated with Supervision.  Toileting from toilet with Supervision for hygiene and clothing management.   Toilet transfer to toilet with Supervision.  Increased functional strength of RUE to 5/5 for ADLs.                         History:     Past Medical History:   Diagnosis Date    Cancer     Diffuse large B cell lymphoma     GERD (gastroesophageal reflux disease)     Hyperlipidemia     Hypertension     Hypothyroidism          Past Surgical History:   Procedure Laterality Date    PORTACATH PLACEMENT Right 03/2018       Time Tracking:     OT Date of Treatment: 05/24/25  OT Start Time: 1301  OT Stop Time: 1318  OT Total Time (min): 17 min    Billable Minutes:Evaluation 7  Self Care/Home Management 10    5/24/2025

## 2025-05-24 NOTE — SUBJECTIVE & OBJECTIVE
Interval History: Patient is feeling much improved she is able to move her right upper and lower extremity. Expressive aphasia significantly improved.     Review of Systems  Objective:     Vital Signs (Most Recent):  Temp: 97.8 °F (36.6 °C) (05/23/25 2301)  Pulse: 91 (05/24/25 0701)  Resp: (!) 28 (05/24/25 0701)  BP: (!) 166/73 (05/24/25 0701)  SpO2: 97 % (05/24/25 0701) Vital Signs (24h Range):  Temp:  [97.8 °F (36.6 °C)-98.2 °F (36.8 °C)] 97.8 °F (36.6 °C)  Pulse:  [75-92] 91  Resp:  [14-31] 28  SpO2:  [81 %-98 %] 97 %  BP: (149-223)/(70-99) 166/73     Weight: 103.7 kg (228 lb 9.9 oz)  Body mass index is 38.04 kg/m².    Intake/Output Summary (Last 24 hours) at 5/24/2025 1242  Last data filed at 5/24/2025 1057  Gross per 24 hour   Intake 202.81 ml   Output 700 ml   Net -497.19 ml         Physical Exam  Vitals and nursing note reviewed.   Constitutional:       General: She is not in acute distress.     Appearance: She is not toxic-appearing.   HENT:      Head: Atraumatic.      Mouth/Throat:      Mouth: Mucous membranes are moist.      Pharynx: Oropharynx is clear.   Eyes:      General: No scleral icterus.     Conjunctiva/sclera: Conjunctivae normal.      Pupils: Pupils are equal, round, and reactive to light.   Cardiovascular:      Rate and Rhythm: Normal rate and regular rhythm.      Heart sounds: No murmur heard.  Pulmonary:      Effort: No respiratory distress.      Breath sounds: No wheezing, rhonchi or rales.   Abdominal:      General: Abdomen is flat. Bowel sounds are normal.      Palpations: Abdomen is soft.   Musculoskeletal:         General: No swelling or deformity.      Cervical back: No rigidity or tenderness.   Skin:     Coloration: Skin is not jaundiced or pale.      Findings: No bruising, erythema or rash.   Neurological:      General: No focal deficit present.      Mental Status: She is alert and oriented to person, place, and time.      Cranial Nerves: No cranial nerve deficit.      Sensory: No  sensory deficit.      Motor: Weakness (right side upper and lower extremity 4/5) present.   Psychiatric:         Mood and Affect: Mood normal.         Behavior: Behavior normal.               Significant Labs: All pertinent labs within the past 24 hours have been reviewed.  CBC:   Recent Labs   Lab 05/23/25  1237 05/24/25  0255   WBC 9.36 9.58   HGB 15.7 16.1*   HCT 45.6 47.7    224     CMP:   Recent Labs   Lab 05/23/25  1237 05/24/25  0255    139   K 3.8 3.4*    102   CO2 28 28   * 122*   BUN 13 10   CREATININE 0.7 0.6   CALCIUM 9.1 9.4   PROT 7.1 7.3   ALBUMIN 4.2 4.2   BILITOT 0.7 0.8   ALKPHOS 70 67   AST 15 15   ALT 14 14   ANIONGAP 10 9       Significant Imaging: I have reviewed all pertinent imaging results/findings within the past 24 hours.

## 2025-05-24 NOTE — PT/OT/SLP EVAL
Physical Therapy Evaluation    Patient Name:  Indira Chauhan   MRN:  [id number]    Recommendations:     Discharge Recommendations: High Intensity Therapy   Discharge Equipment Recommendations: walker, rolling, to be determined by next level of care   Barriers to discharge: None    Assessment:     Indira Chauhan is a 69 y.o. female admitted with a medical diagnosis of CVA (cerebral vascular accident).  She presents with the following impairments/functional limitations: weakness, impaired sensation, impaired endurance, impaired self care skills, impaired functional mobility, gait instability, impaired balance, impaired cognition, decreased upper extremity function, decreased lower extremity function, decreased safety awareness, abnormal tone, impaired coordination, impaired fine motor, impaired cardiopulmonary response to activity       Lives 2SH with boyfriend , but she functions on 1st floor only.  Kerwin with bed mobs;  dizzy with change in position, lean R side and posterior in sitting; improves as she sits and given cues;  transfers min/modA RW;  took 3 small steps to R side toward wc with RW min/modA. (Going to CT)    Rehab Prognosis: Good and Fair; patient would benefit from acute skilled PT services to address these deficits and reach maximum level of function.    Recent Surgery: * No surgery found *      Plan:     During this hospitalization, patient to be seen 6 x/week to address the identified rehab impairments via gait training, therapeutic activities, therapeutic exercises, neuromuscular re-education and progress toward the following goals:    Plan of Care Expires:  06/24/25    Subjective     Chief Complaint: no pain, diminished R side sensation and proprioception;  talking more now than when came to ER, got TNK.  Patient/Family Comments/goals: return PLOF  Pain/Comfort:  Pain Rating 1: 0/10    Patients cultural, spiritual, Taoism conflicts given the current situation: no    Living Environment:  Lives  2SH, with boyfriend, lives mainly on 1st floor, 1 threshold step  Prior to admission, patients level of function was indep in home with no AD; used scooter to go in stores.  Equipment used at home: none.  DME owned (not currently used): scooter.  Upon discharge, patient will have assistance from BF.    Objective:     Communicated with patient, BF, nurse prior to session.  Patient found sitting edge of bed with bed alarm, blood pressure cuff, oxygen, PureWick, peripheral IV, pulse ox (continuous), telemetry  upon PT entry to room.    General Precautions: Standard, fall  Orthopedic Precautions:N/A   Braces: N/A  Respiratory Status: Nasal cannula, flow 2 L/min    Exams:  Cognitive Exam:  Patient is oriented to Person and Place  RLE ROM: WFL  RLE Strength: R hip flex 2/5, knee ext 2+/5, ankle 2/5  LLE ROM: WFL  LLE Strength: L hip 2+/5, knee ext 3+/5, ankle 3/5    Functional Mobility:  Bed Mobility:     Rolling Left:  minimum assistance  Scooting: minimum assistance  Supine to Sit: minimum assistance  Sit to Supine: minimum assistance  Transfers:     Sit to Stand:  minimum assistance and moderate assistance with rolling walker  Gait: 3 steps to R side toward wc using RW and min/modA due to decr balance and coord RLE, decr R  on RW.  Went to CT via wc with nurse      AM-PAC 6 CLICK MOBILITY  Total Score:13       Treatment & Education:  Edu safety, fall prec and pacing    Patient left up in chair with all lines intact and went with nurse to CT.    GOALS:   Multidisciplinary Problems       Physical Therapy Goals          Problem: Physical Therapy    Goal Priority Disciplines Outcome Interventions   Physical Therapy Goal     PT, PT/OT     Description: Goals to be met by: 25     Patient will increase functional independence with mobility by performin. Supine to sit with Victoria  2. Sit to stand transfer with Modified Victoria  3. Gait  x 150 feet with Modified Victoria using Rolling Walker.                           DME Justifications:   Indira's mobility limitation cannot be sufficiently resolved by the use of a cane. Her functional mobility deficit can be sufficiently resolved with the use of a Rolling Walker. Patient's mobility limitation significantly impairs their ability to participate in one of more activities of daily living.  The use of a RW will significantly improve the patient's ability to participate in MRADLS and the patient will use it on regular basis in the home.    History:     Past Medical History:   Diagnosis Date    Cancer     Diffuse large B cell lymphoma     GERD (gastroesophageal reflux disease)     Hyperlipidemia     Hypertension     Hypothyroidism        Past Surgical History:   Procedure Laterality Date    PORTACATH PLACEMENT Right 03/2018       Time Tracking:     PT Received On: 05/24/25  PT Start Time: 1315     PT Stop Time: 1329  PT Total Time (min): 14 min     Billable Minutes: Evaluation 14      05/24/2025

## 2025-05-24 NOTE — ASSESSMENT & PLAN NOTE
Patient's blood pressure range in the last 24 hours was: BP  Min: 149/79  Max: 223/98.The patient's inpatient anti-hypertensive regimen is listed below:      PRN hydralazine, BP goal 180/105

## 2025-05-25 LAB
ABSOLUTE EOSINOPHIL (SMH): 0.32 K/UL
ABSOLUTE MONOCYTE (SMH): 0.74 K/UL (ref 0.3–1)
ABSOLUTE NEUTROPHIL COUNT (SMH): 8.6 K/UL (ref 1.8–7.7)
ALBUMIN SERPL-MCNC: 4.4 G/DL (ref 3.5–5.2)
ALP SERPL-CCNC: 66 UNIT/L (ref 55–135)
ALT SERPL-CCNC: 15 UNIT/L (ref 10–44)
ANION GAP (SMH): 9 MMOL/L (ref 8–16)
AST SERPL-CCNC: 16 UNIT/L (ref 10–40)
BASOPHILS # BLD AUTO: 0.06 K/UL
BASOPHILS NFR BLD AUTO: 0.5 %
BILIRUB SERPL-MCNC: 1 MG/DL (ref 0.1–1)
BUN SERPL-MCNC: 13 MG/DL (ref 8–23)
CALCIUM SERPL-MCNC: 9.7 MG/DL (ref 8.7–10.5)
CHLORIDE SERPL-SCNC: 98 MMOL/L (ref 95–110)
CO2 SERPL-SCNC: 31 MMOL/L (ref 23–29)
CREAT SERPL-MCNC: 0.8 MG/DL (ref 0.5–1.4)
ERYTHROCYTE [DISTWIDTH] IN BLOOD BY AUTOMATED COUNT: 14 % (ref 11.5–14.5)
GFR SERPLBLD CREATININE-BSD FMLA CKD-EPI: >60 ML/MIN/1.73/M2
GLUCOSE SERPL-MCNC: 128 MG/DL (ref 70–110)
HCT VFR BLD AUTO: 47.7 % (ref 37–48.5)
HGB BLD-MCNC: 16 GM/DL (ref 12–16)
IMM GRANULOCYTES # BLD AUTO: 0.05 K/UL (ref 0–0.04)
IMM GRANULOCYTES NFR BLD AUTO: 0.4 % (ref 0–0.5)
LYMPHOCYTES # BLD AUTO: 1.38 K/UL (ref 1–4.8)
MCH RBC QN AUTO: 33.5 PG (ref 27–31)
MCHC RBC AUTO-ENTMCNC: 33.5 G/DL (ref 32–36)
MCV RBC AUTO: 100 FL (ref 82–98)
NUCLEATED RBC (/100WBC) (SMH): 0 /100 WBC
PLATELET # BLD AUTO: 252 K/UL (ref 150–450)
PMV BLD AUTO: 9.5 FL (ref 9.2–12.9)
POTASSIUM SERPL-SCNC: 3.7 MMOL/L (ref 3.5–5.1)
PROT SERPL-MCNC: 7.5 GM/DL (ref 6–8.4)
RBC # BLD AUTO: 4.77 M/UL (ref 4–5.4)
RELATIVE EOSINOPHIL (SMH): 2.9 % (ref 0–8)
RELATIVE LYMPHOCYTE (SMH): 12.4 % (ref 18–48)
RELATIVE MONOCYTE (SMH): 6.6 % (ref 4–15)
RELATIVE NEUTROPHIL (SMH): 77.2 % (ref 38–73)
SODIUM SERPL-SCNC: 138 MMOL/L (ref 136–145)
WBC # BLD AUTO: 11.15 K/UL (ref 3.9–12.7)

## 2025-05-25 PROCEDURE — 36415 COLL VENOUS BLD VENIPUNCTURE: CPT

## 2025-05-25 PROCEDURE — 27000221 HC OXYGEN, UP TO 24 HOURS

## 2025-05-25 PROCEDURE — 25000003 PHARM REV CODE 250

## 2025-05-25 PROCEDURE — 21400001 HC TELEMETRY ROOM

## 2025-05-25 PROCEDURE — 99900035 HC TECH TIME PER 15 MIN (STAT)

## 2025-05-25 PROCEDURE — 63600175 PHARM REV CODE 636 W HCPCS

## 2025-05-25 PROCEDURE — 99900031 HC PATIENT EDUCATION (STAT)

## 2025-05-25 PROCEDURE — 25000003 PHARM REV CODE 250: Performed by: INTERNAL MEDICINE

## 2025-05-25 PROCEDURE — 85025 COMPLETE CBC W/AUTO DIFF WBC: CPT

## 2025-05-25 PROCEDURE — 80053 COMPREHEN METABOLIC PANEL: CPT

## 2025-05-25 PROCEDURE — 94761 N-INVAS EAR/PLS OXIMETRY MLT: CPT

## 2025-05-25 RX ORDER — AMLODIPINE BESYLATE 5 MG/1
10 TABLET ORAL DAILY
Status: DISCONTINUED | OUTPATIENT
Start: 2025-05-25 | End: 2025-05-25

## 2025-05-25 RX ORDER — AMLODIPINE BESYLATE 2.5 MG/1
2.5 TABLET ORAL DAILY
Status: DISCONTINUED | OUTPATIENT
Start: 2025-05-26 | End: 2025-05-26

## 2025-05-25 RX ADMIN — MUPIROCIN 1 G: 20 OINTMENT TOPICAL at 08:05

## 2025-05-25 RX ADMIN — ATORVASTATIN CALCIUM 40 MG: 40 TABLET, FILM COATED ORAL at 08:05

## 2025-05-25 RX ADMIN — ASPIRIN 81 MG: 81 TABLET, COATED ORAL at 08:05

## 2025-05-25 RX ADMIN — CLOPIDOGREL 75 MG: 75 TABLET ORAL at 08:05

## 2025-05-25 RX ADMIN — HYDRALAZINE HYDROCHLORIDE 5 MG: 20 INJECTION INTRAMUSCULAR; INTRAVENOUS at 09:05

## 2025-05-25 RX ADMIN — AMLODIPINE BESYLATE 10 MG: 5 TABLET ORAL at 08:05

## 2025-05-25 RX ADMIN — MUPIROCIN 1 G: 20 OINTMENT TOPICAL at 09:05

## 2025-05-25 NOTE — ASSESSMENT & PLAN NOTE
Patient's blood pressure range in the last 24 hours was: BP  Min: 142/66  Max: 188/73.The patient's inpatient anti-hypertensive regimen is listed below:      PRN hydralazine  Resume home medications

## 2025-05-25 NOTE — NURSING
"Noted pt SBP occasionally goes 140s to 150s, below the minimum goal SBP. Dr. Patten notified. Per doctor, "that's okay, should really just make sure she doesn't go over the upper limit of 180 systolic ."  "

## 2025-05-25 NOTE — PROGRESS NOTES
[facility] Medicine  Progress Note    Patient Name: Indira Chauhan  MRN: [id number]  Patient Class: IP- Inpatient   Admission Date: 5/23/2025  Length of Stay: 2 days  Attending Physician: Tereso Farah MD  Primary Care Provider: Alesia, Primary Doctor        Subjective     Principal Problem:CVA (cerebral vascular accident)        HPI:  This is a 69-year-old lady with comorbid conditions of prior history of lymphoma status post R-CHOP x6, hypertension, hyperlipidemia, chronic tobacco use who presents to emergency department with complaints of right hemiparesis.  Vascular Neurology is consulted and due to patient being within the window of last known well recommendation was made to administer tenecteplase.  Patient is now status post TNK.  On my assessment, unfortunately patient is markedly aphasic and not able to participate verbally.  Per nurse who is bedside her right  strength which is about a 4/5 for me is an improvement from prior.  Patient unable to lift her right arm or leg off of the bed.  Also per nurse patient has been roughly 1 year without any medications and overall poor longitudinal follow up with her primary care provider.  Blood pressure goal 160s to 180s systolic with the instructions to only treat blood pressure if systolic is greater than 180 or diastolic is greater than 90 in his time post TNK.  Admission to the ICU for observation 24 hours post TNK with repeat imaging tomorrow.  MRI still pending.  No large vessel occlusion, however, on independent assessment it appears as that patient has a distal MCA infarct.    Overview/Hospital Course:  Patient is a 69 year old female with HTN, HLD, hypothyroidism and diffuse B cell Lymphoma, s/p RCHOP was admitted with right sided weakness. Patient was admitted with in the window and TNK was given. MRI showed Acute/subacute lacunar infarction involving the left corona radiata and left frontal subcortical white matter near the  apex on the left. Rpt CT pending post TNK no bleeding. Home antihypertensives started.     Interval History: Patient is awake alert and oriented. No overnight events. BP elevated.     Review of Systems  Objective:     Vital Signs (Most Recent):  Temp: 98.1 °F (36.7 °C) (05/25/25 1101)  Pulse: 88 (05/25/25 0800)  Resp: 18 (05/25/25 0800)  BP: (!) 171/71 (05/25/25 0600)  SpO2: 97 % (05/25/25 0800) Vital Signs (24h Range):  Temp:  [98 °F (36.7 °C)-98.2 °F (36.8 °C)] 98.1 °F (36.7 °C)  Pulse:  [67-88] 88  Resp:  [16-33] 18  SpO2:  [89 %-99 %] 97 %  BP: (142-188)/(58-93) 171/71     Weight: 103.7 kg (228 lb 9.9 oz)  Body mass index is 38.04 kg/m².    Intake/Output Summary (Last 24 hours) at 5/25/2025 1310  Last data filed at 5/25/2025 1000  Gross per 24 hour   Intake 1302.11 ml   Output 710 ml   Net 592.11 ml      Physical Exam  Vitals and nursing note reviewed.   Constitutional:       General: She is not in acute distress.     Appearance: She is not toxic-appearing.   HENT:      Head: Atraumatic.      Mouth/Throat:      Mouth: Mucous membranes are moist.      Pharynx: Oropharynx is clear.   Eyes:      General: No scleral icterus.     Conjunctiva/sclera: Conjunctivae normal.      Pupils: Pupils are equal, round, and reactive to light.   Cardiovascular:      Rate and Rhythm: Normal rate and regular rhythm.      Heart sounds: No murmur heard.  Pulmonary:      Effort: No respiratory distress.      Breath sounds: No wheezing, rhonchi or rales.   Abdominal:      General: Abdomen is flat. Bowel sounds are normal.      Palpations: Abdomen is soft.   Musculoskeletal:         General: No swelling or deformity.      Cervical back: No rigidity or tenderness.   Skin:     Coloration: Skin is not jaundiced or pale.      Findings: No bruising, erythema or rash.   Neurological:      General: No focal deficit present.      Mental Status: She is alert and oriented to person, place, and time.      Cranial Nerves: No cranial nerve deficit.       Sensory: No sensory deficit.      Motor: Weakness (right side upper and lower extremity 4/5) present.   Psychiatric:         Mood and Affect: Mood normal.         Behavior: Behavior normal.       Significant Labs: All pertinent labs within the past 24 hours have been reviewed.  CBC:   Recent Labs   Lab 05/24/25 0255 05/25/25 0259   WBC 9.58 11.15   HGB 16.1* 16.0   HCT 47.7 47.7    252     CMP:   Recent Labs   Lab 05/24/25 0255 05/25/25 0259    138   K 3.4* 3.7    98   CO2 28 31*   * 128*   BUN 10 13   CREATININE 0.6 0.8   CALCIUM 9.4 9.7   PROT 7.3 7.5   ALBUMIN 4.2 4.4   BILITOT 0.8 1.0   ALKPHOS 67 66   AST 15 16   ALT 14 15   ANIONGAP 9 9       Significant Imaging: I have reviewed all pertinent imaging results/findings within the past 24 hours.      Assessment & Plan  CVA (cerebral vascular accident)  P/w R hemiparesis  MRI: Acute/subacute lacunar infarction involving the left corona radiata and left frontal subcortical white matter near the apex on the left.   S/p TNK     CTA negative for any LVO or MeVO in the pertinent vascular territory of interest - no targets identified for acute or urgent reperfusion therapy.     repeat CT in 24h no bleeding   DAPT x21 days followed by ASA alone  High intensity statin  PT/OT/SLP      Mixed hyperlipidemia  statin    Acquired hypothyroidism  Resume synthroid    Essential hypertension  Patient's blood pressure range in the last 24 hours was: BP  Min: 142/66  Max: 188/73.The patient's inpatient anti-hypertensive regimen is listed below:      PRN hydralazine  Resume home medications   Tobacco use  Counseling at this time difficult due to aphasia    Diffuse large B-cell lymphoma of lymph nodes of neck  She was diagnosed in October 2017. She underwent chemotherapy with RCHOPx 6   Seems to have been lost to ongoing follow up with oncologist    VTE Risk Mitigation (From admission, onward)           Ordered     IP VTE HIGH RISK PATIENT  Once          05/23/25 1330     Place sequential compression device  Until discontinued         05/23/25 1330                    Discharge Planning   BYRON: 5/26/2025     Code Status: Full Code   Medical Readiness for Discharge Date:   Discharge Plan A: Home with family              Tereso Farah MD  Department of Hospital Medicine   [facility]

## 2025-05-25 NOTE — ASSESSMENT & PLAN NOTE
P/w R hemiparesis  MRI: Acute/subacute lacunar infarction involving the left corona radiata and left frontal subcortical white matter near the apex on the left.   S/p TNK     CTA negative for any LVO or MeVO in the pertinent vascular territory of interest - no targets identified for acute or urgent reperfusion therapy.     repeat CT in 24h no bleeding   DAPT x21 days followed by ASA alone  High intensity statin  PT/OT/SLP

## 2025-05-25 NOTE — NURSING
Cardene gtt titration has been done more conservatively due to pt BP change sensitivity to ordered titration rate, as witnessed several times by Caesar MCGILL.    Pt's orientation to x4 by 10PM; still slight delay in response present. By end of shift, pt has regained purposeful movement of all extremities (without drifting). Facial droop absent. Weakness to L extremities compared to R extremities still present.

## 2025-05-25 NOTE — PLAN OF CARE
Problem: Stroke, Ischemic (Includes Transient Ischemic Attack)  Goal: Optimal Coping  Outcome: Progressing  Goal: Optimal Cognitive Function  Outcome: Progressing  Goal: Improved Communication Skills  Outcome: Progressing  Goal: Optimal Functional Ability  Outcome: Progressing  Goal: Effective Oxygenation and Ventilation  Outcome: Progressing  Goal: Improved Sensorimotor Function  Outcome: Progressing  Goal: Safe and Effective Swallow  Outcome: Progressing     Problem: Fall Injury Risk  Goal: Absence of Fall and Fall-Related Injury  Outcome: Progressing     Problem: Infection  Goal: Absence of Infection Signs and Symptoms  Outcome: Progressing

## 2025-05-25 NOTE — CARE UPDATE
Weaning oxygen    05/25/25 0800   PRE-TX-O2   Device (Oxygen Therapy) nasal cannula   Flow (L/min) (Oxygen Therapy) 3   Pulse Oximetry Type Continuous   $ Pulse Oximetry - Multiple Charge Pulse Oximetry - Multiple   Education   $ Education 15 min;Oxygen   Respiratory Evaluation   $ Care Plan Tech Time 15 min        05/25/25 0800   PRE-TX-O2   Device (Oxygen Therapy) nasal cannula   Flow (L/min) (Oxygen Therapy) 3   Pulse Oximetry Type Continuous   $ Pulse Oximetry - Multiple Charge Pulse Oximetry - Multiple   Education   $ Education 15 min;Oxygen   Respiratory Evaluation   $ Care Plan Tech Time 15 min

## 2025-05-25 NOTE — SUBJECTIVE & OBJECTIVE
Interval History: Patient is awake alert and oriented. No overnight events. BP elevated.     Review of Systems  Objective:     Vital Signs (Most Recent):  Temp: 98.1 °F (36.7 °C) (05/25/25 1101)  Pulse: 88 (05/25/25 0800)  Resp: 18 (05/25/25 0800)  BP: (!) 171/71 (05/25/25 0600)  SpO2: 97 % (05/25/25 0800) Vital Signs (24h Range):  Temp:  [98 °F (36.7 °C)-98.2 °F (36.8 °C)] 98.1 °F (36.7 °C)  Pulse:  [67-88] 88  Resp:  [16-33] 18  SpO2:  [89 %-99 %] 97 %  BP: (142-188)/(58-93) 171/71     Weight: 103.7 kg (228 lb 9.9 oz)  Body mass index is 38.04 kg/m².    Intake/Output Summary (Last 24 hours) at 5/25/2025 1310  Last data filed at 5/25/2025 1000  Gross per 24 hour   Intake 1302.11 ml   Output 710 ml   Net 592.11 ml      Physical Exam  Vitals and nursing note reviewed.   Constitutional:       General: She is not in acute distress.     Appearance: She is not toxic-appearing.   HENT:      Head: Atraumatic.      Mouth/Throat:      Mouth: Mucous membranes are moist.      Pharynx: Oropharynx is clear.   Eyes:      General: No scleral icterus.     Conjunctiva/sclera: Conjunctivae normal.      Pupils: Pupils are equal, round, and reactive to light.   Cardiovascular:      Rate and Rhythm: Normal rate and regular rhythm.      Heart sounds: No murmur heard.  Pulmonary:      Effort: No respiratory distress.      Breath sounds: No wheezing, rhonchi or rales.   Abdominal:      General: Abdomen is flat. Bowel sounds are normal.      Palpations: Abdomen is soft.   Musculoskeletal:         General: No swelling or deformity.      Cervical back: No rigidity or tenderness.   Skin:     Coloration: Skin is not jaundiced or pale.      Findings: No bruising, erythema or rash.   Neurological:      General: No focal deficit present.      Mental Status: She is alert and oriented to person, place, and time.      Cranial Nerves: No cranial nerve deficit.      Sensory: No sensory deficit.      Motor: Weakness (right side upper and lower extremity  4/5) present.   Psychiatric:         Mood and Affect: Mood normal.         Behavior: Behavior normal.       Significant Labs: All pertinent labs within the past 24 hours have been reviewed.  CBC:   Recent Labs   Lab 05/24/25 0255 05/25/25 0259   WBC 9.58 11.15   HGB 16.1* 16.0   HCT 47.7 47.7    252     CMP:   Recent Labs   Lab 05/24/25 0255 05/25/25 0259    138   K 3.4* 3.7    98   CO2 28 31*   * 128*   BUN 10 13   CREATININE 0.6 0.8   CALCIUM 9.4 9.7   PROT 7.3 7.5   ALBUMIN 4.2 4.4   BILITOT 0.8 1.0   ALKPHOS 67 66   AST 15 16   ALT 14 15   ANIONGAP 9 9       Significant Imaging: I have reviewed all pertinent imaging results/findings within the past 24 hours.

## 2025-05-25 NOTE — CARE UPDATE
05/24/25 2013   Patient Assessment/Suction   Level of Consciousness (AVPU) alert   Respiratory Effort Unlabored;Normal   Expansion/Accessory Muscles/Retractions expansion symmetric;no retractions;no use of accessory muscles   Rhythm/Pattern, Respiratory no shortness of breath reported;depth regular;pattern regular;unlabored   Cough Frequency no cough   Skin Integrity   $ Wound Care Tech Time 15 min   Area Observed Left;Right;Behind ear;Cheek;Nares   Skin Appearance without discoloration   PRE-TX-O2   Device (Oxygen Therapy) nasal cannula   $ Is the patient on Low Flow Oxygen? Yes   Flow (L/min) (Oxygen Therapy) 4  (weaned from 5L)   SpO2 96 %   Pulse Oximetry Type Continuous   $ Pulse Oximetry - Multiple Charge Pulse Oximetry - Multiple   Pulse 80   Resp 18   Education   $ Education 15 min;Oxygen

## 2025-05-26 LAB
ANION GAP (SMH): 11 MMOL/L (ref 8–16)
BUN SERPL-MCNC: 17 MG/DL (ref 8–23)
CALCIUM SERPL-MCNC: 9.6 MG/DL (ref 8.7–10.5)
CHLORIDE SERPL-SCNC: 100 MMOL/L (ref 95–110)
CO2 SERPL-SCNC: 29 MMOL/L (ref 23–29)
CREAT SERPL-MCNC: 0.7 MG/DL (ref 0.5–1.4)
GFR SERPLBLD CREATININE-BSD FMLA CKD-EPI: >60 ML/MIN/1.73/M2
GLUCOSE SERPL-MCNC: 99 MG/DL (ref 70–110)
MAGNESIUM SERPL-MCNC: 2.1 MG/DL (ref 1.6–2.6)
POTASSIUM SERPL-SCNC: 3.4 MMOL/L (ref 3.5–5.1)
SODIUM SERPL-SCNC: 140 MMOL/L (ref 136–145)

## 2025-05-26 PROCEDURE — 99900035 HC TECH TIME PER 15 MIN (STAT)

## 2025-05-26 PROCEDURE — 25000003 PHARM REV CODE 250

## 2025-05-26 PROCEDURE — 97535 SELF CARE MNGMENT TRAINING: CPT

## 2025-05-26 PROCEDURE — 11000001 HC ACUTE MED/SURG PRIVATE ROOM

## 2025-05-26 PROCEDURE — 25000003 PHARM REV CODE 250: Performed by: INTERNAL MEDICINE

## 2025-05-26 PROCEDURE — 27000221 HC OXYGEN, UP TO 24 HOURS

## 2025-05-26 PROCEDURE — 80048 BASIC METABOLIC PNL TOTAL CA: CPT | Performed by: INTERNAL MEDICINE

## 2025-05-26 PROCEDURE — 99900031 HC PATIENT EDUCATION (STAT)

## 2025-05-26 PROCEDURE — 83735 ASSAY OF MAGNESIUM: CPT | Performed by: INTERNAL MEDICINE

## 2025-05-26 PROCEDURE — 97116 GAIT TRAINING THERAPY: CPT

## 2025-05-26 PROCEDURE — 94761 N-INVAS EAR/PLS OXIMETRY MLT: CPT

## 2025-05-26 RX ORDER — LEVOTHYROXINE SODIUM 100 UG/1
100 TABLET ORAL
Status: DISCONTINUED | OUTPATIENT
Start: 2025-05-27 | End: 2025-05-27 | Stop reason: HOSPADM

## 2025-05-26 RX ORDER — LOSARTAN POTASSIUM 50 MG/1
50 TABLET ORAL DAILY
Status: DISCONTINUED | OUTPATIENT
Start: 2025-05-26 | End: 2025-05-27 | Stop reason: HOSPADM

## 2025-05-26 RX ORDER — AMLODIPINE BESYLATE 5 MG/1
5 TABLET ORAL DAILY
Status: DISCONTINUED | OUTPATIENT
Start: 2025-05-26 | End: 2025-05-27 | Stop reason: HOSPADM

## 2025-05-26 RX ADMIN — MUPIROCIN 1 G: 20 OINTMENT TOPICAL at 08:05

## 2025-05-26 RX ADMIN — ATORVASTATIN CALCIUM 40 MG: 40 TABLET, FILM COATED ORAL at 08:05

## 2025-05-26 RX ADMIN — CLOPIDOGREL 75 MG: 75 TABLET ORAL at 08:05

## 2025-05-26 RX ADMIN — AMLODIPINE BESYLATE 5 MG: 5 TABLET ORAL at 08:05

## 2025-05-26 RX ADMIN — ASPIRIN 81 MG: 81 TABLET, COATED ORAL at 08:05

## 2025-05-26 RX ADMIN — POTASSIUM BICARBONATE 35 MEQ: 391 TABLET, EFFERVESCENT ORAL at 08:05

## 2025-05-26 RX ADMIN — POTASSIUM BICARBONATE 35 MEQ: 391 TABLET, EFFERVESCENT ORAL at 05:05

## 2025-05-26 RX ADMIN — LOSARTAN POTASSIUM 50 MG: 50 TABLET, FILM COATED ORAL at 11:05

## 2025-05-26 NOTE — PLAN OF CARE
Per [place] discussion with [facility] liaison for [facility], MD is still reviewing and they will not be able to accept until tomorrow if clinically accepted       05/26/25 1518   Post-Acute Status   Post-Acute Authorization Placement

## 2025-05-26 NOTE — PLAN OF CARE
Problem: Stroke, Ischemic (Includes Transient Ischemic Attack)  Goal: Optimal Coping  Outcome: Progressing  Goal: Effective Bowel Elimination  Outcome: Progressing  Goal: Optimal Cerebral Tissue Perfusion  Outcome: Progressing  Goal: Optimal Cognitive Function  Outcome: Progressing  Goal: Improved Communication Skills  Outcome: Progressing  Goal: Optimal Functional Ability  Outcome: Progressing  Goal: Optimal Nutrition Intake  Outcome: Progressing  Goal: Effective Oxygenation and Ventilation  Outcome: Progressing  Goal: Improved Sensorimotor Function  Outcome: Progressing  Goal: Safe and Effective Swallow  Outcome: Progressing  Goal: Effective Urinary Elimination  Outcome: Progressing     Problem: Fall Injury Risk  Goal: Absence of Fall and Fall-Related Injury  Outcome: Progressing     Problem: Infection  Goal: Absence of Infection Signs and Symptoms  Outcome: Progressing     Problem: Adult Inpatient Plan of Care  Goal: Plan of Care Review  Outcome: Progressing  Goal: Patient-Specific Goal (Individualized)  Outcome: Progressing  Goal: Absence of Hospital-Acquired Illness or Injury  Outcome: Progressing  Goal: Optimal Comfort and Wellbeing  Outcome: Progressing  Goal: Readiness for Transition of Care  Outcome: Progressing     Problem: Skin Injury Risk Increased  Goal: Skin Health and Integrity  Outcome: Progressing     Problem: Wound  Goal: Optimal Coping  Outcome: Progressing  Goal: Optimal Functional Ability  Outcome: Progressing  Goal: Absence of Infection Signs and Symptoms  Outcome: Progressing  Goal: Improved Oral Intake  Outcome: Progressing  Goal: Optimal Pain Control and Function  Outcome: Progressing  Goal: Skin Health and Integrity  Outcome: Progressing  Goal: Optimal Wound Healing  Outcome: Progressing

## 2025-05-26 NOTE — PLAN OF CARE
Problem: Physical Therapy  Goal: Physical Therapy Goal  Description: Goals to be met by: 25     Patient will increase functional independence with mobility by performin. Supine to sit with [place]  2. Sit to stand transfer with Modified [place]  3. Gait  x 150 feet with Modified [place] using Rolling Walker.     Outcome: Progressing

## 2025-05-26 NOTE — PT/OT/SLP PROGRESS
Physical Therapy Treatment    Patient Name:  Indira Chauhan   MRN:  [id number]    Recommendations:     Discharge Recommendations: High Intensity Therapy  Discharge Equipment Recommendations: to be determined by next level of care  Barriers to discharge: impaired functional mobility    Assessment:     Indira Chauhan is a 69 y.o. female admitted with a medical diagnosis of CVA (cerebral vascular accident).  She presents with the following impairments/functional limitations: weakness, impaired endurance, impaired self care skills, impaired functional mobility, gait instability, impaired balance, decreased upper extremity function, decreased lower extremity function, impaired cardiopulmonary response to activity, decreased coordination, impaired coordination, impaired fine motor. Patient sitting up in chair and is agreeable to participation with PT treatment. R sided strength improved since initial evaluation on 5/24/25. She requires Haylee for sit to stand with RW and VC for hand placement. She ambulated 25' x2 with RW, CGA-Haylee, assistance with RW use, and patient endorsing R sided weakness with ambulation. She returned to chair with SpO2 92-93%, RN notified, and all needs met.      Based on the patient's progress with physical therapy, motivation to participate in treatment, ability to tolerate 3 hours of therapy daily, good family support, and prior level of function she is an excellent candidate for high intensity therapy to maximize her functional potential prior to return home. Without high intensity therapy intervention, the patient is at increased risk of further functional decline, increased fall risk, and high likelihood of hospital readmission. The patient qualifies for high intensity therapy under Medicare guidelines and a lower level of care cannot meet the patient's needs.          Rehab Prognosis: Good; patient would benefit from acute skilled PT services to address these deficits and reach maximum level of  normal mood with appropriate affect function.    Recent Surgery: * No surgery found *      Plan:     During this hospitalization, patient to be seen 6 x/week to address the identified rehab impairments via gait training, therapeutic activities, therapeutic exercises, neuromuscular re-education and progress toward the following goals:    Plan of Care Expires:  06/24/25    Subjective     Chief Complaint: none  Patient/Family Comments/goals: agreeable to placement in hopes to get back to PLOF  Pain/Comfort:  Pain Rating 1: 0/10      Objective:     Communicated with ARTEM Kim prior to session.  Patient found up in chair with blood pressure cuff, pulse ox (continuous), telemetry upon PT entry to room.     General Precautions: Standard, fall  Orthopedic Precautions: N/A  Braces: N/A  Respiratory Status: Room air     Functional Mobility:  Transfers:     Sit to Stand:  minimum assistance with rolling walker  Gait: 25' x2 with RW, CGA-Haylee, assistance with RW use, and patient endorsing R sided weakness with ambulation      AM-PAC 6 CLICK MOBILITY  Turning over in bed (including adjusting bedclothes, sheets and blankets)?: 3  Sitting down on and standing up from a chair with arms (e.g., wheelchair, bedside commode, etc.): 3  Moving from lying on back to sitting on the side of the bed?: 3  Moving to and from a bed to a chair (including a wheelchair)?: 3  Need to walk in hospital room?: 3  Climbing 3-5 steps with a railing?: 2  Basic Mobility Total Score: 17       Treatment & Education:  Patient was educated on the importance of OOB activity and functional mobility to negate negative effects of prolonged bed rest during hospitalization, safe transfers and ambulation, and D/C planning     Patient left up in chair with all lines intact, call button in reach, and RN notified..    GOALS:   Multidisciplinary Problems       Physical Therapy Goals          Problem: Physical Therapy    Goal Priority Disciplines Outcome Interventions   Physical Therapy Goal     PT, PT/OT  Progressing    Description: Goals to be met by: 25     Patient will increase functional independence with mobility by performin. Supine to sit with [place]  2. Sit to stand transfer with Modified [place]  3. Gait  x 150 feet with Modified [place] using Rolling Walker.                          DME Justifications:  TBD at next level of care    Time Tracking:     PT Received On: 25  PT Start Time: 1017     PT Stop Time: 1032  PT Total Time (min): 15 min     Billable Minutes: Gait Training 15    Treatment Type: Treatment  PT/PTA: PT     Number of PTA visits since last PT visit: 0     2025

## 2025-05-26 NOTE — PT/OT/SLP PROGRESS
Occupational Therapy   Treatment    Name: Indira Chauhan  MRN: [id number]  Admitting Diagnosis:  CVA (cerebral vascular accident)       Recommendations:     Discharge Recommendations: High Intensity Therapy  Discharge Equipment Recommendations:   (TBD)  Barriers to discharge:  none    Based on the patient's progress with occupational therapy, motivation to participate in treatment, good family support, and prior level of function they are an excellent candidate for high intensity therapy and they would benefit from admission to inpatient rehab to maximize their functional potential prior to return home. Without high intensity therapy intervention, the patient is at increased risk of further functional decline, increased fall risk, and high likelihood of hospital readmission. The patient qualifies for high intensity therapy under Medicare guidelines and a lower level of care cannot meet the patient's needs.           Assessment:     Indira Chauhan is a 69 y.o. female with a medical diagnosis of CVA (cerebral vascular accident).   Performance deficits affecting function are weakness, impaired endurance, impaired self care skills, impaired functional mobility, gait instability, impaired balance, decreased safety awareness, decreased lower extremity function, decreased upper extremity function.     Rehab Prognosis:  Good; patient would benefit from acute skilled OT services to address these deficits and reach maximum level of function.       Plan:     Patient to be seen 6 x/week to address the above listed problems via self-care/home management, therapeutic activities, therapeutic exercises  Plan of Care Expires: 06/24/25  Plan of Care Reviewed with: patient, significant other    Subjective     Chief Complaint: r sided weakness, aphasia  Patient/Family Comments/goals: return to PLOF  Pain/Comfort:  Pain Rating 1: 0/10    Objective:     Communicated with: nurse prior to session.  Patient found up in chair with blood  pressure cuff, pulse ox (continuous), telemetry, peripheral IV, oxygen upon OT entry to room.    General Precautions: Standard, fall    Orthopedic Precautions:N/A  Braces: N/A  Respiratory Status: Room air upon entry, 2L NC post activity     Occupational Performance:         Functional Mobility/Transfers:  Patient completed Sit <> Stand Transfer with contact guard assistance  with  rolling walker   Functional Mobility: ambulated to sink and around room with RW CGA    Activities of Daily Living:  Grooming: stand by assistance for oral care, applying deodorant, and washing face in standing at sink.      [facility] 6 Click ADL:      Treatment & Education:  Therapist provided facilitation and instruction of proper body mechanics and fall prevention strategies during tasks listed above.   Instructed patient to sit in bedside chair as tolerated daily to increase OOB/activity tolerance.   Instructed patient to use call light to have nursing staff assist with needs/transfers.   Discussed OT POC and answered all questions within OT scope of practice.        Patient left up in chair with all lines intact, call button in reach, and sig other present    GOALS:   Multidisciplinary Problems       Occupational Therapy Goals          Problem: Occupational Therapy    Goal Priority Disciplines Outcome Interventions   Occupational Therapy Goal     OT, PT/OT     Description: Goals to be met by: 6/24/2025     Patient will increase functional independence with ADLs by performing:    UE Dressing with Supervision.  LE Dressing with Supervision.  Grooming while seated with Supervision.  Toileting from toilet with Supervision for hygiene and clothing management.   Toilet transfer to toilet with Supervision.  Increased functional strength of RUE to 5/5 for ADLs.                             Time Tracking:     OT Date of Treatment: 05/26/25  OT Start Time: 1437  OT Stop Time: 1455  OT Total Time (min): 18 min    Billable Minutes:Self Care/Home  Management 18    OT/SLIME: OT          5/26/2025

## 2025-05-26 NOTE — PLAN OF CARE
IMM discussed with and given to pt       05/26/25 1333   Medicare Message   Important Message from Medicare regarding Discharge Appeal Rights Given to patient/caregiver;Explained to patient/caregiver;Signed/date by patient/caregiver   Date IMM was signed 05/26/25   Time IMM was signed [id number]

## 2025-05-26 NOTE — NURSING TRANSFER
Nursing Transfer Note      5/26/2025   4:50 PM    Nurse giving handoff:Jon MCGILL  Nurse receiving handoff:Deedee MCGRAW    Reason patient is being transferred: Lower level of care    Transfer From: ICU    Transfer via wheelchair    Transfer with None    Transported by Nurse    Transfer Vital Signs:  Blood Pressure:119/67  Heart Rate:90  O2:96 on RA  Temperature:97.9  Respirations:17    Telemetry: Box Number [id number], Rate 90, and Rhythm NSR  Order for Tele Monitor? Yes    Additional Lines: none    Medicines sent: no    Any special needs or follow-up needed: Neuro check and NIH    Patient belongings transferred with patient: Yes    Chart send with patient: Yes    Notified: spouse at bedside.         Upon arrival to floor: cardiac monitor applied, patient oriented to room, call bell in reach, and bed in lowest position. NIH 0 Neuro check complete. Waffle mattress applied. Skin assessment complete. Pt brought dinner up during transfer. Pt denies any needs at this time. Instructed pt to call for assistance. Bed alarm on.

## 2025-05-26 NOTE — PLAN OF CARE
CM met with pt and SO and gave pt preference list.     Met with pt and SO Bill to review discharge recommendation of high intensity and they are agreeable to plan.    Patient/family provided with a list of facilities in-network with patient's payor plan. Providers that are owned, operated, or affiliated with [facility] are included on the list.     Notified that referrals will be sent to the below listed facilities from in-network list based on proximity to home/family support:   [facility]  2.  3.  4.  5. (can send more than 5)    Patient/family instructed to identify preference.    Preferred Facility: (if more than 1, listed in order of descending preference)  [facility]    If an additional preferred facility not listed above is identified, additional referral to be sent. If above facilities unable to accept, will send additional referrals to in-network providers.         05/26/25 1053   Discharge Reassessment   Assessment Type Discharge Planning Reassessment   Did the patient's condition or plan change since previous assessment? Yes   Discharge Plan discussed with: Spouse/sig other;Patient   Name(s) and Number(s) Olszewski,Bill (Friend)  [phone]   Communicated BYRON with patient/caregiver Yes   Discharge Plan A Rehab   Discharge Plan B Rehab   DME Needed Upon Discharge  none  (RW after rehab possibly)   Transition of Care Barriers None   Why the patient remains in the hospital Requires continued medical care   Post-Acute Status   Post-Acute Authorization Placement   Post-Acute Placement Status Referrals Sent   Discharge Delays None known at this time

## 2025-05-26 NOTE — PROGRESS NOTES
[facility] Medicine  Progress Note    Patient Name: Indira Chauhan  MRN: [id number]  Patient Class: IP- Inpatient   Admission Date: 5/23/2025  Length of Stay: 3 days  Attending Physician: Tereso Farah MD  Primary Care Provider: Alesia, Primary Doctor        Subjective     Principal Problem:CVA (cerebral vascular accident)        HPI:  This is a 69-year-old lady with comorbid conditions of prior history of lymphoma status post R-CHOP x6, hypertension, hyperlipidemia, chronic tobacco use who presents to emergency department with complaints of right hemiparesis.  Vascular Neurology is consulted and due to patient being within the window of last known well recommendation was made to administer tenecteplase.  Patient is now status post TNK.  On my assessment, unfortunately patient is markedly aphasic and not able to participate verbally.  Per nurse who is bedside her right  strength which is about a 4/5 for me is an improvement from prior.  Patient unable to lift her right arm or leg off of the bed.  Also per nurse patient has been roughly 1 year without any medications and overall poor longitudinal follow up with her primary care provider.  Blood pressure goal 160s to 180s systolic with the instructions to only treat blood pressure if systolic is greater than 180 or diastolic is greater than 90 in his time post TNK.  Admission to the ICU for observation 24 hours post TNK with repeat imaging tomorrow.  MRI still pending.  No large vessel occlusion, however, on independent assessment it appears as that patient has a distal MCA infarct.    Overview/Hospital Course:  Patient is a 69 year old female with HTN, HLD, hypothyroidism and diffuse B cell Lymphoma, s/p RCHOP was admitted with right sided weakness. Patient was admitted with in the window and TNK was given. MRI showed Acute/subacute lacunar infarction involving the left corona radiata and left frontal subcortical white matter near the  apex on the left. Rpt CT pending post TNK no bleeding. Home antihypertensives started. Norvasc 5 mg and Losartan 50 mg added.     Interval History: Patient is feeling well, no new complains or overnight events. Her BP dropped after 10 mg Norvasc yesterday, 5 mg planned for today. BP elevated this morning, better now.     Review of Systems  Objective:     Vital Signs (Most Recent):  Temp: 98.7 °F (37.1 °C) (05/26/25 0330)  Pulse: 83 (05/26/25 0706)  Resp: (!) 29 (05/26/25 0706)  BP: (!) 141/95 (05/26/25 1105)  SpO2: 96 % (05/26/25 0706) Vital Signs (24h Range):  Temp:  [97.9 °F (36.6 °C)-98.7 °F (37.1 °C)] 98.7 °F (37.1 °C)  Pulse:  [70-99] 83  Resp:  [15-38] 29  SpO2:  [88 %-98 %] 96 %  BP: ()/(45-95) 141/95     Weight: 103.7 kg (228 lb 9.9 oz)  Body mass index is 38.04 kg/m².    Intake/Output Summary (Last 24 hours) at 5/26/2025 1156  Last data filed at 5/26/2025 0645  Gross per 24 hour   Intake 720 ml   Output 250 ml   Net 470 ml         Physical Exam  Vitals and nursing note reviewed.   Constitutional:       General: She is not in acute distress.     Appearance: She is not toxic-appearing.   HENT:      Head: Atraumatic.      Mouth/Throat:      Mouth: Mucous membranes are moist.      Pharynx: Oropharynx is clear.   Eyes:      General: No scleral icterus.     Conjunctiva/sclera: Conjunctivae normal.      Pupils: Pupils are equal, round, and reactive to light.   Cardiovascular:      Rate and Rhythm: Normal rate and regular rhythm.      Heart sounds: No murmur heard.  Pulmonary:      Effort: No respiratory distress.      Breath sounds: No wheezing, rhonchi or rales.   Abdominal:      General: Abdomen is flat. Bowel sounds are normal.      Palpations: Abdomen is soft.   Musculoskeletal:         General: No swelling or deformity.      Cervical back: No rigidity or tenderness.   Skin:     Coloration: Skin is not jaundiced or pale.      Findings: No bruising, erythema or rash.   Neurological:      General: No focal  deficit present.      Mental Status: She is alert and oriented to person, place, and time.      Cranial Nerves: No cranial nerve deficit.      Sensory: No sensory deficit.      Motor: Weakness (right side upper and lower extremity 4/5) present.   Psychiatric:         Mood and Affect: Mood normal.         Behavior: Behavior normal.          Significant Labs: All pertinent labs within the past 24 hours have been reviewed.  CBC:   Recent Labs   Lab 05/25/25  0259   WBC 11.15   HGB 16.0   HCT 47.7        CMP:   Recent Labs   Lab 05/25/25 0259 05/26/25  0348    140   K 3.7 3.4*   CL 98 100   CO2 31* 29   * 99   BUN 13 17   CREATININE 0.8 0.7   CALCIUM 9.7 9.6   PROT 7.5  --    ALBUMIN 4.4  --    BILITOT 1.0  --    ALKPHOS 66  --    AST 16  --    ALT 15  --    ANIONGAP 9 11       Significant Imaging: I have reviewed all pertinent imaging results/findings within the past 24 hours.      Assessment & Plan  CVA (cerebral vascular accident)  P/w R hemiparesis  MRI: Acute/subacute lacunar infarction involving the left corona radiata and left frontal subcortical white matter near the apex on the left.   S/p TNK     CTA negative for any LVO or MeVO in the pertinent vascular territory of interest - no targets identified for acute or urgent reperfusion therapy.     repeat CT in 24h no bleeding   DAPT x21 days followed by ASA alone  High intensity statin  PT/OT/SLP      Mixed hyperlipidemia  statin    Acquired hypothyroidism  Resume synthroid    Essential hypertension  Patient's blood pressure range in the last 24 hours was: BP  Min: 95/45  Max: 188/79.The patient's inpatient anti-hypertensive regimen is listed below:      PRN hydralazine  Norvasc 5 mg   Added losartan 50 mg   Tobacco use  Counseling at this time difficult due to aphasia    Diffuse large B-cell lymphoma of lymph nodes of neck  She was diagnosed in October 2017. She underwent chemotherapy with RCHOPx 6   Seems to have been lost to ongoing follow  up with oncologist    VTE Risk Mitigation (From admission, onward)           Ordered     IP VTE HIGH RISK PATIENT  Once         05/23/25 1330     Place sequential compression device  Until discontinued         05/23/25 1330                    Discharge Planning   BYRON: 5/27/2025     Code Status: Full Code   Medical Readiness for Discharge Date:   Discharge Plan A: Rehab   Discharge Delays: None known at this time              Tereso Farah MD  Department of Hospital Medicine   [facility]

## 2025-05-26 NOTE — SUBJECTIVE & OBJECTIVE
Interval History: Patient is feeling well, no new complains or overnight events. Her BP dropped after 10 mg Norvasc yesterday, 5 mg planned for today. BP elevated this morning, better now.     Review of Systems  Objective:     Vital Signs (Most Recent):  Temp: 98.7 °F (37.1 °C) (05/26/25 0330)  Pulse: 83 (05/26/25 0706)  Resp: (!) 29 (05/26/25 0706)  BP: (!) 141/95 (05/26/25 1105)  SpO2: 96 % (05/26/25 0706) Vital Signs (24h Range):  Temp:  [97.9 °F (36.6 °C)-98.7 °F (37.1 °C)] 98.7 °F (37.1 °C)  Pulse:  [70-99] 83  Resp:  [15-38] 29  SpO2:  [88 %-98 %] 96 %  BP: ()/(45-95) 141/95     Weight: 103.7 kg (228 lb 9.9 oz)  Body mass index is 38.04 kg/m².    Intake/Output Summary (Last 24 hours) at 5/26/2025 1156  Last data filed at 5/26/2025 0645  Gross per 24 hour   Intake 720 ml   Output 250 ml   Net 470 ml         Physical Exam  Vitals and nursing note reviewed.   Constitutional:       General: She is not in acute distress.     Appearance: She is not toxic-appearing.   HENT:      Head: Atraumatic.      Mouth/Throat:      Mouth: Mucous membranes are moist.      Pharynx: Oropharynx is clear.   Eyes:      General: No scleral icterus.     Conjunctiva/sclera: Conjunctivae normal.      Pupils: Pupils are equal, round, and reactive to light.   Cardiovascular:      Rate and Rhythm: Normal rate and regular rhythm.      Heart sounds: No murmur heard.  Pulmonary:      Effort: No respiratory distress.      Breath sounds: No wheezing, rhonchi or rales.   Abdominal:      General: Abdomen is flat. Bowel sounds are normal.      Palpations: Abdomen is soft.   Musculoskeletal:         General: No swelling or deformity.      Cervical back: No rigidity or tenderness.   Skin:     Coloration: Skin is not jaundiced or pale.      Findings: No bruising, erythema or rash.   Neurological:      General: No focal deficit present.      Mental Status: She is alert and oriented to person, place, and time.      Cranial Nerves: No cranial nerve  deficit.      Sensory: No sensory deficit.      Motor: Weakness (right side upper and lower extremity 4/5) present.   Psychiatric:         Mood and Affect: Mood normal.         Behavior: Behavior normal.          Significant Labs: All pertinent labs within the past 24 hours have been reviewed.  CBC:   Recent Labs   Lab 05/25/25  0259   WBC 11.15   HGB 16.0   HCT 47.7        CMP:   Recent Labs   Lab 05/25/25 0259 05/26/25  0348    140   K 3.7 3.4*   CL 98 100   CO2 31* 29   * 99   BUN 13 17   CREATININE 0.8 0.7   CALCIUM 9.7 9.6   PROT 7.5  --    ALBUMIN 4.4  --    BILITOT 1.0  --    ALKPHOS 66  --    AST 16  --    ALT 15  --    ANIONGAP 9 11       Significant Imaging: I have reviewed all pertinent imaging results/findings within the past 24 hours.

## 2025-05-26 NOTE — ASSESSMENT & PLAN NOTE
Patient's blood pressure range in the last 24 hours was: BP  Min: 95/45  Max: 188/79.The patient's inpatient anti-hypertensive regimen is listed below:      PRN hydralazine  Norvasc 5 mg   Added losartan 50 mg

## 2025-05-26 NOTE — PLAN OF CARE
Problem: Stroke, Ischemic (Includes Transient Ischemic Attack)  Goal: Optimal Coping  Outcome: Progressing  Goal: Effective Bowel Elimination  Outcome: Progressing  Goal: Optimal Cerebral Tissue Perfusion  Outcome: Progressing  Goal: Optimal Cognitive Function  Outcome: Progressing  Goal: Improved Communication Skills  Outcome: Progressing  Goal: Optimal Functional Ability  Outcome: Progressing  Goal: Optimal Nutrition Intake  Outcome: Progressing  Goal: Effective Oxygenation and Ventilation  Outcome: Progressing  Goal: Improved Sensorimotor Function  Outcome: Progressing  Goal: Safe and Effective Swallow  Outcome: Progressing  Goal: Effective Urinary Elimination  Outcome: Progressing

## 2025-05-26 NOTE — CARE UPDATE
05/25/25 2031   Patient Assessment/Suction   Level of Consciousness (AVPU) alert   Respiratory Effort Unlabored   Expansion/Accessory Muscles/Retractions expansion symmetric   All Lung Fields Breath Sounds clear;diminished   Rhythm/Pattern, Respiratory pattern regular;depth regular   Cough Frequency infrequent   Cough Type good;nonproductive   Skin Integrity   $ Wound Care Tech Time 15 min   Area Observed Left;Right;Behind ear;Cheek;Upper lip;Nares   Skin Appearance without discoloration   PRE-TX-O2   Device (Oxygen Therapy) nasal cannula   $ Is the patient on Low Flow Oxygen? Yes   Flow (L/min) (Oxygen Therapy) 3   SpO2 (!) 93 %   Pulse Oximetry Type Continuous   $ Pulse Oximetry - Multiple Charge Pulse Oximetry - Multiple   Pulse 82   Resp (!) 22   Education   $ Education Oxygen;15 min

## 2025-05-27 VITALS
HEART RATE: 83 BPM | RESPIRATION RATE: 18 BRPM | BODY MASS INDEX: 38.09 KG/M2 | HEIGHT: 65 IN | TEMPERATURE: 98 F | SYSTOLIC BLOOD PRESSURE: 131 MMHG | WEIGHT: 228.63 LBS | OXYGEN SATURATION: 95 % | DIASTOLIC BLOOD PRESSURE: 66 MMHG

## 2025-05-27 PROCEDURE — 99900035 HC TECH TIME PER 15 MIN (STAT)

## 2025-05-27 PROCEDURE — 97116 GAIT TRAINING THERAPY: CPT

## 2025-05-27 PROCEDURE — 97535 SELF CARE MNGMENT TRAINING: CPT

## 2025-05-27 PROCEDURE — 25000003 PHARM REV CODE 250: Performed by: INTERNAL MEDICINE

## 2025-05-27 PROCEDURE — 94761 N-INVAS EAR/PLS OXIMETRY MLT: CPT

## 2025-05-27 PROCEDURE — 25000003 PHARM REV CODE 250

## 2025-05-27 RX ORDER — ENOXAPARIN SODIUM 100 MG/ML
40 INJECTION SUBCUTANEOUS EVERY 24 HOURS
Status: DISCONTINUED | OUTPATIENT
Start: 2025-05-27 | End: 2025-05-27 | Stop reason: HOSPADM

## 2025-05-27 RX ORDER — ATORVASTATIN CALCIUM 40 MG/1
40 TABLET, FILM COATED ORAL DAILY
Qty: 180 TABLET | Refills: 0 | Status: ON HOLD | OUTPATIENT
Start: 2025-05-28 | End: 2025-11-24

## 2025-05-27 RX ORDER — ASPIRIN 81 MG/1
81 TABLET ORAL DAILY
Qty: 180 TABLET | Refills: 0 | Status: ON HOLD | OUTPATIENT
Start: 2025-05-28 | End: 2026-05-28

## 2025-05-27 RX ORDER — CLOPIDOGREL BISULFATE 75 MG/1
75 TABLET ORAL DAILY
Qty: 21 TABLET | Refills: 0 | Status: ON HOLD | OUTPATIENT
Start: 2025-05-28 | End: 2025-06-18

## 2025-05-27 RX ORDER — DOXYLAMINE SUCCINATE 25 MG
TABLET ORAL 2 TIMES DAILY
Status: DISCONTINUED | OUTPATIENT
Start: 2025-05-27 | End: 2025-05-27 | Stop reason: HOSPADM

## 2025-05-27 RX ADMIN — ASPIRIN 81 MG: 81 TABLET, COATED ORAL at 08:05

## 2025-05-27 RX ADMIN — MUPIROCIN 1 G: 20 OINTMENT TOPICAL at 08:05

## 2025-05-27 RX ADMIN — LOSARTAN POTASSIUM 50 MG: 50 TABLET, FILM COATED ORAL at 08:05

## 2025-05-27 RX ADMIN — LEVOTHYROXINE SODIUM 100 MCG: 100 TABLET ORAL at 05:05

## 2025-05-27 RX ADMIN — CLOPIDOGREL 75 MG: 75 TABLET ORAL at 08:05

## 2025-05-27 RX ADMIN — ACETAMINOPHEN, DEXTROMETHORPHAN HYDROBROMIDE, GUAIFENESIN, AND PHENYLEPHRINE HYDROCHLORIDE: 325; 10; 200; 5 TABLET, COATED ORAL at 09:05

## 2025-05-27 RX ADMIN — AMLODIPINE BESYLATE 5 MG: 5 TABLET ORAL at 08:05

## 2025-05-27 RX ADMIN — ATORVASTATIN CALCIUM 40 MG: 40 TABLET, FILM COATED ORAL at 08:05

## 2025-05-27 NOTE — RESPIRATORY THERAPY
05/26/25 2050   Patient Assessment/Suction   Level of Consciousness (AVPU) alert   PRE-TX-O2   Device (Oxygen Therapy) room air   SpO2 (!) 94 %   Pulse Oximetry Type Intermittent   $ Pulse Oximetry - Multiple Charge Pulse Oximetry - Multiple   Education   $ Education 15 min   Respiratory Evaluation   $ Care Plan Tech Time 15 min

## 2025-05-27 NOTE — CONSULTS
[facility]  Wound Care    Patient Name:  Indira Chauhan   MRN:  [id number]  Date: 5/27/2025  Diagnosis: CVA (cerebral vascular accident)    History:     Past Medical History:   Diagnosis Date    Cancer     Diffuse large B cell lymphoma     GERD (gastroesophageal reflux disease)     Hyperlipidemia     Hypertension     Hypothyroidism        Social History[1]    Precautions:     Allergies as of 05/23/2025    (No Known Allergies)       WOC Assessment Details/Treatment     Patient with MASD to areas beneath breasts, bilateral groins, abd fold, and inner buttock. Patient with minimal skin irritation from this moisture. Cleaned areas and applied a thin layer of Triad. Right foot is noted with fungal pattern rash and peeling skin to the right foot. Left foot with small area of flaky skin to the toes but otherwise intact. Triad to MASD areas every shift. Patient would benefit from miconazole cream to right foot bid. Will request orders from [facility] medicine.      05/26/25 1907   WOCN Assessment   WOCN Total Time (mins) 20   Visit Date 05/26/25   Visit Time 1730   Consult Type New   Wound moisture   Intervention assessed;applied;chart review;coordination of care;team conference;orders   Teaching on-going            [1]   Social History  Socioeconomic History    Marital status:    Occupational History    Occupation: retired   Tobacco Use    Smoking status: Every Day     Current packs/day: 1.00     Average packs/day: 1 pack/day for 50.0 years (50.0 ttl pk-yrs)     Types: Cigarettes    Smokeless tobacco: Never   Substance and Sexual Activity    Alcohol use: Yes     Alcohol/week: 3.0 standard drinks of alcohol     Types: 3 Shots of liquor per week     Comment: social 3/3/2018    Drug use: No    Sexual activity: Yes     Partners: Male     Birth control/protection: Post-menopausal   Social History Narrative    Retired hairdresser     Social Drivers of Health     Financial Resource Strain: Low Risk  (5/25/2025)     Overall Financial Resource Strain (CARDIA)     Difficulty of Paying Living Expenses: Not hard at all   Food Insecurity: No Food Insecurity (5/25/2025)    Hunger Vital Sign     Worried About Running Out of Food in the Last Year: Never true     Ran Out of Food in the Last Year: Never true   Transportation Needs: No Transportation Needs (5/25/2025)    PRAPARE - Transportation     Lack of Transportation (Medical): No     Lack of Transportation (Non-Medical): No   Physical Activity: Unknown (6/14/2022)    Exercise Vital Sign     Days of Exercise per Week: Patient declined   Stress: No Stress Concern Present (5/25/2025)    [employer] - Occupational Stress Questionnaire     Feeling of Stress : Not at all   Housing Stability: Low Risk  (5/25/2025)    Housing Stability Vital Sign     Unable to Pay for Housing in the Last Year: No     Homeless in the Last Year: No

## 2025-05-27 NOTE — PT/OT/SLP PROGRESS
Speech Language Pathology      Indira Chauhan  MRN: [id number]    Patient not seen today secondary to Patient discharged prior to initiation of evaluation.

## 2025-05-27 NOTE — PLAN OF CARE
Problem: Occupational Therapy  Goal: Occupational Therapy Goal  Description: Goals to be met by: 6/24/2025     Patient will increase functional independence with ADLs by performing:    UE Dressing with Supervision.  LE Dressing with Supervision.  Grooming while seated with Supervision.  Toileting from toilet with Supervision for hygiene and clothing management.   Toilet transfer to toilet with Supervision.  Increased functional strength of RUE to 5/5 for ADLs.    Outcome: Progressing

## 2025-05-27 NOTE — DISCHARGE INSTRUCTIONS
[facility]    FACILITY TRANSFER ORDERS      Patient Name: Indira Chauhan  YOB: 1955    PCP: No, Primary Doctor   PCP Address: None  PCP Phone Number: None  PCP Fax: None    Encounter Date: 05/27/2025    Admit to: [facility]     Vital Signs:  Routine    Diagnoses:   Active Hospital Problems    Diagnosis  POA    *CVA (cerebral vascular accident) [I63.9]  Yes    Diffuse large B-cell lymphoma of lymph nodes of neck [C83.31]  Yes    Essential hypertension [I10]  Yes    Tobacco use [Z72.0]  Yes    Mixed hyperlipidemia [E78.2]  Yes    Acquired hypothyroidism [E03.9]  Yes      Resolved Hospital Problems   No resolved problems to display.       Allergies:Review of patient's allergies indicates:  No Known Allergies    Diet: low fat, low cholesterol diet    Activities: Activity as tolerated    Goals of Care Treatment Preferences:  Code Status: Full Code      Nursing: vitals per routine     Labs: none    CONSULTS:    Physical Therapy to evaluate and treat. , Occupational Therapy to evaluate and treat., and Speech Therapy to evaluate and treat for Language.    MISCELLANEOUS CARE: none      WOUND CARE ORDERS  None    Medications: Review discharge medications with patient and family and provide education.         Medication List        START taking these medications      aspirin 81 MG EC tablet  Commonly known as: ECOTRIN  Take 1 tablet (81 mg total) by mouth once daily.  Start taking on: May 28, 2025     atorvastatin 40 MG tablet  Commonly known as: LIPITOR  Take 1 tablet (40 mg total) by mouth once daily.  Start taking on: May 28, 2025     clopidogreL 75 mg tablet  Commonly known as: PLAVIX  Take 1 tablet (75 mg total) by mouth once daily. for 21 days  Start taking on: May 28, 2025            CONTINUE taking these medications      amLODIPine-benazepriL 5-40 mg per capsule  Commonly known as: LOTREL  TAKE 1 CAPSULE BY MOUTH EVERY DAY     levothyroxine 150 MCG tablet  Commonly known as:  SYNTHROID  TAKE 1 TABLET (150 MCG TOTAL) BY MOUTH ONCE DAILY.                Immunizations Administered as of 5/27/2025       Name Date Dose VIS Date Route Exp Date    COVID-19, MRNA, LN-S, PF (Pfizer) (Purple Cap) 9/1/2021 10:48 AM 0.3 mL 12/12/2020 Intramuscular 10/31/2021    Site: Right deltoid     Given By: Mary Jane Vaz LPN     : [employer]     Lot: [id number]     COVID-19, MRNA, LN-S, PF (Pfizer) (Purple Cap) 8/11/2021 10:57 AM 0.3 mL 12/12/2020 Intramuscular 10/31/2021    Site: Right deltoid     Given By: Mary Jane Vaz LPN     : [employer]     Lot: [id number]               .

## 2025-05-27 NOTE — CARE UPDATE
05/27/25 0657   PRE-TX-O2   Device (Oxygen Therapy) room air   SpO2 (!) 93 %   Pulse Oximetry Type Intermittent   $ Pulse Oximetry - Multiple Charge Pulse Oximetry - Multiple   Pulse 86   Resp 15   Respiratory Evaluation   $ Care Plan Tech Time 15 min

## 2025-05-27 NOTE — PT/OT/SLP PROGRESS
Physical Therapy Treatment    Patient Name:  Indira Chauhan   MRN:  [id number]    Recommendations:     Discharge Recommendations: High Intensity Therapy  Discharge Equipment Recommendations: walker, rolling  Barriers to discharge: medical status    Assessment:     Indira Chahuan is a 69 y.o. female admitted with a medical diagnosis of CVA (cerebral vascular accident).  She presents with the following impairments/functional limitations: weakness, impaired endurance, impaired self care skills, impaired functional mobility, gait instability, impaired balance, impaired cognition, decreased lower extremity function, decreased safety awareness, impaired cardiopulmonary response to activity, impaired coordination Patient found UIC and agreed to PT session. Patient required CGA for sit to stand up to RW for two trials with cues for hand placement and technique. Patient ambulated 50ft x2 with RW and CGA. Patient with right foot supination and lacking right toe off. Patient returned to sit UIC with all lines intact and needs met.     Rehab Prognosis: Good; patient would benefit from acute skilled PT services to address these deficits and reach maximum level of function.    Recent Surgery: * No surgery found *      Plan:     During this hospitalization, patient to be seen 6 x/week to address the identified rehab impairments via gait training, therapeutic activities, therapeutic exercises, neuromuscular re-education and progress toward the following goals:    Plan of Care Expires:  06/24/25    Subjective     Chief Complaint: ok and little pain at right foot 3/10  Patient/Family Comments/goals: to get stronger  Pain/Comfort:         Objective:     Communicated with nurse prior to session.  Patient found up in chair with blood pressure cuff, pulse ox (continuous), telemetry, peripheral IV, chair check upon PT entry to room.     General Precautions: Standard, fall  Orthopedic Precautions: N/A  Braces: N/A  Respiratory Status: Room  air     Functional Mobility:  Transfers:     Sit to Stand:  contact guard assistance with rolling walker  Gait: 50ft x2 with RW CGA      AM-PAC 6 CLICK MOBILITY  Turning over in bed (including adjusting bedclothes, sheets and blankets)?: 3  Sitting down on and standing up from a chair with arms (e.g., wheelchair, bedside commode, etc.): 3  Moving from lying on back to sitting on the side of the bed?: 3  Moving to and from a bed to a chair (including a wheelchair)?: 3  Need to walk in hospital room?: 3  Climbing 3-5 steps with a railing?: 3  Basic Mobility Total Score: 18       Treatment & Education:  Pt educated on POC, discharge recommendation, need for assist with mobility, use of call bell to seek assistance as needed and fall prevention      Patient left up in chair with all lines intact, call button in reach, chair alarm on, and spouse present..    GOALS:   Multidisciplinary Problems       Physical Therapy Goals          Problem: Physical Therapy    Goal Priority Disciplines Outcome Interventions   Physical Therapy Goal     PT, PT/OT Progressing    Description: Goals to be met by: 25     Patient will increase functional independence with mobility by performin. Supine to sit with [place]  2. Sit to stand transfer with Modified [place]  3. Gait  x 150 feet with Modified [place] using Rolling Walker.                          DME Justifications:   Indira's mobility limitation cannot be sufficiently resolved by the use of a cane. Her functional mobility deficit can be sufficiently resolved with the use of a Rolling Walker. Patient's mobility limitation significantly impairs their ability to participate in one of more activities of daily living.  The use of a RW will significantly improve the patient's ability to participate in MRADLS and the patient will use it on regular basis in the home.    Time Tracking:     PT Received On: 25  PT Start Time: 957     PT Stop Time: 1010  PT Total  Time (min): 13 min     Billable Minutes: Gait Training 13    Treatment Type: Treatment  PT/PTA: PT     Number of PTA visits since last PT visit: 0     05/27/2025

## 2025-05-27 NOTE — ASSESSMENT & PLAN NOTE
Patient's blood pressure range in the last 24 hours was: BP  Min: 116/68  Max: 151/86.The patient's inpatient anti-hypertensive regimen is listed below:      PRN hydralazine  Norvasc 5 mg   Added losartan 50 mg

## 2025-05-27 NOTE — PLAN OF CARE
Charts and orders reviewed. Pt to discharge NSR.  Pt has no other needs to be addressed by case management. Pt cleared to discharge from case management standpoint. PENDING ECHO RESULTS    NSR will be transporting pt to facility from [facility].        05/27/25 1305   Final Note   Assessment Type Final Discharge Note   Anticipated Discharge Disposition Rehab   What phone number can be called within the next 1-3 days to see how you are doing after discharge? [phone]   Post-Acute Status   Post-Acute Authorization Placement   Post-Acute Placement Status Set-up Complete/Auth obtained   Patient choice form signed by patient/caregiver List with quality metrics by geographic area provided   Discharge Delays None known at this time

## 2025-05-27 NOTE — PT/OT/SLP PROGRESS
Occupational Therapy   Treatment    Name: Indira Chauhan  MRN: [id number]  Admitting Diagnosis:  CVA (cerebral vascular accident)       Recommendations:     Discharge Recommendations: High Intensity Therapy  Discharge Equipment Recommendations:   (TBD)  Barriers to discharge:       Assessment:     Indira Chauhan is a 69 y.o. female with a medical diagnosis of CVA (cerebral vascular accident).  Pt agreeable to therapy session this AM. Performance deficits affecting function are weakness, impaired endurance, impaired self care skills, impaired functional mobility, gait instability, impaired balance, decreased lower extremity function, decreased safety awareness, impaired cardiopulmonary response to activity.     Rehab Prognosis:  Good; patient would benefit from acute skilled OT services to address these deficits and reach maximum level of function.       Plan:     Patient to be seen 6 x/week to address the above listed problems via self-care/home management, therapeutic activities, therapeutic exercises  Plan of Care Expires: 06/24/25  Plan of Care Reviewed with: patient, spouse    Subjective     Chief Complaint: none stated  Patient/Family Comments/goals: none stated  Pain/Comfort:  Pain Rating 1: 0/10    Objective:     Communicated with: nursing prior to session.  Patient found up in chair with telemetry, chair check upon OT entry to room.    General Precautions: Standard, fall    Orthopedic Precautions:N/A  Braces: N/A  Respiratory Status: Room air     Occupational Performance:     Activities of Daily Living:  Lower Body Dressing: pt endorses difficulty with donning shoes/socks PTA. Pt states she mostly wears flip-flops; OT edu pt on proper footwear to decrease risk of falls. OT edu and demonstrated use of AD for LB dressing, including a sock aid, reacher, dressing stick, and long handled shoe horn. After demonstration, pt able to don socks with sock aid with SBA. OT edu on where to purchase kit if she chooses to do  so. Pt and  v/u.      Treatment & Education:  Pt educated on role of OT/POC, importance of OOB/EOB activity, use of call bell, and safety during ADLs, transfers, and functional mobility.    Patient left up in chair with all lines intact, call button in reach, chair alarm on, and  present    GOALS:   Multidisciplinary Problems       Occupational Therapy Goals          Problem: Occupational Therapy    Goal Priority Disciplines Outcome Interventions   Occupational Therapy Goal     OT, PT/OT Progressing    Description: Goals to be met by: 6/24/2025     Patient will increase functional independence with ADLs by performing:    UE Dressing with Supervision.  LE Dressing with Supervision.  Grooming while seated with Supervision.  Toileting from toilet with Supervision for hygiene and clothing management.   Toilet transfer to toilet with Supervision.  Increased functional strength of RUE to 5/5 for ADLs.                           Time Tracking:     OT Date of Treatment: 05/27/25  OT Start Time: 1110  OT Stop Time: 1118  OT Total Time (min): 8 min    Billable Minutes:Self Care/Home Management 8    OT/SLIME: OT          5/27/2025

## 2025-05-27 NOTE — DISCHARGE SUMMARY
[facility] Medicine  Discharge Summary      Patient Name: Indira Chauhan  MRN: [id number]  [facility]: [id number]  Patient Class: IP- Inpatient  Admission Date: 5/23/2025  Hospital Length of Stay: 4 days  Discharge Date and Time: 05/27/2025 1:28 PM  Attending Physician: Saurav Bridges MD   Discharging Provider: Saurav Bridges MD  Primary Care Provider: Alesia, Primary Doctor    Primary Care Team: Networked reference to record PCT     HPI:   This is a 69-year-old lady with comorbid conditions of prior history of lymphoma status post R-CHOP x6, hypertension, hyperlipidemia, chronic tobacco use who presents to emergency department with complaints of right hemiparesis.  Vascular Neurology is consulted and due to patient being within the window of last known well recommendation was made to administer tenecteplase.  Patient is now status post TNK.  On my assessment, unfortunately patient is markedly aphasic and not able to participate verbally.  Per nurse who is bedside her right  strength which is about a 4/5 for me is an improvement from prior.  Patient unable to lift her right arm or leg off of the bed.  Also per nurse patient has been roughly 1 year without any medications and overall poor longitudinal follow up with her primary care provider.  Blood pressure goal 160s to 180s systolic with the instructions to only treat blood pressure if systolic is greater than 180 or diastolic is greater than 90 in his time post TNK.  Admission to the ICU for observation 24 hours post TNK with repeat imaging tomorrow.  MRI still pending.  No large vessel occlusion, however, on independent assessment it appears as that patient has a distal MCA infarct.    * No surgery found *      Hospital Course:   Patient is a 69 year old female with HTN, HLD, hypothyroidism and diffuse B cell Lymphoma, s/p RCHOP was admitted with right sided weakness. Patient was admitted with in the window and TNK was given. MRI showed  Acute/subacute lacunar infarction involving the left corona radiata and left frontal subcortical white matter near the apex on the left. Rpt CT  post TNK no bleeding. Home antihypertensives started. Norvasc 5 mg and Losartan 50 mg added and later changed back to her home medications and discharged to skilled nursing facility in stable condition for rehabilitation     Goals of Care Treatment Preferences:  Code Status: Full Code      SDOH Screening:  The patient was screened for utility difficulties, food insecurity, transport difficulties, housing insecurity, and interpersonal safety and there were no concerns identified this admission.     Consults:   Consults (From admission, onward)          Status Ordering Provider     Inpatient consult to   Once        Provider:  (Not yet assigned)    Acknowledged TOMÁS KEARNEY     Inpatient consult to Physical Medicine Rehab  Once        Provider:  (Not yet assigned)    Acknowledged ISRAEL RAJAN     Inpatient consult to Registered Dietitian/Nutritionist  Once        Provider:  (Not yet assigned)    Completed ISRAEL RAJAN     IP consult to case management/social work  Once        Provider:  (Not yet assigned)    Acknowledged ISRAEL RAJAN     Consult to Telemedicine - Acute Stroke  Once        Provider:  (Not yet assigned)    Completed SHER ORO            Assessment & Plan  CVA (cerebral vascular accident)  P/w R hemiparesis  MRI: Acute/subacute lacunar infarction involving the left corona radiata and left frontal subcortical white matter near the apex on the left.   S/p TNK     CTA negative for any LVO or MeVO in the pertinent vascular territory of interest - no targets identified for acute or urgent reperfusion therapy.     repeat CT in 24h no bleeding   DAPT x21 days followed by ASA alone  High intensity statin  PT/OT/SLP      Mixed hyperlipidemia  statin    Acquired hypothyroidism  Resume synthroid    Essential  "hypertension  Patient's blood pressure range in the last 24 hours was: BP  Min: 116/68  Max: 151/86.The patient's inpatient anti-hypertensive regimen is listed below:      PRN hydralazine  Norvasc 5 mg   Added losartan 50 mg   Tobacco use  Counseling at this time difficult due to aphasia    Diffuse large B-cell lymphoma of lymph nodes of neck  She was diagnosed in October 2017. She underwent chemotherapy with RCHOPx 6   Seems to have been lost to ongoing follow up with oncologist    Final Active Diagnoses:    Diagnosis Date Noted POA    PRINCIPAL PROBLEM:  CVA (cerebral vascular accident) [I63.9] 05/23/2025 Yes    Diffuse large B-cell lymphoma of lymph nodes of neck [C83.31] 02/21/2018 Yes    Essential hypertension [I10] 11/02/2017 Yes    Tobacco use [Z72.0] 11/02/2017 Yes    Mixed hyperlipidemia [E78.2] 10/23/2017 Yes    Acquired hypothyroidism [E03.9] 10/23/2017 Yes      Problems Resolved During this Admission:       Discharged Condition: good    Disposition: Skilled Nursing Facility    Follow Up:   Follow-up Information       Wade Chance MD Follow up in 1 month(s).    Specialties: Vascular Neurology, Neurology  Contact information:  [address]  [place] [id number] [phone]                           Patient Instructions:      Diet Cardiac     Activity as tolerated       Significant Diagnostic Studies: Labs: CMP   Recent Labs   Lab 05/26/25  0348      K 3.4*      CO2 29   GLU 99   BUN 17   CREATININE 0.7   CALCIUM 9.6   ANIONGAP 11    and CBC No results for input(s): "WBC", "HGB", "HCT", "PLT" in the last 48 hours.    Pending Diagnostic Studies:       None           Medications:  Reconciled Home Medications:      Medication List        START taking these medications      aspirin 81 MG EC tablet  Commonly known as: ECOTRIN  Take 1 tablet (81 mg total) by mouth once daily.  Start taking on: May 28, 2025     atorvastatin 40 MG tablet  Commonly known as: LIPITOR  Take 1 tablet (40 mg total) by " mouth once daily.  Start taking on: May 28, 2025     clopidogreL 75 mg tablet  Commonly known as: PLAVIX  Take 1 tablet (75 mg total) by mouth once daily. for 21 days  Start taking on: May 28, 2025            CONTINUE taking these medications      amLODIPine-benazepriL 5-40 mg per capsule  Commonly known as: LOTREL  TAKE 1 CAPSULE BY MOUTH EVERY DAY     levothyroxine 150 MCG tablet  Commonly known as: SYNTHROID  TAKE 1 TABLET (150 MCG TOTAL) BY MOUTH ONCE DAILY.              Indwelling Lines/Drains at time of discharge:   Lines/Drains/Airways       None                 General: Patient resting comfortably in no acute distress. Appears as stated age. Calm  Eyes: EOM intact. No conjunctivae injection. No scleral icterus.  ENT: Hearing grossly intact. No discharge from ears. No nasal discharge.   CVS: RRR. No LE edema BL.  Lungs: CTA BL, no wheezing or crackles. Good breath sounds. No accessory muscle use. No acute respiratory distress  Neuro: non focal , Follows commands. Responds appropriately     Time spent on the discharge of patient: 34 minutes         Saurav Bridges MD  Department of Hospital Medicine  [facility]

## 2025-05-28 LAB
OHS QRS DURATION: 70 MS
OHS QTC CALCULATION: 474 MS

## 2025-06-02 DIAGNOSIS — I63.9 CVA (CEREBRAL VASCULAR ACCIDENT): Primary | ICD-10-CM

## 2025-07-01 ENCOUNTER — OFFICE VISIT (OUTPATIENT)
Dept: FAMILY MEDICINE | Facility: CLINIC | Age: 70
End: 2025-07-01
Payer: MEDICARE

## 2025-07-01 ENCOUNTER — LAB VISIT (OUTPATIENT)
Dept: LAB | Facility: HOSPITAL | Age: 70
End: 2025-07-01
Payer: MEDICARE

## 2025-07-01 VITALS
DIASTOLIC BLOOD PRESSURE: 70 MMHG | BODY MASS INDEX: 37.8 KG/M2 | SYSTOLIC BLOOD PRESSURE: 138 MMHG | OXYGEN SATURATION: 100 % | HEART RATE: 90 BPM | WEIGHT: 226.88 LBS | HEIGHT: 65 IN

## 2025-07-01 DIAGNOSIS — E03.9 ACQUIRED HYPOTHYROIDISM: ICD-10-CM

## 2025-07-01 DIAGNOSIS — Z12.31 SCREENING MAMMOGRAM FOR BREAST CANCER: ICD-10-CM

## 2025-07-01 DIAGNOSIS — I63.9 CEREBROVASCULAR ACCIDENT (CVA), UNSPECIFIED MECHANISM: ICD-10-CM

## 2025-07-01 DIAGNOSIS — E78.2 MIXED HYPERLIPIDEMIA: ICD-10-CM

## 2025-07-01 DIAGNOSIS — Z76.89 ENCOUNTER TO ESTABLISH CARE: Primary | ICD-10-CM

## 2025-07-01 DIAGNOSIS — I65.23 CAROTID ATHEROSCLEROSIS, BILATERAL: ICD-10-CM

## 2025-07-01 DIAGNOSIS — I10 PRIMARY HYPERTENSION: ICD-10-CM

## 2025-07-01 DIAGNOSIS — Z12.11 SCREENING FOR COLON CANCER: ICD-10-CM

## 2025-07-01 PROBLEM — I73.9 INTERMITTENT CLAUDICATION: Status: ACTIVE | Noted: 2019-08-30

## 2025-07-01 PROBLEM — Z85.72 HISTORY OF LYMPHOMA: Status: ACTIVE | Noted: 2023-06-08

## 2025-07-01 PROBLEM — I73.9 CLAUDICATION IN PERIPHERAL VASCULAR DISEASE: Status: RESOLVED | Noted: 2019-08-30 | Resolved: 2025-07-01

## 2025-07-01 LAB — T4 FREE SERPL-MCNC: 1.07 NG/DL (ref 0.71–1.51)

## 2025-07-01 PROCEDURE — 99999 PR PBB SHADOW E&M-EST. PATIENT-LVL IV: CPT | Mod: PBBFAC,,,

## 2025-07-01 PROCEDURE — 99214 OFFICE O/P EST MOD 30 MIN: CPT | Mod: PBBFAC,PN

## 2025-07-01 PROCEDURE — 84443 ASSAY THYROID STIM HORMONE: CPT

## 2025-07-01 PROCEDURE — 36415 COLL VENOUS BLD VENIPUNCTURE: CPT | Mod: PN

## 2025-07-01 PROCEDURE — 84439 ASSAY OF FREE THYROXINE: CPT

## 2025-07-01 RX ORDER — CLOPIDOGREL BISULFATE 75 MG/1
75 TABLET ORAL DAILY
Qty: 30 TABLET | Refills: 0 | Status: SHIPPED | OUTPATIENT
Start: 2025-07-01

## 2025-07-01 RX ORDER — ATORVASTATIN CALCIUM 40 MG/1
40 TABLET, FILM COATED ORAL DAILY
Qty: 90 TABLET | Refills: 1 | Status: SHIPPED | OUTPATIENT
Start: 2025-07-01

## 2025-07-01 RX ORDER — LEVOTHYROXINE SODIUM 150 UG/1
150 TABLET ORAL DAILY
Qty: 90 TABLET | Refills: 0 | Status: SHIPPED | OUTPATIENT
Start: 2025-07-01

## 2025-07-01 RX ORDER — AMLODIPINE AND BENAZEPRIL HYDROCHLORIDE 5; 40 MG/1; MG/1
1 CAPSULE ORAL DAILY
Qty: 90 CAPSULE | Refills: 3 | Status: SHIPPED | OUTPATIENT
Start: 2025-07-01

## 2025-07-01 NOTE — PROGRESS NOTES
SUBJECTIVE:      Patient ID: Indira Chauhan is a 70 y.o. female.    Chief Complaint: Establish Care    History of Present Illness    CHIEF COMPLAINT:  Indira presents to establish care with a new PCP following a recent stroke.    HPI:  Indira is a 70-year-old female, who presents today to establish care.  Patient states that she has not seen a PCP in over 3 years.  Patient has hypertension, hyperlipidemia, intermittent claudication, recent CVA, smoker, hypothyroidism, and history of ossesous and B-cell lymphoma.     Indira had a stroke on May 24,2025, resulting in speech impairment (much improved) and right-sided paralysis (Much improved). She was hospitalized and transferred to a rehabilitation hospital for 2 weeks. She has residual right-sided weakness, though significantly improved. Her speech has also improved, and she denies current confusion. Indira has not followed up with outpatient care or neurology as recommended, stating she does not feel it necessary.  Since hospitalization she did continue and complete a 21 day regimen of Plavix 75 mg daily.  She continues her aspirin 81 mg daily.  She also continues amlodipine-benazepril 5-40 mg daily for hypertension, tolerating well and denies any side effects.  Reports since leaving rehab she did not get her atorvastatin filled due to cost patient was written 180 tablets at once, which is potentially why cost was significantly high.     She was not taking any medications prior to hospitalization but resumed her thyroid medication during hospitalization due to TSH levels were greater than 43.  She is not taking levothyroxine 150 mcg daily, and needs a refill at this time.    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 She underwent chemotherapy approximately 4 years ago, which successfully treated the cancer.     She denies chest pain, chest tightness, shortness of breath, coughing, bloody stool, dark stool, or  any regular bleeding. She also denies having any current lumps related to her previous cancer.    MEDICATIONS:  Indira is on Aspirin 81 mg daily and Amlodipine-benazepril 5-40 mg daily for hypertension. She is also taking Levothyroxine 150 mcg daily for hypothyroidism. Atorvastatin 40 mg daily is prescribed for high cholesterol, but the patient is not taking it. She discontinued Plavix 75 mg daily after 21 days of use post-stroke. Indira discontinued all medications before hospital admission in May but restarted them at the end of May.    SOCIAL HISTORY:  Smokin.5 pack a day  Alcohol: Ceased consumption since the stroke Occupation: At home, not working  Marital status: Has a significant other    TEST RESULTS:  In May, the patient's TSH was extremely high at 46. Her A1C was 5.8, which is in the pre-diabetes range. The CBC, liver function, and kidney function tests conducted in May were all okay. CT: May 24, showed CVA/stroke  CTA Neck: May, showed moderate atherosclerotic changes to bilateral carotids with smaller lumens    MEDICAL HISTORY:  Indira has a history of stroke on May 24th, presenting with right-sided weakness and speech difficulties. She also has hypertension, hyperlipidemia, and hypothyroidism. Indira had cancer 4-5 years and treated with chemotherapy. She has coronary artery disease and moderate atherosclerosis in her carotids.        HPI   Review of Systems   Constitutional:  Negative for activity change, appetite change and fever.   HENT:  Negative for congestion, ear discharge, ear pain, sore throat, trouble swallowing and voice change.    Eyes:  Positive for visual disturbance (wears prescription glasses). Negative for photophobia, pain and discharge.   Respiratory:  Negative for cough, chest tightness, shortness of breath and wheezing.    Cardiovascular:  Negative for chest pain, palpitations and leg swelling.   Gastrointestinal:  Negative for abdominal pain, nausea and vomiting.   Endocrine:  Negative for cold intolerance and heat intolerance.   Genitourinary:  Negative for difficulty urinating and dysuria.   Musculoskeletal:  Positive for gait problem (ambulates with a walker, right-sided weakness status post CVA). Negative for arthralgias.   Skin:  Negative for rash.   Allergic/Immunologic: Negative for immunocompromised state.   Neurological:  Positive for weakness (Right-sided weakness status post CVA). Negative for speech difficulty and headaches.   Psychiatric/Behavioral:  Negative for confusion, decreased concentration, dysphoric mood, hallucinations, self-injury, sleep disturbance and suicidal ideas. The patient is not nervous/anxious and is not hyperactive.        Past Medical History:   Diagnosis Date    Cancer     Diffuse large B cell lymphoma     GERD (gastroesophageal reflux disease)     Hyperlipidemia     Hypertension     Hypothyroidism      Current Medications[1]  Review of patient's allergies indicates:  No Known Allergies   Past Surgical History:   Procedure Laterality Date    PORTACATH PLACEMENT Right 03/2018     Social History     Socioeconomic History    Marital status:    Occupational History    Occupation: retired   Tobacco Use    Smoking status: Every Day     Current packs/day: 0.50     Average packs/day: 0.5 packs/day for 53.5 years (26.7 ttl pk-yrs)     Types: Cigarettes     Start date: 1972    Smokeless tobacco: Never   Substance and Sexual Activity    Alcohol use: Yes     Alcohol/week: 3.0 standard drinks of alcohol     Types: 3 Shots of liquor per week     Comment: social 3/3/2018    Drug use: No    Sexual activity: Yes     Partners: Male     Birth control/protection: Post-menopausal   Social History Narrative    Retired hairdresser     Social Drivers of Health     Financial Resource Strain: Low Risk  (5/28/2025)    Received from [facility]    Overall Financial Resource Strain (CARDIA)     Difficulty of Paying Living Expenses: Not hard at all   Food Insecurity: No  "Food Insecurity (5/28/2025)    Received from [facility]    Hunger Vital Sign     Worried About Running Out of Food in the Last Year: Never true     Ran Out of Food in the Last Year: Never true   Transportation Needs: No Transportation Needs (6/2/2025)    Received from [facility] SDOH Transportation Source     Has lack of transportation kept you from medical appointments or from getting medications?: No     Has lack of transportation kept you from meetings, work, or from getting things needed for daily living?: No   Physical Activity: Unknown (6/14/2022)    Exercise Vital Sign     Days of Exercise per Week: Patient declined   Stress: No Stress Concern Present (6/3/2025)    Received from [facility] - Occupational Stress Questionnaire     Feeling of Stress : Not at all   Housing Stability: Low Risk  (5/28/2025)    Received from [facility]    Housing Stability Vital Sign     Unable to Pay for Housing in the Last Year: No     Number of Times Moved in the Last Year: 0     Homeless in the Last Year: No     Family History   Problem Relation Name Age of Onset    Heart disease Mother      COPD Mother      No Known Problems Father            OBJECTIVE:      Vitals:    07/01/25 1325   BP: 138/70   BP Location: Right arm   Patient Position: Sitting   Pulse: 90   SpO2: 100%   Weight: 102.9 kg (226 lb 13.7 oz)   Height: 5' 4.5" (1.638 m)     Physical Exam  Vitals and nursing note reviewed.   Constitutional:       General: She is not in acute distress.     Appearance: Normal appearance. She is well-developed. She is obese. She is not ill-appearing or toxic-appearing.      Comments: BMI 38   HENT:      Head: Normocephalic and atraumatic.      Right Ear: External ear normal.      Left Ear: External ear normal.      Nose: Nose normal. No congestion or rhinorrhea.      Mouth/Throat:      Mouth: Mucous membranes are moist.      Pharynx: Oropharynx is clear. Uvula midline. " No oropharyngeal exudate or posterior oropharyngeal erythema.   Eyes:      General: Lids are normal. No scleral icterus.     Conjunctiva/sclera: Conjunctivae normal.      Pupils: Pupils are equal, round, and reactive to light.      Right eye: Pupil is round and reactive.      Left eye: Pupil is round and reactive.   Neck:      Thyroid: No thyromegaly.      Vascular: No JVD.      Trachea: Trachea normal.   Cardiovascular:      Rate and Rhythm: Normal rate and regular rhythm.      Pulses: Normal pulses.      Heart sounds: Normal heart sounds.      Comments: 1/2 pack smoker >40 years  Pulmonary:      Effort: Pulmonary effort is normal. No tachypnea or respiratory distress.      Breath sounds: Normal breath sounds. No wheezing.   Musculoskeletal:         General: Normal range of motion.      Cervical back: Normal range of motion and neck supple. No tenderness.      Right lower leg: Edema (1/4) present.      Left lower leg: Edema (1/4) present.   Skin:     General: Skin is warm and dry.      Coloration: Skin is not pale.      Findings: No erythema or rash.   Neurological:      Mental Status: She is alert. She is disoriented.      Motor: Weakness present.      Coordination: Coordination is intact.      Gait: Gait abnormal.      Comments: Residual right-sided weakness s/p CVA, ambulates with walker   Psychiatric:         Mood and Affect: Mood normal.         Speech: Speech normal.         Behavior: Behavior normal. Behavior is cooperative.         Thought Content: Thought content normal.         Judgment: Judgment normal.            Assessment:       1. Encounter to establish care    2. Cerebrovascular accident (CVA), unspecified mechanism    3. Primary hypertension    4. Mixed hyperlipidemia    5. Acquired hypothyroidism    6. Carotid atherosclerosis, bilateral    7. Screening for colon cancer    8. Screening mammogram for breast cancer        Plan:       Assessment & Plan    PLAN SUMMARY:   Order comprehensive fasting  lab panel in 3 months (thyroid function, lipid panel, liver function, renal function)   Order thyroid function test today   Continue aspirin 81 mg daily and add Plavix 75 mg daily for 30 days- (until f/u cardiologist)   Continue amlodipine-benazepril 5-40 mg daily for hypertension   Prescribe atorvastatin 40 mg daily for hyperlipidemia   Continue levothyroxine 150 mcg daily   Order Cologuard for colon cancer screening   Refer to [facility] neurologist for stroke follow-up   Refer to cardiology for carotid atherosclerosis evaluation   Advise patient to quit smoking and maintain alcohol abstinence   Instruct patient to keep legs elevated when sitting or lying down   Follow up with fasting lab work 1-2 days before 3-month appointment        ## CEREBRAL INFARCTION (STROKE):   Indira had a stroke on May 24th with initial symptoms of inability to talk and right-sided weakness.   Speech and right-sided movement have improved significantly since then with no current confusion.   Continue aspirin 81 mg daily and add Plavix 75 mg daily for 30 days pending specialist evaluations.   Discussed risks and benefits of dual antiplatelet therapy, emphasizing stroke and heart attack prevention.   Referred to [facility] neurologist for stroke follow-up, as patient has not yet completed this previously recommended appointment.    ## RIGHT-SIDED HEMIPLEGIA:   Indira initially experienced right-sided paralysis post-stroke, which has now improved with only slight weakness remaining.    # ESSENTIAL HYPERTENSION:   Continue amlodipine-benazepril 5-40 mg daily for hypertension management.   Indira is tolerating this medication well with no reported problems.  -blood pressure control  -limit sodium and caffeine intake    ## ATHEROSCLEROTIC HEART DISEASE AND CAROTID ARTERY STENOSIS:   Indira has coronary artery disease and moderate atherosclerosis in carotids (confirmed by CTA of the neck).   Continue aspirin 81 mg daily and Plavix 75 mg daily for 30  days (until follow up with Cardiology)   Referred to cardiology for evaluation of carotid atherosclerosis, cardiovascular risk management, and monitoring of carotid stenosis.  -continue atorvastatin 40 mg daily    ## HYPERLIPIDEMIA:   Indira has high cholesterol and is supposed to be on atorvastatin.  -discuss dietary and lifestyle modifications   Prescribed atorvastatin 40 mg daily for hyperlipidemia management.   Ordered comprehensive fasting lab panel in 3 months to include lipid panel, liver function, and other tests.    ## HYPOTHYROIDISM:   Indira's TSH was extremely high in May (46 a month ago).   Now back on levothyroxine 150 mcg daily.   Ordered thyroid function test to be done today and planned to recheck in 3 month to assess medication effect.    ## NICOTINE DEPENDENCE:   Indira currently smokes 0.5 pack of cigarettes daily.   Advised to quit smoking.    ## EDEMA:   Indira experiences some swelling in the right leg.   Instructed to continue keeping legs elevated when sitting or lying down to manage swelling.    ## PREDIABETES:   Indira's A1C was 5.8 in the hospital, which is in the pre-diabetes range.    ## HISTORY OF B-CELL LYMPHOMA:   Indira had B-cell lymphoma previously treated with chemotherapy.   Referred to oncology to evaluate the need for further follow-up or treatment.    ## PREVENTIVE CARE:   Advised on the importance of mammogram and colon cancer screening.   Offered and ordered Cologuard as an alternative to colonoscopy for colon cancer screening.    ## FOLLOW-UP:   Follow up in 1 month.   Additionally, follow up with fasting lab work 1-2 days before the 3-month follow-up visit.   Ordered comprehensive lab panel (fasting) in 3 months, including thyroid function, lipid panel, liver function, and renal function.        Encounter to establish care    Cerebrovascular accident (CVA), unspecified mechanism  Comments:  5/2025- CVA  -Continues to have some weakness to right side, ambulates with  walker  -completed 2 weeks inpatient rehab,does not want to complete outpt rehab  Orders:  -     Ambulatory referral/consult to Neurology; Future; Expected date: 07/08/2025  -     clopidogreL (PLAVIX) 75 mg tablet; Take 1 tablet (75 mg total) by mouth once daily.  Dispense: 30 tablet; Refill: 0    Primary hypertension  -     amLODIPine-benazepriL (LOTREL) 5-40 mg per capsule; Take 1 capsule by mouth once daily.  Dispense: 90 capsule; Refill: 3  -     CBC Auto Differential; Future; Expected date: 09/15/2025  -     Comprehensive Metabolic Panel; Future; Expected date: 09/15/2025  -     Lipid Panel; Future; Expected date: 09/15/2025    Mixed hyperlipidemia  -     atorvastatin (LIPITOR) 40 MG tablet; Take 1 tablet (40 mg total) by mouth once daily.  Dispense: 90 tablet; Refill: 1  -     CBC Auto Differential; Future; Expected date: 09/15/2025  -     Comprehensive Metabolic Panel; Future; Expected date: 09/15/2025  -     Lipid Panel; Future; Expected date: 09/15/2025    Acquired hypothyroidism  -     levothyroxine (SYNTHROID) 150 MCG tablet; Take 1 tablet (150 mcg total) by mouth once daily.  Dispense: 90 tablet; Refill: 0  -     TSH; Future; Expected date: 07/01/2025  -     T4, Free; Future; Expected date: 07/01/2025  -     TSH; Future; Expected date: 09/15/2025  -     T4, Free; Future; Expected date: 09/15/2025    Carotid atherosclerosis, bilateral  -     Ambulatory referral/consult to Cardiology; Future; Expected date: 07/08/2025  -     clopidogreL (PLAVIX) 75 mg tablet; Take 1 tablet (75 mg total) by mouth once daily.  Dispense: 30 tablet; Refill: 0    Screening for colon cancer  -     Cologuard Screening (Multitarget Stool DNA); Future; Expected date: 07/01/2025    Screening mammogram for breast cancer  -     Mammo Digital Screening Bilat w/ Zia (XPD); Future; Expected date: 07/01/2025        Follow up in about 3 months (around 10/1/2025) for HLD, HTN, LABS, thyroid.      7/1/2025 JAZZY Kovacs, COMPAP  This  note was generated with the assistance of ambient listening technology. Verbal consent was obtained by the patient and accompanying visitor(s) for the recording of patient appointment to facilitate this note. I attest to having reviewed and edited the generated note for accuracy, though some syntax or spelling errors may persist. Please contact the author of this note for any clarification.              [1]   Current Outpatient Medications   Medication Sig Dispense Refill    aspirin (ECOTRIN) 81 MG EC tablet Take 1 tablet (81 mg total) by mouth once daily. 180 tablet 0    amLODIPine-benazepriL (LOTREL) 5-40 mg per capsule Take 1 capsule by mouth once daily. 90 capsule 3    atorvastatin (LIPITOR) 40 MG tablet Take 1 tablet (40 mg total) by mouth once daily. 90 tablet 1    clopidogreL (PLAVIX) 75 mg tablet Take 1 tablet (75 mg total) by mouth once daily. 30 tablet 0    levothyroxine (SYNTHROID) 150 MCG tablet Take 1 tablet (150 mcg total) by mouth once daily. 90 tablet 0     No current facility-administered medications for this visit.

## 2025-07-02 DIAGNOSIS — Z78.0 MENOPAUSE: ICD-10-CM

## 2025-07-02 LAB — TSH SERPL-ACNC: 5.99 UIU/ML (ref 0.4–4)

## 2025-07-03 ENCOUNTER — RESULTS FOLLOW-UP (OUTPATIENT)
Dept: FAMILY MEDICINE | Facility: CLINIC | Age: 70
End: 2025-07-03

## 2025-07-21 ENCOUNTER — HOSPITAL ENCOUNTER (INPATIENT)
Facility: HOSPITAL | Age: 70
LOS: 2 days | Discharge: HOME | DRG: 065 | End: 2025-07-23
Attending: EMERGENCY MEDICINE | Admitting: INTERNAL MEDICINE
Payer: MEDICARE

## 2025-07-21 DIAGNOSIS — I63.9 STROKE: ICD-10-CM

## 2025-07-21 DIAGNOSIS — E78.2 MIXED HYPERLIPIDEMIA: ICD-10-CM

## 2025-07-21 DIAGNOSIS — Z72.0 TOBACCO USE: ICD-10-CM

## 2025-07-21 DIAGNOSIS — I65.23 CAROTID ATHEROSCLEROSIS, BILATERAL: ICD-10-CM

## 2025-07-21 DIAGNOSIS — I63.9 CEREBROVASCULAR ACCIDENT (CVA), UNSPECIFIED MECHANISM: ICD-10-CM

## 2025-07-21 DIAGNOSIS — I63.522 CEREBROVASCULAR ACCIDENT (CVA) DUE TO OCCLUSION OF LEFT ANTERIOR CEREBRAL ARTERY: ICD-10-CM

## 2025-07-21 DIAGNOSIS — I10 PRIMARY HYPERTENSION: ICD-10-CM

## 2025-07-21 DIAGNOSIS — I63.9 CEREBROVASCULAR ACCIDENT (CVA), UNSPECIFIED MECHANISM: Primary | ICD-10-CM

## 2025-07-21 DIAGNOSIS — R29.818 ACUTE FOCAL NEUROLOGICAL DEFICIT: ICD-10-CM

## 2025-07-21 LAB
ABSOLUTE EOSINOPHIL (SMH): 0.3 K/UL
ABSOLUTE MONOCYTE (SMH): 0.81 K/UL (ref 0.3–1)
ABSOLUTE NEUTROPHIL COUNT (SMH): 7.3 K/UL (ref 1.8–7.7)
ALBUMIN SERPL-MCNC: 4.3 G/DL (ref 3.5–5.2)
ALP SERPL-CCNC: 92 UNIT/L (ref 55–135)
ALT SERPL-CCNC: 13 UNIT/L (ref 10–44)
ANION GAP (SMH): 8 MMOL/L (ref 8–16)
AST SERPL-CCNC: 16 UNIT/L (ref 10–40)
BASOPHILS # BLD AUTO: 0.07 K/UL
BASOPHILS NFR BLD AUTO: 0.7 %
BILIRUB SERPL-MCNC: 0.7 MG/DL (ref 0.1–1)
BILIRUB UR QL STRIP.AUTO: NEGATIVE
BUN SERPL-MCNC: 17 MG/DL (ref 8–23)
CALCIUM SERPL-MCNC: 9.5 MG/DL (ref 8.7–10.5)
CHLORIDE SERPL-SCNC: 103 MMOL/L (ref 95–110)
CHOLEST SERPL-MCNC: 123 MG/DL (ref 120–199)
CHOLEST/HDLC SERPL: 2.7 {RATIO} (ref 2–5)
CLARITY UR: CLEAR
CO2 SERPL-SCNC: 27 MMOL/L (ref 23–29)
COLOR UR AUTO: YELLOW
CREAT SERPL-MCNC: 0.7 MG/DL (ref 0.5–1.4)
CREAT SERPL-MCNC: 0.8 MG/DL (ref 0.5–1.4)
ERYTHROCYTE [DISTWIDTH] IN BLOOD BY AUTOMATED COUNT: 12.2 % (ref 11.5–14.5)
GFR SERPLBLD CREATININE-BSD FMLA CKD-EPI: >60 ML/MIN/1.73/M2
GLUCOSE SERPL-MCNC: 115 MG/DL (ref 70–110)
GLUCOSE UR QL STRIP: NEGATIVE
HCT VFR BLD AUTO: 40.2 % (ref 37–48.5)
HDLC SERPL-MCNC: 45 MG/DL (ref 40–75)
HDLC SERPL: 36.6 % (ref 20–50)
HGB BLD-MCNC: 13.7 GM/DL (ref 12–16)
HGB UR QL STRIP: NEGATIVE
IMM GRANULOCYTES # BLD AUTO: 0.05 K/UL (ref 0–0.04)
IMM GRANULOCYTES NFR BLD AUTO: 0.5 % (ref 0–0.5)
INR PPP: 1 (ref 0.8–1.2)
KETONES UR QL STRIP: NEGATIVE
LDLC SERPL CALC-MCNC: 61 MG/DL (ref 63–159)
LEUKOCYTE ESTERASE UR QL STRIP: NEGATIVE
LYMPHOCYTES # BLD AUTO: 1.17 K/UL (ref 1–4.8)
MCH RBC QN AUTO: 33.4 PG (ref 27–31)
MCHC RBC AUTO-ENTMCNC: 34.1 G/DL (ref 32–36)
MCV RBC AUTO: 98 FL (ref 82–98)
NITRITE UR QL STRIP: NEGATIVE
NONHDLC SERPL-MCNC: 78 MG/DL
NUCLEATED RBC (/100WBC) (SMH): 0 /100 WBC
OHS QRS DURATION: 70 MS
OHS QTC CALCULATION: 450 MS
PH UR STRIP: 7 [PH]
PLATELET # BLD AUTO: 278 K/UL (ref 150–450)
PMV BLD AUTO: 10.2 FL (ref 9.2–12.9)
POCT GLUCOSE: 117 MG/DL (ref 70–110)
POTASSIUM SERPL-SCNC: 4.4 MMOL/L (ref 3.5–5.1)
PROT SERPL-MCNC: 7.4 GM/DL (ref 6–8.4)
PROT UR QL STRIP: NEGATIVE
PROTHROMBIN TIME: 10.8 SECONDS (ref 9–12.5)
RBC # BLD AUTO: 4.1 M/UL (ref 4–5.4)
RELATIVE EOSINOPHIL (SMH): 3.1 % (ref 0–8)
RELATIVE LYMPHOCYTE (SMH): 12 % (ref 18–48)
RELATIVE MONOCYTE (SMH): 8.3 % (ref 4–15)
RELATIVE NEUTROPHIL (SMH): 75.4 % (ref 38–73)
SAMPLE: NORMAL
SODIUM SERPL-SCNC: 138 MMOL/L (ref 136–145)
SP GR UR STRIP: >=1.03
TRIGL SERPL-MCNC: 85 MG/DL (ref 30–150)
TSH SERPL-ACNC: 1.91 UIU/ML (ref 0.34–5.6)
UROBILINOGEN UR STRIP-ACNC: ABNORMAL EU/DL
WBC # BLD AUTO: 9.74 K/UL (ref 3.9–12.7)

## 2025-07-21 PROCEDURE — 80053 COMPREHEN METABOLIC PANEL: CPT | Performed by: EMERGENCY MEDICINE

## 2025-07-21 PROCEDURE — 25500020 PHARM REV CODE 255: Performed by: EMERGENCY MEDICINE

## 2025-07-21 PROCEDURE — 84443 ASSAY THYROID STIM HORMONE: CPT | Performed by: EMERGENCY MEDICINE

## 2025-07-21 PROCEDURE — 81003 URINALYSIS AUTO W/O SCOPE: CPT

## 2025-07-21 PROCEDURE — G0426 INPT/ED TELECONSULT50: HCPCS | Mod: 95,,, | Performed by: PSYCHIATRY & NEUROLOGY

## 2025-07-21 PROCEDURE — 82465 ASSAY BLD/SERUM CHOLESTEROL: CPT | Performed by: EMERGENCY MEDICINE

## 2025-07-21 PROCEDURE — 25000003 PHARM REV CODE 250

## 2025-07-21 PROCEDURE — 85025 COMPLETE CBC W/AUTO DIFF WBC: CPT | Performed by: EMERGENCY MEDICINE

## 2025-07-21 PROCEDURE — 93005 ELECTROCARDIOGRAM TRACING: CPT | Performed by: INTERNAL MEDICINE

## 2025-07-21 PROCEDURE — 63600175 PHARM REV CODE 636 W HCPCS

## 2025-07-21 PROCEDURE — 93010 ELECTROCARDIOGRAM REPORT: CPT | Mod: ,,, | Performed by: INTERNAL MEDICINE

## 2025-07-21 PROCEDURE — 99900031 HC PATIENT EDUCATION (STAT)

## 2025-07-21 PROCEDURE — 94760 N-INVAS EAR/PLS OXIMETRY 1: CPT

## 2025-07-21 PROCEDURE — 85610 PROTHROMBIN TIME: CPT | Performed by: EMERGENCY MEDICINE

## 2025-07-21 PROCEDURE — 82962 GLUCOSE BLOOD TEST: CPT

## 2025-07-21 PROCEDURE — 25000003 PHARM REV CODE 250: Performed by: STUDENT IN AN ORGANIZED HEALTH CARE EDUCATION/TRAINING PROGRAM

## 2025-07-21 PROCEDURE — 82565 ASSAY OF CREATININE: CPT

## 2025-07-21 PROCEDURE — 99285 EMERGENCY DEPT VISIT HI MDM: CPT | Mod: 25

## 2025-07-21 PROCEDURE — 36415 COLL VENOUS BLD VENIPUNCTURE: CPT | Performed by: EMERGENCY MEDICINE

## 2025-07-21 PROCEDURE — 11000001 HC ACUTE MED/SURG PRIVATE ROOM

## 2025-07-21 RX ORDER — SODIUM CHLORIDE 0.9 % (FLUSH) 0.9 %
10 SYRINGE (ML) INJECTION
Status: DISCONTINUED | OUTPATIENT
Start: 2025-07-21 | End: 2025-07-23 | Stop reason: HOSPADM

## 2025-07-21 RX ORDER — ASPIRIN 81 MG/1
81 TABLET ORAL DAILY
Status: DISCONTINUED | OUTPATIENT
Start: 2025-07-22 | End: 2025-07-23 | Stop reason: HOSPADM

## 2025-07-21 RX ORDER — ENOXAPARIN SODIUM 100 MG/ML
40 INJECTION SUBCUTANEOUS EVERY 24 HOURS
Status: DISCONTINUED | OUTPATIENT
Start: 2025-07-21 | End: 2025-07-23 | Stop reason: HOSPADM

## 2025-07-21 RX ORDER — POLYETHYLENE GLYCOL 3350 17 G/17G
17 POWDER, FOR SOLUTION ORAL DAILY PRN
COMMUNITY
Start: 2025-06-02 | End: 2025-07-21

## 2025-07-21 RX ORDER — IBUPROFEN 200 MG
1 TABLET ORAL DAILY
Status: DISCONTINUED | OUTPATIENT
Start: 2025-07-22 | End: 2025-07-23 | Stop reason: HOSPADM

## 2025-07-21 RX ORDER — KETOROLAC TROMETHAMINE 30 MG/ML
15 INJECTION, SOLUTION INTRAMUSCULAR; INTRAVENOUS EVERY 6 HOURS PRN
Status: ACTIVE | OUTPATIENT
Start: 2025-07-21 | End: 2025-07-22

## 2025-07-21 RX ORDER — LABETALOL HYDROCHLORIDE 5 MG/ML
10 INJECTION, SOLUTION INTRAVENOUS
Status: DISCONTINUED | OUTPATIENT
Start: 2025-07-21 | End: 2025-07-23 | Stop reason: HOSPADM

## 2025-07-21 RX ORDER — BISACODYL 10 MG/1
10 SUPPOSITORY RECTAL DAILY PRN
Status: DISCONTINUED | OUTPATIENT
Start: 2025-07-21 | End: 2025-07-23 | Stop reason: HOSPADM

## 2025-07-21 RX ORDER — ATORVASTATIN CALCIUM 40 MG/1
40 TABLET, FILM COATED ORAL DAILY
Status: DISCONTINUED | OUTPATIENT
Start: 2025-07-22 | End: 2025-07-23 | Stop reason: HOSPADM

## 2025-07-21 RX ORDER — CLOPIDOGREL BISULFATE 75 MG/1
75 TABLET ORAL DAILY
Status: DISCONTINUED | OUTPATIENT
Start: 2025-07-22 | End: 2025-07-23 | Stop reason: HOSPADM

## 2025-07-21 RX ORDER — ACETAMINOPHEN 325 MG/1
650 TABLET ORAL EVERY 6 HOURS PRN
Status: DISCONTINUED | OUTPATIENT
Start: 2025-07-21 | End: 2025-07-23 | Stop reason: HOSPADM

## 2025-07-21 RX ORDER — MELOXICAM 7.5 MG/1
7.5 TABLET ORAL DAILY PRN
COMMUNITY
Start: 2025-06-02 | End: 2025-07-21

## 2025-07-21 RX ORDER — FAMOTIDINE 20 MG/1
20 TABLET, FILM COATED ORAL 2 TIMES DAILY
Status: DISCONTINUED | OUTPATIENT
Start: 2025-07-21 | End: 2025-07-23 | Stop reason: HOSPADM

## 2025-07-21 RX ORDER — ONDANSETRON HYDROCHLORIDE 2 MG/ML
4 INJECTION, SOLUTION INTRAVENOUS EVERY 6 HOURS PRN
Status: DISCONTINUED | OUTPATIENT
Start: 2025-07-21 | End: 2025-07-23 | Stop reason: HOSPADM

## 2025-07-21 RX ORDER — LIDOCAINE 50 MG/G
1 PATCH TOPICAL DAILY PRN
Status: DISCONTINUED | OUTPATIENT
Start: 2025-07-21 | End: 2025-07-23 | Stop reason: HOSPADM

## 2025-07-21 RX ORDER — TALC
6 POWDER (GRAM) TOPICAL NIGHTLY PRN
Status: DISCONTINUED | OUTPATIENT
Start: 2025-07-21 | End: 2025-07-23 | Stop reason: HOSPADM

## 2025-07-21 RX ADMIN — FAMOTIDINE 20 MG: 20 TABLET, FILM COATED ORAL at 10:07

## 2025-07-21 RX ADMIN — ENOXAPARIN SODIUM 40 MG: 40 INJECTION SUBCUTANEOUS at 05:07

## 2025-07-21 RX ADMIN — Medication 6 MG: at 10:07

## 2025-07-21 RX ADMIN — IOHEXOL 100 ML: 350 INJECTION, SOLUTION INTRAVENOUS at 01:07

## 2025-07-21 NOTE — ED NOTES
"Pt arrived via ambulance complaining of "not being able to get out the car" but pt normally needs help off the couch and chairs per , "never the car though." Pt had a CVA in May of '25 with right sided weakness residue that she uses a walker to ambulated since. Pt had right lower limb drift where it did not hit the bed but she could not keep it raised to comparatively to the left. Pt had no drift with right upper limb. Pt denies any other complaint. Pt stated she has been eating and drinking normal for her which is no water but "a lot of coffee all the time." Pt's  at bedside and is caregiver along with a pretty decent historian for the pt.    "

## 2025-07-21 NOTE — HPI
70-year-old female presented to ED for eval of stroke-like symptoms. pMHx diffuse B-cell lymphoma, GERD, HTN, HLD, hypothyroidism, stroke, PVD.  Patient at 11:15 a.m. today she experienced RSW when trying to get out of the car, she stated weakness was significant enough where she felt like she could not get herself of the car.  Denies chest pain, shortness of breath.  Denies abdominal pain, nausea, vomiting, changes in bowel habits.  Denies fever, chills, recent illness. Patient does have history of recent stroke in May that was treated with TNK which presented with similar symptoms, reports compliance with DAPT, did not take Plavix today as she ran out yesterday but did take ASA. CTH no acute intracranial process. CTA H/N impression with no evidence of large vessel occlusion; stable abrupt cut off of and A2 segment of the left anterior cerebral artery which may be secondary to high-grade stenosis or thrombus; multifocal carotid atherosclerosis resulting in mild narrowing at the origin of the ICAs bilaterally. MRI brain impression with acute-subacute infarct in the left frontal corona-radiata; subacute-chronic infarct in the adjacent, posterosuperior left frontal lobe; deep cerebral white matter findings of moderate-to-severe small vessel ischemic disease. Tele stroke eval done in ED, noted chronic L A2 occlusion, TNK not recommended 2/2 recent stroke, thrombectomy not recommended.  Admit to hospital medicine for further eval.

## 2025-07-21 NOTE — ED PROVIDER NOTES
Encounter Date: 7/21/2025       History     Chief Complaint   Patient presents with    Extremity Weakness     HPI 70-year-old woman presents emergency department for difficulty getting out of the car.  Pre-hospital code stroke activated on patient arrival.  Patient had right-sided weakness, no aphasia neglect or vision changes.  Symptoms began at 11:15 a.m. She has a history of a acute lacunar infarct in the left corona radiata and left frontal subcortical white matter on May 23rd and was treated with TNK.  Currently on DAPT with aspirin and Plavix.  Review of patient's allergies indicates:  No Known Allergies  Past Medical History:   Diagnosis Date    Cancer     Diffuse large B cell lymphoma     GERD (gastroesophageal reflux disease)     Hyperlipidemia     Hypertension     Hypothyroidism      Past Surgical History:   Procedure Laterality Date    PORTACATH PLACEMENT Right 03/2018     Family History   Problem Relation Name Age of Onset    Heart disease Mother      COPD Mother      No Known Problems Father       Social History[1]  Review of Systems   Constitutional:  Negative for fever.   HENT:  Negative for sore throat.    Respiratory:  Negative for shortness of breath.    Cardiovascular:  Negative for chest pain.   Gastrointestinal:  Negative for nausea.   Genitourinary:  Negative for dysuria.   Musculoskeletal:  Positive for gait problem. Negative for back pain.   Skin:  Negative for rash.   Neurological:  Positive for weakness.   Hematological:  Does not bruise/bleed easily.       Physical Exam     Initial Vitals [07/21/25 1316]   BP Pulse Resp Temp SpO2   106/76 104 16 -- 95 %      MAP       --         Physical Exam    Constitutional: Vital signs are normal. She appears well-developed and well-nourished. She is Obese .  Non-toxic appearance. No distress.   HENT:   Head: Normocephalic and atraumatic.   Eyes: EOM are normal. Pupils are equal, round, and reactive to light.   Neck: Neck supple. No JVD present.    Normal range of motion.  Cardiovascular:  Normal rate, regular rhythm, normal heart sounds and intact distal pulses.     Exam reveals no gallop and no friction rub.       No murmur heard.  Pulmonary/Chest: Breath sounds normal. She has no wheezes. She has no rhonchi. She has no rales.   Abdominal: Abdomen is soft. Bowel sounds are normal. There is no abdominal tenderness. There is no rebound and no guarding.   Musculoskeletal:         General: Normal range of motion.      Cervical back: Normal range of motion and neck supple. No rigidity.     Neurological: She is alert and oriented to person, place, and time. She has normal reflexes. No cranial nerve deficit or sensory deficit. She exhibits normal muscle tone. Coordination normal. GCS score is 15. GCS eye subscore is 4. GCS verbal subscore is 5. GCS motor subscore is 6.   Right upper and lower extremity weakness with drift.  No visual field deficits.  No aphasia.   Skin: Skin is warm and dry.   Psychiatric: She has a normal mood and affect. Her speech is normal and behavior is normal. She is not actively hallucinating.         ED Course   Procedures  Labs Reviewed   COMPREHENSIVE METABOLIC PANEL - Abnormal       Result Value    Sodium 138      Potassium 4.4      Chloride 103      CO2 27      Glucose 115 (*)     BUN 17      Creatinine 0.7      Calcium 9.5      Protein Total 7.4      Albumin 4.3      Bilirubin Total 0.7      ALP 92      AST 16      ALT 13      Anion Gap 8      eGFR >60     LIPID PANEL - Abnormal    Cholesterol Total 123      Triglyceride 85      HDL Cholesterol 45      LDL Cholesterol 61.0 (*)     HDL/Cholesterol Ratio 36.6      Cholesterol/HDL Ratio 2.7      Non HDL Cholesterol 78     CBC WITH DIFFERENTIAL - Abnormal    WBC 9.74      RBC 4.10      Hgb 13.7      Hct 40.2      MCV 98      MCH 33.4 (*)     MCHC 34.1      RDW 12.2      Platelet Count 278      MPV 10.2      Nucleated RBC 0      Neut % 75.4 (*)     Lymph % 12.0 (*)     Mono % 8.3       Eos % 3.1      Basophil % 0.7      Imm Grans % 0.5      Neut # 7.3      Lymph # 1.17      Mono # 0.81      Eos # 0.30      Baso # 0.07      Imm Grans # 0.05 (*)    POCT GLUCOSE - Abnormal    POCT Glucose 117 (*)    PROTIME-INR - Normal    PT 10.8      INR 1.0     TSH - Normal    TSH 1.905     CBC W/ AUTO DIFFERENTIAL    Narrative:     The following orders were created for panel order CBC W/ AUTO DIFFERENTIAL.  Procedure                               Abnormality         Status                     ---------                               -----------         ------                     CBC with Differential[[id number]]       Abnormal            Final result                 Please view results for these tests on the individual orders.   EXTRA TUBES    Narrative:     The following orders were created for panel order EXTRA TUBES.  Procedure                               Abnormality         Status                     ---------                               -----------         ------                     Light Green Top Hold[[id number]]                            In process                 Lavender Top Hold[[id number]]                               In process                 Lavender Top Hold[[id number]]                               In process                 Gold Top Hold[[id number]]                                   In process                 Gold Top Hold[[id number]]                                   In process                   Please view results for these tests on the individual orders.   LIGHT GREEN TOP HOLD   LAVENDER TOP HOLD   LAVENDER TOP HOLD   GOLD TOP HOLD   GOLD TOP HOLD   URINALYSIS, REFLEX TO URINE CULTURE   ISTAT CREATININE    POC Creatinine 0.8      Sample VENOUS     POCT GLUCOSE MONITORING CONTINUOUS     EKG Readings: (Independently Interpreted)   Initial Reading: No STEMI.   Sinus rhythm with PACs.  91 beats per minute.  Low voltage QRS.  Normal T-waves and ST segments.     ECG Results              ECG 12  lead (In process)        Collection Time Result Time QRS Duration [facility] QTC Calculation    07/21/25 13:57:48 07/21/25 16:02:36 70 450                     In process by Interface, Lab In [facility] (07/21/25 16:02:41)                   Narrative:    Test Reason : R29.818,    Vent. Rate :  91 BPM     Atrial Rate :  91 BPM     P-R Int : 170 ms          QRS Dur :  70 ms      QT Int : 366 ms       P-R-T Axes :  47  45  54 degrees    QTcB Int : 450 ms    Sinus rhythm with Premature atrial complexes  Low voltage QRS  Borderline Abnormal ECG  No previous ECGs available    Referred By: AAAREFERRAL SELF           Confirmed By:                                   Imaging Results              X-Ray Chest AP Single View (Final result)  Result time 07/21/25 16:58:15      Final result by Jon Noguera MD (07/21/25 16:58:15)                   Impression:      No acute cardiac or pulmonary process.      Electronically signed by: Jon Noguera  Date:    07/21/2025  Time:    16:58               Narrative:    CLINICAL HISTORY:  ([id number])71 y/o  (1955) F    decreased breath sounds;    TECHNIQUE:  (A#[id number], exam time 7/21/2025 16:57)    XR CHEST 1 VIEW [id number]    COMPARISON:  Radiograph from 02/25/2019    FINDINGS:  The lungs are clear. Costophrenic angles are seen without effusion. No pneumothorax is identified. The heart is normal in size. There is a right-sided subclavian medical infusion port with tip at the level of the SVC.  Osseous structures show degenerative changes in the spine. The visualized upper abdomen is unremarkable.                                       MRI Brain Without Contrast (Edited Result - FINAL)  Result time 07/21/25 15:28:27      Addendum (preliminary) 1 of 1 by Jon Noguera MD (07/21/25 15:28:27)      RESULT NOTIFICATION: These observations were discussed by the dictating physician, by phone with, and acknowledged by Josiah Ball at 15:28 on 7/21/2025.      Electronically signed  by: Jon Noguera  Date:    07/21/2025  Time:    15:28                 Final result by Jon Noguera MD (07/21/25 14:53:28)                   Impression:      1.  Acute-subacute infarct in the left frontal corona-radiata (parasagittal-deep-cerebral white matter of the left frontal lobe).    2.  Subacute-chronic infarct in the adjacent, posterosuperior left frontal lobe (seen on the MRI from 05/23/2025)    3.  No acute intracranial hemorrhage, no hydrocephalus, herniation or midline shift.    4.  Deep cerebral white matter findings of moderate-to-severe small vessel ischemic disease.    This report was flagged in Epic as abnormal.      Electronically signed by: Jon Noguera  Date:    07/21/2025  Time:    14:53               Narrative:    CLINICAL HISTORY:  ([id number])71 y/o  (1955) F    Neuro deficit, acute, stroke suspected;    TECHNIQUE:  (A#[id number], exam time 7/21/2025 14:45)    MRI BRAIN WITHOUT CONTRAST [id number]    Multiplanar multisequence MRI of the brain was performed.    CONTRAST:    No contrast was administered.    COMPARISON:  Most recent MRI from 05/23/2025.    FINDINGS:  Technically suboptimal exam secondary to motion artifact with repeated sequences attempted.    Multifocal areas of abnormal diffusion restriction with matched decrease ADC signal intensity increased T2 FLAIR signal intensity compatible with acute-subacute infarct in a linear distribution in the parasagittal left frontal corona radiata (image 44 series 3).  This is adjacent-2 and slightly more anterior and inferior to the previously described infarct.  There is a punctate focus in the periventricular right occipital lobe (image 39), which may represent a tiny additional focus of disease.    No hydrocephalus, herniation or midline shift in the basal/suprasellar cisterns are patent.  No focal abnormal signal intensity to suggest an intracranial bleed.  No acute osseous abnormality is identified.    Overall there is  moderate to severe periventricular deep cerebral white matter T2 FLAIR hyperintensity in both cerebral hemispheres, a nonspecific finding in this age group which can be seen in diffuse white matter process but 1 which is most commonly associated with small vessel ischemic disease.    The pituitary gland and infundibulum is normal in size without abnormal signal pattern; the craniocervical junction is within normal limits.  The orbital contents are normal.  The visualized paranasal sinuses and mastoid air cells are essentially clear.  There is leftward nasal septal deviation.                                       CTA Head and Neck (xpd) (Final result)  Result time 07/21/25 13:57:06      Final result by Olivia Mar MD (07/21/25 13:57:06)                   Impression:      Stable abrupt cut off of and A2 segment of the left anterior cerebral artery which may be secondary to high-grade stenosis or thrombus    Multifocal carotid atherosclerosis resulting in mild narrowing at the origin of the ICAs bilaterally    No evidence of large vessel occlusion      Electronically signed by: Olivia Mar  Date:    07/21/2025  Time:    13:57               Narrative:    EXAMINATION:  CTA HEAD AND NECK (XPD)    CLINICAL HISTORY:  Neuro deficit, acute, stroke suspected;    TECHNIQUE:  Non contrast low dose axial images were obtained through the head. CT angiogram was performed from the level of the lana to the top of the head following the IV administration of 100mL of Omnipaque 350.   Sagittal and coronal reconstructions and maximum intensity projection reconstructions were performed. Arterial stenosis percentages are based on NASCET measurement criteria.    COMPARISON:  05/23/2025    FINDINGS:  Extracranial:    Aorta and great vessels: Left-sided aortic arch with normal configuration.   No evidence of stenosis of the origins of the great vessels from the arch.    Subclavian arteries: Mild luminal narrowing of the left  subclavian artery of up to 40%.    Stable narrowing of the right subclavian artery approximately 60% the subclavian arteries are patent.    Right carotid: The right common carotid artery is without significant stenosis. Moderate atherosclerotic calcification at the carotid bulb and proximal ICA where there is less than 50% stenosis.  There is sharp acute angulation of the proximal internal carotid artery resulting in a 60% stenosis.  The remainder of the cervical ICA is normal.  There is no dissection.    Left carotid: The left common carotid artery is without significant stenosis. moderate atherosclerotic calcification at the left carotid bulb and ICA origin resulting in less than 50% stenosis the left internal carotid artery is without significant stenosis, occlusion, or dissection.    Extracranial vertebral arteries: The left vertebral artery is dominant the vertebral arteries are without significant stenosis, occlusion, or dissection to the skull base.  Hypoplastic right V4 segment    Intracranial:    Anterior circulation: Moderate vascular calcification of the cavernous carotid arteries resulting and mild stenosis.  The middle cerebral arteries are normal.  Hypoplastic right A1 segment.  There is stable abrupt cut off of the A2 segment on the left anterior cerebral artery.  This may be secondary to high-grade stenosis or thrombus.  The distal right anterior cerebral artery is normal.    Posterior circulation: Hypoplastic right V4 segment the basilar artery is normal. The posterior cerebral arteries are normal.    No evidence of aneurysm or arteriovenous malformation    Dural venous sinuses are patent.    Other:    Paraspinous soft tissues are normal.    The visualized portions of the lung apices are unremarkable.    There are degenerative spine changes. No concerning osseous lesions identified.                                       CT HEAD FOR CODE STROKE (Final result)  Result time 07/21/25 13:26:04      Final  result by Jon Noguera MD (07/21/25 13:26:04)                   Impression:      1. No acute intracranial process.    2. Chronic/involutional findings as noted above.    RESULT NOTIFICATION: These observations were discussed by the dictating physician, by phone with, and acknowledged by Josiah Ball at 13:24 on 7/21/2025.      Electronically signed by: Jon Noguera  Date:    07/21/2025  Time:    13:26               Narrative:    CLINICAL HISTORY:  ([id number])69 y/o  (1955) F    Neuro deficit, acute, stroke suspected;    TECHNIQUE:  (A#[id number], exam time 7/21/2025 13:23)    CT HEAD FOR CODE STROKE [id number]    Axial CT of the brain without contrast using soft tissue and bone algorithm. None.    CMS MANDATED QUALITY DATA - CT RADIATION - 436    All CT scans at this facility utilize dose modulation, iterative reconstruction, and/or weight based dosing when appropriate to reduce radiation dose to as low as reasonably achievable.    COMPARISON:  Most recent CT from 05/24/2025    FINDINGS:  No acute intracranial hemorrhage, edema or mass effect, and no acute parenchymal abnormality. There is no hydrocephalus, herniation or midline shift, and the basal and suprasellar cisterns are within normal limits. The osseous structures show no acute skull fracture.    Moderate periventricular deep cerebral white matter low attenuation  nonspecific findings which can be seen in any diffuse white matter process but most commonly associated with chronic microvascular ischemic disease. There are scattered atheromatous calcifications in the intracranial internal carotid arteries.    Orbital contents appear within normal limits. External auditory canals are unremarkable. The visualized paranasal sinuses and mastoid air cells are essentially clear.                                       Medications   sodium chloride 0.9% flush 10 mL (has no administration in time range)   sodium chloride 0.9% bolus 500 mL 500 mL (has no  administration in time range)   labetaloL injection 10 mg (has no administration in time range)   bisacodyL suppository 10 mg (has no administration in time range)   enoxaparin injection 40 mg (40 mg Subcutaneous Given 7/21/25 1704)   acetaminophen tablet 650 mg (has no administration in time range)   famotidine tablet 20 mg (has no administration in time range)   ondansetron injection 4 mg (has no administration in time range)   nicotine 14 mg/24 hr 1 patch (has no administration in time range)   aspirin EC tablet 81 mg (has no administration in time range)   atorvastatin tablet 40 mg (has no administration in time range)   clopidogreL tablet 75 mg (has no administration in time range)   levothyroxine tablet 150 mcg (has no administration in time range)   iohexoL (OMNIPAQUE 350) injection 100 mL (100 mLs Intravenous Given 7/21/25 1346)     Medical Decision Making  70-year-old woman presents emergency department for difficulty getting out of the car.  Pre-hospital code stroke activated on patient arrival.  Patient had right-sided weakness, no aphasia neglect or vision changes.  Symptoms began at 11:15 a.m. She has a history of a acute lacunar infarct in the left corona radiata and left frontal subcortical white matter on May 23rd and was treated with TNK.  Currently on DAPT with aspirin and Plavix.  Left-sided weakness on exam.  Van negative.      Thrombolysis Candidate? No, Recent moderate to severe stroke (< 3 months)    Delays to Thrombolysis?  Not Applicable  NIH of 2 on my exam with improvement to NIH of 1 on neurology exam.  Vascular Neurology evaluated patient, CT head CTA negative.  MRI recommended.  MRI reveals a new infarct in her left corona radiata in addition to the old infarct she suffered there.  Discussed with Hospital Medicine who will admit the patient for further evaluation and treatment.      Amount and/or Complexity of Data Reviewed  Labs: ordered.  Radiology: ordered.    Risk  Prescription drug  management.  Decision regarding hospitalization.              Attending Attestation:             Attending ED Notes:   Critical Care Time MDM    The high probability of sudden, clinically significant deterioration in the patient's condition required the highest level of my preparedness to intervene urgently.    The services I provided to this patient were to treat and/or prevent clinically significant deterioration that could result in permanent disability, chronic pain and/or death.     Services included the following: chart data review, reviewing nursing notes and/or old charts, documentation time, consultant collaboration regarding findings and treatment options, medication orders and management, direct patient care, vital sign assessments and ordering, interpreting and reviewing diagnostic studies/lab tests.    Aggregate critical care time was 50 minutes, which includes only time during which I was engaged in work directly related to the patient's care, as described above, whether at the bedside or elsewhere in the Emergency Department.     Josiah Ball M.D.                                          Clinical Impression:  Final diagnoses:  [R29.818] Acute focal neurological deficit  [I63.9] Cerebrovascular accident (CVA), unspecified mechanism (Primary)  [I63.9] Stroke          ED Disposition Condition    Admit                       [1]   Social History  Tobacco Use    Smoking status: Every Day     Current packs/day: 0.50     Average packs/day: 0.5 packs/day for 53.6 years (26.8 ttl pk-yrs)     Types: Cigarettes     Start date: 1972    Smokeless tobacco: Never   Substance Use Topics    Alcohol use: Yes     Alcohol/week: 3.0 standard drinks of alcohol     Types: 3 Shots of liquor per week     Comment: social 3/3/2018    Drug use: No        Josiah Ball MD  07/21/25 [id number]

## 2025-07-21 NOTE — TELEMEDICINE CONSULT
"[facility]  Vascular Neurology  Comprehensive Stroke Center  TeleVascular Neurology Acute Consultation Note        Consult Information  Consults    Consulting Provider: CA AG   Current Providers  No providers found    Patient Location:  [facility] EMERGENCY DEPARTMENT Emergency Department    Spoke hospital nurse at bedside with patient assisting consultant.  Patient information was obtained from patient.       Stroke Documentation  Acute Stroke Times   Last Known Normal Date: 07/21/25  Last Known Normal Time: 1115  Stroke Team Called Date: 07/21/25  Stroke Team Called Time: 1320  Stroke Team Arrival Date: 07/21/25  Stroke Team Arrival Time: 1327  CT Interpretation Time: 1327  Thrombolytic Therapy Recommended: No  Thrombectomy Recommended: No    NIH Scale:  1a. Level of Consciousness: 0-->Alert, keenly responsive  1b. LOC Questions: 0-->Answers both questions correctly  1c. LOC Commands: 0-->Performs both tasks correctly  2. Best Gaze: 0-->Normal  3. Visual: 0-->No visual loss  4. Facial Palsy: 0-->Normal symmetrical movements  5a. Motor Arm, Left: 0-->No drift, limb holds 90 (or 45) degrees for full 10 secs  5b. Motor Arm, Right: 0-->No drift, limb holds 90 (or 45) degrees for full 10 secs  6a. Motor Leg, Left: 0-->No drift, leg holds 30 degree position for full 5 secs  6b. Motor Leg, Right: 1-->Drift, leg falls by the end of the 5-sec period but does not hit bed  7. Limb Ataxia: 0-->Absent  8. Sensory: 0-->Normal, no sensory loss  9. Best Language: 0-->No aphasia, normal  10. Dysarthria: 0-->Normal  11. Extinction and Inattention (formerly Neglect): 0-->No abnormality  Total (NIH Stroke Scale): 1      Modified [place] Baseline:    Modified [place] Discharge:    [place] Coma Scale:     ABCD2 Score:    [id number] Score:    HAS -BLED Score:    ICH Score:    Hunt & Egan Classification:      Blood pressure (!) 146/69, pulse 94, resp. rate 18, height 5' 4" (1.626 m), weight 102.5 kg (226 " "lb), SpO2 (!) 93%.      In my opinion, this was a: Tier 1; VAN Stroke Assessment: Negative     Medical Decision Making  HPI:  70 y.o. female with recent admission for L-ELSIE stroke, mild residual R sided weakness (ambulates with walker now), HTN, DLBCL, presenting with R leg weakness.  Patient reports being unable to get out of the car today because her R leg "wouldn't move."  She needs help getting up from the couch but states she doesn't usually need her  to pull her up out of the car.     Images personally reviewed and interpreted:  Study: Head CT and CTA Head & Neck  Study Interpretation: L A2 occlusion (chronic)     Assessment and plan:  Exam notable only for mild R leg drift -- consistent with deficits from recent stroke.  BP initially low (106/76) - concerning for hypoperfusion.  Recommend MRI brain to evaluate for possible extension of recent stroke.     Lytics recommendation: Thrombolytic therapy not recommended due to Patient back to neurological baseline and Recent previous stroke     Thrombectomy recommendation: No; at this time symptoms not suggestive of large vessel occlusion  Placement recommendation: pending further studies               ROS  Physical Exam  Past Medical History:   Diagnosis Date    Cancer     Diffuse large B cell lymphoma     GERD (gastroesophageal reflux disease)     Hyperlipidemia     Hypertension     Hypothyroidism      Past Surgical History:   Procedure Laterality Date    PORTACATH PLACEMENT Right 03/2018     Family History   Problem Relation Name Age of Onset    Heart disease Mother      COPD Mother      No Known Problems Father         Diagnoses  Other: recrudescence    Karrie Park MD      Emergent/Acute neurological consultation requested by spoke provider due to critical concerns for possible cerebrovascular event that could result in permanent loss of neurologic/bodily function, severe disability or death of this patient.  Immediate/timely evaluation by a highly " prepared expert is paramount for optimal outcomes  High risk for neurological deterioration if not properly diagnosed  High risk for neurological deterioration if not treated promplty/as soon as possible  Complex diagnostic evaluation may be required (advanced imaging)  High risk treatment options (thrombolytics and/or thrombectomy)    Patient care was coordinated with spoke provider, including but not limted to    Discussing likely diagnosis/etiology of symptoms  Making recommendations for further diagnostic studies  Making recommendations for intravenous thrombolytics or other advanced therapies  Making recommendations for disposition (admission/transfer for higher level of care)      Neurology consultation requested by spoke provider. Audiovisual encounter with the patient performed using a secure connection.  Results and impressions from the visit are documented on this note and were communicated to the consulting provider/team via direct communication. The note has been shared for addition to the patients electronic medical record.

## 2025-07-21 NOTE — SUBJECTIVE & OBJECTIVE
"HPI:  70 y.o. female with recent admission for L-ELSIE stroke, mild residual R sided weakness (ambulates with walker now), HTN, DLBCL, presenting with R leg weakness.  Patient reports being unable to get out of the car today because her R leg "wouldn't move."  She needs help getting up from the couch but states she doesn't usually need her  to pull her up out of the car.     Images personally reviewed and interpreted:  Study: Head CT and CTA Head & Neck  Study Interpretation: L A2 occlusion (chronic)     Assessment and plan:  Exam notable only for mild R leg drift -- consistent with deficits from recent stroke.  BP initially low (106/76) - concerning for hypoperfusion.  Recommend MRI brain to evaluate for possible extension of recent stroke.     Lytics recommendation: Thrombolytic therapy not recommended due to Patient back to neurological baseline and Recent previous stroke     Thrombectomy recommendation: No; at this time symptoms not suggestive of large vessel occlusion  Placement recommendation: pending further studies             "

## 2025-07-21 NOTE — ASSESSMENT & PLAN NOTE
Patient's blood pressure range in the last 24 hours was: BP  Min: 106/76  Max: 151/69.The patient's inpatient anti-hypertensive regimen is listed below:  Current Antihypertensives  labetaloL injection 10 mg, Every 15 min PRN, Intravenous    Plan  - BP is controlled, no changes needed to their regimen  - Holding home CCB 2/2 permissive HTN

## 2025-07-21 NOTE — PHARMACY MED REC
"          Admission Medication History     The home medication history was taken by [facility].    You may go to "Admission" then "Reconcile Home Medications" tabs to review and/or act upon these items.     The home medication list has been updated by the Pharmacy department.   Please read ALL comments highlighted in yellow.   Please address this information as you see fit.    Feel free to contact us if you have any questions or require assistance.      The medications listed below were removed from the home medication list. Please reorder if appropriate:  Patient reports no longer taking the following medication(s):  Mobic  Glycolax          Medications listed below were obtained from: Patient/family and Analytic software- [employer]  Medications Ordered Prior to Encounter[1]      [facility]  [id number]        .               [1]   No current facility-administered medications on file prior to encounter.     Current Outpatient Medications on File Prior to Encounter   Medication Sig Dispense Refill    amLODIPine-benazepriL (LOTREL) 5-40 mg per capsule Take 1 capsule by mouth once daily. 90 capsule 3    aspirin (ECOTRIN) 81 MG EC tablet Take 1 tablet (81 mg total) by mouth once daily. 180 tablet 0    atorvastatin (LIPITOR) 40 MG tablet Take 1 tablet (40 mg total) by mouth once daily. 90 tablet 1    clopidogreL (PLAVIX) 75 mg tablet Take 1 tablet (75 mg total) by mouth once daily. 30 tablet 0    levothyroxine (SYNTHROID) 150 MCG tablet Take 1 tablet (150 mcg total) by mouth once daily. 90 tablet 0    [DISCONTINUED] meloxicam (MOBIC) 7.5 MG tablet Take 7.5 mg by mouth daily as needed.      [DISCONTINUED] polyethylene glycol (GLYCOLAX) 17 gram PwPk Take 17 g by mouth daily as needed.       "

## 2025-07-21 NOTE — ASSESSMENT & PLAN NOTE
Antithrombotics for secondary stroke prevention: Antiplatelets: Aspirin: 81 mg daily  Clopidogrel: 75 mg daily    Statins for secondary stroke prevention and hyperlipidemia, if present:   Statins: Atorvastatin- 40 mg daily    Aggressive risk factor modification: HTN, Smoking, HLD, Obesity     Rehab efforts: The patient has been evaluated by a stroke team provider and the therapy needs have been fully considered based off the presenting complaints and exam findings. The following therapy evaluations are needed: PT evaluate and treat, OT evaluate and treat, SLP evaluate and treat    Diagnostics ordered/pending: CT scan of head without contrast to asses brain parenchyma, CTA Head to assess vasculature , CTA Neck/Arch to assess vasculature, HgbA1C to assess blood glucose levels, Lipid Profile to assess cholesterol levels, MRI head without contrast to assess brain parenchyma, TTE to assess cardiac function/status , TSH to assess thyroid function    VTE prophylaxis: Enoxaparin 40 mg SQ every 24 hours  Mechanical prophylaxis: Place SCDs    BP parameters: Infarct: No intervention, SBP <220    CXR  Neuro checks  Consult neurology

## 2025-07-21 NOTE — H&P
[facility] - Emergency Dept  Hospital Medicine  History & Physical    Patient Name: Indira Chauhan  MRN: [id number]  Patient Class: IP- Inpatient  Admission Date: 7/21/2025  Attending Physician: Matt Dong MD   Primary Care Provider: Bela Lombardi NP         Patient information was obtained from patient, spouse/SO, past medical records, and ER records.     Subjective:     Principal Problem:Stroke    Chief Complaint:   Chief Complaint   Patient presents with    Extremity Weakness        HPI: 70-year-old female presented to ED for eval of stroke-like symptoms. pMHx diffuse B-cell lymphoma, GERD, HTN, HLD, hypothyroidism, stroke, PVD.  Patient at 11:15 a.m. today she experienced RSW when trying to get out of the car, she stated weakness was significant enough where she felt like she could not get herself of the car.  Denies chest pain, shortness of breath.  Denies abdominal pain, nausea, vomiting, changes in bowel habits.  Denies fever, chills, recent illness. Patient does have history of recent stroke in May that was treated with TNK which presented with similar symptoms, reports compliance with DAPT, did not take Plavix today as she ran out yesterday but did take ASA. CTH no acute intracranial process. CTA H/N impression with no evidence of large vessel occlusion; stable abrupt cut off of and A2 segment of the left anterior cerebral artery which may be secondary to high-grade stenosis or thrombus; multifocal carotid atherosclerosis resulting in mild narrowing at the origin of the ICAs bilaterally. MRI brain impression with acute-subacute infarct in the left frontal corona-radiata; subacute-chronic infarct in the adjacent, posterosuperior left frontal lobe; deep cerebral white matter findings of moderate-to-severe small vessel ischemic disease. Tele stroke eval done in ED, noted chronic L A2 occlusion, TNK not recommended 2/2 recent stroke, thrombectomy not recommended.  Admit to hospital medicine  for further eval.    Past Medical History:   Diagnosis Date    Cancer     Diffuse large B cell lymphoma     GERD (gastroesophageal reflux disease)     Hyperlipidemia     Hypertension     Hypothyroidism        Past Surgical History:   Procedure Laterality Date    PORTACATH PLACEMENT Right 03/2018       Review of patient's allergies indicates:  No Known Allergies    No current facility-administered medications on file prior to encounter.     Current Outpatient Medications on File Prior to Encounter   Medication Sig    amLODIPine-benazepriL (LOTREL) 5-40 mg per capsule Take 1 capsule by mouth once daily.    aspirin (ECOTRIN) 81 MG EC tablet Take 1 tablet (81 mg total) by mouth once daily.    atorvastatin (LIPITOR) 40 MG tablet Take 1 tablet (40 mg total) by mouth once daily.    clopidogreL (PLAVIX) 75 mg tablet Take 1 tablet (75 mg total) by mouth once daily.    levothyroxine (SYNTHROID) 150 MCG tablet Take 1 tablet (150 mcg total) by mouth once daily.    meloxicam (MOBIC) 7.5 MG tablet Take 7.5 mg by mouth daily as needed.    polyethylene glycol (GLYCOLAX) 17 gram PwPk Take 17 g by mouth daily as needed.     Family History       Problem Relation (Age of Onset)    COPD Mother    Heart disease Mother    No Known Problems Father          Tobacco Use    Smoking status: Every Day     Current packs/day: 0.50     Average packs/day: 0.5 packs/day for 53.6 years (26.8 ttl pk-yrs)     Types: Cigarettes     Start date: 1972    Smokeless tobacco: Never   Substance and Sexual Activity    Alcohol use: Yes     Alcohol/week: 3.0 standard drinks of alcohol     Types: 3 Shots of liquor per week     Comment: social 3/3/2018    Drug use: No    Sexual activity: Yes     Partners: Male     Birth control/protection: Post-menopausal     Review of Systems   Constitutional:  Negative for chills and fever.   HENT:  Negative for congestion and sore throat.    Eyes:  Negative for visual disturbance.   Respiratory:  Negative for shortness of  breath.    Cardiovascular:  Negative for chest pain.   Gastrointestinal:  Negative for abdominal pain, constipation, diarrhea, nausea and vomiting.   Genitourinary:  Negative for flank pain.   Musculoskeletal:  Positive for back pain.   Neurological:  Positive for weakness. Negative for syncope, facial asymmetry, speech difficulty and numbness.   Psychiatric/Behavioral:  Negative for confusion.      Objective:     Vital Signs (Most Recent):  Pulse: 80 (07/21/25 1532)  Resp: 19 (07/21/25 1532)  BP: 134/61 (07/21/25 1532)  SpO2: (!) 94 % (07/21/25 1537) Vital Signs (24h Range):  Pulse:  [] 80  Resp:  [16-21] 19  SpO2:  [93 %-96 %] 94 %  BP: (106-151)/(59-76) 134/61     Weight: 102.5 kg (226 lb)  Body mass index is 38.79 kg/m².     Physical Exam  Vitals reviewed.   Constitutional:       General: She is not in acute distress.  HENT:      Head: Normocephalic and atraumatic.      Nose: Nose normal.      Mouth/Throat:      Mouth: Mucous membranes are moist.   Eyes:      Conjunctiva/sclera: Conjunctivae normal.   Cardiovascular:      Rate and Rhythm: Normal rate and regular rhythm.   Pulmonary:      Effort: Pulmonary effort is normal. No respiratory distress.      Breath sounds: Decreased breath sounds present.   Abdominal:      Palpations: Abdomen is soft.      Tenderness: There is no abdominal tenderness. There is no guarding.   Musculoskeletal:         General: Normal range of motion.      Cervical back: Normal range of motion.      Right lower leg: Edema present.      Left lower leg: Edema present.   Skin:     General: Skin is warm and dry.      Comments: Mottled BLE.   Neurological:      Mental Status: She is alert and oriented to person, place, and time.      Sensory: No sensory deficit.      Motor: Weakness (RUE) present.      Comments: Drift RLE   Psychiatric:         Mood and Affect: Mood normal.                Significant Labs: All pertinent labs within the past 24 hours have been reviewed.  Bilirubin:  "  Recent Labs   Lab 07/21/25  1327   BILITOT 0.7     CBC:   Recent Labs   Lab 07/21/25  1327   WBC 9.74   HGB 13.7   HCT 40.2        CMP:   Recent Labs   Lab 07/21/25  1327      K 4.4      CO2 27   *   BUN 17   CREATININE 0.7   CALCIUM 9.5   PROT 7.4   ALBUMIN 4.3   BILITOT 0.7   ALKPHOS 92   AST 16   ALT 13   ANIONGAP 8     Lipid Panel:   Recent Labs   Lab 07/21/25  1327   CHOL 123   HDL 45   LDLCALC 61.0*   TRIG 85   CHOLHDL 36.6     TSH:   Recent Labs   Lab 07/21/25  1327   TSH 1.905     Urine Studies: No results for input(s): "COLORU", "APPEARANCEUA", "PHUR", "SPECGRAV", "PROTEINUA", "GLUCUA", "KETONESU", "BILIRUBINUA", "OCCULTUA", "NITRITE", "UROBILINOGEN", "LEUKOCYTESUR", "RBCUA", "WBCUA", "BACTERIA", "SQUAMEPITHEL", "HYALINECASTS" in the last 48 hours.    Invalid input(s): "WRIGHTSUR"    Significant Imaging: I have reviewed all pertinent imaging results/findings within the past 24 hours.  Assessment/Plan:     Assessment & Plan  Stroke    Antithrombotics for secondary stroke prevention: Antiplatelets: Aspirin: 81 mg daily  Clopidogrel: 75 mg daily    Statins for secondary stroke prevention and hyperlipidemia, if present:   Statins: Atorvastatin- 40 mg daily    Aggressive risk factor modification: HTN, Smoking, HLD, Obesity     Rehab efforts: The patient has been evaluated by a stroke team provider and the therapy needs have been fully considered based off the presenting complaints and exam findings. The following therapy evaluations are needed: PT evaluate and treat, OT evaluate and treat, SLP evaluate and treat    Diagnostics ordered/pending: CT scan of head without contrast to asses brain parenchyma, CTA Head to assess vasculature , CTA Neck/Arch to assess vasculature, HgbA1C to assess blood glucose levels, Lipid Profile to assess cholesterol levels, MRI head without contrast to assess brain parenchyma, TTE to assess cardiac function/status , TSH to assess thyroid function    VTE " prophylaxis: Enoxaparin 40 mg SQ every 24 hours  Mechanical prophylaxis: Place SCDs    BP parameters: Infarct: No intervention, SBP <220    CXR  Neuro checks  Consult neurology      Mixed hyperlipidemia  Resume home statin    Acquired hypothyroidism  Resume home levothyroxine    History of lymphoma  Hx of osseous and B cell lymphoma, s/p chemo 4 years ago    Primary hypertension  Patient's blood pressure range in the last 24 hours was: BP  Min: 106/76  Max: 151/69.The patient's inpatient anti-hypertensive regimen is listed below:  Current Antihypertensives  labetaloL injection 10 mg, Every 15 min PRN, Intravenous    Plan  - BP is controlled, no changes needed to their regimen  - Holding home CCB 2/2 permissive HTN  VTE Risk Mitigation (From admission, onward)           Ordered     enoxaparin injection 40 mg  Daily         07/21/25 1638     IP VTE HIGH RISK PATIENT  Once         07/21/25 1638     Place sequential compression device  Until discontinued         07/21/25 1638                                                Flora Mendosa NP  Department of Hospital Medicine  [facility] - Emergency Dept

## 2025-07-21 NOTE — SUBJECTIVE & OBJECTIVE
Past Medical History:   Diagnosis Date    Cancer     Diffuse large B cell lymphoma     GERD (gastroesophageal reflux disease)     Hyperlipidemia     Hypertension     Hypothyroidism        Past Surgical History:   Procedure Laterality Date    PORTACATH PLACEMENT Right 03/2018       Review of patient's allergies indicates:  No Known Allergies    No current facility-administered medications on file prior to encounter.     Current Outpatient Medications on File Prior to Encounter   Medication Sig    amLODIPine-benazepriL (LOTREL) 5-40 mg per capsule Take 1 capsule by mouth once daily.    aspirin (ECOTRIN) 81 MG EC tablet Take 1 tablet (81 mg total) by mouth once daily.    atorvastatin (LIPITOR) 40 MG tablet Take 1 tablet (40 mg total) by mouth once daily.    clopidogreL (PLAVIX) 75 mg tablet Take 1 tablet (75 mg total) by mouth once daily.    levothyroxine (SYNTHROID) 150 MCG tablet Take 1 tablet (150 mcg total) by mouth once daily.    meloxicam (MOBIC) 7.5 MG tablet Take 7.5 mg by mouth daily as needed.    polyethylene glycol (GLYCOLAX) 17 gram PwPk Take 17 g by mouth daily as needed.     Family History       Problem Relation (Age of Onset)    COPD Mother    Heart disease Mother    No Known Problems Father          Tobacco Use    Smoking status: Every Day     Current packs/day: 0.50     Average packs/day: 0.5 packs/day for 53.6 years (26.8 ttl pk-yrs)     Types: Cigarettes     Start date: 1972    Smokeless tobacco: Never   Substance and Sexual Activity    Alcohol use: Yes     Alcohol/week: 3.0 standard drinks of alcohol     Types: 3 Shots of liquor per week     Comment: social 3/3/2018    Drug use: No    Sexual activity: Yes     Partners: Male     Birth control/protection: Post-menopausal     Review of Systems   Constitutional:  Negative for chills and fever.   HENT:  Negative for congestion and sore throat.    Eyes:  Negative for visual disturbance.   Respiratory:  Negative for shortness of breath.    Cardiovascular:   Negative for chest pain.   Gastrointestinal:  Negative for abdominal pain, constipation, diarrhea, nausea and vomiting.   Genitourinary:  Negative for flank pain.   Musculoskeletal:  Positive for back pain.   Neurological:  Positive for weakness. Negative for syncope, facial asymmetry, speech difficulty and numbness.   Psychiatric/Behavioral:  Negative for confusion.      Objective:     Vital Signs (Most Recent):  Pulse: 80 (07/21/25 1532)  Resp: 19 (07/21/25 1532)  BP: 134/61 (07/21/25 1532)  SpO2: (!) 94 % (07/21/25 1537) Vital Signs (24h Range):  Pulse:  [] 80  Resp:  [16-21] 19  SpO2:  [93 %-96 %] 94 %  BP: (106-151)/(59-76) 134/61     Weight: 102.5 kg (226 lb)  Body mass index is 38.79 kg/m².     Physical Exam  Vitals reviewed.   Constitutional:       General: She is not in acute distress.  HENT:      Head: Normocephalic and atraumatic.      Nose: Nose normal.      Mouth/Throat:      Mouth: Mucous membranes are moist.   Eyes:      Conjunctiva/sclera: Conjunctivae normal.   Cardiovascular:      Rate and Rhythm: Normal rate and regular rhythm.   Pulmonary:      Effort: Pulmonary effort is normal. No respiratory distress.      Breath sounds: Decreased breath sounds present.   Abdominal:      Palpations: Abdomen is soft.      Tenderness: There is no abdominal tenderness. There is no guarding.   Musculoskeletal:         General: Normal range of motion.      Cervical back: Normal range of motion.      Right lower leg: Edema present.      Left lower leg: Edema present.   Skin:     General: Skin is warm and dry.      Comments: Mottled BLE.   Neurological:      Mental Status: She is alert and oriented to person, place, and time.      Sensory: No sensory deficit.      Motor: Weakness (RUE) present.      Comments: Drift RLE   Psychiatric:         Mood and Affect: Mood normal.                Significant Labs: All pertinent labs within the past 24 hours have been reviewed.  Bilirubin:   Recent Labs   Lab  "07/21/25  1327   BILITOT 0.7     CBC:   Recent Labs   Lab 07/21/25  1327   WBC 9.74   HGB 13.7   HCT 40.2        CMP:   Recent Labs   Lab 07/21/25  1327      K 4.4      CO2 27   *   BUN 17   CREATININE 0.7   CALCIUM 9.5   PROT 7.4   ALBUMIN 4.3   BILITOT 0.7   ALKPHOS 92   AST 16   ALT 13   ANIONGAP 8     Lipid Panel:   Recent Labs   Lab 07/21/25  1327   CHOL 123   HDL 45   LDLCALC 61.0*   TRIG 85   CHOLHDL 36.6     TSH:   Recent Labs   Lab 07/21/25  1327   TSH 1.905     Urine Studies: No results for input(s): "COLORU", "APPEARANCEUA", "PHUR", "SPECGRAV", "PROTEINUA", "GLUCUA", "KETONESU", "BILIRUBINUA", "OCCULTUA", "NITRITE", "UROBILINOGEN", "LEUKOCYTESUR", "RBCUA", "WBCUA", "BACTERIA", "SQUAMEPITHEL", "HYALINECASTS" in the last 48 hours.    Invalid input(s): "WRIGHTSUR"    Significant Imaging: I have reviewed all pertinent imaging results/findings within the past 24 hours.  "

## 2025-07-22 ENCOUNTER — CLINICAL SUPPORT (OUTPATIENT)
Dept: CARDIOLOGY | Facility: HOSPITAL | Age: 70
End: 2025-07-22
Attending: EMERGENCY MEDICINE
Payer: MEDICARE

## 2025-07-22 ENCOUNTER — TELEPHONE (OUTPATIENT)
Dept: NEUROLOGY | Facility: CLINIC | Age: 70
End: 2025-07-22
Payer: MEDICARE

## 2025-07-22 ENCOUNTER — CLINICAL SUPPORT (OUTPATIENT)
Dept: SMOKING CESSATION | Facility: CLINIC | Age: 70
End: 2025-07-22

## 2025-07-22 VITALS — WEIGHT: 225.5 LBS | BODY MASS INDEX: 38.5 KG/M2 | HEIGHT: 64 IN

## 2025-07-22 DIAGNOSIS — F17.200 NICOTINE DEPENDENCE: Primary | ICD-10-CM

## 2025-07-22 PROBLEM — I63.522 CEREBROVASCULAR ACCIDENT (CVA) DUE TO OCCLUSION OF LEFT ANTERIOR CEREBRAL ARTERY: Status: ACTIVE | Noted: 2025-07-21

## 2025-07-22 LAB
ABSOLUTE EOSINOPHIL (SMH): 0.33 K/UL
ABSOLUTE MONOCYTE (SMH): 0.88 K/UL (ref 0.3–1)
ABSOLUTE NEUTROPHIL COUNT (SMH): 5.7 K/UL (ref 1.8–7.7)
ALBUMIN SERPL-MCNC: 4.1 G/DL (ref 3.5–5.2)
ALP SERPL-CCNC: 88 UNIT/L (ref 55–135)
ALT SERPL-CCNC: 11 UNIT/L (ref 10–44)
ANION GAP (SMH): 8 MMOL/L (ref 8–16)
AORTIC ROOT ANNULUS: 3.1 CM
AORTIC SIZE INDEX: 1.6 CM/M2
AORTIC VALVE CUSP SEPERATION: 2.1 CM
APICAL FOUR CHAMBER EJECTION FRACTION: 60 %
APTT PPP: 27.9 SECONDS (ref 21–32)
ASCENDING AORTA: 3.3 CM
AST SERPL-CCNC: 11 UNIT/L (ref 10–40)
AV INDEX (PROSTH): 0.77
AV MEAN GRADIENT: 4 MMHG
AV PEAK GRADIENT: 8 MMHG
AV VALVE AREA BY VELOCITY RATIO: 2.2 CM²
AV VALVE AREA: 2.2 CM²
AV VELOCITY RATIO: 0.79
BASOPHILS # BLD AUTO: 0.06 K/UL
BASOPHILS NFR BLD AUTO: 0.7 %
BILIRUB SERPL-MCNC: 0.7 MG/DL (ref 0.1–1)
BSA FOR ECHO PROCEDURE: 2.15 M2
BUN SERPL-MCNC: 12 MG/DL (ref 8–23)
CALCIUM SERPL-MCNC: 9.2 MG/DL (ref 8.7–10.5)
CHLORIDE SERPL-SCNC: 101 MMOL/L (ref 95–110)
CO2 SERPL-SCNC: 31 MMOL/L (ref 23–29)
CREAT SERPL-MCNC: 0.6 MG/DL (ref 0.5–1.4)
CV ECHO LV RWT: 0.33 CM
DOP CALC AO PEAK VEL: 1.4 M/S
DOP CALC AO VTI: 28.8 CM
DOP CALC LVOT AREA: 2.8 CM2
DOP CALC LVOT DIAMETER: 1.9 CM
DOP CALC LVOT PEAK VEL: 1.1 M/S
DOP CALC LVOT STROKE VOLUME: 62.9 CM3
DOP CALC MV VTI: 37.5 CM
DOP CALCLVOT PEAK VEL VTI: 22.2 CM
E WAVE DECELERATION TIME: 243 MSEC
E/A RATIO: 0.91
E/E' RATIO: 17 M/S
EAG (SMH): 131 MG/DL (ref 68–131)
ECHO LV POSTERIOR WALL: 0.8 CM (ref 0.6–1.1)
ERYTHROCYTE [DISTWIDTH] IN BLOOD BY AUTOMATED COUNT: 12 % (ref 11.5–14.5)
FRACTIONAL SHORTENING: 37.5 % (ref 28–44)
GFR SERPLBLD CREATININE-BSD FMLA CKD-EPI: >60 ML/MIN/1.73/M2
GLUCOSE SERPL-MCNC: 99 MG/DL (ref 70–110)
HBA1C MFR BLD: 6.2 % (ref 4.5–6.2)
HCT VFR BLD AUTO: 40.6 % (ref 37–48.5)
HGB BLD-MCNC: 13.4 GM/DL (ref 12–16)
IMM GRANULOCYTES # BLD AUTO: 0.03 K/UL (ref 0–0.04)
IMM GRANULOCYTES NFR BLD AUTO: 0.4 % (ref 0–0.5)
INR PPP: 1 (ref 0.8–1.2)
INTERVENTRICULAR SEPTUM: 0.8 CM (ref 0.6–1.1)
LEFT ATRIUM AREA SYSTOLIC (APICAL 2 CHAMBER): 19.2 CM2
LEFT ATRIUM AREA SYSTOLIC (APICAL 4 CHAMBER): 17.9 CM2
LEFT ATRIUM VOLUME INDEX MOD: 23 ML/M2
LEFT ATRIUM VOLUME MOD: 48 ML
LEFT INTERNAL DIMENSION IN SYSTOLE: 3 CM (ref 2.1–4)
LEFT VENTRICLE DIASTOLIC VOLUME INDEX: 52.43 ML/M2
LEFT VENTRICLE DIASTOLIC VOLUME: 108 ML
LEFT VENTRICLE END DIASTOLIC VOLUME APICAL 4 CHAMBER: 57.1 ML
LEFT VENTRICLE END SYSTOLIC VOLUME APICAL 2 CHAMBER: 51.1 ML
LEFT VENTRICLE END SYSTOLIC VOLUME APICAL 4 CHAMBER: 41.6 ML
LEFT VENTRICLE MASS INDEX: 61.5 G/M2
LEFT VENTRICLE SYSTOLIC VOLUME INDEX: 17 ML/M2
LEFT VENTRICLE SYSTOLIC VOLUME: 35 ML
LEFT VENTRICULAR INTERNAL DIMENSION IN DIASTOLE: 4.8 CM (ref 3.5–6)
LEFT VENTRICULAR MASS: 126.7 G
LV LATERAL E/E' RATIO: 14.8 M/S
LV SEPTAL E/E' RATIO: 19.7 M/S
LVED V (TEICH): 107.52 ML
LVES V (TEICH): 35 ML
LVOT MG: 2 MMHG
LVOT MV: 0.7 CM/S
LYMPHOCYTES # BLD AUTO: 1.39 K/UL (ref 1–4.8)
Lab: 2 CM/M
MAGNESIUM SERPL-MCNC: 1.9 MG/DL (ref 1.6–2.6)
MCH RBC QN AUTO: 32.9 PG (ref 27–31)
MCHC RBC AUTO-ENTMCNC: 33 G/DL (ref 32–36)
MCV RBC AUTO: 100 FL (ref 82–98)
MV MEAN GRADIENT: 4 MMHG
MV PEAK A VEL: 1.3 M/S
MV PEAK E VEL: 1.18 M/S
MV PEAK GRADIENT: 9 MMHG
MV STENOSIS PRESSURE HALF TIME: 91 MS
MV VALVE AREA BY CONTINUITY EQUATION: 1.68 CM2
MV VALVE AREA P 1/2 METHOD: 2.42 CM2
NUCLEATED RBC (/100WBC) (SMH): 0 /100 WBC
OHS CV CPX PATIENT HEIGHT IN: 64
PHOSPHATE SERPL-MCNC: 4.2 MG/DL (ref 2.7–4.5)
PLATELET # BLD AUTO: 278 K/UL (ref 150–450)
PMV BLD AUTO: 9.9 FL (ref 9.2–12.9)
POTASSIUM SERPL-SCNC: 3.6 MMOL/L (ref 3.5–5.1)
PROT SERPL-MCNC: 6.9 GM/DL (ref 6–8.4)
PROTHROMBIN TIME: 10.9 SECONDS (ref 9–12.5)
PV MV: 0.87 M/S
PV PEAK GRADIENT: 6 MMHG
PV PEAK VELOCITY: 1.21 M/S
RA PRESSURE ESTIMATED: 3 MMHG
RBC # BLD AUTO: 4.07 M/UL (ref 4–5.4)
RELATIVE EOSINOPHIL (SMH): 3.9 % (ref 0–8)
RELATIVE LYMPHOCYTE (SMH): 16.6 % (ref 18–48)
RELATIVE MONOCYTE (SMH): 10.5 % (ref 4–15)
RELATIVE NEUTROPHIL (SMH): 67.9 % (ref 38–73)
RIGHT ATRIUM END SYSTOLIC VOLUME APICAL 4 CHAMBER INDEX BSA: 12.43 ML/M2
RIGHT ATRIUM VOLUME AREA LENGTH APICAL 4 CHAMBER: 25.6 ML
RV TISSUE DOPPLER FREE WALL SYSTOLIC VELOCITY 1 (APICAL 4 CHAMBER VIEW): 11.1 CM/S
SODIUM SERPL-SCNC: 140 MMOL/L (ref 136–145)
TDI LATERAL: 0.08 M/S
TDI SEPTAL: 0.06 M/S
TDI: 0.07 M/S
TRICUSPID ANNULAR PLANE SYSTOLIC EXCURSION: 3 CM
WBC # BLD AUTO: 8.36 K/UL (ref 3.9–12.7)
Z-SCORE OF LEFT VENTRICULAR DIMENSION IN END DIASTOLE: -2.59
Z-SCORE OF LEFT VENTRICULAR DIMENSION IN END SYSTOLE: -1.89

## 2025-07-22 PROCEDURE — 99406 BEHAV CHNG SMOKING 3-10 MIN: CPT | Performed by: CLINIC/CENTER

## 2025-07-22 PROCEDURE — 97165 OT EVAL LOW COMPLEX 30 MIN: CPT

## 2025-07-22 PROCEDURE — 25000003 PHARM REV CODE 250

## 2025-07-22 PROCEDURE — 21400001 HC TELEMETRY ROOM

## 2025-07-22 PROCEDURE — 92610 EVALUATE SWALLOWING FUNCTION: CPT

## 2025-07-22 PROCEDURE — 25000003 PHARM REV CODE 250: Performed by: STUDENT IN AN ORGANIZED HEALTH CARE EDUCATION/TRAINING PROGRAM

## 2025-07-22 PROCEDURE — 84100 ASSAY OF PHOSPHORUS: CPT

## 2025-07-22 PROCEDURE — 92523 SPEECH SOUND LANG COMPREHEN: CPT

## 2025-07-22 PROCEDURE — 94799 UNLISTED PULMONARY SVC/PX: CPT

## 2025-07-22 PROCEDURE — 83735 ASSAY OF MAGNESIUM: CPT

## 2025-07-22 PROCEDURE — G0427 INPT/ED TELECONSULT70: HCPCS | Mod: 95,G0,, | Performed by: NURSE PRACTITIONER

## 2025-07-22 PROCEDURE — 80053 COMPREHEN METABOLIC PANEL: CPT

## 2025-07-22 PROCEDURE — 85730 THROMBOPLASTIN TIME PARTIAL: CPT

## 2025-07-22 PROCEDURE — 93306 TTE W/DOPPLER COMPLETE: CPT

## 2025-07-22 PROCEDURE — 36415 COLL VENOUS BLD VENIPUNCTURE: CPT

## 2025-07-22 PROCEDURE — 94761 N-INVAS EAR/PLS OXIMETRY MLT: CPT

## 2025-07-22 PROCEDURE — 93306 TTE W/DOPPLER COMPLETE: CPT | Mod: 26,,, | Performed by: INTERNAL MEDICINE

## 2025-07-22 PROCEDURE — 97530 THERAPEUTIC ACTIVITIES: CPT

## 2025-07-22 PROCEDURE — 99900035 HC TECH TIME PER 15 MIN (STAT)

## 2025-07-22 PROCEDURE — 99900031 HC PATIENT EDUCATION (STAT)

## 2025-07-22 PROCEDURE — 83036 HEMOGLOBIN GLYCOSYLATED A1C: CPT

## 2025-07-22 PROCEDURE — 97535 SELF CARE MNGMENT TRAINING: CPT

## 2025-07-22 PROCEDURE — 85610 PROTHROMBIN TIME: CPT

## 2025-07-22 PROCEDURE — 94760 N-INVAS EAR/PLS OXIMETRY 1: CPT

## 2025-07-22 PROCEDURE — 97161 PT EVAL LOW COMPLEX 20 MIN: CPT

## 2025-07-22 PROCEDURE — 85025 COMPLETE CBC W/AUTO DIFF WBC: CPT

## 2025-07-22 RX ADMIN — CLOPIDOGREL 75 MG: 75 TABLET ORAL at 09:07

## 2025-07-22 RX ADMIN — FAMOTIDINE 20 MG: 20 TABLET, FILM COATED ORAL at 09:07

## 2025-07-22 RX ADMIN — LEVOTHYROXINE SODIUM 150 MCG: 0.1 TABLET ORAL at 09:07

## 2025-07-22 RX ADMIN — ATORVASTATIN CALCIUM 40 MG: 40 TABLET, FILM COATED ORAL at 09:07

## 2025-07-22 RX ADMIN — FAMOTIDINE 20 MG: 20 TABLET, FILM COATED ORAL at 08:07

## 2025-07-22 RX ADMIN — Medication 6 MG: at 08:07

## 2025-07-22 RX ADMIN — ASPIRIN 81 MG: 81 TABLET ORAL at 09:07

## 2025-07-22 NOTE — HOSPITAL COURSE
Patient with B-cell lymphoma, GERD, hypertension, hyperlipidemia, CVA with residual right-sided weakness presented with worsening right lower extremity weakness.  Neurology consulted.  MRI showed acute/subacute infarct in the left frontal corona radiata.  Started on dual antiplatelet therapy. Neuro  follow up outpatient. Pt was denied by rehab, pt chose outpatient pt. Stable for dc. DAPT for 3 months per neuro then asa after.     Physical Exam  Constitutional:       General: She is not in acute distress.  Cardiovascular:      Rate and Rhythm: Normal rate and regular rhythm.   Pulmonary:      Effort: Pulmonary effort is normal. No respiratory distress.

## 2025-07-22 NOTE — ASSESSMENT & PLAN NOTE
Antithrombotics for secondary stroke prevention: Antiplatelets: Aspirin: 81 mg daily  Clopidogrel: 75 mg daily    Statins for secondary stroke prevention and hyperlipidemia, if present:   Statins: Atorvastatin- 40 mg daily    Aggressive risk factor modification: HTN, Smoking, HLD, Obesity     Rehab efforts: The patient has been evaluated by a stroke team provider and the therapy needs have been fully considered based off the presenting complaints and exam findings. The following therapy evaluations are needed: PT evaluate and treat, OT evaluate and treat, SLP evaluate and treat    Diagnostics ordered/pending: ECHO    VTE prophylaxis: Enoxaparin 40 mg SQ every 24 hours  Mechanical prophylaxis: Place SCDs    BP parameters: Infarct: No intervention, SBP <220    Neuro consulted

## 2025-07-22 NOTE — PLAN OF CARE
[facility]  Initial Discharge Assessment       Primary Care Provider: Bela Lombardi NP    Admission Diagnosis: Stroke [I63.9]    Admission Date: 7/21/2025  Expected Discharge Date: 7/23/2025    CM met with patient bedside. CM verified demographics, insurance, supports, and PCP.  Patient reported she was here at [facility] in the last 30 days. Patient states that she takes Plavix. CM assessed patient's needs. Patient is able to complete ADLs with assistance from DME. Patient verified at home DME: bedside commode and rolling walker.  Patient verified No HH, No Dialysis, and No Oxygen.     Patient [facility] [address]  Patient confirmed Stuart Olszewski [phone]  will be source of transportation at the time of discharge.     Transition of Care Barriers: None    Payor: MEDICARE / Plan: MEDICARE PART A & B / Product Type: Government /     Extended Emergency Contact Information  Primary Emergency Contact: Olszewski,Bill  Address: [address] [place] [address]           [place] [address] [facility]  Home Phone: [phone]  Mobile Phone: [phone]  Relation: Friend  Preferred language: English   needed? No  Secondary Emergency Contact: JasonManuel  Home Phone: [phone]  Relation: Brother    Discharge Plan A: Home with family  Discharge Plan B: Home      [id number] [place] - [address]  [address] [place] [address]  Phone: [phone] Fax: [phone]      Initial Assessment (most recent)       Adult Discharge Assessment - 07/22/25 0934          Discharge Assessment    Assessment Type Discharge Planning Assessment     Confirmed/corrected address, phone number and insurance Yes     Confirmed Demographics Correct on Facesheet     Source of Information patient     Communicated BYRON with patient/caregiver Yes     People in Home spouse     Name(s) of People in Home Stuart Olszewski [phone]     Facility Arrived From: home     Do you expect to  return to your current living situation? Yes     Do you have help at home or someone to help you manage your care at home? Yes     Who are your caregiver(s) and their phone number(s)? Bill Olszewski [phone]     Prior to hospitilization cognitive status: Alert/Oriented;No Deficits     Current cognitive status: Alert/Oriented;No Deficits     Walking or Climbing Stairs Difficulty yes     Walking or Climbing Stairs ambulation difficulty, requires equipment     Dressing/Bathing Difficulty no     Equipment Currently Used at Home walker, rolling;bedside commode     Readmission within 30 days? Yes     Patient currently being followed by outpatient case management? No     Do you currently have service(s) that help you manage your care at home? No     Do you take prescription medications? Yes     Do you have prescription coverage? Yes     Do you have any problems affording any of your prescribed medications? No     Is the patient taking medications as prescribed? yes     Who is going to help you get home at discharge? Bill Olszewski [phone]     How do you get to doctors appointments? family or friend will provide     Are you on dialysis? No     Do you take coumadin? Yes   Plavix    Discharge Plan A Home with family     Discharge Plan B Home     DME Needed Upon Discharge  none     Discharge Plan discussed with: Patient     Transition of Care Barriers None

## 2025-07-22 NOTE — PT/OT/SLP EVAL
"Occupational Therapy   Evaluation    Name: Indira Chauhan  MRN: [id number]  Admitting Diagnosis: Cerebrovascular accident (CVA) due to occlusion of left anterior cerebral artery  Recent Surgery: * No surgery found *      Recommendations:     Discharge Recommendations: High Intensity Therapy  Discharge Equipment Recommendations:  to be determined by next level of care  Barriers to discharge:   (Increased assist with ADLs, IADLs, and mobility)    Assessment:     Indira Chauhan is a 70 y.o. female with a medical diagnosis of Cerebrovascular accident (CVA) due to occlusion of left anterior cerebral artery. Pt is agreeable to OT evaluation this AM. Performance deficits affecting function: weakness, impaired endurance, impaired self care skills, impaired functional mobility, gait instability, impaired balance, decreased upper extremity function, decreased lower extremity function, decreased safety awareness, impaired cardiopulmonary response to activity.      Rehab Prognosis: Good; patient would benefit from acute skilled OT services to address these deficits and reach maximum level of function.       Plan:     Patient to be seen 6 x/week to address the above listed problems via self-care/home management, therapeutic activities, therapeutic exercises  Plan of Care Expires: 08/22/25  Plan of Care Reviewed with: patient    Subjective     Chief Complaint: "I just got comfy in bed"  Patient/Family Comments/goals: improve function    Occupational Profile:  Living Environment: Pt lives with  in a 2SH with living arrangements on 1st floor; has a WIS with a shower chair   Previous level of function: Mod I with ADLs prior to admission  Roles and Routines: wife; limited homemaker  Equipment Used at Home: shower chair, walker, rolling, bedside commode, grab bar  Assistance upon Discharge: yes, from facility     Pain/Comfort:  Pain Rating 1: 0/10    Patients cultural, spiritual, Sikh conflicts given the current situation: " no    Objective:     Communicated with: nursing prior to session.  Patient found HOB elevated with telemetry, bed alarm upon OT entry to room.    General Precautions: Standard, fall  Orthopedic Precautions: N/A  Braces: N/A  Respiratory Status: Room air    Occupational Performance:    Bed Mobility:    Patient completed Supine to Sit with contact guard assistance  Patient completed Sit to Supine with contact guard assistance    Functional Mobility/Transfers:  Patient completed Sit <> Stand Transfer with contact guard assistance  with  rolling walker   Functional Mobility: Pt took side steps EOB with CGA and RW with no LOB or SOB    Activities of Daily Living:  Grooming: set-up assistance to brush teeth and wash face EOB ; SBA seated balance  Lower Body Dressing: total assistance to don/doff socks seated EOB; would benefit from education on sock aid for increased independence with dressing   Toileting: Zhen      Cognitive/Visual Perceptual:  Cognitive/Psychosocial Skills:     -       Follows Commands/attention:Follows one-step commands  -       Communication: clear/fluent  -       Memory: No Deficits noted  -       Safety awareness/insight to disability: impaired   -       Mood/Affect/Coping skills/emotional control: Appropriate to situation, Cooperative, and Pleasant    Physical Exam:  Balance:    -       SBA seated balance; CGA standing balance with RW  Upper Extremity Range of Motion:     -       Right Upper Extremity: WFL  -       Left Upper Extremity: WFL  Upper Extremity Strength:    -       Right Upper Extremity: 4/5  -       Left Upper Extremity: 5/5   Strength:    -       Right Upper Extremity: WFL  -       Left Upper Extremity: WFL  Fine Motor Coordination:    -       Intact  Left hand, finger to nose and Right hand, finger to nose    AMPAC 6 Click ADL:  AMPAC Total Score: 19    Treatment & Education:  Pt educated on role of OT/POC, importance of OOB/EOB activity, use of call bell, and safety during  ADLs, transfers, and functional mobility.    Patient left HOB elevated with all lines intact, call button in reach, and bed alarm on    GOALS:   Multidisciplinary Problems       Occupational Therapy Goals          Problem: Occupational Therapy    Goal Priority Disciplines Outcome Interventions   Occupational Therapy Goal     OT, PT/OT     Description: Goals to be met by: 8/22/25     Patient will increase functional independence with ADLs by performing:    UE Dressing with Supervision.  LE Dressing with Supervision.  Grooming while standing at sink with Supervision.  Toileting from toilet with Supervision for hygiene and clothing management.   Toilet transfer to toilet with Supervision.                         DME Justifications:  No DME recommended requiring DME justifications    History:     Past Medical History:   Diagnosis Date    Cancer     Diffuse large B cell lymphoma     GERD (gastroesophageal reflux disease)     Hyperlipidemia     Hypertension     Hypothyroidism          Past Surgical History:   Procedure Laterality Date    PORTACATH PLACEMENT Right 03/2018       Time Tracking:     OT Date of Treatment: 07/22/25  OT Start Time: 1017  OT Stop Time: 1031  OT Total Time (min): 14 min    Billable Minutes:Evaluation 6  Self Care/Home Management 8    7/22/2025

## 2025-07-22 NOTE — ASSESSMENT & PLAN NOTE
Patient's blood pressure range in the last 24 hours was: BP  Min: 110/52  Max: 172/80.The patient's inpatient anti-hypertensive regimen is listed below:  Current Antihypertensives  labetaloL injection 10 mg, Every 15 min PRN, Intravenous    Plan  - BP is controlled, no changes needed to their regimen  - Holding home CCB 2/2 permissive HTN

## 2025-07-22 NOTE — PLAN OF CARE
Goals to be met by: 25     Patient will increase functional independence with mobility by performin. Supine to sit with Supervision  2. Sit to stand transfer with Supervision  3. Bed to chair transfer with Supervision using Rolling Walker  4. Gait  x 150 feet with Supervision using Rolling Walker.

## 2025-07-22 NOTE — RESPIRATORY THERAPY
07/21/25 1940   Patient Assessment/Suction   Level of Consciousness (AVPU) alert   PRE-TX-O2   Device (Oxygen Therapy) room air   SpO2 98 %   Pulse Oximetry Type Intermittent   $ Pulse Oximetry - Single Charge Pulse Oximetry - Single   Pulse 88   Education   $ Education Oxygen;15 min

## 2025-07-22 NOTE — RESPIRATORY THERAPY
07/22/25 0805   Patient Assessment/Suction   Level of Consciousness (AVPU) alert   Respiratory Effort Normal;Unlabored   Expansion/Accessory Muscles/Retractions no use of accessory muscles;no retractions;expansion symmetric   Rhythm/Pattern, Respiratory unlabored;pattern regular;depth regular   PRE-TX-O2   Device (Oxygen Therapy) room air   SpO2 96 %   Pulse Oximetry Type Continuous   $ Pulse Oximetry - Multiple Charge Pulse Oximetry - Multiple   Pulse 77   Education   $ Education 15 min;Oxygen

## 2025-07-22 NOTE — SUBJECTIVE & OBJECTIVE
HPI:  70 y.o. female with prior stroke (mild residual RSW), HTN, HLD, hypothyroidism, B-cell lymphoma, GERD, PVD presented with right leg weakness. Stroke code was activated and patient seen by TeleStroke Team.  NIH 1. Patient not treated with thrombolytic therapy as she was back to her baseline.  Symptoms not felt to be due to LVO.  Patient admitted for stroke workup.  Today patient feels the right leg is still weaker than her normal baseline from her prior stroke.  No other reported symptoms.  Has been taking ASA and Plavix at home and reports compliance with exception of not taking on day she came to the ED.     Images personally reviewed and interpreted:  Study: Head CT, CTA Head & Neck, and MRI Brain  Study Interpretation: CT head: No early infarct signs.  No hemorrhage.  No mass effect. CTA head and neck: Occlusion left A2 segment.  Appears similar to CTA 5/23/2025. Diffusion restriction left ELSIE in similar distribution as seen on 5/23/2025 MRI now with acute on chronic findings.    Additional studies reviewed:   Study: Cardiac_Neuro: 2D echo  Study Interpretation: Pending    Laboratory studies reviewed:  BMP:   Lab Results   Component Value Date     07/22/2025    K 3.6 07/22/2025     07/22/2025    CO2 31 (H) 07/22/2025    BUN 12 07/22/2025    CREATININE 0.6 07/22/2025    CALCIUM 9.2 07/22/2025     CBC:   Lab Results   Component Value Date    WBC 8.36 07/22/2025    RBC 4.07 07/22/2025    HGB 13.4 07/22/2025    HCT 40.6 07/22/2025     07/22/2025     (H) 07/22/2025    MCH 32.9 (H) 07/22/2025    MCHC 33.0 07/22/2025     Lipid Panel:   Lab Results   Component Value Date    CHOL 123 07/21/2025    LDLCALC 61.0 (L) 07/21/2025    HDL 45 07/21/2025    TRIG 85 07/21/2025     Coagulation:   Lab Results   Component Value Date    INR 1.0 07/22/2025    APTT 27.9 07/22/2025     Hgb A1C:   Lab Results   Component Value Date    HGBA1C 6.2 07/22/2025     TSH:   Lab Results   Component Value Date    TSH  1.905 07/21/2025       Documentation personally reviewed:  Notes: Notes: ED notes, H&P, and Consult notes from:[facility]     Assessment and plan:  70 y.o. female with prior stroke (mild residual RSW), HTN, HLD, tobacco abuse, hypothyroidism, B-cell lymphoma, GERD, PVD now with acute on chronic infarcts to the left ELSIE in setting of known A2 occlusion.    Recommendations  ASA 81mg daily with Plavix 75mg daily x 3 months in setting of intracranial atherosclerotic disease then monotherapy with ASA thereafter  Platelet response to ASA and Plavix - if platelets are low (indicating inhibition) continue as above.  If study does not support inhibition would discontinue Plavix and start Brilinta  Continue home statin - currently at goal LDL  Aggressive stroke risk factor modification: HTN, HLD, tobacco abuse  Follow TTE  Follow therapy recommendations  Can slowly reduce BP over next 24-48 hours - avoid aggressive BP lowering and sudden drops in BP that can lead to hypoperfusion  If pending studies are unremarkable, no further inpatient stroke workup needed and patient can dispo with therapy recommendations once medically ready  Please contact us with any further questions or concerns or if patient has any acute neurological changes (new symptoms, worsening deficits).      Post charge discharge plan:  Clinic follow up: Vascular Neurology    Visit Type: in person or virtual  Timeframe: Next available    Collaborating Physician, Dr. Thao, was available during today's encounter. Any change to plan along with cosign to appear in the EMR.

## 2025-07-22 NOTE — PT/OT/SLP EVAL
"Speech Language Pathology Evaluation  Cognitive/Bedside Swallow    Patient Name:  Indira Chauhan   MRN:  [id number]  Admitting Diagnosis: Stroke    Recommendations:                  General Recommendations:  Follow-up not indicated  Diet recommendations:  Regular Diet - IDDSI Level 7, Thin liquids - IDDSI Level 0   Aspiration Precautions: Standard aspiration precautions   General Precautions: Standard,    Communication strategies:  none  Discharge recommendations:  No Therapy Indicated   Barriers to Discharge:  None    Assessment:     Indira Chauhan is a 70 y.o. female with an admitting diagnosis of stroke.  She presents with similar cognitive-linguistic deficits from last admitting CVA in May of this year. Deficits noted in executive functioning, complex language, and memory. Per pt and pt's spouse, pt functioning well w/ present deficits and is at baseline. Swallowing WFL; no overt s/s aspiration or oropharyngeal dysphagia. Rec Regular (IDDSI 7) textures with thin liquids (IDDSI 0). Diet recs communicated patient's nurse, MD, and  via secure chat @ 1829. No further ST needs at this time.    History:   Per H&P:  "HPI: 70-year-old female presented to ED for eval of stroke-like symptoms. pMHx diffuse B-cell lymphoma, GERD, HTN, HLD, hypothyroidism, stroke, PVD.  Patient at 11:15 a.m. today she experienced RSW when trying to get out of the car, she stated weakness was significant enough where she felt like she could not get herself of the car.  Denies chest pain, shortness of breath.  Denies abdominal pain, nausea, vomiting, changes in bowel habits.  Denies fever, chills, recent illness. Patient does have history of recent stroke in May that was treated with TNK which presented with similar symptoms, reports compliance with DAPT, did not take Plavix today as she ran out yesterday but did take ASA. CTH no acute intracranial process. CTA H/N impression with no evidence of large vessel occlusion; stable abrupt " "cut off of and A2 segment of the left anterior cerebral artery which may be secondary to high-grade stenosis or thrombus; multifocal carotid atherosclerosis resulting in mild narrowing at the origin of the ICAs bilaterally. MRI brain impression with acute-subacute infarct in the left frontal corona-radiata; subacute-chronic infarct in the adjacent, posterosuperior left frontal lobe; deep cerebral white matter findings of moderate-to-severe small vessel ischemic disease. Tele stroke eval done in ED, noted chronic L A2 occlusion, TNK not recommended 2/2 recent stroke, thrombectomy not recommended.  Admit to hospital medicine for further eval."    Past Medical History:   Diagnosis Date    Cancer     Diffuse large B cell lymphoma     GERD (gastroesophageal reflux disease)     Hyperlipidemia     Hypertension     Hypothyroidism        Past Surgical History:   Procedure Laterality Date    PORTACATH PLACEMENT Right 03/2018       Social History: Patient lives with her .    Prior Intubation HX:  none this admit    Modified Barium Swallow: none found in epic or reported by pt    Imaging:   Imaging Results              X-Ray Chest AP Single View (Final result)  Result time 07/21/25 16:58:15      Final result by Jon Noguera MD (07/21/25 16:58:15)                   Impression:      No acute cardiac or pulmonary process.      Electronically signed by: Jon Noguera  Date:    07/21/2025  Time:    16:58               Narrative:    CLINICAL HISTORY:  ([id number])69 y/o  (1955) F    decreased breath sounds;    TECHNIQUE:  (A#[id number], exam time 7/21/2025 16:57)    XR CHEST 1 VIEW [id number]    COMPARISON:  Radiograph from 02/25/2019    FINDINGS:  The lungs are clear. Costophrenic angles are seen without effusion. No pneumothorax is identified. The heart is normal in size. There is a right-sided subclavian medical infusion port with tip at the level of the SVC.  Osseous structures show degenerative changes in the " spine. The visualized upper abdomen is unremarkable.                                       MRI Brain Without Contrast (Edited Result - FINAL)  Result time 07/21/25 15:28:27      Addendum (preliminary) 1 of 1 by Jon Noguera MD (07/21/25 15:28:27)      RESULT NOTIFICATION: These observations were discussed by the dictating physician, by phone with, and acknowledged by Josiah Ball at 15:28 on 7/21/2025.      Electronically signed by: Jon Noguera  Date:    07/21/2025  Time:    15:28                 Final result by Jon Noguera MD (07/21/25 14:53:28)                   Impression:      1.  Acute-subacute infarct in the left frontal corona-radiata (parasagittal-deep-cerebral white matter of the left frontal lobe).    2.  Subacute-chronic infarct in the adjacent, posterosuperior left frontal lobe (seen on the MRI from 05/23/2025)    3.  No acute intracranial hemorrhage, no hydrocephalus, herniation or midline shift.    4.  Deep cerebral white matter findings of moderate-to-severe small vessel ischemic disease.    This report was flagged in Epic as abnormal.      Electronically signed by: Jon Noguera  Date:    07/21/2025  Time:    14:53               Narrative:    CLINICAL HISTORY:  ([id number])69 y/o  (1955) F    Neuro deficit, acute, stroke suspected;    TECHNIQUE:  (A#[id number], exam time 7/21/2025 14:45)    MRI BRAIN WITHOUT CONTRAST [id number]    Multiplanar multisequence MRI of the brain was performed.    CONTRAST:    No contrast was administered.    COMPARISON:  Most recent MRI from 05/23/2025.    FINDINGS:  Technically suboptimal exam secondary to motion artifact with repeated sequences attempted.    Multifocal areas of abnormal diffusion restriction with matched decrease ADC signal intensity increased T2 FLAIR signal intensity compatible with acute-subacute infarct in a linear distribution in the parasagittal left frontal corona radiata (image 44 series 3).  This is adjacent-2 and  slightly more anterior and inferior to the previously described infarct.  There is a punctate focus in the periventricular right occipital lobe (image 39), which may represent a tiny additional focus of disease.    No hydrocephalus, herniation or midline shift in the basal/suprasellar cisterns are patent.  No focal abnormal signal intensity to suggest an intracranial bleed.  No acute osseous abnormality is identified.    Overall there is moderate to severe periventricular deep cerebral white matter T2 FLAIR hyperintensity in both cerebral hemispheres, a nonspecific finding in this age group which can be seen in diffuse white matter process but 1 which is most commonly associated with small vessel ischemic disease.    The pituitary gland and infundibulum is normal in size without abnormal signal pattern; the craniocervical junction is within normal limits.  The orbital contents are normal.  The visualized paranasal sinuses and mastoid air cells are essentially clear.  There is leftward nasal septal deviation.                                       CTA Head and Neck (xpd) (Final result)  Result time 07/21/25 13:57:06      Final result by Olivia Mar MD (07/21/25 13:57:06)                   Impression:      Stable abrupt cut off of and A2 segment of the left anterior cerebral artery which may be secondary to high-grade stenosis or thrombus    Multifocal carotid atherosclerosis resulting in mild narrowing at the origin of the ICAs bilaterally    No evidence of large vessel occlusion      Electronically signed by: Olivia Mar  Date:    07/21/2025  Time:    13:57               Narrative:    EXAMINATION:  CTA HEAD AND NECK (XPD)    CLINICAL HISTORY:  Neuro deficit, acute, stroke suspected;    TECHNIQUE:  Non contrast low dose axial images were obtained through the head. CT angiogram was performed from the level of the lana to the top of the head following the IV administration of 100mL of Omnipaque 350.    Sagittal and coronal reconstructions and maximum intensity projection reconstructions were performed. Arterial stenosis percentages are based on NASCET measurement criteria.    COMPARISON:  05/23/2025    FINDINGS:  Extracranial:    Aorta and great vessels: Left-sided aortic arch with normal configuration.   No evidence of stenosis of the origins of the great vessels from the arch.    Subclavian arteries: Mild luminal narrowing of the left subclavian artery of up to 40%.    Stable narrowing of the right subclavian artery approximately 60% the subclavian arteries are patent.    Right carotid: The right common carotid artery is without significant stenosis. Moderate atherosclerotic calcification at the carotid bulb and proximal ICA where there is less than 50% stenosis.  There is sharp acute angulation of the proximal internal carotid artery resulting in a 60% stenosis.  The remainder of the cervical ICA is normal.  There is no dissection.    Left carotid: The left common carotid artery is without significant stenosis. moderate atherosclerotic calcification at the left carotid bulb and ICA origin resulting in less than 50% stenosis the left internal carotid artery is without significant stenosis, occlusion, or dissection.    Extracranial vertebral arteries: The left vertebral artery is dominant the vertebral arteries are without significant stenosis, occlusion, or dissection to the skull base.  Hypoplastic right V4 segment    Intracranial:    Anterior circulation: Moderate vascular calcification of the cavernous carotid arteries resulting and mild stenosis.  The middle cerebral arteries are normal.  Hypoplastic right A1 segment.  There is stable abrupt cut off of the A2 segment on the left anterior cerebral artery.  This may be secondary to high-grade stenosis or thrombus.  The distal right anterior cerebral artery is normal.    Posterior circulation: Hypoplastic right V4 segment the basilar artery is normal. The  posterior cerebral arteries are normal.    No evidence of aneurysm or arteriovenous malformation    Dural venous sinuses are patent.    Other:    Paraspinous soft tissues are normal.    The visualized portions of the lung apices are unremarkable.    There are degenerative spine changes. No concerning osseous lesions identified.                                       CT HEAD FOR CODE STROKE (Final result)  Result time 07/21/25 13:26:04      Final result by Jon Noguera MD (07/21/25 13:26:04)                   Impression:      1. No acute intracranial process.    2. Chronic/involutional findings as noted above.    RESULT NOTIFICATION: These observations were discussed by the dictating physician, by phone with, and acknowledged by Josiah Ball at 13:24 on 7/21/2025.      Electronically signed by: Jon Noguera  Date:    07/21/2025  Time:    13:26               Narrative:    CLINICAL HISTORY:  ([id number])71 y/o  (1955) F    Neuro deficit, acute, stroke suspected;    TECHNIQUE:  (A#[id number], exam time 7/21/2025 13:23)    CT HEAD FOR CODE STROKE [id number]    Axial CT of the brain without contrast using soft tissue and bone algorithm. None.    CMS MANDATED QUALITY DATA - CT RADIATION - 436    All CT scans at this facility utilize dose modulation, iterative reconstruction, and/or weight based dosing when appropriate to reduce radiation dose to as low as reasonably achievable.    COMPARISON:  Most recent CT from 05/24/2025    FINDINGS:  No acute intracranial hemorrhage, edema or mass effect, and no acute parenchymal abnormality. There is no hydrocephalus, herniation or midline shift, and the basal and suprasellar cisterns are within normal limits. The osseous structures show no acute skull fracture.    Moderate periventricular deep cerebral white matter low attenuation  nonspecific findings which can be seen in any diffuse white matter process but most commonly associated with chronic microvascular  ischemic disease. There are scattered atheromatous calcifications in the intracranial internal carotid arteries.    Orbital contents appear within normal limits. External auditory canals are unremarkable. The visualized paranasal sinuses and mastoid air cells are essentially clear.                                      Prior diet: Regular, thin at home and at time of CSE.    Occupation/hobbies/homemaking: Pt is IND in ADLs; she does not do much at home since having her last CVA in May of this year.    Subjective     Pt awake laying in bed.  at bedside. Pt was agreeable to ST eval.  Patient goals: to go home; to take a nap    Pain/Comfort:       Respiratory Status: Room air    Objective:     Cognitive Status:    Problem Solving Solutions 3/3, L   Functional math 2/2, [facility]   MoCA (Version 8.3) administered with pt achieving a score of 23/30 (WNL is >/= 26/30) suggesting mild cognitive-linguistic impairment. Pt earned 3 of 5 points on visuospatial/executive functions, 3 of 3 points on naming, 5 of 6 points for attention, 0 of 3 points for language, 2 of 2 points for abstraction, 3 of 5 points for delayed recall and 6 of 6 points for orientation. 1 additional point added to overall score for education level of 12th grade. Deficits in complex language and visuospatial skills/executive functions; mild memory deficits noted as well. Pt was able to recall missed memory items when given category cue and MC cue, 1 each for 2 missed items. Last admit in May of this year, pt earned a 22/30 on MoCA version 8.1; score mildly improved from last admit. Per pt and , pt functioning adequately w/ present deficits at baseline.    Receptive Language:   Comprehension:   Questions Simple yes/no WFL  Open ended questions WFL  Commands  One step WFL  two step basic commands WFL  complex/abstract commands WFL  Conversation WFL    Pragmatics:    appropriate    Expressive Language:  Verbal:    Initiation WFL  Repetition Words WFL  and Sentences replaced 1 word in 1st sentence on MoCA and 1 in 2nd sentence as well as adding a word at the end of the sentence   Naming Confrontation WFL, Convergent WFL, and Divergent responsive 5/11 on language fluency portion of MoCA  Conversational speech WFL      Motor Speech:  WFL    Voice:   Adequate for age, sex, size.    Visual-Spatial:  Grossly intact; no obvious deficits noted. No obvious field cuts or neglect noted. Hands on clock drawing same size; bed drawing w/ inadequate placement of headboard on MoCA.    Reading:   WFL     Written Expression:   WFL    Oral Musculature Evaluation  Oral Musculature: WFL  Dentition: upper dentures, uses dentures to eat, other (see comments) (edentulous lower jaw)  Secretion Management: adequate  Mucosal Quality: adequate  Mandibular Strength and Mobility: WFL  Oral Labial Strength and Mobility: WFL  Lingual Strength and Mobility: WFL  Velar Elevation: WFL  Buccal Strength and Mobility: WFL  Volitional Cough: elicited; functional  Volitional Swallow: laryngeal movement palpated  Voice Prior to PO Intake: clear    Bedside Swallow Eval:   Consistencies Assessed:  Thin liquids water via cup and straw  Puree tsp bites pudding  Mixed consistencies tsp bites diced peaches in thin juice  Solids cracker     Oral Phase:   WFL    Pharyngeal Phase:   no overt clinical signs/symptoms of aspiration  no overt clinical signs/symptoms of pharyngeal dysphagia    Compensatory Strategies  None    Treatment: Pt and family educated re purpose of evaluation, role of SLP, results of evaluation, and recs. Pt and family expressed understanding of recs. Recs shared w/ MD, , and nurse.    Goals:   Multidisciplinary Problems       SLP Goals       Not on file                    Plan:     Patient to be seen:      Plan of Care expires:     Plan of Care reviewed with:  patient, spouse, other (see comments) (MD, , nurse)   SLP Follow-Up:  No       Time Tracking:     SLP  Treatment Date:   07/22/25  Speech Start Time:  1133  Speech Stop Time:  1157     Speech Total Time (min):  24 min    Billable Minutes: Eval speech/cognitive-linguistic 12 min  and Eval Swallow and Oral Function 12 min    07/22/2025

## 2025-07-22 NOTE — PLAN OF CARE
PAKO sent a SC to Radha with [facility] and also a referral to [facility] via [facility] asking them to evaluate pt for rehab placement. SW will cont to follow for any further d/c needs.     07/22/25 [id number]   Post-Acute Status   Post-Acute Authorization Placement   Post-Acute Placement Status Referrals Sent   Discharge Plan   Discharge Plan A Rehab

## 2025-07-22 NOTE — PLAN OF CARE
Problem: Stroke, Ischemic (Includes Transient Ischemic Attack)  Goal: Optimal Coping  Outcome: Ongoing  Goal: Effective Bowel Elimination  Outcome: Ongoing  Goal: Optimal Cerebral Tissue Perfusion  Outcome: Ongoing  Goal: Optimal Cognitive Function  Outcome: Ongoing  Goal: Improved Communication Skills  Outcome: Ongoing  Goal: Optimal Functional Ability  Outcome: Ongoing  Goal: Optimal Nutrition Intake  Outcome: Ongoing  Goal: Effective Oxygenation and Ventilation  Outcome: Ongoing  Goal: Improved Sensorimotor Function  Outcome: Ongoing  Goal: Safe and Effective Swallow  Outcome: Ongoing  Goal: Effective Urinary Elimination  Outcome: Ongoing     Problem: Fall Injury Risk  Goal: Absence of Fall and Fall-Related Injury  Outcome: Ongoing     Problem: Adult Inpatient Plan of Care  Goal: Plan of Care Review  Outcome: Ongoing  Goal: Patient-Specific Goal (Individualized)  Outcome: Ongoing  Goal: Absence of Hospital-Acquired Illness or Injury  Outcome: Ongoing  Goal: Optimal Comfort and Wellbeing  Outcome: Ongoing  Goal: Readiness for Transition of Care  Outcome: Ongoing     Problem: Wound  Goal: Optimal Coping  Outcome: Ongoing  Goal: Optimal Functional Ability  Outcome: Ongoing  Goal: Absence of Infection Signs and Symptoms  Outcome: Ongoing  Goal: Improved Oral Intake  Outcome: Ongoing  Goal: Optimal Pain Control and Function  Outcome: Ongoing  Goal: Skin Health and Integrity  Outcome: Ongoing  Goal: Optimal Wound Healing  Outcome: Ongoing     Problem: Hospitalized Older Adult  Goal: Optimal Cognitive Function  Outcome: Ongoing  Goal: Effective Bowel Elimination  Outcome: Ongoing  Goal: Optimal Coping  Outcome: Ongoing  Goal: Fluid and Electrolyte Balance  Outcome: Ongoing  Goal: Optimal Functional Ability  Outcome: Ongoing  Goal: Improved Oral Intake  Outcome: Ongoing  Goal: Adequate Sleep/Rest  Outcome: Ongoing  Goal: Effective Urinary Elimination  Outcome: Ongoing

## 2025-07-22 NOTE — PROGRESS NOTES
[facility] Medicine  Progress Note    Patient Name: Indira Chauhan  MRN: [id number]  Patient Class: IP- Inpatient   Admission Date: 7/21/2025  Length of Stay: 1 days  Attending Physician: Luh Teixeira MD  Primary Care Provider: Bela Lombardi NP        Subjective     Principal Problem:Stroke        HPI:  70-year-old female presented to ED for eval of stroke-like symptoms. pMHx diffuse B-cell lymphoma, GERD, HTN, HLD, hypothyroidism, stroke, PVD.  Patient at 11:15 a.m. today she experienced RSW when trying to get out of the car, she stated weakness was significant enough where she felt like she could not get herself of the car.  Denies chest pain, shortness of breath.  Denies abdominal pain, nausea, vomiting, changes in bowel habits.  Denies fever, chills, recent illness. Patient does have history of recent stroke in May that was treated with TNK which presented with similar symptoms, reports compliance with DAPT, did not take Plavix today as she ran out yesterday but did take ASA. CTH no acute intracranial process. CTA H/N impression with no evidence of large vessel occlusion; stable abrupt cut off of and A2 segment of the left anterior cerebral artery which may be secondary to high-grade stenosis or thrombus; multifocal carotid atherosclerosis resulting in mild narrowing at the origin of the ICAs bilaterally. MRI brain impression with acute-subacute infarct in the left frontal corona-radiata; subacute-chronic infarct in the adjacent, posterosuperior left frontal lobe; deep cerebral white matter findings of moderate-to-severe small vessel ischemic disease. Tele stroke eval done in ED, noted chronic L A2 occlusion, TNK not recommended 2/2 recent stroke, thrombectomy not recommended.  Admit to hospital medicine for further eval.    Overview/Hospital Course:  Patient with B-cell lymphoma, GERD, hypertension, hyperlipidemia, CVA with residual right-sided weakness presented with worsening right  lower extremity weakness.  Neurology consulted.  MRI showed acute/subacute infarct in the left frontal corona radiata.  Started on dual antiplatelet therapy.  PT/OT/ST consulted.    Interval History:  Patient seen and examined this morning, reports some improvement in right lower extremity weakness.  Echocardiogram pending.  Neurology follow up pending.    Review of Systems   Respiratory:  Negative for shortness of breath.    Cardiovascular:  Negative for chest pain.   Neurological:  Positive for weakness.     Objective:     Vital Signs (Most Recent):  Temp: 98.5 °F (36.9 °C) (07/22/25 0719)  Pulse: 77 (07/22/25 0805)  Resp: 18 (07/21/25 2119)  BP: (!) 142/78 (07/22/25 0719)  SpO2: 96 % (07/22/25 0805) Vital Signs (24h Range):  Temp:  [98 °F (36.7 °C)-98.5 °F (36.9 °C)] 98.5 °F (36.9 °C)  Pulse:  [64-98] 77  Resp:  [17-24] 18  SpO2:  [91 %-98 %] 96 %  BP: (110-172)/(52-83) 142/78     Weight: 102.3 kg (225 lb 8.5 oz)  Body mass index is 38.71 kg/m².    Intake/Output Summary (Last 24 hours) at 7/22/2025 1335  Last data filed at 7/22/2025 0933  Gross per 24 hour   Intake 220 ml   Output 850 ml   Net -630 ml         Physical Exam  Vitals reviewed.   Constitutional:       General: She is not in acute distress.  Cardiovascular:      Rate and Rhythm: Normal rate and regular rhythm.   Pulmonary:      Effort: Pulmonary effort is normal. No respiratory distress.   Musculoskeletal:         General: Normal range of motion.   Skin:     General: Skin is warm and dry.   Neurological:      Mental Status: She is alert and oriented to person, place, and time.      Sensory: No sensory deficit.      Motor: Weakness (RUE) present.      Comments: Drift RLE   Psychiatric:         Mood and Affect: Mood normal.               Significant Labs: All pertinent labs within the past 24 hours have been reviewed.  CBC:   Recent Labs   Lab 07/21/25  1327 07/22/25  0419   WBC 9.74 8.36   HGB 13.7 13.4   HCT 40.2 40.6    278     CMP:   Recent  Labs   Lab 07/21/25  1327 07/22/25  0419    140   K 4.4 3.6    101   CO2 27 31*   * 99   BUN 17 12   CREATININE 0.7 0.6   CALCIUM 9.5 9.2   PROT 7.4 6.9   ALBUMIN 4.3 4.1   BILITOT 0.7 0.7   ALKPHOS 92 88   AST 16 11   ALT 13 11   ANIONGAP 8 8       Significant Imaging: I have reviewed all pertinent imaging results/findings within the past 24 hours.      Assessment & Plan  Stroke    Antithrombotics for secondary stroke prevention: Antiplatelets: Aspirin: 81 mg daily  Clopidogrel: 75 mg daily    Statins for secondary stroke prevention and hyperlipidemia, if present:   Statins: Atorvastatin- 40 mg daily    Aggressive risk factor modification: HTN, Smoking, HLD, Obesity     Rehab efforts: The patient has been evaluated by a stroke team provider and the therapy needs have been fully considered based off the presenting complaints and exam findings. The following therapy evaluations are needed: PT evaluate and treat, OT evaluate and treat, SLP evaluate and treat    Diagnostics ordered/pending: ECHO    VTE prophylaxis: Enoxaparin 40 mg SQ every 24 hours  Mechanical prophylaxis: Place SCDs    BP parameters: Infarct: No intervention, SBP <220    Neuro consulted       Mixed hyperlipidemia  Resume home statin    Acquired hypothyroidism  Resume home levothyroxine    History of lymphoma  Hx of osseous and B cell lymphoma, s/p chemo 4 years ago    Primary hypertension  Patient's blood pressure range in the last 24 hours was: BP  Min: 110/52  Max: 172/80.The patient's inpatient anti-hypertensive regimen is listed below:  Current Antihypertensives  labetaloL injection 10 mg, Every 15 min PRN, Intravenous    Plan  - BP is controlled, no changes needed to their regimen  - Holding home CCB 2/2 permissive HTN  VTE Risk Mitigation (From admission, onward)           Ordered     enoxaparin injection 40 mg  Daily         07/21/25 1638     IP VTE HIGH RISK PATIENT  Once         07/21/25 1638     Place sequential  compression device  Until discontinued         07/21/25 1638                    Discharge Planning   BYRON: 7/23/2025     Code Status: Full Code   Medical Readiness for Discharge Date:   Discharge Plan A: Home with family                  Please place Justification for DME      Luh Teixeira MD  Department of Hospital Medicine   [facility]

## 2025-07-22 NOTE — TELEMEDICINE CONSULT
[facility]  Tele-Vascular Neurology   Consult Note      Consult Information  Inpatient Consult to Neurology Services (General Neurology)  Consult performed by: Trang Whelan NP  Consult ordered by: Flora Mendosa NP          Consulting Provider: CA AG   Current Providers  No providers found    Patient Location:  [facility] PROGRESSIVE CARE UNIT IP Unit    Spoke hospital nurse at bedside with patient assisting consultant.  Patient information was obtained from patient.       Vascular Neurology Documentation  Acute Stroke Times   Last Known Normal Date: 07/21/25  Last Known Normal Time: 1115  Stroke Team Called Date: 07/21/25  Stroke Team Called Time: 1320  Stroke Team Arrival Date: 07/21/25  Stroke Team Arrival Time: 1327  CT Interpretation Time: 1327  Thrombolytic Therapy Recommended: No  Thrombectomy Recommended: No    NIH Scale:  1a. Level of Consciousness: 0-->Alert, keenly responsive  1b. LOC Questions: 0-->Answers both questions correctly  1c. LOC Commands: 0-->Performs both tasks correctly  2. Best Gaze: 0-->Normal  3. Visual: 0-->No visual loss  4. Facial Palsy: 0-->Normal symmetrical movements  5a. Motor Arm, Left: 0-->No drift, limb holds 90 (or 45) degrees for full 10 secs  5b. Motor Arm, Right: 0-->No drift, limb holds 90 (or 45) degrees for full 10 secs  6a. Motor Leg, Left: 0-->No drift, leg holds 30 degree position for full 5 secs  6b. Motor Leg, Right: 0-->No drift, leg holds 30 degree position for full 5 secs  7. Limb Ataxia: 0-->Absent  8. Sensory: 0-->Normal, no sensory loss  9. Best Language: 0-->No aphasia, normal  10. Dysarthria: 0-->Normal  11. Extinction and Inattention (formerly Neglect): 0-->No abnormality  Total (NIH Stroke Scale): 0      Modified Mannie: Score: 3  Clara Coma Scale:     ABCD2 Score:    [id number] Score:    HAS -BLED Score:    ICH Score:    Hunt & Egan Classification:      Blood pressure (!) 151/89, pulse 90, temperature 98.1 °F (36.7 °C), temperature  "source Oral, resp. rate 18, height 5' 4" (1.626 m), weight 102.3 kg (225 lb 8.5 oz), SpO2 97%.    VAN Stroke Assessment: Negative    Medical Decision Making  HPI:  70 y.o. female with prior stroke (mild residual RSW), HTN, HLD, hypothyroidism, B-cell lymphoma, GERD, PVD presented with right leg weakness. Stroke code was activated and patient seen by TeleStroke Team.  NIH 1. Patient not treated with thrombolytic therapy as she was back to her baseline.  Symptoms not felt to be due to LVO.  Patient admitted for stroke workup.  Today patient feels the right leg is still weaker than her normal baseline from her prior stroke.  No other reported symptoms.  Has been taking ASA and Plavix at home and reports compliance with exception of not taking on day she came to the ED.     Images personally reviewed and interpreted:  Study: Head CT, CTA Head & Neck, and MRI Brain  Study Interpretation: CT head: No early infarct signs.  No hemorrhage.  No mass effect. CTA head and neck: Occlusion left A2 segment.  Appears similar to CTA 5/23/2025. Diffusion restriction left ELSIE in similar distribution as seen on 5/23/2025 MRI now with acute on chronic findings.    Additional studies reviewed:   Study: Cardiac_Neuro: 2D echo  Study Interpretation: Pending    Laboratory studies reviewed:  BMP:   Lab Results   Component Value Date     07/22/2025    K 3.6 07/22/2025     07/22/2025    CO2 31 (H) 07/22/2025    BUN 12 07/22/2025    CREATININE 0.6 07/22/2025    CALCIUM 9.2 07/22/2025     CBC:   Lab Results   Component Value Date    WBC 8.36 07/22/2025    RBC 4.07 07/22/2025    HGB 13.4 07/22/2025    HCT 40.6 07/22/2025     07/22/2025     (H) 07/22/2025    MCH 32.9 (H) 07/22/2025    MCHC 33.0 07/22/2025     Lipid Panel:   Lab Results   Component Value Date    CHOL 123 07/21/2025    LDLCALC 61.0 (L) 07/21/2025    HDL 45 07/21/2025    TRIG 85 07/21/2025     Coagulation:   Lab Results   Component Value Date    INR 1.0 " 07/22/2025    APTT 27.9 07/22/2025     Hgb A1C:   Lab Results   Component Value Date    HGBA1C 6.2 07/22/2025     TSH:   Lab Results   Component Value Date    TSH 1.905 07/21/2025       Documentation personally reviewed:  Notes: Notes: ED notes, H&P, and Consult notes from:[facility]     Assessment and plan:  70 y.o. female with prior stroke (mild residual RSW), HTN, HLD, tobacco abuse, hypothyroidism, B-cell lymphoma, GERD, PVD now with acute on chronic infarcts to the left ELSIE in setting of known A2 occlusion.    Recommendations  ASA 81mg daily with Plavix 75mg daily x 3 months in setting of intracranial atherosclerotic disease then monotherapy with ASA thereafter  Platelet response to ASA and Plavix - if platelets are low (indicating inhibition) continue as above.  If study does not support inhibition would discontinue Plavix and start Brilinta  Continue home statin - currently at goal LDL  Aggressive stroke risk factor modification: HTN, HLD, tobacco abuse  Follow TTE  Follow therapy recommendations  Can slowly reduce BP over next 24-48 hours - avoid aggressive BP lowering and sudden drops in BP that can lead to hypoperfusion  If pending studies are unremarkable, no further inpatient stroke workup needed and patient can dispo with therapy recommendations once medically ready  Please contact us with any further questions or concerns or if patient has any acute neurological changes (new symptoms, worsening deficits).      Post charge discharge plan:  Clinic follow up: Vascular Neurology    Visit Type: in person or virtual  Timeframe: Next available    Collaborating Physician, Dr. Thao, was available during today's encounter. Any change to plan along with cosign to appear in the EMR.      Additional Physical Exam, History, & ROS  ROS  Physical Exam    Neurological Exam  LOC: alert  Attention Span: Good   Language: No aphasia  Articulation: No dysarthria  Orientation: Person, Place, Time   Visual Fields: Full  EOM (CN III,  IV, VI): Full/intact  Facial Sensation (CN V): Normal  Facial Movement (CN VII): Symmetric facial expression    Motor:   Full antigravity; Full power noted with nurse assistance in all 4 extremities  Cerebellum: No evidence of appendicular or axial ataxia  Sensation: Intact to light touch      Past Medical History:   Diagnosis Date    Cancer     Diffuse large B cell lymphoma     GERD (gastroesophageal reflux disease)     Hyperlipidemia     Hypertension     Hypothyroidism      Past Surgical History:   Procedure Laterality Date    PORTACATH PLACEMENT Right 03/2018     Family History   Problem Relation Name Age of Onset    Heart disease Mother      COPD Mother      No Known Problems Father         Diagnoses  Problem Noted   Cerebrovascular Accident (Cva) Due to Occlusion of Left Anterior Cerebral Artery 7/21/2025   Primary Hypertension 6/8/2023   Tobacco Use 11/2/2017   Mixed Hyperlipidemia 10/23/2017       Trang Whelan NP    Neurology consultation requested by spoke provider. Audiovisual encounter with the patient performed using a secure connection.  Results and impressions from the visit are documented on this note and were communicated to the consulting provider/team via direct communication. The note has been shared for addition to the patients electronic medical record.

## 2025-07-22 NOTE — PT/OT/SLP EVAL
Physical Therapy Evaluation    Patient Name:  Indira Chauhan   MRN:  [id number]    Recommendations:     Discharge Recommendations: High Intensity Therapy   Discharge Equipment Recommendations: none   Barriers to discharge: Decreased caregiver support    Assessment:     Indira Chauhan is a 70 y.o. female admitted with a medical diagnosis of Cerebrovascular accident (CVA) due to occlusion of left anterior cerebral artery.  She presents with the following impairments/functional limitations: weakness, impaired endurance, impaired self care skills, impaired functional mobility, gait instability, impaired balance, decreased lower extremity function, impaired cardiopulmonary response to activity, impaired coordination. Pt found HOB elevated and agreeable to working with PT. Pt A & O x  4 and has the following co-morbidities: HTN, previous CVA 5/23/25, Hx Lymphoma.  Pt tolerated session fairly w/ slightly delayed cognitive processing and pt required moderate to minimum assistance for safe mobilization during session today. Pt would benefit from acute PT during hospitalization to increase strength, endurance and safety with mobility and would benefit from High Intensity Therapy prior to discharge home.      Rehab Prognosis: Good and Fair; patient would benefit from acute skilled PT services to address these deficits and reach maximum level of function.    Recent Surgery: * No surgery found *      Plan:     During this hospitalization, patient to be seen daily to address the identified rehab impairments via gait training, therapeutic activities, therapeutic exercises, neuromuscular re-education and progress toward the following goals:    Plan of Care Expires:  08/19/25    Subjective     Chief Complaint: Pt reported her  was taking her to lunch, but she couldn't get out of the car without his help and then fell to her knee.  Patient/Family Comments/goals: Return to prior level of functional  mobility.    Pain/Comfort:  Pain Rating 1: 0/10  Pain Rating Post-Intervention 1: 0/10    Patients cultural, spiritual, Restorationism conflicts given the current situation:      Living Environment:  Pt lives with her spouse in a H with BERNAR[place].  Prior to admission, patients level of function was using a RW for mobility with Supervision for safety. Prior to stroke in May pt was Independent with mobility with no AD.  Equipment used at home: walker, rolling, bedside commode.  DME owned (not currently used): none.  Upon discharge, patient will have assistance from facility staff and her spouse.    Objective:     Communicated with ARTEM Payne prior to session.  Patient found HOB elevated with bed alarm, peripheral IV, telemetry  upon PT entry to room.    General Precautions: Standard, fall  Orthopedic Precautions:N/A   Braces: N/A  Respiratory Status: Room air    Exams:  Cognitive Exam:  Patient is oriented to Person, Place, Time, Situation, and with mild delays for processing/responding.  Fine Motor Coordination:    -       Impaired  RLE heel shin    RLE ROM: WFL  RLE Strength: grossly 4- to 4/5  LLE ROM: WFL  LLE Strength: grossly 4+/5    Functional Mobility:  Bed Mobility:     Scooting: minimum assistance, moderate assistance, of 1  persons, and in sitting  Supine to Sit: moderate assistance and of 1-2 persons  Sit to Supine: moderate assistance and of 1-2 persons  Transfers:     Sit to Stand:  minimum assistance and of 2 persons with rolling walker  Gait: x minimum assistance of 1 person with rolling walker and vc for safe technique.      AM-PAC 6 CLICK MOBILITY  Total Score:15       Treatment & Education:  Pt was educated on the following: call light use, importance of OOB activity and functional mobility to negate the negative effects of prolonged bed rest during this hospitalization, safe transfers/ambulation and discharge planning recommendations/options.      Patient left HOB elevated with all lines intact, call  button in reach, bed alarm on, and RN notified.    GOALS:   Multidisciplinary Problems       Physical Therapy Goals          Problem: Physical Therapy    Goal Priority Disciplines Outcome Interventions   Physical Therapy Goal     PT, PT/OT     Description: Goals to be met by: 25     Patient will increase functional independence with mobility by performin. Supine to sit with Supervision  2. Sit to stand transfer with Supervision  3. Bed to chair transfer with Supervision using Rolling Walker  4. Gait  x 150 feet with Supervision using Rolling Walker.                              DME Justifications:  No DME recommended requiring DME justifications    History:     Past Medical History:   Diagnosis Date    Cancer     Diffuse large B cell lymphoma     GERD (gastroesophageal reflux disease)     Hyperlipidemia     Hypertension     Hypothyroidism        Past Surgical History:   Procedure Laterality Date    PORTACATH PLACEMENT Right 2018       Time Tracking:     PT Received On: 25  PT Start Time: 0955     PT Stop Time: 1011  PT Total Time (min): 16 min     Billable Minutes: Evaluation 8 and Therapeutic Activity 8      2025

## 2025-07-22 NOTE — PROGRESS NOTES
07/22/25 1000   Tobacco Cessation Intervention   Do you use any type of tobacco product? Yes   Are you interested in quitting use of tobacco products? Thinking about quitting   Are you interested in Nicotine Replacement for symptom relief? No   $ Smoking Cessation Charges Smoking Cessation - Intermediate (CTTS)

## 2025-07-22 NOTE — SUBJECTIVE & OBJECTIVE
Interval History:  Patient seen and examined this morning, reports some improvement in right lower extremity weakness.  Echocardiogram pending.  Neurology follow up pending.    Review of Systems   Respiratory:  Negative for shortness of breath.    Cardiovascular:  Negative for chest pain.   Neurological:  Positive for weakness.     Objective:     Vital Signs (Most Recent):  Temp: 98.5 °F (36.9 °C) (07/22/25 0719)  Pulse: 77 (07/22/25 0805)  Resp: 18 (07/21/25 2119)  BP: (!) 142/78 (07/22/25 0719)  SpO2: 96 % (07/22/25 0805) Vital Signs (24h Range):  Temp:  [98 °F (36.7 °C)-98.5 °F (36.9 °C)] 98.5 °F (36.9 °C)  Pulse:  [64-98] 77  Resp:  [17-24] 18  SpO2:  [91 %-98 %] 96 %  BP: (110-172)/(52-83) 142/78     Weight: 102.3 kg (225 lb 8.5 oz)  Body mass index is 38.71 kg/m².    Intake/Output Summary (Last 24 hours) at 7/22/2025 1335  Last data filed at 7/22/2025 0933  Gross per 24 hour   Intake 220 ml   Output 850 ml   Net -630 ml         Physical Exam  Vitals reviewed.   Constitutional:       General: She is not in acute distress.  Cardiovascular:      Rate and Rhythm: Normal rate and regular rhythm.   Pulmonary:      Effort: Pulmonary effort is normal. No respiratory distress.   Musculoskeletal:         General: Normal range of motion.   Skin:     General: Skin is warm and dry.   Neurological:      Mental Status: She is alert and oriented to person, place, and time.      Sensory: No sensory deficit.      Motor: Weakness (RUE) present.      Comments: Drift RLE   Psychiatric:         Mood and Affect: Mood normal.               Significant Labs: All pertinent labs within the past 24 hours have been reviewed.  CBC:   Recent Labs   Lab 07/21/25  1327 07/22/25  0419   WBC 9.74 8.36   HGB 13.7 13.4   HCT 40.2 40.6    278     CMP:   Recent Labs   Lab 07/21/25  1327 07/22/25  0419    140   K 4.4 3.6    101   CO2 27 31*   * 99   BUN 17 12   CREATININE 0.7 0.6   CALCIUM 9.5 9.2   PROT 7.4 6.9   ALBUMIN  4.3 4.1   BILITOT 0.7 0.7   ALKPHOS 92 88   AST 16 11   ALT 13 11   ANIONGAP 8 8       Significant Imaging: I have reviewed all pertinent imaging results/findings within the past 24 hours.

## 2025-07-22 NOTE — CONSULTS
Educated pt on heart healthy diet per stroke pathway.  Education focused on including healthy fats- gave examples and lowering LDL levels by excluding saturated/trans fat in the diet. Went over types of foods that have saturated/trans fat. Encouraged cooking with olive oil instead of butter. Choosing whole grains over refined grains, choosing canned foods with no salt added and plan frozen veggies instead of veggies cooked in butter and cream sauces. Encouraged patient to choose leaner cuts of meat and decreasing high fat meats such as martinez, sausage, hot dogs, etc. Educated pt on decreasing sodium in diet. To try not to cook with salt and use herbs and spices to make food more flavorful. To limit eating out-if patient chooses to eat out, informed patient to discuss with  to cook meats and veggies with no salt and olive oil instead of butter. Provided handouts on all of these topics for pt to use in the future. Also provided RD contact information for pt to call with future questions.  Patient voiced understanding.

## 2025-07-23 ENCOUNTER — TELEPHONE (OUTPATIENT)
Dept: NEUROLOGY | Facility: CLINIC | Age: 70
End: 2025-07-23
Payer: MEDICARE

## 2025-07-23 ENCOUNTER — TELEPHONE (OUTPATIENT)
Dept: FAMILY MEDICINE | Facility: CLINIC | Age: 70
End: 2025-07-23
Payer: MEDICARE

## 2025-07-23 VITALS
SYSTOLIC BLOOD PRESSURE: 125 MMHG | OXYGEN SATURATION: 96 % | HEIGHT: 64 IN | DIASTOLIC BLOOD PRESSURE: 75 MMHG | TEMPERATURE: 98 F | BODY MASS INDEX: 38.5 KG/M2 | RESPIRATION RATE: 17 BRPM | WEIGHT: 225.5 LBS | HEART RATE: 82 BPM

## 2025-07-23 LAB
ABSOLUTE EOSINOPHIL (SMH): 0.37 K/UL
ABSOLUTE MONOCYTE (SMH): 0.78 K/UL (ref 0.3–1)
ABSOLUTE NEUTROPHIL COUNT (SMH): 5.4 K/UL (ref 1.8–7.7)
BASOPHILS # BLD AUTO: 0.05 K/UL
BASOPHILS NFR BLD AUTO: 0.6 %
ERYTHROCYTE [DISTWIDTH] IN BLOOD BY AUTOMATED COUNT: 11.9 % (ref 11.5–14.5)
HCT VFR BLD AUTO: 37.6 % (ref 37–48.5)
HGB BLD-MCNC: 13 GM/DL (ref 12–16)
IMM GRANULOCYTES # BLD AUTO: 0.02 K/UL (ref 0–0.04)
IMM GRANULOCYTES NFR BLD AUTO: 0.3 % (ref 0–0.5)
LYMPHOCYTES # BLD AUTO: 1.31 K/UL (ref 1–4.8)
MCH RBC QN AUTO: 33.1 PG (ref 27–31)
MCHC RBC AUTO-ENTMCNC: 34.6 G/DL (ref 32–36)
MCV RBC AUTO: 96 FL (ref 82–98)
NUCLEATED RBC (/100WBC) (SMH): 0 /100 WBC
PLATELET # BLD AUTO: 278 K/UL (ref 150–450)
PMV BLD AUTO: 9.9 FL (ref 9.2–12.9)
RBC # BLD AUTO: 3.93 M/UL (ref 4–5.4)
RELATIVE EOSINOPHIL (SMH): 4.7 % (ref 0–8)
RELATIVE LYMPHOCYTE (SMH): 16.5 % (ref 18–48)
RELATIVE MONOCYTE (SMH): 9.8 % (ref 4–15)
RELATIVE NEUTROPHIL (SMH): 68.1 % (ref 38–73)
WBC # BLD AUTO: 7.92 K/UL (ref 3.9–12.7)

## 2025-07-23 PROCEDURE — 25000003 PHARM REV CODE 250: Performed by: STUDENT IN AN ORGANIZED HEALTH CARE EDUCATION/TRAINING PROGRAM

## 2025-07-23 PROCEDURE — 36415 COLL VENOUS BLD VENIPUNCTURE: CPT | Performed by: STUDENT IN AN ORGANIZED HEALTH CARE EDUCATION/TRAINING PROGRAM

## 2025-07-23 PROCEDURE — 97530 THERAPEUTIC ACTIVITIES: CPT

## 2025-07-23 PROCEDURE — 85025 COMPLETE CBC W/AUTO DIFF WBC: CPT | Performed by: STUDENT IN AN ORGANIZED HEALTH CARE EDUCATION/TRAINING PROGRAM

## 2025-07-23 PROCEDURE — 97535 SELF CARE MNGMENT TRAINING: CPT

## 2025-07-23 PROCEDURE — 97116 GAIT TRAINING THERAPY: CPT

## 2025-07-23 PROCEDURE — 25000003 PHARM REV CODE 250

## 2025-07-23 RX ORDER — BENAZEPRIL HYDROCHLORIDE 20 MG/1
40 TABLET ORAL DAILY
Status: DISCONTINUED | OUTPATIENT
Start: 2025-07-23 | End: 2025-07-23 | Stop reason: HOSPADM

## 2025-07-23 RX ORDER — AMLODIPINE AND BENAZEPRIL HYDROCHLORIDE 5; 40 MG/1; MG/1
1 CAPSULE ORAL DAILY
Status: DISCONTINUED | OUTPATIENT
Start: 2025-07-23 | End: 2025-07-23

## 2025-07-23 RX ORDER — CLOPIDOGREL BISULFATE 75 MG/1
75 TABLET ORAL DAILY
Qty: 90 TABLET | Refills: 0 | Status: SHIPPED | OUTPATIENT
Start: 2025-07-23

## 2025-07-23 RX ORDER — IBUPROFEN 200 MG
1 TABLET ORAL DAILY
Qty: 30 PATCH | Refills: 1 | Status: SHIPPED | OUTPATIENT
Start: 2025-07-23 | End: 2025-07-23 | Stop reason: HOSPADM

## 2025-07-23 RX ORDER — AMLODIPINE BESYLATE 5 MG/1
5 TABLET ORAL DAILY
Status: DISCONTINUED | OUTPATIENT
Start: 2025-07-23 | End: 2025-07-23 | Stop reason: HOSPADM

## 2025-07-23 RX ADMIN — ACETAMINOPHEN 650 MG: 325 TABLET ORAL at 05:07

## 2025-07-23 RX ADMIN — ASPIRIN 81 MG: 81 TABLET ORAL at 08:07

## 2025-07-23 RX ADMIN — CLOPIDOGREL 75 MG: 75 TABLET ORAL at 08:07

## 2025-07-23 RX ADMIN — BENAZEPRIL HYDROCHLORIDE 40 MG: 20 TABLET, COATED ORAL at 11:07

## 2025-07-23 RX ADMIN — FAMOTIDINE 20 MG: 20 TABLET, FILM COATED ORAL at 08:07

## 2025-07-23 RX ADMIN — AMLODIPINE BESYLATE 5 MG: 5 TABLET ORAL at 11:07

## 2025-07-23 RX ADMIN — ATORVASTATIN CALCIUM 40 MG: 40 TABLET, FILM COATED ORAL at 08:07

## 2025-07-23 RX ADMIN — LEVOTHYROXINE SODIUM 150 MCG: 0.1 TABLET ORAL at 08:07

## 2025-07-23 NOTE — RESPIRATORY THERAPY
07/22/25 1950   Patient Assessment/Suction   Level of Consciousness (AVPU) alert   Respiratory Effort Normal;Unlabored   PRE-TX-O2   Device (Oxygen Therapy) room air   SpO2 95 %   Pulse Oximetry Type Intermittent   $ Pulse Oximetry - Single Charge Pulse Oximetry - Single   Education   $ Education Oxygen;15 min

## 2025-07-23 NOTE — PLAN OF CARE
Cardiology appt added to AVS, PCP appt requested via inbox, Neuro appt requested via inbox       07/23/25 7024   Post-Acute Status   Post-Acute Authorization Other   Hospital Resources/Appts/Education Provided Post-Acute resouces added to AVS;Appointments scheduled and added to AVS

## 2025-07-23 NOTE — TELEPHONE ENCOUNTER
----- Message from  Vera sent at 7/23/2025  3:36 PM CDT -----  Regarding: Hospital Follow Up Discharge  Date: 07/23/2025      Patient: Indira Chauhan  YOB: 1955    RE: [facility] Hospital Admission     Admit Diagnosis: OSVALDO Chauhan is being discharged from the hospital today.           Transportation Needs:  @[facility]@        A follow-up appointment has been recommended within 3 days of discharge to support post-discharge management and reduce risk of admission.       Sincerely,   Vera Sosa

## 2025-07-23 NOTE — PT/OT/SLP PROGRESS
"Physical Therapy Treatment    Patient Name:  Indira Chauhan   MRN:  [id number]    Recommendations:     Discharge Recommendations: High Intensity Therapy (vs Low Int(OP PT) w/ 24/7 care)  Discharge Equipment Recommendations: none  Barriers to discharge: medical needs    Assessment:     Indira Chauhan is a 70 y.o. female admitted with a medical diagnosis of Cerebrovascular accident (CVA) due to occlusion of left anterior cerebral artery.  She presents with the following impairments/functional limitations: weakness, impaired endurance, impaired self care skills, impaired functional mobility, gait instability, impaired balance, decreased lower extremity function, impaired cardiopulmonary response to activity, impaired coordination.  Pt's  present today & confirmed pt having more difficulty this admit than after discharge from rehab in May; however, he also reported pt has only been moving around when necessary for bathroom, etc. When PT questioned why pt didn't follow through with therapy after Rehab discharge, pt said she didn't know and her  reported "I think she didn't think she could get any better." PT educated pt on the need to continue with PT as long as therapist feels progress can still be made in order to return to prior level of independent functional mobility. Pt's  reported he is able to assist pt at home and is agreeable to OP PT if pt is denied rehab at this point.     Rehab Prognosis: Fair; patient would benefit from acute skilled PT services to address these deficits and reach maximum level of function.    Recent Surgery: * No surgery found *      Plan:     During this hospitalization, patient to be seen 6 x/week to address the identified rehab impairments via gait training, therapeutic activities, therapeutic exercises, neuromuscular re-education and progress toward the following goals:    Plan of Care Expires:  08/19/25    Subjective     Chief Complaint: Pt reported having a cramp " "in her R calf upon standing/during gait.  Patient/Family Comments/goals: Return to prior level of functional mobility.    Pain/Comfort:  Pain Rating 1:  (no c/o pain until pt stood, "cramp" R calf)  Location - Side 1: Right  Location 1: calf  Pain Addressed 1: Reposition, Distraction, Cessation of Activity  Pain Rating Post-Intervention 1: 7/10      Objective:     Communicated with ARTEM Govea prior to session.  Patient found sitting edge of bed with bed alarm, peripheral IV, telemetry upon PT entry to room.     General Precautions: Standard, fall  Orthopedic Precautions: N/A  Braces: N/A  Respiratory Status: Room air     Functional Mobility:  Transfers:     Sit to Stand:  contact guard assistance with rolling walker  Gait: x 20' with RW and CGA for safety with pt self-limiting due to cramp in R calf.      AM-PAC 6 CLICK MOBILITY          Treatment & Education:  Pt was educated on the following: call light use, importance of OOB activity and functional mobility to negate the negative effects of prolonged bed rest during this hospitalization, safe transfers/ambulation and discharge planning recommendations/options.'      Patient left sitting edge of bed with all lines intact, call button in reach, bed alarm on, pt's  present, and tray table left in front of pt for safety..    GOALS:   Multidisciplinary Problems       Physical Therapy Goals          Problem: Physical Therapy    Goal Priority Disciplines Outcome Interventions   Physical Therapy Goal     PT, PT/OT     Description: Goals to be met by: 25     Patient will increase functional independence with mobility by performin. Supine to sit with Supervision  2. Sit to stand transfer with Supervision  3. Bed to chair transfer with Supervision using Rolling Walker  4. Gait  x 150 feet with Supervision using Rolling Walker.                              DME Justifications:  No DME recommended requiring DME justifications    Time Tracking:     PT Received " On: 07/23/25  PT Start Time: 0945     PT Stop Time: 1001  PT Total Time (min): 16 min     Billable Minutes: Gait Training 16    Treatment Type: Treatment  PT/PTA: PT     Number of PTA visits since last PT visit: 0     07/23/2025

## 2025-07-23 NOTE — CONSULTS
22G x 1.75IN PIV placed in Left Forearm by ABBIE using Ultrasound Guidance.    Indication: PVA  Attempts: 1      Recommended Care and Maintenance:  Flush and Lock every 12 hours (once per shift if not in use) and PRN before and after each use.

## 2025-07-23 NOTE — ASSESSMENT & PLAN NOTE
Patient's blood pressure range in the last 24 hours was: BP  Min: 125/75  Max: 155/90.The patient's inpatient anti-hypertensive regimen is listed below:  Current Antihypertensives  labetaloL injection 10 mg, Every 15 min PRN, Intravenous  amLODIPine tablet 5 mg, Daily, Oral  benazepriL tablet 40 mg, Daily, Oral    Plan  - BP is controlled, no changes needed to their regimen  Resume medications

## 2025-07-23 NOTE — PLAN OF CARE
Problem: Adult Inpatient Plan of Care  Goal: Plan of Care Review  Outcome: Progressing     Problem: Adult Inpatient Plan of Care  Goal: Optimal Comfort and Wellbeing  Outcome: Progressing     Problem: Adult Inpatient Plan of Care  Goal: Absence of Hospital-Acquired Illness or Injury  Outcome: Progressing     Problem: Stroke, Ischemic (Includes Transient Ischemic Attack)  Goal: Optimal Cerebral Tissue Perfusion  Outcome: Progressing     Problem: Stroke, Ischemic (Includes Transient Ischemic Attack)  Goal: Optimal Cognitive Function  Outcome: Progressing     Problem: Stroke, Ischemic (Includes Transient Ischemic Attack)  Goal: Optimal Functional Ability  Outcome: Progressing     Problem: Stroke, Ischemic (Includes Transient Ischemic Attack)  Goal: Optimal Nutrition Intake  Outcome: Progressing     Problem: Fall Injury Risk  Goal: Absence of Fall and Fall-Related Injury  Outcome: Progressing

## 2025-07-23 NOTE — PT/OT/SLP PROGRESS
Occupational Therapy   Treatment    Name: Indira Chauhan  MRN: [id number]  Admitting Diagnosis:  Cerebrovascular accident (CVA) due to occlusion of left anterior cerebral artery       Recommendations:     Discharge Recommendations: High Intensity Therapy (vs Low Intensity (OP OT w/ 24 supervision))  Discharge Equipment Recommendations:  to be determined by next level of care  Barriers to discharge:   (Increased assist with ADLs, IADLs, and mobility)    Assessment:     Indira Chauhan is a 70 y.o. female with a medical diagnosis of Cerebrovascular accident (CVA) due to occlusion of left anterior cerebral artery.  Pt is agreeable to OT treatment session this AM. Performance deficits affecting function are weakness, impaired endurance, impaired self care skills, impaired functional mobility, gait instability, impaired balance, impaired cognition, decreased safety awareness, impaired cardiopulmonary response to activity, decreased upper extremity function, decreased lower extremity function.     Rehab Prognosis:  Good; patient would benefit from acute skilled OT services to address these deficits and reach maximum level of function.       Plan:     Patient to be seen 5 x/week to address the above listed problems via self-care/home management, therapeutic activities, therapeutic exercises  Plan of Care Expires: 08/22/25  Plan of Care Reviewed with: patient, spouse    Subjective     Chief Complaint: wants to get cleaned up  Patient/Family Comments/goals: none stated  Pain/Comfort:  Pain Rating 1: 0/10    Objective:     Communicated with: nursing prior to session.  Patient found HOB elevated with telemetry, pulse ox (continuous), PureWick, blood pressure cuff upon OT entry to room.    General Precautions: Standard, fall    Orthopedic Precautions:N/A  Braces: N/A  Respiratory Status: Room air     Occupational Performance:     Bed Mobility:    Patient completed Supine to Sit with stand by assistance  Patient completed Sit to  Supine with stand by assistance     Functional Mobility/Transfers:  Patient completed Sit <> Stand Transfer with contact guard assistance  with  rolling walker   Functional Mobility: Pt amb to bathroom with CGA and RW with no LOB or SOB    Activities of Daily Living:  Grooming: stand by assistance to brush teeth, wash face, and comb hair standing at sink  Bathing: stand by assistance to massage shower cap into hair seated EOB  Toileting: test companyck        [facility] 6 Click ADL:      Treatment & Education:  Pt educated on role of OT/POC, importance of OOB/EOB activity, use of call bell, and safety during ADLs, transfers, and functional mobility.    Patient left HOB elevated with all lines intact, call button in reach, nurse extender notified, and spouse present    GOALS:   Multidisciplinary Problems       Occupational Therapy Goals          Problem: Occupational Therapy    Goal Priority Disciplines Outcome Interventions   Occupational Therapy Goal     OT, PT/OT     Description: Goals to be met by: 8/22/25     Patient will increase functional independence with ADLs by performing:    UE Dressing with Supervision.  LE Dressing with Supervision.  Grooming while standing at sink with Supervision.  Toileting from toilet with Supervision for hygiene and clothing management.   Toilet transfer to toilet with Supervision.                         DME Justifications:  No DME recommended requiring DME justifications    Time Tracking:     OT Date of Treatment: 07/23/25  OT Start Time: 1054  OT Stop Time: 1120  OT Total Time (min): 26 min    Billable Minutes:Self Care/Home Management 26    OT/SLIME: OT          7/23/2025

## 2025-07-23 NOTE — TELEPHONE ENCOUNTER
----- Message from  Vera sent at 7/23/2025  3:38 PM CDT -----  Regarding: Hospital Follow Up  Patient discharging today with CVA, please call for a hospital follow up    Thank you

## 2025-07-23 NOTE — DISCHARGE SUMMARY
[facility] Medicine  Discharge Summary      Patient Name: Indira Chauhan  MRN: [id number]  MAYELIN: [id number]  Patient Class: IP- Inpatient  Admission Date: 7/21/2025  Hospital Length of Stay: 2 days  Discharge Date and Time: 7/23/2025  5:58 PM  Attending Physician: No att. providers found   Discharging Provider: Luh Teixeira MD  Primary Care Provider: Bela Lombardi NP    Primary Care Team: Networked reference to record PCT     HPI:   70-year-old female presented to ED for eval of stroke-like symptoms. pMHx diffuse B-cell lymphoma, GERD, HTN, HLD, hypothyroidism, stroke, PVD.  Patient at 11:15 a.m. today she experienced RSW when trying to get out of the car, she stated weakness was significant enough where she felt like she could not get herself of the car.  Denies chest pain, shortness of breath.  Denies abdominal pain, nausea, vomiting, changes in bowel habits.  Denies fever, chills, recent illness. Patient does have history of recent stroke in May that was treated with TNK which presented with similar symptoms, reports compliance with DAPT, did not take Plavix today as she ran out yesterday but did take ASA. CTH no acute intracranial process. CTA H/N impression with no evidence of large vessel occlusion; stable abrupt cut off of and A2 segment of the left anterior cerebral artery which may be secondary to high-grade stenosis or thrombus; multifocal carotid atherosclerosis resulting in mild narrowing at the origin of the ICAs bilaterally. MRI brain impression with acute-subacute infarct in the left frontal corona-radiata; subacute-chronic infarct in the adjacent, posterosuperior left frontal lobe; deep cerebral white matter findings of moderate-to-severe small vessel ischemic disease. Tele stroke eval done in ED, noted chronic L A2 occlusion, TNK not recommended 2/2 recent stroke, thrombectomy not recommended.  Admit to hospital medicine for further eval.    * No surgery found *       Hospital Course:   Patient with B-cell lymphoma, GERD, hypertension, hyperlipidemia, CVA with residual right-sided weakness presented with worsening right lower extremity weakness.  Neurology consulted.  MRI showed acute/subacute infarct in the left frontal corona radiata.  Started on dual antiplatelet therapy. Neuro  follow up outpatient. Pt was denied by rehab, pt chose outpatient pt. Stable for dc. DAPT for 3 months per neuro then asa after.     Physical Exam  Constitutional:       General: She is not in acute distress.  Cardiovascular:      Rate and Rhythm: Normal rate and regular rhythm.   Pulmonary:      Effort: Pulmonary effort is normal. No respiratory distress.      Goals of Care Treatment Preferences:  Code Status: Full Code         Consults:   Consults (From admission, onward)          Status Ordering Provider     Inpatient consult to Midline team  Once        Provider:  (Not yet assigned)    Completed TIFFANIE HARRY     Inpatient consult to Registered Dietitian/Nutritionist  Once        Provider:  (Not yet assigned)    Completed THERESA KEVIN     IP consult to case management/social work  Once        Provider:  (Not yet assigned)    Acknowledged THERESA KEVIN     Inpatient Consult to Neurology Services (General Neurology)  Once        Provider:  (Not yet assigned)    Completed THERESA KEVIN     Consult to Telemedicine - Acute Stroke  Once        Provider:  (Not yet assigned)    Acknowledged CA GA            Assessment & Plan  Cerebrovascular accident (CVA) due to occlusion of left anterior cerebral artery    Antithrombotics for secondary stroke prevention: Antiplatelets: Aspirin: 81 mg daily  Clopidogrel: 75 mg daily dual antiplatelet therapy for 3 months per neurology then aspirin alone afterwards.  Follow up with Neurology outpatient.    Statins for secondary stroke prevention and hyperlipidemia, if present:   Statins: Atorvastatin- 40 mg daily    Aggressive risk factor  modification: HTN, Smoking, HLD, Obesity     Rehab efforts: The patient has been evaluated by a stroke team provider and the therapy needs have been fully considered based off the presenting complaints and exam findings. The following therapy evaluations are needed: PT evaluate and treat, OT evaluate and treat, SLP evaluate and treat      VTE prophylaxis: Enoxaparin 40 mg SQ every 24 hours  Mechanical prophylaxis: Place SCDs    Approach normotensive blood pressure      Mixed hyperlipidemia  Resume home statin    Acquired hypothyroidism  Resume home levothyroxine    History of lymphoma  Hx of osseous and B cell lymphoma, s/p chemo 4 years ago    Primary hypertension  Patient's blood pressure range in the last 24 hours was: BP  Min: 125/75  Max: 155/90.The patient's inpatient anti-hypertensive regimen is listed below:  Current Antihypertensives  labetaloL injection 10 mg, Every 15 min PRN, Intravenous  amLODIPine tablet 5 mg, Daily, Oral  benazepriL tablet 40 mg, Daily, Oral    Plan  - BP is controlled, no changes needed to their regimen  Resume medications  Tobacco use  Aware    Final Active Diagnoses:    Diagnosis Date Noted POA    PRINCIPAL PROBLEM:  Cerebrovascular accident (CVA) due to occlusion of left anterior cerebral artery [I63.522] 07/21/2025 Yes    History of lymphoma [Z85.72] 06/08/2023 Not Applicable    Primary hypertension [I10] 06/08/2023 Yes    Tobacco use [Z72.0] 11/02/2017 Yes    Acquired hypothyroidism [E03.9] 10/23/2017 Yes    Mixed hyperlipidemia [E78.2] 10/23/2017 Yes      Problems Resolved During this Admission:       Discharged Condition: stable    Disposition: Home-Health Care [facility]    Follow Up:   Follow-up Information       Bela Lombardi NP Follow up.    Specialty: Family Medicine  Why: Inbox message sent for hospital follow up  Contact information:  [address]  [facility]  [place] [address]  [phone]               Manuel Tomlinson MD Follow up on  8/14/2025.    Specialties: Cardiovascular Disease, Cardiology  Why: 1:40 pm  Contact information:  [address] [facility]  [address]  [place] [address]  [phone]               [facility] Neurology Follow up.    Specialty: Neurology  Why: Inbox message sent for hospital follow up    Please call for appt if you have not heard from them in the next couple of days  Contact information:  [address]  [facility] [address] [phone]  Additional information:  Please park in Lot A or B and use Entrance 1. Check in at 2nd floor registration                         Patient Instructions:      Ambulatory referral/consult to Smoking Cessation Program   Standing Status: Future   Referral Priority: Routine Referral Type: Consultation   Referral Reason: Specialty Services Required   Requested Specialty: CTTS   Number of Visits Requested: 1     Ambulatory Referral/Consult to Physical Therapy   Referral Priority: Routine Referral Type: Physical Therapy   Referral Reason: Specialty Services Required   Number of Visits Requested: 1     Activity as tolerated       Significant Diagnostic Studies: Labs: CMP   Recent Labs   Lab 07/22/25  0419      K 3.6      CO2 31*   GLU 99   BUN 12   CREATININE 0.6   CALCIUM 9.2   PROT 6.9   ALBUMIN 4.1   BILITOT 0.7   ALKPHOS 88   AST 11   ALT 11   ANIONGAP 8    and CBC   Recent Labs   Lab 07/22/25  0419 07/23/25  0714   WBC 8.36 7.92   HGB 13.4 13.0   HCT 40.6 37.6    278       Pending Diagnostic Studies:       Procedure Component Value Units Date/Time    EXTRA TUBES [[id number]] Collected: 07/21/25 1332    Order Status: Sent Lab Status: In process Updated: 07/21/25 1332    Specimen: Blood, Venous     Narrative:      The following orders were created for panel order EXTRA TUBES.  Procedure                               Abnormality         Status                     ---------                               -----------         ------                     Light Green Top  Hold[[id number]]                            In process                 Lavender Top Hold[[id number]]                               In process                 Lavender Top Hold[[id number]]                               In process                 Gold Top Hold[[id number]]                                   In process                 Gold Top Hold[[id number]]                                   In process                   Please view results for these tests on the individual orders.           Medications:  Reconciled Home Medications:      Medication List        CONTINUE taking these medications      amLODIPine-benazepriL 5-40 mg per capsule  Commonly known as: LOTREL  Take 1 capsule by mouth once daily.     aspirin 81 MG EC tablet  Commonly known as: ECOTRIN  Take 1 tablet (81 mg total) by mouth once daily.     atorvastatin 40 MG tablet  Commonly known as: LIPITOR  Take 1 tablet (40 mg total) by mouth once daily.     clopidogreL 75 mg tablet  Commonly known as: PLAVIX  Take 1 tablet (75 mg total) by mouth once daily.     levothyroxine 150 MCG tablet  Commonly known as: SYNTHROID  Take 1 tablet (150 mcg total) by mouth once daily.              Indwelling Lines/Drains at time of discharge:   Lines/Drains/Airways       Drain  Duration             Female External Urinary Catheter w/ Suction 07/21/25 1177 1 day                        Time spent on the discharge of patient: 31 minutes         Luh Teixeira MD  Department of Hospital Medicine  [facility] hospital

## 2025-07-23 NOTE — PLAN OF CARE
Pt clear for DC from case management standpoint. Discharging to home.    Patient has chosen to dc with outpatient therapy.    Transportation will be by friend, Bill    PENDING MED DELIVERY       07/23/25 6902   Final Note   Assessment Type Final Discharge Note   Anticipated Discharge Disposition Home   Hospital Resources/Appts/Education Provided Appointments scheduled and added to AVS;Post-Acute resouces added to AVS

## 2025-07-23 NOTE — SUBJECTIVE & OBJECTIVE
Interval History:  Patient seen and examined this morning, no acute issues.  Was denied by no showed rehab.  Plan on discharge tomorrow with home health care.    Review of Systems   Respiratory:  Negative for shortness of breath.    Cardiovascular:  Negative for chest pain.   Neurological:  Positive for weakness.     Objective:     Vital Signs (Most Recent):  Temp: 97.9 °F (36.6 °C) (07/23/25 1135)  Pulse: 78 (07/23/25 1135)  Resp: 17 (07/23/25 1135)  BP: (!) 145/90 (07/23/25 1135)  SpO2: 98 % (07/23/25 1135) Vital Signs (24h Range):  Temp:  [97.7 °F (36.5 °C)-98.2 °F (36.8 °C)] 97.9 °F (36.6 °C)  Pulse:  [65-90] 78  Resp:  [17-18] 17  SpO2:  [93 %-98 %] 98 %  BP: (133-155)/(62-90) 145/90     Weight: 102.3 kg (225 lb 8.5 oz)  Body mass index is 38.71 kg/m².    Intake/Output Summary (Last 24 hours) at 7/23/2025 1512  Last data filed at 7/23/2025 1300  Gross per 24 hour   Intake 480 ml   Output 700 ml   Net -220 ml         Physical Exam  Vitals reviewed.   Constitutional:       General: She is not in acute distress.  Cardiovascular:      Rate and Rhythm: Normal rate and regular rhythm.   Pulmonary:      Effort: Pulmonary effort is normal. No respiratory distress.   Musculoskeletal:         General: Normal range of motion.   Skin:     General: Skin is warm and dry.   Neurological:      Mental Status: She is alert and oriented to person, place, and time.      Sensory: No sensory deficit.      Motor: Weakness (RUE) present.      Comments: Drift RLE   Psychiatric:         Mood and Affect: Mood normal.               Significant Labs: All pertinent labs within the past 24 hours have been reviewed.  CBC:   Recent Labs   Lab 07/22/25 0419 07/23/25  0714   WBC 8.36 7.92   HGB 13.4 13.0   HCT 40.6 37.6    278     CMP:   Recent Labs   Lab 07/22/25  0419      K 3.6      CO2 31*   GLU 99   BUN 12   CREATININE 0.6   CALCIUM 9.2   PROT 6.9   ALBUMIN 4.1   BILITOT 0.7   ALKPHOS 88   AST 11   ALT 11   ANIONGAP 8        Significant Imaging: I have reviewed all pertinent imaging results/findings within the past 24 hours.

## 2025-07-23 NOTE — PLAN OF CARE
Problem: Stroke, Ischemic (Includes Transient Ischemic Attack)  Goal: Optimal Coping  Outcome: Met  Goal: Effective Bowel Elimination  Outcome: Met  Goal: Optimal Cerebral Tissue Perfusion  Outcome: Met  Goal: Optimal Cognitive Function  Outcome: Met  Goal: Improved Communication Skills  Outcome: Met  Goal: Optimal Functional Ability  Outcome: Met  Goal: Optimal Nutrition Intake  Outcome: Met  Goal: Effective Oxygenation and Ventilation  Outcome: Met  Goal: Improved Sensorimotor Function  Outcome: Met  Goal: Safe and Effective Swallow  Outcome: Met  Goal: Effective Urinary Elimination  Outcome: Met     Problem: Fall Injury Risk  Goal: Absence of Fall and Fall-Related Injury  Outcome: Met     Problem: Adult Inpatient Plan of Care  Goal: Plan of Care Review  Outcome: Met  Goal: Patient-Specific Goal (Individualized)  Outcome: Met  Goal: Absence of Hospital-Acquired Illness or Injury  Outcome: Met  Goal: Optimal Comfort and Wellbeing  Outcome: Met  Goal: Readiness for Transition of Care  Outcome: Met     Problem: Infection  Goal: Absence of Infection Signs and Symptoms  Outcome: Met     Problem: Wound  Goal: Optimal Coping  Outcome: Met  Goal: Optimal Functional Ability  Outcome: Met  Goal: Absence of Infection Signs and Symptoms  Outcome: Met  Goal: Improved Oral Intake  Outcome: Met  Goal: Optimal Pain Control and Function  Outcome: Met  Goal: Skin Health and Integrity  Outcome: Met  Goal: Optimal Wound Healing  Outcome: Met     Problem: Hospitalized Older Adult  Goal: Optimal Cognitive Function  Outcome: Met  Goal: Effective Bowel Elimination  Outcome: Met  Goal: Optimal Coping  Outcome: Met  Goal: Fluid and Electrolyte Balance  Outcome: Met  Goal: Optimal Functional Ability  Outcome: Met  Goal: Improved Oral Intake  Outcome: Met  Goal: Adequate Sleep/Rest  Outcome: Met  Goal: Effective Urinary Elimination  Outcome: Met

## 2025-07-23 NOTE — ASSESSMENT & PLAN NOTE
Antithrombotics for secondary stroke prevention: Antiplatelets: Aspirin: 81 mg daily  Clopidogrel: 75 mg daily dual antiplatelet therapy for 3 months per neurology then aspirin alone afterwards.  Follow up with Neurology outpatient.    Statins for secondary stroke prevention and hyperlipidemia, if present:   Statins: Atorvastatin- 40 mg daily    Aggressive risk factor modification: HTN, Smoking, HLD, Obesity     Rehab efforts: The patient has been evaluated by a stroke team provider and the therapy needs have been fully considered based off the presenting complaints and exam findings. The following therapy evaluations are needed: PT evaluate and treat, OT evaluate and treat, SLP evaluate and treat      VTE prophylaxis: Enoxaparin 40 mg SQ every 24 hours  Mechanical prophylaxis: Place SCDs    Approach normotensive blood pressure

## 2025-07-23 NOTE — PLAN OF CARE
SW met with pt at bedside and due to her being denied by Dr. Mancia @ [facility]. Pt states she would like to go home with outpt PT/OT. SW will cont to follow for any further d/c needs or concerns.      07/23/25 3051   Discharge Plan   Discharge Plan A Home with family

## 2025-07-23 NOTE — ASSESSMENT & PLAN NOTE
Patient's blood pressure range in the last 24 hours was: BP  Min: 133/62  Max: 155/90.The patient's inpatient anti-hypertensive regimen is listed below:  Current Antihypertensives  labetaloL injection 10 mg, Every 15 min PRN, Intravenous  amLODIPine tablet 5 mg, Daily, Oral  benazepriL tablet 40 mg, Daily, Oral    Plan  - BP is controlled, no changes needed to their regimen  Resume medications

## 2025-07-23 NOTE — PROGRESS NOTES
[facility] Medicine  Progress Note    Patient Name: Indira Chauhan  MRN: [id number]  Patient Class: IP- Inpatient   Admission Date: 7/21/2025  Length of Stay: 2 days  Attending Physician: Luh Teixeira MD  Primary Care Provider: Bela Lombardi NP        Subjective     Principal Problem:Cerebrovascular accident (CVA) due to occlusion of left anterior cerebral artery        HPI:  70-year-old female presented to ED for eval of stroke-like symptoms. pMHx diffuse B-cell lymphoma, GERD, HTN, HLD, hypothyroidism, stroke, PVD.  Patient at 11:15 a.m. today she experienced RSW when trying to get out of the car, she stated weakness was significant enough where she felt like she could not get herself of the car.  Denies chest pain, shortness of breath.  Denies abdominal pain, nausea, vomiting, changes in bowel habits.  Denies fever, chills, recent illness. Patient does have history of recent stroke in May that was treated with TNK which presented with similar symptoms, reports compliance with DAPT, did not take Plavix today as she ran out yesterday but did take ASA. CTH no acute intracranial process. CTA H/N impression with no evidence of large vessel occlusion; stable abrupt cut off of and A2 segment of the left anterior cerebral artery which may be secondary to high-grade stenosis or thrombus; multifocal carotid atherosclerosis resulting in mild narrowing at the origin of the ICAs bilaterally. MRI brain impression with acute-subacute infarct in the left frontal corona-radiata; subacute-chronic infarct in the adjacent, posterosuperior left frontal lobe; deep cerebral white matter findings of moderate-to-severe small vessel ischemic disease. Tele stroke eval done in ED, noted chronic L A2 occlusion, TNK not recommended 2/2 recent stroke, thrombectomy not recommended.  Admit to hospital medicine for further eval.    Overview/Hospital Course:  Patient with B-cell lymphoma, GERD, hypertension,  hyperlipidemia, CVA with residual right-sided weakness presented with worsening right lower extremity weakness.  Neurology consulted.  MRI showed acute/subacute infarct in the left frontal corona radiata.  Started on dual antiplatelet therapy. Neuro  follow up outpatient.  PT/OT/ST consulted.     Interval History:  Patient seen and examined this morning, no acute issues.  Was denied by no showed rehab.  Plan on discharge tomorrow with home health care.    Review of Systems   Respiratory:  Negative for shortness of breath.    Cardiovascular:  Negative for chest pain.   Neurological:  Positive for weakness.     Objective:     Vital Signs (Most Recent):  Temp: 97.9 °F (36.6 °C) (07/23/25 1135)  Pulse: 78 (07/23/25 1135)  Resp: 17 (07/23/25 1135)  BP: (!) 145/90 (07/23/25 1135)  SpO2: 98 % (07/23/25 1135) Vital Signs (24h Range):  Temp:  [97.7 °F (36.5 °C)-98.2 °F (36.8 °C)] 97.9 °F (36.6 °C)  Pulse:  [65-90] 78  Resp:  [17-18] 17  SpO2:  [93 %-98 %] 98 %  BP: (133-155)/(62-90) 145/90     Weight: 102.3 kg (225 lb 8.5 oz)  Body mass index is 38.71 kg/m².    Intake/Output Summary (Last 24 hours) at 7/23/2025 1512  Last data filed at 7/23/2025 1300  Gross per 24 hour   Intake 480 ml   Output 700 ml   Net -220 ml         Physical Exam  Vitals reviewed.   Constitutional:       General: She is not in acute distress.  Cardiovascular:      Rate and Rhythm: Normal rate and regular rhythm.   Pulmonary:      Effort: Pulmonary effort is normal. No respiratory distress.   Musculoskeletal:         General: Normal range of motion.   Skin:     General: Skin is warm and dry.   Neurological:      Mental Status: She is alert and oriented to person, place, and time.      Sensory: No sensory deficit.      Motor: Weakness (RUE) present.      Comments: Drift RLE   Psychiatric:         Mood and Affect: Mood normal.               Significant Labs: All pertinent labs within the past 24 hours have been reviewed.  CBC:   Recent Labs   Lab  07/22/25  0419 07/23/25  0714   WBC 8.36 7.92   HGB 13.4 13.0   HCT 40.6 37.6    278     CMP:   Recent Labs   Lab 07/22/25 0419      K 3.6      CO2 31*   GLU 99   BUN 12   CREATININE 0.6   CALCIUM 9.2   PROT 6.9   ALBUMIN 4.1   BILITOT 0.7   ALKPHOS 88   AST 11   ALT 11   ANIONGAP 8       Significant Imaging: I have reviewed all pertinent imaging results/findings within the past 24 hours.      Assessment & Plan  Cerebrovascular accident (CVA) due to occlusion of left anterior cerebral artery    Antithrombotics for secondary stroke prevention: Antiplatelets: Aspirin: 81 mg daily  Clopidogrel: 75 mg daily dual antiplatelet therapy for 3 months per neurology then aspirin alone afterwards.  Follow up with Neurology outpatient.    Statins for secondary stroke prevention and hyperlipidemia, if present:   Statins: Atorvastatin- 40 mg daily    Aggressive risk factor modification: HTN, Smoking, HLD, Obesity     Rehab efforts: The patient has been evaluated by a stroke team provider and the therapy needs have been fully considered based off the presenting complaints and exam findings. The following therapy evaluations are needed: PT evaluate and treat, OT evaluate and treat, SLP evaluate and treat      VTE prophylaxis: Enoxaparin 40 mg SQ every 24 hours  Mechanical prophylaxis: Place SCDs    Approach normotensive blood pressure      Mixed hyperlipidemia  Resume home statin    Acquired hypothyroidism  Resume home levothyroxine    History of lymphoma  Hx of osseous and B cell lymphoma, s/p chemo 4 years ago    Primary hypertension  Patient's blood pressure range in the last 24 hours was: BP  Min: 133/62  Max: 155/90.The patient's inpatient anti-hypertensive regimen is listed below:  Current Antihypertensives  labetaloL injection 10 mg, Every 15 min PRN, Intravenous  amLODIPine tablet 5 mg, Daily, Oral  benazepriL tablet 40 mg, Daily, Oral    Plan  - BP is controlled, no changes needed to their  regimen  Resume medications  Tobacco use  Aware    VTE Risk Mitigation (From admission, onward)           Ordered     enoxaparin injection 40 mg  Daily         07/21/25 1638     IP VTE HIGH RISK PATIENT  Once         07/21/25 1638     Place sequential compression device  Until discontinued         07/21/25 1638                    Discharge Planning   BYRON: 7/24/2025     Code Status: Full Code   Medical Readiness for Discharge Date:   Discharge Plan A: Home with family                  Please place Justification for DME      Luh Teixeira MD  Department of Hospital Medicine   [facility]

## 2025-07-23 NOTE — PLAN OF CARE
Awaiting an answer from Dr. Mancia if he will admit pt to [facility].      07/23/25 1103   Post-Acute Status   Post-Acute Authorization Placement   Post-Acute Placement Status Referrals Sent   Discharge Plan   Discharge Plan A Rehab     1441- Dr. Mancia denied pt due to high performance.

## 2025-07-24 ENCOUNTER — PATIENT OUTREACH (OUTPATIENT)
Dept: FAMILY MEDICINE | Facility: CLINIC | Age: 70
End: 2025-07-24
Payer: MEDICARE

## 2025-07-24 ENCOUNTER — TELEPHONE (OUTPATIENT)
Dept: FAMILY MEDICINE | Facility: CLINIC | Age: 70
End: 2025-07-24
Payer: MEDICARE

## 2025-07-24 NOTE — TELEPHONE ENCOUNTER
----- Message from Nurse Durbin sent at 7/24/2025  9:21 AM CDT -----    ----- Message -----  From: Erum Douglass LPN  Sent: 7/24/2025   8:12 AM CDT  To: [facility] Staff    Call patient - needs post-hospital phone call within 2 business days and hospital follow up visit scheduled within 7-14 days.

## 2025-07-24 NOTE — TELEPHONE ENCOUNTER
[facility] follow up appointments go out farther than 2 weeks so she will need to go on hospital follow up schedule.

## 2025-07-24 NOTE — TELEPHONE ENCOUNTER
You routed this to me but [facility] follow up appointments go out farther than 2 weeks so she will need to go on hospital follow up schedule.

## 2025-07-25 ENCOUNTER — TELEPHONE (OUTPATIENT)
Dept: FAMILY MEDICINE | Facility: CLINIC | Age: 70
End: 2025-07-25
Payer: MEDICARE

## 2025-07-25 ENCOUNTER — HOSPITAL ENCOUNTER (INPATIENT)
Facility: HOSPITAL | Age: 70
LOS: 3 days | Discharge: NURSING, REHAB OR LONG-TERM CARE | DRG: 065 | End: 2025-07-29
Attending: STUDENT IN AN ORGANIZED HEALTH CARE EDUCATION/TRAINING PROGRAM | Admitting: INTERNAL MEDICINE
Payer: MEDICARE

## 2025-07-25 DIAGNOSIS — R53.1 WEAKNESS: ICD-10-CM

## 2025-07-25 DIAGNOSIS — R20.2 TINGLING IN EXTREMITIES: Primary | ICD-10-CM

## 2025-07-25 DIAGNOSIS — I63.522 CEREBROVASCULAR ACCIDENT (CVA) DUE TO OCCLUSION OF LEFT ANTERIOR CEREBRAL ARTERY: ICD-10-CM

## 2025-07-25 DIAGNOSIS — Z86.73 HISTORY OF MULTIPLE CEREBROVASCULAR ACCIDENTS (CVAS): ICD-10-CM

## 2025-07-25 LAB
ABSOLUTE EOSINOPHIL (SMH): 0.5 K/UL
ABSOLUTE MONOCYTE (SMH): 0.89 K/UL (ref 0.3–1)
ABSOLUTE NEUTROPHIL COUNT (SMH): 5.1 K/UL (ref 1.8–7.7)
ALBUMIN SERPL-MCNC: 3.9 G/DL (ref 3.5–5.2)
ALP SERPL-CCNC: 84 UNIT/L (ref 55–135)
ALT SERPL-CCNC: 17 UNIT/L (ref 10–44)
ANION GAP (SMH): 6 MMOL/L (ref 8–16)
APTT PPP: 26 SECONDS (ref 21–32)
AST SERPL-CCNC: 20 UNIT/L (ref 10–40)
BASOPHILS # BLD AUTO: 0.07 K/UL
BASOPHILS NFR BLD AUTO: 0.8 %
BILIRUB SERPL-MCNC: 0.4 MG/DL (ref 0.1–1)
BILIRUB UR QL STRIP.AUTO: NEGATIVE
BNP SERPL-MCNC: 20 PG/ML
BUN SERPL-MCNC: 17 MG/DL (ref 8–23)
CALCIUM SERPL-MCNC: 9.3 MG/DL (ref 8.7–10.5)
CHLORIDE SERPL-SCNC: 105 MMOL/L (ref 95–110)
CLARITY UR: CLEAR
CO2 SERPL-SCNC: 29 MMOL/L (ref 23–29)
COLOR UR AUTO: YELLOW
CREAT SERPL-MCNC: 0.7 MG/DL (ref 0.5–1.4)
ERYTHROCYTE [DISTWIDTH] IN BLOOD BY AUTOMATED COUNT: 11.9 % (ref 11.5–14.5)
GFR SERPLBLD CREATININE-BSD FMLA CKD-EPI: >60 ML/MIN/1.73/M2
GLUCOSE SERPL-MCNC: 104 MG/DL (ref 70–110)
GLUCOSE UR QL STRIP: NEGATIVE
HCT VFR BLD AUTO: 39 % (ref 37–48.5)
HCV AB SERPL QL IA: NORMAL
HGB BLD-MCNC: 12.8 GM/DL (ref 12–16)
HGB UR QL STRIP: NEGATIVE
HIV 1+2 AB+HIV1 P24 AG SERPL QL IA: NORMAL
IMM GRANULOCYTES # BLD AUTO: 0.05 K/UL (ref 0–0.04)
IMM GRANULOCYTES NFR BLD AUTO: 0.6 % (ref 0–0.5)
INR PPP: 0.9 (ref 0.8–1.2)
KETONES UR QL STRIP: NEGATIVE
LDH SERPL L TO P-CCNC: 1.05 MMOL/L (ref 0.5–2.2)
LEUKOCYTE ESTERASE UR QL STRIP: NEGATIVE
LYMPHOCYTES # BLD AUTO: 1.74 K/UL (ref 1–4.8)
MAGNESIUM SERPL-MCNC: 2.1 MG/DL (ref 1.6–2.6)
MCH RBC QN AUTO: 32.6 PG (ref 27–31)
MCHC RBC AUTO-ENTMCNC: 32.8 G/DL (ref 32–36)
MCV RBC AUTO: 99 FL (ref 82–98)
NITRITE UR QL STRIP: NEGATIVE
NUCLEATED RBC (/100WBC) (SMH): 0 /100 WBC
PH UR STRIP: 7 [PH]
PLATELET # BLD AUTO: 302 K/UL (ref 150–450)
PMV BLD AUTO: 9.9 FL (ref 9.2–12.9)
POCT GLUCOSE: 101 MG/DL (ref 70–110)
POTASSIUM SERPL-SCNC: 4.1 MMOL/L (ref 3.5–5.1)
PROT SERPL-MCNC: 7.1 GM/DL (ref 6–8.4)
PROT UR QL STRIP: NEGATIVE
PROTHROMBIN TIME: 10.4 SECONDS (ref 9–12.5)
RBC # BLD AUTO: 3.93 M/UL (ref 4–5.4)
RELATIVE EOSINOPHIL (SMH): 6 % (ref 0–8)
RELATIVE LYMPHOCYTE (SMH): 20.8 % (ref 18–48)
RELATIVE MONOCYTE (SMH): 10.6 % (ref 4–15)
RELATIVE NEUTROPHIL (SMH): 61.2 % (ref 38–73)
SAMPLE: NORMAL
SODIUM SERPL-SCNC: 140 MMOL/L (ref 136–145)
SP GR UR STRIP: 1.01
TSH SERPL-ACNC: 1.2 UIU/ML (ref 0.34–5.6)
UROBILINOGEN UR STRIP-ACNC: NEGATIVE EU/DL
WBC # BLD AUTO: 8.38 K/UL (ref 3.9–12.7)

## 2025-07-25 PROCEDURE — 93010 ELECTROCARDIOGRAM REPORT: CPT | Mod: ,,, | Performed by: INTERNAL MEDICINE

## 2025-07-25 PROCEDURE — 86803 HEPATITIS C AB TEST: CPT | Performed by: EMERGENCY MEDICINE

## 2025-07-25 PROCEDURE — G0378 HOSPITAL OBSERVATION PER HR: HCPCS

## 2025-07-25 PROCEDURE — 85730 THROMBOPLASTIN TIME PARTIAL: CPT | Performed by: COMMUNITY HEALTH WORKER

## 2025-07-25 PROCEDURE — 83735 ASSAY OF MAGNESIUM: CPT | Performed by: COMMUNITY HEALTH WORKER

## 2025-07-25 PROCEDURE — 25500020 PHARM REV CODE 255: Performed by: STUDENT IN AN ORGANIZED HEALTH CARE EDUCATION/TRAINING PROGRAM

## 2025-07-25 PROCEDURE — 83880 ASSAY OF NATRIURETIC PEPTIDE: CPT | Performed by: COMMUNITY HEALTH WORKER

## 2025-07-25 PROCEDURE — 85610 PROTHROMBIN TIME: CPT | Performed by: COMMUNITY HEALTH WORKER

## 2025-07-25 PROCEDURE — 82962 GLUCOSE BLOOD TEST: CPT

## 2025-07-25 PROCEDURE — A9585 GADOBUTROL INJECTION: HCPCS | Performed by: STUDENT IN AN ORGANIZED HEALTH CARE EDUCATION/TRAINING PROGRAM

## 2025-07-25 PROCEDURE — 81003 URINALYSIS AUTO W/O SCOPE: CPT | Performed by: COMMUNITY HEALTH WORKER

## 2025-07-25 PROCEDURE — 84443 ASSAY THYROID STIM HORMONE: CPT | Performed by: COMMUNITY HEALTH WORKER

## 2025-07-25 PROCEDURE — 87389 HIV-1 AG W/HIV-1&-2 AB AG IA: CPT | Performed by: EMERGENCY MEDICINE

## 2025-07-25 PROCEDURE — 80053 COMPREHEN METABOLIC PANEL: CPT | Performed by: COMMUNITY HEALTH WORKER

## 2025-07-25 PROCEDURE — 99285 EMERGENCY DEPT VISIT HI MDM: CPT | Mod: 25

## 2025-07-25 PROCEDURE — 85025 COMPLETE CBC W/AUTO DIFF WBC: CPT | Performed by: COMMUNITY HEALTH WORKER

## 2025-07-25 PROCEDURE — 93005 ELECTROCARDIOGRAM TRACING: CPT | Performed by: INTERNAL MEDICINE

## 2025-07-25 RX ORDER — GADOBUTROL 604.72 MG/ML
10 INJECTION INTRAVENOUS
Status: COMPLETED | OUTPATIENT
Start: 2025-07-25 | End: 2025-07-25

## 2025-07-25 RX ORDER — SODIUM CHLORIDE 0.9 % (FLUSH) 0.9 %
10 SYRINGE (ML) INJECTION
Status: DISCONTINUED | OUTPATIENT
Start: 2025-07-25 | End: 2025-07-29 | Stop reason: HOSPADM

## 2025-07-25 RX ORDER — SODIUM,POTASSIUM PHOSPHATES 280-250MG
2 POWDER IN PACKET (EA) ORAL
Status: DISCONTINUED | OUTPATIENT
Start: 2025-07-25 | End: 2025-07-29 | Stop reason: HOSPADM

## 2025-07-25 RX ORDER — LANOLIN ALCOHOL/MO/W.PET/CERES
800 CREAM (GRAM) TOPICAL
Status: DISCONTINUED | OUTPATIENT
Start: 2025-07-25 | End: 2025-07-29 | Stop reason: HOSPADM

## 2025-07-25 RX ORDER — CLOPIDOGREL BISULFATE 75 MG/1
75 TABLET ORAL DAILY
Status: DISCONTINUED | OUTPATIENT
Start: 2025-07-26 | End: 2025-07-29 | Stop reason: HOSPADM

## 2025-07-25 RX ORDER — ONDANSETRON HYDROCHLORIDE 2 MG/ML
4 INJECTION, SOLUTION INTRAVENOUS EVERY 8 HOURS PRN
Status: DISCONTINUED | OUTPATIENT
Start: 2025-07-25 | End: 2025-07-29 | Stop reason: HOSPADM

## 2025-07-25 RX ORDER — ACETAMINOPHEN 325 MG/1
650 TABLET ORAL EVERY 6 HOURS PRN
Status: DISCONTINUED | OUTPATIENT
Start: 2025-07-25 | End: 2025-07-29 | Stop reason: HOSPADM

## 2025-07-25 RX ORDER — ATORVASTATIN CALCIUM 40 MG/1
40 TABLET, FILM COATED ORAL DAILY
Status: DISCONTINUED | OUTPATIENT
Start: 2025-07-26 | End: 2025-07-29 | Stop reason: HOSPADM

## 2025-07-25 RX ORDER — ASPIRIN 81 MG/1
81 TABLET ORAL DAILY
Status: DISCONTINUED | OUTPATIENT
Start: 2025-07-26 | End: 2025-07-29 | Stop reason: HOSPADM

## 2025-07-25 RX ORDER — BISACODYL 10 MG/1
10 SUPPOSITORY RECTAL DAILY PRN
Status: DISCONTINUED | OUTPATIENT
Start: 2025-07-25 | End: 2025-07-29 | Stop reason: HOSPADM

## 2025-07-25 RX ORDER — ENOXAPARIN SODIUM 100 MG/ML
40 INJECTION SUBCUTANEOUS EVERY 24 HOURS
Status: DISCONTINUED | OUTPATIENT
Start: 2025-07-25 | End: 2025-07-29 | Stop reason: HOSPADM

## 2025-07-25 RX ADMIN — GADOBUTROL 10 ML: 604.72 INJECTION INTRAVENOUS at 09:07

## 2025-07-25 RX ADMIN — IOHEXOL 100 ML: 350 INJECTION, SOLUTION INTRAVENOUS at 06:07

## 2025-07-25 NOTE — ED PROVIDER NOTES
Encounter Date: 7/25/2025       History     Chief Complaint   Patient presents with    Tingling     Tingling to bilat hands and feet when she was sitting on the toilet. Recently d/c from hospital on Wednesday.      HPI    Patient is a 70-year-old female with past medical history of CVA (March 2025 and then recent hospitalization earlier this week for acute on chronic CVA but was discharged after being found not to be a TNK candidate), hypothyroidism, hyperlipidemia, hypertension, history of large B-cell lymphoma, and lytic bone lesions indicating osteo metastases who presents to the emergency department with worsening right-sided lower extremity weakness and bilateral numbness and tingling to hands and feet that occurred approximately starting at 2:00 p.m. today.  Patient also endorsing falling off couch onto and taking 4 hours to get back up but denies loss of consciousness or head trauma and attributes it to her right-sided leg weakness.  Denies upper extremity weakness, slurred speech, facial droop, visual changes, chest pain, shortness of breath, headache, cough, fever or medication changes.    Review of patient's allergies indicates:  No Known Allergies  Past Medical History:   Diagnosis Date    Cancer     Diffuse large B cell lymphoma     GERD (gastroesophageal reflux disease)     Hyperlipidemia     Hypertension     Hypothyroidism      Past Surgical History:   Procedure Laterality Date    PORTACATH PLACEMENT Right 03/2018     Family History   Problem Relation Name Age of Onset    Heart disease Mother      COPD Mother      No Known Problems Father       Social History[1]  Review of Systems  As above  Physical Exam     Initial Vitals [07/25/25 1646]   BP Pulse Resp Temp SpO2   (!) 131/59 87 18 98.9 °F (37.2 °C) 95 %      MAP       --         Physical Exam    Constitutional: She appears well-developed and well-nourished.   HENT:   Head: Normocephalic.   Right Ear: External ear normal.   Left Ear: External ear  normal.   Eyes: EOM are normal. Pupils are equal, round, and reactive to light.   Neck: Neck supple. No thyromegaly present. No tracheal deviation present. No JVD present.   Normal range of motion.  Cardiovascular:  Normal rate and regular rhythm.           Pulmonary/Chest: Breath sounds normal. No respiratory distress.   Abdominal: Abdomen is soft. She exhibits no distension. There is no abdominal tenderness.   Musculoskeletal:      Cervical back: Normal range of motion and neck supple.     Lymphadenopathy:     She has no cervical adenopathy.   Neurological: She is alert and oriented to person, place, and time. A sensory deficit (Bilateral hands and feet) is present. GCS score is 15. GCS eye subscore is 4. GCS verbal subscore is 5. GCS motor subscore is 6.   Patient has mild right lower extremity weakness but is able to raise right lower leg is just slower when compared to left.  Van negative.   Skin: Skin is warm and dry. Capillary refill takes less than 2 seconds.         ED Course   Procedures  Labs Reviewed   COMPREHENSIVE METABOLIC PANEL - Abnormal       Result Value    Sodium 140      Potassium 4.1      Chloride 105      CO2 29      Glucose 104      BUN 17      Creatinine 0.7      Calcium 9.3      Protein Total 7.1      Albumin 3.9      Bilirubin Total 0.4      ALP 84      AST 20      ALT 17      Anion Gap 6 (*)     eGFR >60     CBC WITH DIFFERENTIAL - Abnormal    WBC 8.38      RBC 3.93 (*)     Hgb 12.8      Hct 39.0      MCV 99 (*)     MCH 32.6 (*)     MCHC 32.8      RDW 11.9      Platelet Count 302      MPV 9.9      Nucleated RBC 0      Neut % 61.2      Lymph % 20.8      Mono % 10.6      Eos % 6.0      Basophil % 0.8      Imm Grans % 0.6 (*)     Neut # 5.1      Lymph # 1.74      Mono # 0.89      Eos # 0.50      Baso # 0.07      Imm Grans # 0.05 (*)    APTT - Normal    PTT 26.0     MAGNESIUM - Normal    Magnesium 2.1     TSH - Normal    TSH 1.199     PROTIME-INR - Normal    PT 10.4      INR 0.9      URINALYSIS, REFLEX TO URINE CULTURE - Normal    Color, UA Yellow      Appearance, UA Clear      Spec Grav UA 1.010      pH, UA 7.0      Protein, UA Negative      Glucose, UA Negative      Ketones, UA Negative      Blood, UA Negative      Bilirubin, UA Negative      Urobilinogen, UA Negative      Nitrites, UA Negative      Leukocyte Esterase, UA Negative     B-TYPE NATRIURETIC PEPTIDE (PERFORMABLE ONLY) - Normal    BNP 20     CBC W/ AUTO DIFFERENTIAL    Narrative:     The following orders were created for panel order CBC auto differential.  Procedure                               Abnormality         Status                     ---------                               -----------         ------                     CBC with Differential[[id number]]       Abnormal            Final result                 Please view results for these tests on the individual orders.   B-TYPE NATRIURETIC PEPTIDE    Narrative:     The following orders were created for panel order Brain Natriuertic Peptide (BNP).  Procedure                               Abnormality         Status                     ---------                               -----------         ------                     BNP Performable[[id number]]             Normal              Final result                 Please view results for these tests on the individual orders.   HEPATITIS C ANTIBODY   HEP C VIRUS HOLD SPECIMEN   HIV 1 / 2 ANTIBODY   HIV VIRUS CONFIRMATION HOLD SPECIMEN   EXTRA TUBES    Narrative:     The following orders were created for panel order EXTRA TUBES.  Procedure                               Abnormality         Status                     ---------                               -----------         ------                     Light Green Top Hold[[id number]]                            In process                   Please view results for these tests on the individual orders.   LIGHT GREEN TOP HOLD   POCT GLUCOSE    POCT Glucose 101     ISTAT LACTATE    POC  Lactate 1.05      Sample VENOUS     POCT GLUCOSE MONITORING CONTINUOUS   POCT LACTATE          Imaging Results              CTA Head and Neck (xpd) (Final result)  Result time 07/25/25 19:10:07      Final result by Syed Zheng MD (07/25/25 19:10:07)                   Impression:      Redemonstration of occlusion of the left A2 branch without additional evidence of large vessel occlusion or hemodynamically significant stenosis.      Electronically signed by: Syed Zheng  Date:    07/25/2025  Time:    19:10               Narrative:    EXAMINATION:  CTA HEAD AND NECK (XPD); CT HEAD WITHOUT CONTRAST    CLINICAL HISTORY:  Neuro deficit, acute, stroke suspected;; Transient ischemic attack (TIA);    TECHNIQUE:  Non contrast low dose axial images were obtained through the head. CT angiogram was performed from the level of the lana to the top of the head following the IV administration of 100mL of Omnipaque 350.   Sagittal and coronal reconstructions and maximum intensity projection reconstructions were performed. Arterial stenosis percentages are based on NASCET measurement criteria.    COMPARISON:  CT head and CTA head neck from 07/21/2025.    FINDINGS:  Intracranial Compartment:    Ventricles and sulci are normal in size for age without evidence of hydrocephalus. No extra-axial blood or fluid collections.    Chronic ischemic small vessel disease is present involving the cerebral white matter.  There is vasogenic edema versus encephalomalacia involving the left frontoparietal region superiorly and medially.  No parenchymal mass, hemorrhage, edema, or major vascular distribution infarct.    Skull/Extracranial Contents (limited evaluation): No fracture. Mastoid air cells and paranasal sinuses are essentially clear.    Non-Vascular Structures of the Neck/Thoracic Inlet (limited evaluation): Normal.    Aorta: Normal 3 vessel arch.    Extracranial carotid circulation: No hemodynamically significant stenosis,  aneurysmal dilatation, or dissection.  Moderate atherosclerotic calcification is present involving the bilateral carotid bifurcations.    Extracranial vertebral circulation: No hemodynamically significant stenosis, aneurysmal dilatation, or dissection.  The right vertebral artery is diminutive and the left vertebral artery is dominant.    Intracranial Arteries: There is redemonstration of occlusion the left A2 branch.  Fetal origin of the right posterior cerebral artery is noted.  No additional evidence of significant stenosis, aneurysm, or dissection.  Moderate atherosclerotic calcification is present involving the bilateral carotid siphons.    Venous structures (limited evaluation): Normal.                                       CT Head Without Contrast (Final result)  Result time 07/25/25 19:10:07      Final result by Syed Zheng MD (07/25/25 19:10:07)                   Impression:      Redemonstration of occlusion of the left A2 branch without additional evidence of large vessel occlusion or hemodynamically significant stenosis.      Electronically signed by: Syed Zheng  Date:    07/25/2025  Time:    19:10               Narrative:    EXAMINATION:  CTA HEAD AND NECK (XPD); CT HEAD WITHOUT CONTRAST    CLINICAL HISTORY:  Neuro deficit, acute, stroke suspected;; Transient ischemic attack (TIA);    TECHNIQUE:  Non contrast low dose axial images were obtained through the head. CT angiogram was performed from the level of the lana to the top of the head following the IV administration of 100mL of Omnipaque 350.   Sagittal and coronal reconstructions and maximum intensity projection reconstructions were performed. Arterial stenosis percentages are based on NASCET measurement criteria.    COMPARISON:  CT head and CTA head neck from 07/21/2025.    FINDINGS:  Intracranial Compartment:    Ventricles and sulci are normal in size for age without evidence of hydrocephalus. No extra-axial blood or fluid  collections.    Chronic ischemic small vessel disease is present involving the cerebral white matter.  There is vasogenic edema versus encephalomalacia involving the left frontoparietal region superiorly and medially.  No parenchymal mass, hemorrhage, edema, or major vascular distribution infarct.    Skull/Extracranial Contents (limited evaluation): No fracture. Mastoid air cells and paranasal sinuses are essentially clear.    Non-Vascular Structures of the Neck/Thoracic Inlet (limited evaluation): Normal.    Aorta: Normal 3 vessel arch.    Extracranial carotid circulation: No hemodynamically significant stenosis, aneurysmal dilatation, or dissection.  Moderate atherosclerotic calcification is present involving the bilateral carotid bifurcations.    Extracranial vertebral circulation: No hemodynamically significant stenosis, aneurysmal dilatation, or dissection.  The right vertebral artery is diminutive and the left vertebral artery is dominant.    Intracranial Arteries: There is redemonstration of occlusion the left A2 branch.  Fetal origin of the right posterior cerebral artery is noted.  No additional evidence of significant stenosis, aneurysm, or dissection.  Moderate atherosclerotic calcification is present involving the bilateral carotid siphons.    Venous structures (limited evaluation): Normal.                                       Medications   iohexoL (OMNIPAQUE 350) injection 100 mL (100 mLs Intravenous Given 7/25/25 6863)     Medical Decision Making  Amount and/or Complexity of Data Reviewed  Labs: ordered. Decision-making details documented in ED Course.  Radiology: ordered.    Risk  Prescription drug management.    I have evaluated all tests and imaging independently.    Patient is a 70-year-old female with 2 strokes over the past 6 months (May 2025 posterior left frontal lobe and earlier this week left frontal corona radiata) right-sided lower extremity weakness that is documented as residual focal  deficit.  Vital signs as above.  Physical exam significant for van negative, relatively normal neuro exam with bilateral hand and feet numbness and tingling as well as right lower extremity slower to respond but able to raise against gravity.  Patient was not stroke activated but CT head and CTA head and neck showed only redemonstration of prior findings.  Workup significant for no electrolyte derangements, normal renal/hepatic lobes, normal white count, normal H/H, and normal cardiac workup including troponin, BNP and coags.    Neurology consulted and recommended overnight obs and placement in rehab as well as imaging of brain and C-spine with concern given recent history of strokes.  Hospitalist consulted and patient to be admitted.  MRI C-spine and MRI brain ordered.    Patient amenable to plan.  Questions answered bedside.  Anticipate admission.  Case discussed with the attending.    Notably, patient also request inpatient rehab assistance.        Komal Child DO   PGY-4 Emergency Medicine  07/25/2025  7:53 PM             ED Course as of 07/1955 Fri Jul 25, 2025 1846 BNP: 20 [KB]   1847 TSH: 1.199 [KB]   1847 Hemoglobin: 12.8 [KB]   1847 Hematocrit: 39.0 [KB]   1847 WBC: 8.38 [KB]   1920 CT Head Without Contrast    Impression:  Redemonstration of occlusion of the left A2 branch without additional evidence of large vessel occlusion or hemodynamically significant stenosis. [KB]   1920 CTA Head and Neck (xpd)    Impression:  Redemonstration of occlusion of the left A2 branch without additional evidence of large vessel occlusion or hemodynamically significant stenosis. [KB]      ED Course User Index  [KB] Komal Child MD                               Clinical Impression:  Final diagnoses:  [R53.1] Weakness  [R20.2] Tingling in extremities (Primary)  [Z86.73] History of multiple cerebrovascular accidents (CVAs)          ED Disposition Condition    Admit                       [1]   Social  History  Tobacco Use    Smoking status: Every Day     Current packs/day: 0.50     Average packs/day: 0.5 packs/day for 54.6 years (27.3 ttl pk-yrs)     Types: Cigarettes     Start date: 1971    Smokeless tobacco: Never   Substance Use Topics    Alcohol use: Yes     Alcohol/week: 3.0 standard drinks of alcohol     Types: 3 Shots of liquor per week     Comment: social 3/3/2018    Drug use: No        Komal Child MD  Resident  07/1955

## 2025-07-26 LAB
ABSOLUTE EOSINOPHIL (SMH): 0.71 K/UL
ABSOLUTE MONOCYTE (SMH): 1.14 K/UL (ref 0.3–1)
ABSOLUTE NEUTROPHIL COUNT (SMH): 6.4 K/UL (ref 1.8–7.7)
ALBUMIN SERPL-MCNC: 4.4 G/DL (ref 3.5–5.2)
ALP SERPL-CCNC: 94 UNIT/L (ref 55–135)
ALT SERPL-CCNC: 16 UNIT/L (ref 10–44)
ANION GAP (SMH): 9 MMOL/L (ref 8–16)
AST SERPL-CCNC: 19 UNIT/L (ref 10–40)
BASOPHILS # BLD AUTO: 0.08 K/UL
BASOPHILS NFR BLD AUTO: 0.8 %
BILIRUB SERPL-MCNC: 0.5 MG/DL (ref 0.1–1)
BUN SERPL-MCNC: 13 MG/DL (ref 8–23)
CALCIUM SERPL-MCNC: 9.4 MG/DL (ref 8.7–10.5)
CHLORIDE SERPL-SCNC: 103 MMOL/L (ref 95–110)
CO2 SERPL-SCNC: 27 MMOL/L (ref 23–29)
CREAT SERPL-MCNC: 0.6 MG/DL (ref 0.5–1.4)
ERYTHROCYTE [DISTWIDTH] IN BLOOD BY AUTOMATED COUNT: 11.9 % (ref 11.5–14.5)
GFR SERPLBLD CREATININE-BSD FMLA CKD-EPI: >60 ML/MIN/1.73/M2
GLUCOSE SERPL-MCNC: 92 MG/DL (ref 70–110)
HCT VFR BLD AUTO: 42.1 % (ref 37–48.5)
HGB BLD-MCNC: 14 GM/DL (ref 12–16)
IMM GRANULOCYTES # BLD AUTO: 0.05 K/UL (ref 0–0.04)
IMM GRANULOCYTES NFR BLD AUTO: 0.5 % (ref 0–0.5)
LYMPHOCYTES # BLD AUTO: 1.93 K/UL (ref 1–4.8)
MCH RBC QN AUTO: 33.1 PG (ref 27–31)
MCHC RBC AUTO-ENTMCNC: 33.3 G/DL (ref 32–36)
MCV RBC AUTO: 100 FL (ref 82–98)
NUCLEATED RBC (/100WBC) (SMH): 0 /100 WBC
OHS QRS DURATION: 84 MS
OHS QTC CALCULATION: 457 MS
PLATELET # BLD AUTO: 308 K/UL (ref 150–450)
PMV BLD AUTO: 9.7 FL (ref 9.2–12.9)
POCT GLUCOSE: 113 MG/DL (ref 70–110)
POCT GLUCOSE: 94 MG/DL (ref 70–110)
POTASSIUM SERPL-SCNC: 3.9 MMOL/L (ref 3.5–5.1)
PROT SERPL-MCNC: 7.2 GM/DL (ref 6–8.4)
RBC # BLD AUTO: 4.23 M/UL (ref 4–5.4)
RELATIVE EOSINOPHIL (SMH): 6.9 % (ref 0–8)
RELATIVE LYMPHOCYTE (SMH): 18.8 % (ref 18–48)
RELATIVE MONOCYTE (SMH): 11.1 % (ref 4–15)
RELATIVE NEUTROPHIL (SMH): 61.9 % (ref 38–73)
SODIUM SERPL-SCNC: 139 MMOL/L (ref 136–145)
WBC # BLD AUTO: 10.27 K/UL (ref 3.9–12.7)

## 2025-07-26 PROCEDURE — 25000003 PHARM REV CODE 250: Performed by: INTERNAL MEDICINE

## 2025-07-26 PROCEDURE — 97116 GAIT TRAINING THERAPY: CPT

## 2025-07-26 PROCEDURE — 97530 THERAPEUTIC ACTIVITIES: CPT

## 2025-07-26 PROCEDURE — 80053 COMPREHEN METABOLIC PANEL: CPT | Performed by: STUDENT IN AN ORGANIZED HEALTH CARE EDUCATION/TRAINING PROGRAM

## 2025-07-26 PROCEDURE — 97165 OT EVAL LOW COMPLEX 30 MIN: CPT

## 2025-07-26 PROCEDURE — 97162 PT EVAL MOD COMPLEX 30 MIN: CPT

## 2025-07-26 PROCEDURE — 36415 COLL VENOUS BLD VENIPUNCTURE: CPT | Performed by: STUDENT IN AN ORGANIZED HEALTH CARE EDUCATION/TRAINING PROGRAM

## 2025-07-26 PROCEDURE — 21400001 HC TELEMETRY ROOM

## 2025-07-26 PROCEDURE — 63600175 PHARM REV CODE 636 W HCPCS

## 2025-07-26 PROCEDURE — 25000003 PHARM REV CODE 250: Performed by: STUDENT IN AN ORGANIZED HEALTH CARE EDUCATION/TRAINING PROGRAM

## 2025-07-26 PROCEDURE — 85025 COMPLETE CBC W/AUTO DIFF WBC: CPT | Performed by: STUDENT IN AN ORGANIZED HEALTH CARE EDUCATION/TRAINING PROGRAM

## 2025-07-26 PROCEDURE — 94761 N-INVAS EAR/PLS OXIMETRY MLT: CPT

## 2025-07-26 RX ORDER — AMLODIPINE BESYLATE 5 MG/1
5 TABLET ORAL DAILY
Status: DISCONTINUED | OUTPATIENT
Start: 2025-07-27 | End: 2025-07-29 | Stop reason: HOSPADM

## 2025-07-26 RX ORDER — FAMOTIDINE 10 MG/ML
20 INJECTION, SOLUTION INTRAVENOUS 2 TIMES DAILY
Status: DISCONTINUED | OUTPATIENT
Start: 2025-07-26 | End: 2025-07-28

## 2025-07-26 RX ORDER — TALC
6 POWDER (GRAM) TOPICAL NIGHTLY PRN
Status: DISCONTINUED | OUTPATIENT
Start: 2025-07-26 | End: 2025-07-29 | Stop reason: HOSPADM

## 2025-07-26 RX ADMIN — FAMOTIDINE 20 MG: 10 INJECTION, SOLUTION INTRAVENOUS at 07:07

## 2025-07-26 RX ADMIN — Medication 6 MG: at 09:07

## 2025-07-26 RX ADMIN — ASPIRIN 81 MG: 81 TABLET ORAL at 08:07

## 2025-07-26 RX ADMIN — ACETAMINOPHEN 650 MG: 325 TABLET ORAL at 10:07

## 2025-07-26 RX ADMIN — ATORVASTATIN CALCIUM 40 MG: 40 TABLET, FILM COATED ORAL at 08:07

## 2025-07-26 RX ADMIN — LEVOTHYROXINE SODIUM 150 MCG: 0.03 TABLET ORAL at 05:07

## 2025-07-26 RX ADMIN — FAMOTIDINE 20 MG: 10 INJECTION, SOLUTION INTRAVENOUS at 01:07

## 2025-07-26 RX ADMIN — CLOPIDOGREL 75 MG: 75 TABLET ORAL at 08:07

## 2025-07-26 NOTE — PT/OT/SLP EVAL
"Occupational Therapy   Evaluation    Name: Indira Chauhan  MRN: [id number]  Admitting Diagnosis: CVA (cerebral vascular accident)  Recent Surgery: * No surgery found *      Recommendations:     Discharge Recommendations: Moderate Intensity Therapy  Discharge Equipment Recommendations:  to be determined by next level of care  Barriers to discharge:   (medical condition)    Assessment:     Indira Chauhan is a 70 y.o. female with a medical diagnosis of CVA (cerebral vascular accident).  She presents with decreased ability to participate in ADLs and functional transfers without assistance. She needs MOD A to complete LE dressing, toileting, and bathing. She needed CGA to t/f from STS using RW. Performance deficits affecting function: weakness, impaired endurance, impaired self care skills, impaired functional mobility, decreased coordination, decreased upper extremity function, decreased lower extremity function, decreased safety awareness, pain, impaired balance.      Rehab Prognosis: Good; patient would benefit from acute skilled OT services to address these deficits and reach maximum level of function.       Plan:     Patient to be seen 5 x/week to address the above listed problems via self-care/home management, therapeutic activities, therapeutic exercises  Plan of Care Expires: 08/26/25  Plan of Care Reviewed with: patient    Subjective     Chief Complaint: " My knee hurts"   Patient/Family Comments/goals: n/a     Occupational Profile:  Living Environment: Pt lives with her  in a 2 story house.The 2nd floor is not used. She has a walk in shower and uses a [facility] as a shower chair.   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  Patient found up in chair with telemetry, peripheral IV, PureWick upon OT entry to room.    General Precautions: Standard, fall  Orthopedic Precautions: N/A  Braces: N/A  Respiratory Status: Room air    Occupational Performance:      Functional Mobility/Transfers:  Patient completed Sit <> Stand Transfer with contact guard assistance  with  rolling walker     Activities of Daily Living:  Lower Body Dressing: moderate assistance to put socks on    Cognitive/Visual Perceptual:  Cognitive/Psychosocial Skills:     -       Oriented to: Person, Place, Time, and Situation   -       Follows Commands/attention:Follows multistep  commands  -       Communication: clear/fluent  -       Memory: No Deficits noted  -       Safety awareness/insight to disability: intact     Physical Exam:  Upper Extremity Range of Motion:     -       Right Upper Extremity: WFL  -       Left Upper Extremity: WNL  Upper Extremity Strength:    -       Right Upper Extremity: WFL  -       Left Upper Extremity: WNL   Strength:    -       Right Upper Extremity: WFL  -       Left Upper Extremity: WNL    AMPAC 6 Click ADL:  AMPAC Total Score: 21    Treatment & Education:  Pt was educated on the role of occupational therapy and the importance of completing ADLs and functional mobility.     Patient left up in chair with all lines intact, call button in reach, and chair alarm on    GOALS:   Multidisciplinary Problems       Occupational Therapy Goals          Problem: Occupational Therapy    Goal Priority Disciplines Outcome Interventions   Occupational Therapy Goal     OT, PT/OT     Description: Goals to be met by: 8/26/25     Patient will increase functional independence with ADLs by performing:    UE Dressing with [place].  LE Dressing with Modified [place].  Grooming while seated with Minimal Assistance.  Toileting from  bedside commode with Minimal Assistance for hygiene and clothing management.   Toilet transfer to bedside commode with Modified [place].                         DME Justifications:  No DME recommended requiring DME justifications    History:     Past Medical History:   Diagnosis Date    Cancer     Diffuse large B cell lymphoma     GERD (gastroesophageal reflux disease)     Hyperlipidemia     Hypertension     Hypothyroidism          Past Surgical History:   Procedure Laterality Date    PORTACATH PLACEMENT Right 03/2018       Time Tracking:     OT Date of Treatment: 07/26/25  OT Start Time: 1242  OT Stop Time: 1255  OT Total Time (min): 13 min    Billable Minutes:Evaluation 5  Therapeutic Activity 8    7/26/2025

## 2025-07-26 NOTE — CARE UPDATE
07/26/25 0727   PRE-TX-O2   Device (Oxygen Therapy) room air   SpO2 96 %   Pulse Oximetry Type Intermittent   $ Pulse Oximetry - Multiple Charge Pulse Oximetry - Multiple   Pulse 75   Resp 15        07/26/25 0727   PRE-TX-O2   Device (Oxygen Therapy) room air   SpO2 96 %   Pulse Oximetry Type Intermittent   $ Pulse Oximetry - Multiple Charge Pulse Oximetry - Multiple   Pulse 75   Resp 15

## 2025-07-26 NOTE — H&P
[facility] - Emergency Dept  Hospital Medicine  History & Physical    Patient Name: Indira Chauhan  MRN: [id number]  Patient Class: OP- Observation  Admission Date: 7/25/2025  Attending Physician: Luh Teixeira MD  Primary Care Provider: Bela Lombardi NP         Patient information was obtained from patient and ER records.     Subjective:     Principal Problem:CVA (cerebral vascular accident)    Chief Complaint:   Chief Complaint   Patient presents with    Tingling     Tingling to bilat hands and feet when she was sitting on the toilet. Recently d/c from hospital on Wednesday.         HPI: 70-year-old female pMHx diffuse B-cell lymphoma, GERD, HTN, HLD, hypothyroidism, stroke, PVD presented for bilateral upper extremity numbness and tingling and right lower extremity weakness.  Patient was recently discharged 2 days ago from hospital after treatment for left frontal CVA.  At the time it was recommended for inpatient rehab, patient was denied by [facility] rehab.  Patient elected to go home on home health care rather than skilled nursing facility.  Patient reports no change in right lower extremity weakness, had a fall earlier today and was not able to get up off the floor.  Stated after fall she developed bilateral upper extremity numbness and tingling in her hands.  Denies any loss of consciousness, chest pain, shortness for breath, nausea, vomiting, diarrhea.  CTA head and neck and CT head showed redemonstration of the occlusion of the left A2 branch without additional evidence of large vessel occlusion or significant stenosis.  Discussed with patient, she is agreeable to skilled nursing facility placement.  Reports no worsening weakness in the right lower extremity says that it is about the same.  ED physician discussed with on-call neurologist recommended MRI brain and MRI C-spine.  Patient reports compliance with medication.    Past Medical History:   Diagnosis Date    Cancer     Diffuse  large B cell lymphoma     GERD (gastroesophageal reflux disease)     Hyperlipidemia     Hypertension     Hypothyroidism        Past Surgical History:   Procedure Laterality Date    PORTACATH PLACEMENT Right 03/2018       Review of patient's allergies indicates:  No Known Allergies    No current facility-administered medications on file prior to encounter.     Current Outpatient Medications on File Prior to Encounter   Medication Sig    amLODIPine-benazepriL (LOTREL) 5-40 mg per capsule Take 1 capsule by mouth once daily.    aspirin (ECOTRIN) 81 MG EC tablet Take 1 tablet (81 mg total) by mouth once daily.    atorvastatin (LIPITOR) 40 MG tablet Take 1 tablet (40 mg total) by mouth once daily.    clopidogreL (PLAVIX) 75 mg tablet Take 1 tablet (75 mg total) by mouth once daily.    levothyroxine (SYNTHROID) 150 MCG tablet Take 1 tablet (150 mcg total) by mouth once daily.     Family History       Problem Relation (Age of Onset)    COPD Mother    Heart disease Mother    No Known Problems Father          Tobacco Use    Smoking status: Every Day     Current packs/day: 0.50     Average packs/day: 0.5 packs/day for 54.6 years (27.3 ttl pk-yrs)     Types: Cigarettes     Start date: 1971    Smokeless tobacco: Never   Substance and Sexual Activity    Alcohol use: Yes     Alcohol/week: 3.0 standard drinks of alcohol     Types: 3 Shots of liquor per week     Comment: social 3/3/2018    Drug use: No    Sexual activity: Yes     Partners: Male     Birth control/protection: Post-menopausal     Review of Systems   Constitutional:  Negative for appetite change and fatigue.   Respiratory:  Negative for shortness of breath.    Cardiovascular:  Negative for chest pain and palpitations.   Gastrointestinal:  Negative for abdominal pain.   Genitourinary:  Negative for difficulty urinating.   Musculoskeletal:  Negative for back pain.   Neurological:  Positive for weakness. Negative for dizziness.   Psychiatric/Behavioral:  Negative for  agitation.      Objective:     Vital Signs (Most Recent):  Temp: 98.9 °F (37.2 °C) (07/25/25 1646)  Pulse: 80 (07/25/25 1956)  Resp: 18 (07/25/25 1646)  BP: (!) 144/102 (07/25/25 1956)  SpO2: 99 % (07/25/25 1956) Vital Signs (24h Range):  Temp:  [98.9 °F (37.2 °C)] 98.9 °F (37.2 °C)  Pulse:  [75-87] 80  Resp:  [18] 18  SpO2:  [95 %-99 %] 99 %  BP: (125-156)/() 144/102     Weight: 102.1 kg (225 lb)  Body mass index is 38.62 kg/m².     Physical Exam  Constitutional:       General: She is not in acute distress.  HENT:      Head: Atraumatic.   Cardiovascular:      Rate and Rhythm: Normal rate and regular rhythm.   Pulmonary:      Effort: No respiratory distress.      Breath sounds: No wheezing.   Abdominal:      Palpations: Abdomen is soft.   Musculoskeletal:         General: No swelling.   Skin:     General: Skin is dry.   Neurological:      Mental Status: She is alert and oriented to person, place, and time.      Motor: Weakness (RLE) present.                Significant Labs: All pertinent labs within the past 24 hours have been reviewed.  CBC:   Recent Labs   Lab 07/25/25  1737   WBC 8.38   HGB 12.8   HCT 39.0        CMP:   Recent Labs   Lab 07/25/25  1737      K 4.1      CO2 29      BUN 17   CREATININE 0.7   CALCIUM 9.3   PROT 7.1   ALBUMIN 3.9   BILITOT 0.4   ALKPHOS 84   AST 20   ALT 17   ANIONGAP 6*       Significant Imaging: I have reviewed all pertinent imaging results/findings within the past 24 hours.  Assessment/Plan:     Assessment & Plan  CVA (cerebral vascular accident)  Diagnosed with acute/subacute infarct of the left frontal corona radiata on 07/21  Right lower extremity weakness, same as when patient was discharged   Has new UE tingling  Continue dual antiplatelet therapy and statin.  It was recommended for dual antiplatelet therapy for 3 months then aspirin alone during last visit   ED physician discussed with neurologist, recommended repeating MRI brain and ordering  MRI C-spine. Consult neuro if new/acute findings  We will allow for permissive hypertension until repeat MRI results  Patient will need rehab placement  PT/OT  Mixed hyperlipidemia  Continue statin    Acquired hypothyroidism  Continue Synthroid    Tobacco use  Aware    Diffuse large B-cell lymphoma of lymph nodes of multiple regions  Hx of osseous and B cell lymphoma, s/p chemo 4 years ago     VTE Risk Mitigation (From admission, onward)           Ordered     enoxaparin injection 40 mg  Daily         07/25/25 2003     IP VTE HIGH RISK PATIENT  Once         07/25/25 2003     Place sequential compression device  Until discontinued         07/25/25 2003                                   On 07/25/2025, patient should be placed in hospital observation services under my care.             Luh Teixeira MD  Department of Hospital Medicine  [facility] - Emergency Dept

## 2025-07-26 NOTE — HPI
70-year-old female pMHx diffuse B-cell lymphoma, GERD, HTN, HLD, hypothyroidism, stroke, PVD presented for bilateral upper extremity numbness and tingling and right lower extremity weakness.  Patient was recently discharged 2 days ago from hospital after treatment for left frontal CVA.  At the time it was recommended for inpatient rehab, patient was denied by [facility] rehab.  Patient elected to go home on home health care rather than skilled nursing facility.  Patient reports no change in right lower extremity weakness, had a fall earlier today and was not able to get up off the floor.  Stated after fall she developed bilateral upper extremity numbness and tingling in her hands.  Denies any loss of consciousness, chest pain, shortness for breath, nausea, vomiting, diarrhea.  CTA head and neck and CT head showed redemonstration of the occlusion of the left A2 branch without additional evidence of large vessel occlusion or significant stenosis.  Discussed with patient, she is agreeable to skilled nursing facility placement.  Reports no worsening weakness in the right lower extremity says that it is about the same.  ED physician discussed with on-call neurologist recommended MRI brain and MRI C-spine.  Patient reports compliance with medication.

## 2025-07-26 NOTE — SUBJECTIVE & OBJECTIVE
Past Medical History:   Diagnosis Date    Cancer     Diffuse large B cell lymphoma     GERD (gastroesophageal reflux disease)     Hyperlipidemia     Hypertension     Hypothyroidism        Past Surgical History:   Procedure Laterality Date    PORTACATH PLACEMENT Right 03/2018       Review of patient's allergies indicates:  No Known Allergies    No current facility-administered medications on file prior to encounter.     Current Outpatient Medications on File Prior to Encounter   Medication Sig    amLODIPine-benazepriL (LOTREL) 5-40 mg per capsule Take 1 capsule by mouth once daily.    aspirin (ECOTRIN) 81 MG EC tablet Take 1 tablet (81 mg total) by mouth once daily.    atorvastatin (LIPITOR) 40 MG tablet Take 1 tablet (40 mg total) by mouth once daily.    clopidogreL (PLAVIX) 75 mg tablet Take 1 tablet (75 mg total) by mouth once daily.    levothyroxine (SYNTHROID) 150 MCG tablet Take 1 tablet (150 mcg total) by mouth once daily.     Family History       Problem Relation (Age of Onset)    COPD Mother    Heart disease Mother    No Known Problems Father          Tobacco Use    Smoking status: Every Day     Current packs/day: 0.50     Average packs/day: 0.5 packs/day for 54.6 years (27.3 ttl pk-yrs)     Types: Cigarettes     Start date: 1971    Smokeless tobacco: Never   Substance and Sexual Activity    Alcohol use: Yes     Alcohol/week: 3.0 standard drinks of alcohol     Types: 3 Shots of liquor per week     Comment: social 3/3/2018    Drug use: No    Sexual activity: Yes     Partners: Male     Birth control/protection: Post-menopausal     Review of Systems   Constitutional:  Negative for appetite change and fatigue.   Respiratory:  Negative for shortness of breath.    Cardiovascular:  Negative for chest pain and palpitations.   Gastrointestinal:  Negative for abdominal pain.   Genitourinary:  Negative for difficulty urinating.   Musculoskeletal:  Negative for back pain.   Neurological:  Positive for weakness. Negative  for dizziness.   Psychiatric/Behavioral:  Negative for agitation.      Objective:     Vital Signs (Most Recent):  Temp: 98.9 °F (37.2 °C) (07/25/25 1646)  Pulse: 80 (07/25/25 1956)  Resp: 18 (07/25/25 1646)  BP: (!) 144/102 (07/25/25 1956)  SpO2: 99 % (07/25/25 1956) Vital Signs (24h Range):  Temp:  [98.9 °F (37.2 °C)] 98.9 °F (37.2 °C)  Pulse:  [75-87] 80  Resp:  [18] 18  SpO2:  [95 %-99 %] 99 %  BP: (125-156)/() 144/102     Weight: 102.1 kg (225 lb)  Body mass index is 38.62 kg/m².     Physical Exam  Constitutional:       General: She is not in acute distress.  HENT:      Head: Atraumatic.   Cardiovascular:      Rate and Rhythm: Normal rate and regular rhythm.   Pulmonary:      Effort: No respiratory distress.      Breath sounds: No wheezing.   Abdominal:      Palpations: Abdomen is soft.   Musculoskeletal:         General: No swelling.   Skin:     General: Skin is dry.   Neurological:      Mental Status: She is alert and oriented to person, place, and time.      Motor: Weakness (RLE) present.                Significant Labs: All pertinent labs within the past 24 hours have been reviewed.  CBC:   Recent Labs   Lab 07/25/25  1737   WBC 8.38   HGB 12.8   HCT 39.0        CMP:   Recent Labs   Lab 07/25/25  1737      K 4.1      CO2 29      BUN 17   CREATININE 0.7   CALCIUM 9.3   PROT 7.1   ALBUMIN 3.9   BILITOT 0.4   ALKPHOS 84   AST 20   ALT 17   ANIONGAP 6*       Significant Imaging: I have reviewed all pertinent imaging results/findings within the past 24 hours.

## 2025-07-26 NOTE — SUBJECTIVE & OBJECTIVE
Interval History:  Patient seen and examined.  Overnight notes reviewed.  Patient's  was at bedside.  Patient reports continued weakness to right lower extremity.  Repeat MRI brain demonstrated prior stroke.  PT/OT consulted.  Patient states showed PT/OT recommended high-intensity versus moderate intensive therapy she would be agreeable to placement.    Review of Systems   Respiratory:  Negative for shortness of breath.    Cardiovascular:  Negative for chest pain.   Gastrointestinal:  Negative for abdominal pain and nausea.   Neurological:  Positive for weakness.     Objective:     Vital Signs (Most Recent):  Temp: 97.4 °F (36.3 °C) (07/26/25 1145)  Pulse: 82 (07/26/25 1145)  Resp: 18 (07/26/25 1145)  BP: 136/63 (07/26/25 1145)  SpO2: 100 % (07/26/25 1145) Vital Signs (24h Range):  Temp:  [97.4 °F (36.3 °C)-98.9 °F (37.2 °C)] 97.4 °F (36.3 °C)  Pulse:  [18-91] 82  Resp:  [15-20] 18  SpO2:  [95 %-100 %] 100 %  BP: (125-173)/() 136/63     Weight: 102.1 kg (225 lb)  Body mass index is 38.62 kg/m².    Intake/Output Summary (Last 24 hours) at 7/26/2025 1355  Last data filed at 7/26/2025 0532  Gross per 24 hour   Intake 240 ml   Output 150 ml   Net 90 ml         Physical Exam  Vitals and nursing note reviewed.   Constitutional:       General: She is not in acute distress.     Appearance: Normal appearance. She is normal weight.   HENT:      Head: Normocephalic and atraumatic.      Mouth/Throat:      Mouth: Mucous membranes are moist.   Eyes:      Extraocular Movements: Extraocular movements intact.      Conjunctiva/sclera: Conjunctivae normal.      Pupils: Pupils are equal, round, and reactive to light.   Cardiovascular:      Rate and Rhythm: Normal rate and regular rhythm.      Pulses: Normal pulses.      Heart sounds: Normal heart sounds.   Pulmonary:      Effort: Pulmonary effort is normal.      Breath sounds: Normal breath sounds.   Abdominal:      General: Abdomen is flat. Bowel sounds are normal. There  is no distension.      Palpations: Abdomen is soft.      Tenderness: There is no abdominal tenderness.   Musculoskeletal:         General: Normal range of motion.      Cervical back: Normal range of motion and neck supple.   Skin:     General: Skin is warm.   Neurological:      General: No focal deficit present.      Mental Status: She is alert and oriented to person, place, and time. Mental status is at baseline.      Comments: Patient is AAOx3.   Sensation intact.   Decreased strength to RLE  Normal FTN.   Normal speech.    Psychiatric:         Mood and Affect: Mood normal.         Behavior: Behavior normal.               Significant Labs: All pertinent labs within the past 24 hours have been reviewed.  CBC:   Recent Labs   Lab 07/25/25 1737 07/26/25  0458   WBC 8.38 10.27   HGB 12.8 14.0   HCT 39.0 42.1    308     CMP:   Recent Labs   Lab 07/25/25 1737 07/26/25  0458    139   K 4.1 3.9    103   CO2 29 27    92   BUN 17 13   CREATININE 0.7 0.6   CALCIUM 9.3 9.4   PROT 7.1 7.2   ALBUMIN 3.9 4.4   BILITOT 0.4 0.5   ALKPHOS 84 94   AST 20 19   ALT 17 16   ANIONGAP 6* 9       Significant Imaging: I have reviewed all pertinent imaging results/findings within the past 24 hours.

## 2025-07-26 NOTE — ASSESSMENT & PLAN NOTE
Diagnosed with acute/subacute infarct of the left frontal corona radiata on 07/21  Right lower extremity weakness, same as when patient was discharged   Has new UE tingling  Continue dual antiplatelet therapy and statin.  It was recommended for dual antiplatelet therapy for 3 months then aspirin alone during last visit   ED physician discussed with neurologist, recommended repeating MRI brain and ordering MRI C-spine. Consult neuro if new/acute findings  We will allow for permissive hypertension until repeat MRI results  Patient will need rehab placement  PT/OT

## 2025-07-26 NOTE — PROGRESS NOTES
[facility] Medicine  Progress Note    Patient Name: Indira Chauhan  MRN: [id number]  Patient Class: OP- Observation   Admission Date: 7/25/2025  Length of Stay: 0 days  Attending Physician: Ana Taylor MD  Primary Care Provider: Bela Lombardi NP        Subjective     Principal Problem:CVA (cerebral vascular accident)        HPI:  70-year-old female pMHx diffuse B-cell lymphoma, GERD, HTN, HLD, hypothyroidism, stroke, PVD presented for bilateral upper extremity numbness and tingling and right lower extremity weakness.  Patient was recently discharged 2 days ago from hospital after treatment for left frontal CVA.  At the time it was recommended for inpatient rehab, patient was denied by [facility] rehab.  Patient elected to go home on home health care rather than skilled nursing facility.  Patient reports no change in right lower extremity weakness, had a fall earlier today and was not able to get up off the floor.  Stated after fall she developed bilateral upper extremity numbness and tingling in her hands.  Denies any loss of consciousness, chest pain, shortness for breath, nausea, vomiting, diarrhea.  CTA head and neck and CT head showed redemonstration of the occlusion of the left A2 branch without additional evidence of large vessel occlusion or significant stenosis.  Discussed with patient, she is agreeable to skilled nursing facility placement.  Reports no worsening weakness in the right lower extremity says that it is about the same.  ED physician discussed with on-call neurologist recommended MRI brain and MRI C-spine.  Patient reports compliance with medication.    Overview/Hospital Course:  No notes on file    Interval History:  Patient seen and examined.  Overnight notes reviewed.  Patient's  was at bedside.  Patient reports continued weakness to right lower extremity.  Repeat MRI brain demonstrated prior stroke.  PT/OT consulted.  Patient states showed PT/OT  recommended high-intensity versus moderate intensive therapy she would be agreeable to placement.    Review of Systems   Respiratory:  Negative for shortness of breath.    Cardiovascular:  Negative for chest pain.   Gastrointestinal:  Negative for abdominal pain and nausea.   Neurological:  Positive for weakness.     Objective:     Vital Signs (Most Recent):  Temp: 97.4 °F (36.3 °C) (07/26/25 1145)  Pulse: 82 (07/26/25 1145)  Resp: 18 (07/26/25 1145)  BP: 136/63 (07/26/25 1145)  SpO2: 100 % (07/26/25 1145) Vital Signs (24h Range):  Temp:  [97.4 °F (36.3 °C)-98.9 °F (37.2 °C)] 97.4 °F (36.3 °C)  Pulse:  [18-91] 82  Resp:  [15-20] 18  SpO2:  [95 %-100 %] 100 %  BP: (125-173)/() 136/63     Weight: 102.1 kg (225 lb)  Body mass index is 38.62 kg/m².    Intake/Output Summary (Last 24 hours) at 7/26/2025 1355  Last data filed at 7/26/2025 0532  Gross per 24 hour   Intake 240 ml   Output 150 ml   Net 90 ml         Physical Exam  Vitals and nursing note reviewed.   Constitutional:       General: She is not in acute distress.     Appearance: Normal appearance. She is normal weight.   HENT:      Head: Normocephalic and atraumatic.      Mouth/Throat:      Mouth: Mucous membranes are moist.   Eyes:      Extraocular Movements: Extraocular movements intact.      Conjunctiva/sclera: Conjunctivae normal.      Pupils: Pupils are equal, round, and reactive to light.   Cardiovascular:      Rate and Rhythm: Normal rate and regular rhythm.      Pulses: Normal pulses.      Heart sounds: Normal heart sounds.   Pulmonary:      Effort: Pulmonary effort is normal.      Breath sounds: Normal breath sounds.   Abdominal:      General: Abdomen is flat. Bowel sounds are normal. There is no distension.      Palpations: Abdomen is soft.      Tenderness: There is no abdominal tenderness.   Musculoskeletal:         General: Normal range of motion.      Cervical back: Normal range of motion and neck supple.   Skin:     General: Skin is warm.    Neurological:      General: No focal deficit present.      Mental Status: She is alert and oriented to person, place, and time. Mental status is at baseline.      Comments: Patient is AAOx3.   Sensation intact.   Decreased strength to RLE  Normal FTN.   Normal speech.    Psychiatric:         Mood and Affect: Mood normal.         Behavior: Behavior normal.               Significant Labs: All pertinent labs within the past 24 hours have been reviewed.  CBC:   Recent Labs   Lab 07/25/25  1737 07/26/25  0458   WBC 8.38 10.27   HGB 12.8 14.0   HCT 39.0 42.1    308     CMP:   Recent Labs   Lab 07/25/25 1737 07/26/25  0458    139   K 4.1 3.9    103   CO2 29 27    92   BUN 17 13   CREATININE 0.7 0.6   CALCIUM 9.3 9.4   PROT 7.1 7.2   ALBUMIN 3.9 4.4   BILITOT 0.4 0.5   ALKPHOS 84 94   AST 20 19   ALT 17 16   ANIONGAP 6* 9       Significant Imaging: I have reviewed all pertinent imaging results/findings within the past 24 hours.      Assessment & Plan  CVA (cerebral vascular accident)  Diagnosed with acute/subacute infarct of the left frontal corona radiata on 07/21  Right lower extremity weakness, same as when patient was discharged   Has new UE tingling  Continue dual antiplatelet therapy and statin.  It was recommended for dual antiplatelet therapy for 3 months then aspirin alone during last visit   ED physician discussed with neurologist, recommended repeating MRI brain and ordering MRI C-spine. Consult neuro if new/acute findings  MRI brain: Subacute infarcts in the left ELSIE territory and the right occipital lobe.   Patient will need rehab placement  PT/OT  Mixed hyperlipidemia  Continue statin    Acquired hypothyroidism  Continue Synthroid    Tobacco use  Aware    Diffuse large B-cell lymphoma of lymph nodes of multiple regions  Hx of osseous and B cell lymphoma, s/p chemo 4 years ago     VTE Risk Mitigation (From admission, onward)           Ordered     enoxaparin injection 40 mg  Daily          07/25/25 2003     IP VTE HIGH RISK PATIENT  Once         07/25/25 2003     Place sequential compression device  Until discontinued         07/25/25 2003                    Discharge Planning   BYRON:      Code Status: Full Code   Medical Readiness for Discharge Date:   Discharge Plan A: Home with family                  Please place Justification for DME      NAV BakerC  Department of Hospital Medicine   [facility]

## 2025-07-26 NOTE — ASSESSMENT & PLAN NOTE
Diagnosed with acute/subacute infarct of the left frontal corona radiata on 07/21  Right lower extremity weakness, same as when patient was discharged   Has new UE tingling  Continue dual antiplatelet therapy and statin.  It was recommended for dual antiplatelet therapy for 3 months then aspirin alone during last visit   ED physician discussed with neurologist, recommended repeating MRI brain and ordering MRI C-spine. Consult neuro if new/acute findings  MRI brain: Subacute infarcts in the left ELSIE territory and the right occipital lobe.   Patient will need rehab placement  PT/OT

## 2025-07-26 NOTE — PLAN OF CARE
Problem: Hospitalized Older Adult  Goal: Optimal Cognitive Function  Outcome: Progressing  Goal: Effective Bowel Elimination  Outcome: Progressing  Goal: Optimal Coping  Outcome: Progressing  Goal: Fluid and Electrolyte Balance  Outcome: Progressing  Goal: Optimal Functional Ability  Outcome: Progressing  Goal: Improved Oral Intake  Outcome: Progressing  Goal: Adequate Sleep/Rest  Outcome: Progressing  Goal: Effective Urinary Elimination  Outcome: Progressing     Problem: Adult Inpatient Plan of Care  Goal: Plan of Care Review  Outcome: Progressing  Goal: Patient-Specific Goal (Individualized)  Outcome: Progressing  Goal: Absence of Hospital-Acquired Illness or Injury  Outcome: Progressing  Goal: Optimal Comfort and Wellbeing  Outcome: Progressing  Goal: Readiness for Transition of Care  Outcome: Progressing     Problem: Infection  Goal: Absence of Infection Signs and Symptoms  Outcome: Progressing     Problem: Stroke, Ischemic (Includes Transient Ischemic Attack)  Goal: Optimal Coping  Outcome: Progressing  Goal: Effective Bowel Elimination  Outcome: Progressing  Goal: Optimal Cerebral Tissue Perfusion  Outcome: Progressing  Goal: Optimal Cognitive Function  Outcome: Progressing  Goal: Improved Communication Skills  Outcome: Progressing  Goal: Optimal Functional Ability  Outcome: Progressing  Goal: Optimal Nutrition Intake  Outcome: Progressing  Goal: Effective Oxygenation and Ventilation  Outcome: Progressing  Goal: Improved Sensorimotor Function  Outcome: Progressing  Goal: Safe and Effective Swallow  Outcome: Progressing  Goal: Effective Urinary Elimination  Outcome: Progressing     Problem: Skin Injury Risk Increased  Goal: Skin Health and Integrity  Outcome: Progressing

## 2025-07-26 NOTE — PHARMACY MED REC
"Admission Medication History     The home medication history was taken by Candido Walker.    You may go to "Admission" then "Reconcile Home Medications" tabs to review and/or act upon these items.     The home medication list has been updated by the Pharmacy department.   Please read ALL comments highlighted in yellow.   Please address this information as you see fit.    Feel free to contact us if you have any questions or require assistance.        Medications listed below were obtained from: Patient/family and Analytic software- [employer]  No current facility-administered medications on file prior to encounter.     Current Outpatient Medications on File Prior to Encounter   Medication Sig Dispense Refill    amLODIPine-benazepriL (LOTREL) 5-40 mg per capsule Take 1 capsule by mouth once daily. 90 capsule 3    aspirin (ECOTRIN) 81 MG EC tablet Take 1 tablet (81 mg total) by mouth once daily. 180 tablet 0    atorvastatin (LIPITOR) 40 MG tablet Take 1 tablet (40 mg total) by mouth once daily. 90 tablet 1    clopidogreL (PLAVIX) 75 mg tablet Take 1 tablet (75 mg total) by mouth once daily. 90 tablet 0    levothyroxine (SYNTHROID) 150 MCG tablet Take 1 tablet (150 mcg total) by mouth once daily. 90 tablet 0           Candido Walker  EXT 1921                .          "

## 2025-07-26 NOTE — PLAN OF CARE
Pt was explained LOGAN. Pt verbalized understanding of LOGAN and signed. LOGAN scanned to .    07/26/25 1338   LOGAN Message   Medicare Outpatient and Observation Notification regarding financial responsibility Given to patient/caregiver;Explained to patient/caregiver;Signed/date by patient/caregiver   Date LOGAN was signed 07/26/25   Time LOGAN was signed 0036

## 2025-07-26 NOTE — PLAN OF CARE
[facility]  Initial Discharge Assessment       Primary Care Provider: Bela Lombardi NP    Admission Diagnosis: Tingling in extremities [R20.2]    Admission Date: 7/25/2025  Expected Discharge Date:      completed discharge assessment with pt and pt's boyfriend at bedside (Stuart Olszewski [phone] . Pt AAOx4s. Pt lives at home with boyfriend. Demographics, PCP, and insurance verified. No home health. No dialysis. Pt completes ADLs without assistance, but does have some DME at home if needed. Pt to discharge home via boyfriend transport. Pt has no other needs to be addressed at this time.     Transition of Care Barriers: None    Payor: MEDICARE / Plan: MEDICARE PART A & B / Product Type: Government /     Extended Emergency Contact Information  Primary Emergency Contact: Olszewski,Bill  Address: [address]           [place] [address] [facility]  Home Phone: [phone]  Mobile Phone: [phone]  Relation: Friend  Preferred language: English   needed? No  Secondary Emergency Contact: Manuel Gomez  Home Phone: [phone]  Relation: Brother    Discharge Plan A: Home with family  Discharge Plan B: Home with family      [id number] [place] - [address]  [address] [address]  Phone: [phone] Fax: [phone]      Initial Assessment (most recent)       Adult Discharge Assessment - 07/26/25 1324          Discharge Assessment    Assessment Type Discharge Planning Assessment     Confirmed/corrected address, phone number and insurance Yes     Confirmed Demographics Correct on Facesheet     Source of Information patient     People in Home significant other     Name(s) of People in Home Bill Olszewski (boyfriend) [phone]     Facility Arrived From: Home     Do you expect to return to your current living situation? Yes     Do you have help at home or someone to help you manage your care at home? Yes     Who are your  caregiver(s) and their phone number(s)? Bill Olszewski ([email or web address]) [phone]     Prior to hospitilization cognitive status: Unable to Assess     Current cognitive status: Alert/Oriented     Walking or Climbing Stairs Difficulty no     Dressing/Bathing Difficulty no     Home Accessibility wheelchair accessible     Home Layout Able to live on 1st floor     Equipment Currently Used at Home walker, rolling;blood pressure machine     Readmission within 30 days? Yes     Patient currently being followed by outpatient case management? No     Do you currently have service(s) that help you manage your care at home? No     Do you take prescription medications? Yes     Do you have prescription coverage? Yes     Do you have any problems affording any of your prescribed medications? No     Is the patient taking medications as prescribed? yes     Who is going to help you get home at discharge? Bill Olszewski (mal) [phone]     How do you get to doctors appointments? car, drives self;family or friend will provide     Are you on dialysis? No     Do you take coumadin? No   Plavix/ASA    Discharge Plan A Home with family     Discharge Plan B Home with family     DME Needed Upon Discharge  none     Discharge Plan discussed with: Spouse/sig other;Patient     Name(s) and Number(s) Bill Olszewski (mal) [phone]     Transition of Care Barriers None

## 2025-07-26 NOTE — PT/OT/SLP EVAL
Physical Therapy Evaluation    Patient Name:  Indira Chauhan   MRN:  [id number]    Recommendations:     Discharge Recommendations: Moderate Intensity Therapy   Discharge Equipment Recommendations: to be determined by next level of care   Barriers to discharge: medical status    Assessment:     Indira Chauhan is a 70 y.o. female admitted with a medical diagnosis of CVA (cerebral vascular accident).  She presents with the following impairments/functional limitations: weakness, impaired endurance, impaired functional mobility, gait instability, impaired balance, decreased lower extremity function, decreased safety awareness, pain, impaired cardiopulmonary response to activity.    Pt found supine in bed at start of session. Pt agreeable to session at this visit. Supine<>sit bed mobility Min A. Sit<>stand transfers CGA with RW. Ambulated 30' with RW and CGA. Pt with cramping in her R leg that decreased pt's ability to ambulate further. Decreased toe off and heel strike seen on the R side during gait.    Rehab Prognosis: Fair; patient would benefit from acute skilled PT services to address these deficits and reach maximum level of function.    Recent Surgery: * No surgery found *      Plan:     During this hospitalization, patient to be seen 6 x/week to address the identified rehab impairments via gait training, therapeutic activities, neuromuscular re-education, therapeutic exercises and progress toward the following goals:    Plan of Care Expires:  08/26/25    Subjective     Chief Complaint: pain in R knee  Patient/Family Comments/goals: go to SNF  Pain/Comfort:  Pain Rating 1: 5/10  Location - Side 1: Right  Location - Orientation 1: generalized  Location 1: knee  Pain Addressed 1: Reposition, Distraction    Patients cultural, spiritual, Adventism conflicts given the current situation:      Living Environment:  Pt lives with  in 2 story house with 1 flight of stairs inside home. R handrails on the steps.    Prior  to admission, patients level of function was Mod I.  Equipment used at home: walker, rolling, bedside commode, grab bar (suction grab bars).  DME owned (not currently used): none.  Upon discharge, patient will have assistance from .    Objective:     Communicated with RN, Ninfa, prior to session.  Patient found supine with bed alarm, PureWick, peripheral IV, telemetry  upon PT entry to room.    General Precautions: Standard, fall  Orthopedic Precautions:    Braces:    Respiratory Status: Room air    Exams:  RLE ROM: WFL  RLE Strength: Deficits: 4/5 generalized  LLE ROM: WFL  LLE Strength: WFL    Functional Mobility:  Bed Mobility:     Supine to Sit: minimum assistance  Transfers:     Sit to Stand:  contact guard assistance with rolling walker  Gait: 30' with RW and CGA      AM-PAC 6 CLICK MOBILITY  Total Score:18       Treatment & Education:  Spoke with pt about sitting up in chair to help improve blood flow for healing and decrease pain. Spoke with pt about using call button to notify RN.    Patient left up in chair with all lines intact, call button in reach, chair alarm on, RN notified, and  present.    GOALS:   Multidisciplinary Problems       Physical Therapy Goals          Problem: Physical Therapy    Goal Priority Disciplines Outcome Interventions   Physical Therapy Goal     PT, PT/OT     Description: Goals to be met by: 2025     Patient will increase functional independence with mobility by performin. Supine to sit with Contact Guard Assistance  2. Sit to supine with Contact Guard Assistance  3. Sit to stand transfer with Supervision  4. Gait  x 150 feet with Contact Guard Assistance using Rolling Walker.                          DME Justifications:  No DME recommended requiring DME justifications    History:     Past Medical History:   Diagnosis Date    Cancer     Diffuse large B cell lymphoma     GERD (gastroesophageal reflux disease)     Hyperlipidemia     Hypertension      Hypothyroidism        Past Surgical History:   Procedure Laterality Date    PORTACATH PLACEMENT Right 03/2018       Time Tracking:     PT Received On: 07/26/25  PT Start Time: 1218     PT Stop Time: 1242  PT Total Time (min): 24 min     Billable Minutes: Evaluation 8, Gait Training 8, and Therapeutic Activity 8      07/26/2025

## 2025-07-27 LAB
ABSOLUTE EOSINOPHIL (SMH): 0.48 K/UL
ABSOLUTE MONOCYTE (SMH): 0.79 K/UL (ref 0.3–1)
ABSOLUTE NEUTROPHIL COUNT (SMH): 5.5 K/UL (ref 1.8–7.7)
ALBUMIN SERPL-MCNC: 4 G/DL (ref 3.5–5.2)
ALP SERPL-CCNC: 86 UNIT/L (ref 55–135)
ALT SERPL-CCNC: 16 UNIT/L (ref 10–44)
ANION GAP (SMH): 7 MMOL/L (ref 8–16)
AST SERPL-CCNC: 14 UNIT/L (ref 10–40)
BASOPHILS # BLD AUTO: 0.04 K/UL
BASOPHILS NFR BLD AUTO: 0.5 %
BILIRUB SERPL-MCNC: 0.5 MG/DL (ref 0.1–1)
BUN SERPL-MCNC: 13 MG/DL (ref 8–23)
CALCIUM SERPL-MCNC: 9 MG/DL (ref 8.7–10.5)
CHLORIDE SERPL-SCNC: 102 MMOL/L (ref 95–110)
CO2 SERPL-SCNC: 31 MMOL/L (ref 23–29)
CREAT SERPL-MCNC: 0.7 MG/DL (ref 0.5–1.4)
ERYTHROCYTE [DISTWIDTH] IN BLOOD BY AUTOMATED COUNT: 11.8 % (ref 11.5–14.5)
GFR SERPLBLD CREATININE-BSD FMLA CKD-EPI: >60 ML/MIN/1.73/M2
GLUCOSE SERPL-MCNC: 124 MG/DL (ref 70–110)
HCT VFR BLD AUTO: 37.9 % (ref 37–48.5)
HGB BLD-MCNC: 13 GM/DL (ref 12–16)
IMM GRANULOCYTES # BLD AUTO: 0.03 K/UL (ref 0–0.04)
IMM GRANULOCYTES NFR BLD AUTO: 0.4 % (ref 0–0.5)
LYMPHOCYTES # BLD AUTO: 1.4 K/UL (ref 1–4.8)
MCH RBC QN AUTO: 32.7 PG (ref 27–31)
MCHC RBC AUTO-ENTMCNC: 34.3 G/DL (ref 32–36)
MCV RBC AUTO: 96 FL (ref 82–98)
NUCLEATED RBC (/100WBC) (SMH): 0 /100 WBC
PLATELET # BLD AUTO: 288 K/UL (ref 150–450)
PMV BLD AUTO: 9.5 FL (ref 9.2–12.9)
POTASSIUM SERPL-SCNC: 3.3 MMOL/L (ref 3.5–5.1)
PROT SERPL-MCNC: 6.7 GM/DL (ref 6–8.4)
RBC # BLD AUTO: 3.97 M/UL (ref 4–5.4)
RELATIVE EOSINOPHIL (SMH): 5.8 % (ref 0–8)
RELATIVE LYMPHOCYTE (SMH): 17.1 % (ref 18–48)
RELATIVE MONOCYTE (SMH): 9.6 % (ref 4–15)
RELATIVE NEUTROPHIL (SMH): 66.6 % (ref 38–73)
SODIUM SERPL-SCNC: 140 MMOL/L (ref 136–145)
WBC # BLD AUTO: 8.21 K/UL (ref 3.9–12.7)

## 2025-07-27 PROCEDURE — 80053 COMPREHEN METABOLIC PANEL: CPT | Performed by: STUDENT IN AN ORGANIZED HEALTH CARE EDUCATION/TRAINING PROGRAM

## 2025-07-27 PROCEDURE — 25000003 PHARM REV CODE 250

## 2025-07-27 PROCEDURE — 25000003 PHARM REV CODE 250: Performed by: STUDENT IN AN ORGANIZED HEALTH CARE EDUCATION/TRAINING PROGRAM

## 2025-07-27 PROCEDURE — 86580 TB INTRADERMAL TEST: CPT

## 2025-07-27 PROCEDURE — 63600175 PHARM REV CODE 636 W HCPCS

## 2025-07-27 PROCEDURE — 36415 COLL VENOUS BLD VENIPUNCTURE: CPT | Performed by: STUDENT IN AN ORGANIZED HEALTH CARE EDUCATION/TRAINING PROGRAM

## 2025-07-27 PROCEDURE — 21400001 HC TELEMETRY ROOM

## 2025-07-27 PROCEDURE — 94761 N-INVAS EAR/PLS OXIMETRY MLT: CPT

## 2025-07-27 PROCEDURE — 99900035 HC TECH TIME PER 15 MIN (STAT)

## 2025-07-27 PROCEDURE — 30200315 PPD INTRADERMAL TEST REV CODE 302

## 2025-07-27 PROCEDURE — 85025 COMPLETE CBC W/AUTO DIFF WBC: CPT | Performed by: STUDENT IN AN ORGANIZED HEALTH CARE EDUCATION/TRAINING PROGRAM

## 2025-07-27 PROCEDURE — 99900031 HC PATIENT EDUCATION (STAT)

## 2025-07-27 PROCEDURE — 25000003 PHARM REV CODE 250: Performed by: INTERNAL MEDICINE

## 2025-07-27 RX ADMIN — FAMOTIDINE 20 MG: 10 INJECTION, SOLUTION INTRAVENOUS at 08:07

## 2025-07-27 RX ADMIN — LEVOTHYROXINE SODIUM 150 MCG: 0.03 TABLET ORAL at 05:07

## 2025-07-27 RX ADMIN — AMLODIPINE BESYLATE 5 MG: 5 TABLET ORAL at 10:07

## 2025-07-27 RX ADMIN — FAMOTIDINE 20 MG: 10 INJECTION, SOLUTION INTRAVENOUS at 10:07

## 2025-07-27 RX ADMIN — CLOPIDOGREL 75 MG: 75 TABLET ORAL at 10:07

## 2025-07-27 RX ADMIN — Medication 6 MG: at 11:07

## 2025-07-27 RX ADMIN — ACETAMINOPHEN 650 MG: 325 TABLET ORAL at 11:07

## 2025-07-27 RX ADMIN — ATORVASTATIN CALCIUM 40 MG: 40 TABLET, FILM COATED ORAL at 10:07

## 2025-07-27 RX ADMIN — ASPIRIN 81 MG: 81 TABLET ORAL at 10:07

## 2025-07-27 RX ADMIN — TUBERCULIN PURIFIED PROTEIN DERIVATIVE 5 UNITS: 5 INJECTION, SOLUTION INTRADERMAL at 02:07

## 2025-07-27 NOTE — PLAN OF CARE
Met with patient to review discharge recommendation of SNF and she is agreeable to plan    Patient/family provided list of facilities in-network with patient's payor plan. Providers that are owned, operated, or affiliated with [facility] are included on the list.     Notified that referral sent to below listed facilities from in-network list based on proximity to home/family support:     [facility]  2.   [facility]  3.   [facility]     Patient/family instructed to identify preference.    Patient has declined to select a preferred provider and elects placement with the first accepting in network provider that is available to provide services as ordered by the physician     If an additional preferred facility not listed above is identified, additional referral to be sent. If above facilities unable to accept, will send additional referrals to in-network providers.      PPD ordered     07/27/25 1138   Post-Acute Status   Post-Acute Authorization Placement   Post-Acute Placement Status Referrals Sent   Hospital Resources/Appts/Education Provided Provided patient/caregiver with written discharge plan information   Discharge Delays None known at this time   Discharge Plan   Discharge Plan A Skilled Nursing Facility   Discharge Plan B Skilled Nursing Facility

## 2025-07-27 NOTE — CARE UPDATE
07/27/25 0715   PRE-TX-O2   Device (Oxygen Therapy) room air   SpO2 98 %   Pulse Oximetry Type Intermittent   $ Pulse Oximetry - Multiple Charge Pulse Oximetry - Multiple   Pulse 92   Resp 15

## 2025-07-27 NOTE — SUBJECTIVE & OBJECTIVE
Interval History:  Patient seen and examined.  Overnight notes reviewed.  Patient's  was at bedside.  Patient reports continued weakness to right lower extremity and arthralgia to right knee.  X-ray right knee ordered. PT/OT following and recommending moderate intensive therapy.  Case management consulted for discharge planning.  Noted to have low potassium on a.m. labs and PRN lytes ordered.    Review of Systems   Respiratory:  Negative for shortness of breath.    Cardiovascular:  Negative for chest pain.   Gastrointestinal:  Negative for abdominal pain and nausea.   Musculoskeletal:  Positive for arthralgias.   Neurological:  Positive for weakness.     Objective:     Vital Signs (Most Recent):  Temp: 97.8 °F (36.6 °C) (07/27/25 1122)  Pulse: 98 (07/27/25 1122)  Resp: 18 (07/27/25 1122)  BP: 138/83 (07/27/25 1122)  SpO2: (!) 94 % (07/27/25 1122) Vital Signs (24h Range):  Temp:  [97.4 °F (36.3 °C)-98.6 °F (37 °C)] 97.8 °F (36.6 °C)  Pulse:  [] 98  Resp:  [15-18] 18  SpO2:  [94 %-100 %] 94 %  BP: (120-161)/(62-83) 138/83     Weight: 102.1 kg (225 lb)  Body mass index is 38.62 kg/m².    Intake/Output Summary (Last 24 hours) at 7/27/2025 1132  Last data filed at 7/27/2025 0941  Gross per 24 hour   Intake 240 ml   Output 400 ml   Net -160 ml         Physical Exam  Vitals and nursing note reviewed.   Constitutional:       General: She is not in acute distress.     Appearance: Normal appearance. She is normal weight.   HENT:      Head: Normocephalic and atraumatic.      Mouth/Throat:      Mouth: Mucous membranes are moist.   Eyes:      Extraocular Movements: Extraocular movements intact.      Conjunctiva/sclera: Conjunctivae normal.      Pupils: Pupils are equal, round, and reactive to light.   Cardiovascular:      Rate and Rhythm: Normal rate and regular rhythm.      Pulses: Normal pulses.      Heart sounds: Normal heart sounds.   Pulmonary:      Effort: Pulmonary effort is normal.      Breath sounds: Normal  breath sounds.   Abdominal:      General: Abdomen is flat. Bowel sounds are normal. There is no distension.      Palpations: Abdomen is soft.      Tenderness: There is no abdominal tenderness.   Musculoskeletal:         General: Normal range of motion.      Cervical back: Normal range of motion and neck supple.      Comments: No bony tenderness to right knee.   Skin:     General: Skin is warm.   Neurological:      General: No focal deficit present.      Mental Status: She is alert and oriented to person, place, and time. Mental status is at baseline.      Comments: Patient is AAOx3.   Sensation intact.   Decreased strength to RLE  Normal FTN.   Normal speech.    Psychiatric:         Mood and Affect: Mood normal.         Behavior: Behavior normal.               Significant Labs: All pertinent labs within the past 24 hours have been reviewed.  CBC:   Recent Labs   Lab 07/25/25  1737 07/26/25  0458 07/27/25  0512   WBC 8.38 10.27 8.21   HGB 12.8 14.0 13.0   HCT 39.0 42.1 37.9    308 288     CMP:   Recent Labs   Lab 07/25/25  1737 07/26/25  0458 07/27/25  0512    139 140   K 4.1 3.9 3.3*    103 102   CO2 29 27 31*    92 124*   BUN 17 13 13   CREATININE 0.7 0.6 0.7   CALCIUM 9.3 9.4 9.0   PROT 7.1 7.2 6.7   ALBUMIN 3.9 4.4 4.0   BILITOT 0.4 0.5 0.5   ALKPHOS 84 94 86   AST 20 19 14   ALT 17 16 16   ANIONGAP 6* 9 7*       Significant Imaging: I have reviewed all pertinent imaging results/findings within the past 24 hours.    X-ray right knee:   No acute fracture or dislocation.

## 2025-07-27 NOTE — PLAN OF CARE
Problem: Hospitalized Older Adult  Goal: Optimal Cognitive Function  Outcome: Progressing  Goal: Optimal Functional Ability  Outcome: Progressing     Problem: Adult Inpatient Plan of Care  Goal: Plan of Care Review  Outcome: Progressing

## 2025-07-27 NOTE — PROGRESS NOTES
[facility]  Progress Note    Patient Name: Indira Chauhan  MRN: [id number]  Patient Class: IP- Inpatient   Admission Date: 7/25/2025  Length of Stay: 1 days  Attending Physician: Ana Taylor MD  Primary Care Provider: Bela Lombardi NP        Subjective     Principal Problem:CVA (cerebral vascular accident)        HPI:  70-year-old female pMHx diffuse B-cell lymphoma, GERD, HTN, HLD, hypothyroidism, stroke, PVD presented for bilateral upper extremity numbness and tingling and right lower extremity weakness.  Patient was recently discharged 2 days ago from hospital after treatment for left frontal CVA.  At the time it was recommended for inpatient rehab, patient was denied by [facility] rehab.  Patient elected to go home on home health care rather than skilled nursing facility.  Patient reports no change in right lower extremity weakness, had a fall earlier today and was not able to get up off the floor.  Stated after fall she developed bilateral upper extremity numbness and tingling in her hands.  Denies any loss of consciousness, chest pain, shortness for breath, nausea, vomiting, diarrhea.  CTA head and neck and CT head showed redemonstration of the occlusion of the left A2 branch without additional evidence of large vessel occlusion or significant stenosis.  Discussed with patient, she is agreeable to skilled nursing facility placement.  Reports no worsening weakness in the right lower extremity says that it is about the same.  ED physician discussed with on-call neurologist recommended MRI brain and MRI C-spine.  Patient reports compliance with medication.    Overview/Hospital Course:  No notes on file    Interval History:  Patient seen and examined.  Overnight notes reviewed.  Patient's  was at bedside.  Patient reports continued weakness to right lower extremity and arthralgia to right knee.  X-ray right knee ordered. PT/OT following and recommending moderate  intensive therapy.  Case management consulted for discharge planning.  Noted to have low potassium on a.m. labs and PRN lytes ordered.    Review of Systems   Respiratory:  Negative for shortness of breath.    Cardiovascular:  Negative for chest pain.   Gastrointestinal:  Negative for abdominal pain and nausea.   Musculoskeletal:  Positive for arthralgias.   Neurological:  Positive for weakness.     Objective:     Vital Signs (Most Recent):  Temp: 97.8 °F (36.6 °C) (07/27/25 1122)  Pulse: 98 (07/27/25 1122)  Resp: 18 (07/27/25 1122)  BP: 138/83 (07/27/25 1122)  SpO2: (!) 94 % (07/27/25 1122) Vital Signs (24h Range):  Temp:  [97.4 °F (36.3 °C)-98.6 °F (37 °C)] 97.8 °F (36.6 °C)  Pulse:  [] 98  Resp:  [15-18] 18  SpO2:  [94 %-100 %] 94 %  BP: (120-161)/(62-83) 138/83     Weight: 102.1 kg (225 lb)  Body mass index is 38.62 kg/m².    Intake/Output Summary (Last 24 hours) at 7/27/2025 1132  Last data filed at 7/27/2025 0941  Gross per 24 hour   Intake 240 ml   Output 400 ml   Net -160 ml         Physical Exam  Vitals and nursing note reviewed.   Constitutional:       General: She is not in acute distress.     Appearance: Normal appearance. She is normal weight.   HENT:      Head: Normocephalic and atraumatic.      Mouth/Throat:      Mouth: Mucous membranes are moist.   Eyes:      Extraocular Movements: Extraocular movements intact.      Conjunctiva/sclera: Conjunctivae normal.      Pupils: Pupils are equal, round, and reactive to light.   Cardiovascular:      Rate and Rhythm: Normal rate and regular rhythm.      Pulses: Normal pulses.      Heart sounds: Normal heart sounds.   Pulmonary:      Effort: Pulmonary effort is normal.      Breath sounds: Normal breath sounds.   Abdominal:      General: Abdomen is flat. Bowel sounds are normal. There is no distension.      Palpations: Abdomen is soft.      Tenderness: There is no abdominal tenderness.   Musculoskeletal:         General: Normal range of motion.      Cervical  back: Normal range of motion and neck supple.      Comments: No bony tenderness to right knee.   Skin:     General: Skin is warm.   Neurological:      General: No focal deficit present.      Mental Status: She is alert and oriented to person, place, and time. Mental status is at baseline.      Comments: Patient is AAOx3.   Sensation intact.   Decreased strength to RLE  Normal FTN.   Normal speech.    Psychiatric:         Mood and Affect: Mood normal.         Behavior: Behavior normal.               Significant Labs: All pertinent labs within the past 24 hours have been reviewed.  CBC:   Recent Labs   Lab 07/25/25 1737 07/26/25 0458 07/27/25  0512   WBC 8.38 10.27 8.21   HGB 12.8 14.0 13.0   HCT 39.0 42.1 37.9    308 288     CMP:   Recent Labs   Lab 07/25/25 1737 07/26/25 0458 07/27/25  0512    139 140   K 4.1 3.9 3.3*    103 102   CO2 29 27 31*    92 124*   BUN 17 13 13   CREATININE 0.7 0.6 0.7   CALCIUM 9.3 9.4 9.0   PROT 7.1 7.2 6.7   ALBUMIN 3.9 4.4 4.0   BILITOT 0.4 0.5 0.5   ALKPHOS 84 94 86   AST 20 19 14   ALT 17 16 16   ANIONGAP 6* 9 7*       Significant Imaging: I have reviewed all pertinent imaging results/findings within the past 24 hours.    X-ray right knee:   No acute fracture or dislocation.       Assessment & Plan  CVA (cerebral vascular accident)  Diagnosed with acute/subacute infarct of the left frontal corona radiata on 07/21  Right lower extremity weakness, same as when patient was discharged   Has new UE tingling  Continue dual antiplatelet therapy and statin.  It was recommended for dual antiplatelet therapy for 3 months then aspirin alone during last visit   ED physician discussed with neurologist, recommended repeating MRI brain and ordering MRI C-spine. Consult neuro if new/acute findings  MRI brain: Subacute infarcts in the left ELSIE territory and the right occipital lobe.   Patient will need rehab placement  PT/OT consulted and recommended monitoring Ancef  therapy; case management consulted for discharge planning  Patient agreeable to placement  Mixed hyperlipidemia  Continue statin    Acquired hypothyroidism  Continue Synthroid    Tobacco use  Aware    Diffuse large B-cell lymphoma of lymph nodes of multiple regions  Hx of osseous and B cell lymphoma, s/p chemo 4 years ago     VTE Risk Mitigation (From admission, onward)           Ordered     enoxaparin injection 40 mg  Daily         07/25/25 2003     IP VTE HIGH RISK PATIENT  Once         07/25/25 2003     Place sequential compression device  Until discontinued         07/25/25 2003                    Discharge Planning   BYRON: 7/27/2025     Code Status: Full Code   Medical Readiness for Discharge Date:   Discharge Plan A: Home with family                  Please place Justification for DME      NAV BakerC  Department of Hospital Medicine   [facility]

## 2025-07-27 NOTE — PLAN OF CARE
Problem: Hospitalized Older Adult  Goal: Optimal Cognitive Function  7/26/2025 2153 by Debbie Pena RN  Outcome: Progressing  7/26/2025 2153 by Debbie Pena RN  Outcome: Progressing  Goal: Optimal Coping  Outcome: Progressing  Goal: Optimal Functional Ability  7/26/2025 2153 by Debbie Pena RN  Outcome: Progressing  7/26/2025 2153 by Debbie Pena RN  Outcome: Progressing     Problem: Adult Inpatient Plan of Care  Goal: Plan of Care Review  Outcome: Progressing     Problem: Stroke, Ischemic (Includes Transient Ischemic Attack)  Goal: Optimal Coping  Outcome: Progressing  Goal: Effective Bowel Elimination  Outcome: Progressing  Goal: Optimal Cerebral Tissue Perfusion  Outcome: Progressing

## 2025-07-27 NOTE — ASSESSMENT & PLAN NOTE
Diagnosed with acute/subacute infarct of the left frontal corona radiata on 07/21  Right lower extremity weakness, same as when patient was discharged   Has new UE tingling  Continue dual antiplatelet therapy and statin.  It was recommended for dual antiplatelet therapy for 3 months then aspirin alone during last visit   ED physician discussed with neurologist, recommended repeating MRI brain and ordering MRI C-spine. Consult neuro if new/acute findings  MRI brain: Subacute infarcts in the left ELSIE territory and the right occipital lobe.   Patient will need rehab placement  PT/OT consulted and recommended monitoring Ancef therapy; case management consulted for discharge planning  Patient agreeable to placement

## 2025-07-27 NOTE — HOSPITAL COURSE
Patient is admitted to the hospital with right lower extremity weakness.  Patient is monitored closely throughout hospital stay.  Patient had recent hospitalization for stroke where rehab placement was recommended but patient declined.  MRI brain obtained and redemonstrated prior stroke.  PT/OT were consulted and recommended moderate intensive therapy.  Case management was consulted for discharge planning.  Patient was accepted to [facility] and authorization was obtained.  Patient was seen on day of discharge.  Patient deemed appropriate for discharge.  Patient to continue DAPT with aspirin and Plavix for 90 days followed by aspirin monotherapy thereafter.  To continue Lipitor.  Patient to follow up with Neurology and PCP.  Patient discharged to [facility].

## 2025-07-27 NOTE — PLAN OF CARE
Problem: Hospitalized Older Adult  Goal: Optimal Cognitive Function  Outcome: Ongoing  Goal: Effective Bowel Elimination  Outcome: Ongoing  Goal: Optimal Coping  Outcome: Ongoing  Goal: Fluid and Electrolyte Balance  Outcome: Ongoing  Goal: Optimal Functional Ability  Outcome: Ongoing  Goal: Improved Oral Intake  Outcome: Ongoing  Goal: Adequate Sleep/Rest  Outcome: Ongoing  Goal: Effective Urinary Elimination  Outcome: Ongoing     Problem: Adult Inpatient Plan of Care  Goal: Plan of Care Review  Outcome: Ongoing  Goal: Patient-Specific Goal (Individualized)  Outcome: Ongoing  Goal: Absence of Hospital-Acquired Illness or Injury  Outcome: Ongoing  Goal: Optimal Comfort and Wellbeing  Outcome: Ongoing  Goal: Readiness for Transition of Care  Outcome: Ongoing     Problem: Infection  Goal: Absence of Infection Signs and Symptoms  Outcome: Ongoing     Problem: Stroke, Ischemic (Includes Transient Ischemic Attack)  Goal: Optimal Coping  Outcome: Ongoing  Goal: Effective Bowel Elimination  Outcome: Ongoing  Goal: Optimal Cerebral Tissue Perfusion  Outcome: Ongoing  Goal: Optimal Cognitive Function  Outcome: Ongoing  Goal: Improved Communication Skills  Outcome: Ongoing  Goal: Optimal Functional Ability  Outcome: Ongoing  Goal: Optimal Nutrition Intake  Outcome: Ongoing  Goal: Effective Oxygenation and Ventilation  Outcome: Ongoing  Goal: Improved Sensorimotor Function  Outcome: Ongoing  Goal: Safe and Effective Swallow  Outcome: Ongoing  Goal: Effective Urinary Elimination  Outcome: Ongoing     Problem: Wound  Goal: Optimal Coping  Outcome: Ongoing  Goal: Optimal Functional Ability  Outcome: Ongoing  Goal: Absence of Infection Signs and Symptoms  Outcome: Ongoing  Goal: Improved Oral Intake  Outcome: Ongoing  Goal: Optimal Pain Control and Function  Outcome: Ongoing  Goal: Skin Health and Integrity  Outcome: Ongoing  Goal: Optimal Wound Healing  Outcome: Ongoing

## 2025-07-28 LAB
ABSOLUTE EOSINOPHIL (SMH): 0.52 K/UL
ABSOLUTE MONOCYTE (SMH): 0.58 K/UL (ref 0.3–1)
ABSOLUTE NEUTROPHIL COUNT (SMH): 5.1 K/UL (ref 1.8–7.7)
ANION GAP (SMH): 7 MMOL/L (ref 8–16)
BASOPHILS # BLD AUTO: 0.06 K/UL
BASOPHILS NFR BLD AUTO: 0.8 %
BUN SERPL-MCNC: 15 MG/DL (ref 8–23)
CALCIUM SERPL-MCNC: 9.6 MG/DL (ref 8.7–10.5)
CHLORIDE SERPL-SCNC: 101 MMOL/L (ref 95–110)
CO2 SERPL-SCNC: 31 MMOL/L (ref 23–29)
CREAT SERPL-MCNC: 0.6 MG/DL (ref 0.5–1.4)
ERYTHROCYTE [DISTWIDTH] IN BLOOD BY AUTOMATED COUNT: 11.8 % (ref 11.5–14.5)
GFR SERPLBLD CREATININE-BSD FMLA CKD-EPI: >60 ML/MIN/1.73/M2
GLUCOSE SERPL-MCNC: 147 MG/DL (ref 70–110)
HCT VFR BLD AUTO: 42.6 % (ref 37–48.5)
HGB BLD-MCNC: 14 GM/DL (ref 12–16)
IMM GRANULOCYTES # BLD AUTO: 0.04 K/UL (ref 0–0.04)
IMM GRANULOCYTES NFR BLD AUTO: 0.5 % (ref 0–0.5)
LYMPHOCYTES # BLD AUTO: 1.42 K/UL (ref 1–4.8)
MCH RBC QN AUTO: 32.3 PG (ref 27–31)
MCHC RBC AUTO-ENTMCNC: 32.9 G/DL (ref 32–36)
MCV RBC AUTO: 98 FL (ref 82–98)
NUCLEATED RBC (/100WBC) (SMH): 0 /100 WBC
PLATELET # BLD AUTO: 347 K/UL (ref 150–450)
PMV BLD AUTO: 9.8 FL (ref 9.2–12.9)
POTASSIUM SERPL-SCNC: 3.8 MMOL/L (ref 3.5–5.1)
RBC # BLD AUTO: 4.33 M/UL (ref 4–5.4)
RELATIVE EOSINOPHIL (SMH): 6.7 % (ref 0–8)
RELATIVE LYMPHOCYTE (SMH): 18.4 % (ref 18–48)
RELATIVE MONOCYTE (SMH): 7.5 % (ref 4–15)
RELATIVE NEUTROPHIL (SMH): 66.1 % (ref 38–73)
SODIUM SERPL-SCNC: 139 MMOL/L (ref 136–145)
WBC # BLD AUTO: 7.71 K/UL (ref 3.9–12.7)

## 2025-07-28 PROCEDURE — 25000003 PHARM REV CODE 250

## 2025-07-28 PROCEDURE — 97530 THERAPEUTIC ACTIVITIES: CPT | Mod: CQ

## 2025-07-28 PROCEDURE — 25000003 PHARM REV CODE 250: Performed by: INTERNAL MEDICINE

## 2025-07-28 PROCEDURE — 94760 N-INVAS EAR/PLS OXIMETRY 1: CPT

## 2025-07-28 PROCEDURE — 25000003 PHARM REV CODE 250: Performed by: STUDENT IN AN ORGANIZED HEALTH CARE EDUCATION/TRAINING PROGRAM

## 2025-07-28 PROCEDURE — 99900031 HC PATIENT EDUCATION (STAT)

## 2025-07-28 PROCEDURE — 63600175 PHARM REV CODE 636 W HCPCS

## 2025-07-28 PROCEDURE — 84520 ASSAY OF UREA NITROGEN: CPT

## 2025-07-28 PROCEDURE — 97535 SELF CARE MNGMENT TRAINING: CPT

## 2025-07-28 PROCEDURE — 36415 COLL VENOUS BLD VENIPUNCTURE: CPT

## 2025-07-28 PROCEDURE — 21400001 HC TELEMETRY ROOM

## 2025-07-28 PROCEDURE — 85025 COMPLETE CBC W/AUTO DIFF WBC: CPT

## 2025-07-28 RX ORDER — FAMOTIDINE 20 MG/1
20 TABLET, FILM COATED ORAL 2 TIMES DAILY
Status: DISCONTINUED | OUTPATIENT
Start: 2025-07-28 | End: 2025-07-29 | Stop reason: HOSPADM

## 2025-07-28 RX ADMIN — AMLODIPINE BESYLATE 5 MG: 5 TABLET ORAL at 08:07

## 2025-07-28 RX ADMIN — Medication 6 MG: at 09:07

## 2025-07-28 RX ADMIN — CLOPIDOGREL 75 MG: 75 TABLET ORAL at 08:07

## 2025-07-28 RX ADMIN — ASPIRIN 81 MG: 81 TABLET ORAL at 08:07

## 2025-07-28 RX ADMIN — FAMOTIDINE 20 MG: 10 INJECTION, SOLUTION INTRAVENOUS at 08:07

## 2025-07-28 RX ADMIN — ATORVASTATIN CALCIUM 40 MG: 40 TABLET, FILM COATED ORAL at 08:07

## 2025-07-28 RX ADMIN — FAMOTIDINE 20 MG: 20 TABLET, FILM COATED ORAL at 08:07

## 2025-07-28 RX ADMIN — LEVOTHYROXINE SODIUM 150 MCG: 0.03 TABLET ORAL at 05:07

## 2025-07-28 NOTE — RESPIRATORY THERAPY
07/27/25 1913   Patient Assessment/Suction   Level of Consciousness (AVPU) alert   Respiratory Effort Normal;Unlabored   Expansion/Accessory Muscles/Retractions no retractions;expansion symmetric;no use of accessory muscles   Rhythm/Pattern, Respiratory no shortness of breath reported;depth regular;pattern regular;unlabored   Cough Frequency no cough   PRE-TX-O2   Device (Oxygen Therapy) room air   SpO2 98 %   Pulse Oximetry Type Intermittent   $ Pulse Oximetry - Multiple Charge Pulse Oximetry - Multiple   Pulse 78   Resp 18   Education   $ Education 15 min  (p.ox)   Respiratory Evaluation   $ Care Plan Tech Time 15 min

## 2025-07-28 NOTE — PROGRESS NOTES
Pharmacist Intervention IV to PO Note    Indira Chauhan is a 70 y.o. female being treated with IV medication famotidine    Patient Data:    Vital Signs (Most Recent):  Temp: 97.7 °F (36.5 °C) (07/28/25 0745)  Pulse: 71 (07/28/25 0745)  Resp: 18 (07/28/25 0745)  BP: (!) 128/53 (07/28/25 0745)  SpO2: 97 % (07/28/25 0745) Vital Signs (72h Range):  Temp:  [97.4 °F (36.3 °C)-98.9 °F (37.2 °C)]   Pulse:  []   Resp:  [15-20]   BP: (120-173)/()   SpO2:  [94 %-100 %]      CBC:  Recent Labs   Lab 07/25/25 1737 07/26/25 0458 07/27/25  0512   WBC 8.38 10.27 8.21   RBC 3.93* 4.23 3.97*   HGB 12.8 14.0 13.0   HCT 39.0 42.1 37.9    308 288   MCV 99* 100* 96   MCH 32.6* 33.1* 32.7*   MCHC 32.8 33.3 34.3     CMP:     Recent Labs   Lab 07/25/25 1737 07/26/25 0458 07/27/25  0512    92 124*   CALCIUM 9.3 9.4 9.0   ALBUMIN 3.9 4.4 4.0   PROT 7.1 7.2 6.7    139 140   K 4.1 3.9 3.3*   CO2 29 27 31*    103 102   BUN 17 13 13   CREATININE 0.7 0.6 0.7   ALKPHOS 84 94 86   ALT 17 16 16   AST 20 19 14   BILITOT 0.4 0.5 0.5       Dietary Orders:  Diet Orders            Diet Heart Healthy: Heart Healthy starting at 07/25 2005            Based on the following criteria, this patient qualifies for intravenous to oral conversion:  [x] The patients gastrointestinal tract is functioning (tolerating medications via oral or enteral route for 24 hours and tolerating food or enteral feeds for 24 hours).  [x] The patient is hemodynamically stable for 24 hours (heart rate <100 beats per minute, systolic blood pressure >99 mm Hg, and respiratory rate <20 breaths per minute).  [x] The patient shows clinical improvement (afebrile for at least 24 hours and white blood cell count downtrending or normalized). Additionally, the patient must be non-neutropenic (absolute neutrophil count >500 cells/mm3).  [x] For antimicrobials, the patient has received IV therapy for at least 24 hours.    Famotidine 20 mg BID IV will be  changed to Famotidine 20 mg BID PO    Pharmacist's Name: Carlo Cueva  Pharmacist's Extension: [phone]

## 2025-07-28 NOTE — PLAN OF CARE
Pt has been medically accepted by [facility]. PAKO completed Locet and PASRR.      07/28/25 1136   Post-Acute Status   Post-Acute Authorization Placement   Post-Acute Placement Status Referrals Sent   Discharge Plan   Discharge Plan A Skilled Nursing Facility     1415- The Form 142 for Indira Chauhan, SSN #  has been approved with the effective dates 7/28/2025 to 11/5/2025. The admitting facility can retrieve a copy of the Form 142 in Assessment [employer] for their records once it becomes available.  PAKO notified  dwayne Foreman of the following message via Epic.     8745- PAKO met pt at bedside to discuss SNF placement @ [facility]. Both pt and pt's boyfriend were happy [facility]. PAKO will cont to follow for any further d/c needs.

## 2025-07-28 NOTE — PROGRESS NOTES
[facility]  Progress Note    Patient Name: Indira Chauhan  MRN: [id number]  Patient Class: IP- Inpatient   Admission Date: 7/25/2025  Length of Stay: 2 days  Attending Physician: Ana Taylor MD  Primary Care Provider: Bela Lombardi NP        Subjective     Principal Problem:CVA (cerebral vascular accident)        HPI:  70-year-old female pMHx diffuse B-cell lymphoma, GERD, HTN, HLD, hypothyroidism, stroke, PVD presented for bilateral upper extremity numbness and tingling and right lower extremity weakness.  Patient was recently discharged 2 days ago from hospital after treatment for left frontal CVA.  At the time it was recommended for inpatient rehab, patient was denied by [facility] rehab.  Patient elected to go home on home health care rather than skilled nursing facility.  Patient reports no change in right lower extremity weakness, had a fall earlier today and was not able to get up off the floor.  Stated after fall she developed bilateral upper extremity numbness and tingling in her hands.  Denies any loss of consciousness, chest pain, shortness for breath, nausea, vomiting, diarrhea.  CTA head and neck and CT head showed redemonstration of the occlusion of the left A2 branch without additional evidence of large vessel occlusion or significant stenosis.  Discussed with patient, she is agreeable to skilled nursing facility placement.  Reports no worsening weakness in the right lower extremity says that it is about the same.  ED physician discussed with on-call neurologist recommended MRI brain and MRI C-spine.  Patient reports compliance with medication.    Overview/Hospital Course:  Patient is admitted to the hospital with right lower extremity weakness.  Patient is monitored closely throughout hospital stay.  Patient had recent hospitalization for stroke where rehab placement was recommended but patient declined.  MRI brain obtained and redemonstrated prior stroke.   PT/OT were consulted and recommended moderate intensive therapy.  Case management was consulted for discharge planning.    Interval History:  Patient seen and examined.  Overnight notes reviewed.  Patient's  was at bedside.  Potassium improved on a.m. labs.  Patient reports continued fatigue and weakness to right lower extremity. PT/OT following and recommending moderate intensive therapy.  Case management consulted for discharge planning.     Review of Systems   Constitutional:  Positive for fatigue.   Respiratory:  Negative for shortness of breath.    Cardiovascular:  Negative for chest pain.   Gastrointestinal:  Negative for abdominal pain and nausea.   Neurological:  Positive for weakness.     Objective:     Vital Signs (Most Recent):  Temp: 97.5 °F (36.4 °C) (07/28/25 1124)  Pulse: 84 (07/28/25 1124)  Resp: 18 (07/28/25 1124)  BP: (!) 147/69 (07/28/25 1124)  SpO2: 99 % (07/28/25 1124) Vital Signs (24h Range):  Temp:  [97.4 °F (36.3 °C)-97.8 °F (36.6 °C)] 97.5 °F (36.4 °C)  Pulse:  [63-84] 84  Resp:  [18] 18  SpO2:  [95 %-100 %] 99 %  BP: (123-154)/(53-82) 147/69     Weight: 102.1 kg (225 lb)  Body mass index is 38.62 kg/m².    Intake/Output Summary (Last 24 hours) at 7/28/2025 8337  Last data filed at 7/28/2025 0900  Gross per 24 hour   Intake 150 ml   Output --   Net 150 ml         Physical Exam  Vitals and nursing note reviewed.   Constitutional:       General: She is not in acute distress.     Appearance: Normal appearance. She is normal weight.   HENT:      Head: Normocephalic and atraumatic.      Mouth/Throat:      Mouth: Mucous membranes are moist.   Eyes:      Extraocular Movements: Extraocular movements intact.      Conjunctiva/sclera: Conjunctivae normal.      Pupils: Pupils are equal, round, and reactive to light.   Cardiovascular:      Rate and Rhythm: Normal rate and regular rhythm.      Pulses: Normal pulses.      Heart sounds: Normal heart sounds.   Pulmonary:      Effort: Pulmonary effort  is normal.      Breath sounds: Normal breath sounds.   Abdominal:      General: Abdomen is flat. Bowel sounds are normal. There is no distension.      Palpations: Abdomen is soft.      Tenderness: There is no abdominal tenderness.   Musculoskeletal:         General: Normal range of motion.      Cervical back: Normal range of motion and neck supple.   Skin:     General: Skin is warm.   Neurological:      General: No focal deficit present.      Mental Status: She is alert and oriented to person, place, and time. Mental status is at baseline.      Comments: Patient is AAOx3.   Normal speech.    Psychiatric:         Mood and Affect: Mood normal.         Behavior: Behavior normal.               Significant Labs: All pertinent labs within the past 24 hours have been reviewed.  CBC:   Recent Labs   Lab 07/27/25 0512 07/28/25  0928   WBC 8.21 7.71   HGB 13.0 14.0   HCT 37.9 42.6    347     CMP:   Recent Labs   Lab 07/27/25 0512 07/28/25  0928    139   K 3.3* 3.8    101   CO2 31* 31*   * 147*   BUN 13 15   CREATININE 0.7 0.6   CALCIUM 9.0 9.6   PROT 6.7  --    ALBUMIN 4.0  --    BILITOT 0.5  --    ALKPHOS 86  --    AST 14  --    ALT 16  --    ANIONGAP 7* 7*       Significant Imaging: I have reviewed all pertinent imaging results/findings within the past 24 hours.    X-ray right knee:   No acute fracture or dislocation.       Assessment & Plan  CVA (cerebral vascular accident)  Diagnosed with acute/subacute infarct of the left frontal corona radiata on 07/21  Right lower extremity weakness, same as when patient was discharged   Has new UE tingling  Continue dual antiplatelet therapy and statin.  It was recommended for dual antiplatelet therapy for 3 months then aspirin alone during last visit   ED physician discussed with neurologist, recommended repeating MRI brain and ordering MRI C-spine. Consult neuro if new/acute findings  MRI brain: Subacute infarcts in the left ELSIE territory and the right  occipital lobe.   Patient will need rehab placement  PT/OT consulted and recommended monitoring Ancef therapy; case management consulted for discharge planning  Patient agreeable to placement  Mixed hyperlipidemia  Continue statin    Acquired hypothyroidism  Continue Synthroid    Tobacco use  Aware    Diffuse large B-cell lymphoma of lymph nodes of multiple regions  Hx of osseous and B cell lymphoma, s/p chemo 4 years ago     VTE Risk Mitigation (From admission, onward)           Ordered     enoxaparin injection 40 mg  Daily         07/25/25 2003     IP VTE HIGH RISK PATIENT  Once         07/25/25 2003     Place sequential compression device  Until discontinued         07/25/25 2003                    Discharge Planning   BYRON: 7/29/2025     Code Status: Full Code   Medical Readiness for Discharge Date:   Discharge Plan A: Skilled Nursing Facility   Discharge Delays: None known at this time              Please place Justification for DME      Kathleen Hidalgo PA-C  Department of Hospital Medicine   [facility]

## 2025-07-28 NOTE — PLAN OF CARE
Problem: Hospitalized Older Adult  Goal: Optimal Cognitive Function  Outcome: Ongoing  Goal: Effective Bowel Elimination  Outcome: Ongoing  Goal: Optimal Coping  Outcome: Ongoing  Goal: Fluid and Electrolyte Balance  Outcome: Ongoing  Goal: Optimal Functional Ability  Outcome: Ongoing  Goal: Improved Oral Intake  Outcome: Ongoing  Goal: Adequate Sleep/Rest  Outcome: Ongoing  Goal: Effective Urinary Elimination  Outcome: Ongoing     Problem: Adult Inpatient Plan of Care  Goal: Plan of Care Review  Outcome: Ongoing  Goal: Patient-Specific Goal (Individualized)  Outcome: Ongoing  Goal: Absence of Hospital-Acquired Illness or Injury  Outcome: Ongoing  Goal: Optimal Comfort and Wellbeing  Outcome: Ongoing  Goal: Readiness for Transition of Care  Outcome: Ongoing     Problem: Infection  Goal: Absence of Infection Signs and Symptoms  Outcome: Ongoing     Problem: Stroke, Ischemic (Includes Transient Ischemic Attack)  Goal: Optimal Coping  Outcome: Ongoing  Goal: Effective Bowel Elimination  Outcome: Ongoing  Goal: Optimal Cerebral Tissue Perfusion  Outcome: Ongoing  Goal: Optimal Cognitive Function  Outcome: Ongoing  Goal: Improved Communication Skills  Outcome: Ongoing  Goal: Optimal Functional Ability  Outcome: Ongoing  Goal: Optimal Nutrition Intake  Outcome: Ongoing  Goal: Effective Oxygenation and Ventilation  Outcome: Ongoing  Goal: Improved Sensorimotor Function  Outcome: Ongoing  Goal: Safe and Effective Swallow  Outcome: Ongoing  Goal: Effective Urinary Elimination  Outcome: Ongoing     Problem: Fall Injury Risk  Goal: Absence of Fall and Fall-Related Injury  Outcome: Ongoing

## 2025-07-28 NOTE — PLAN OF CARE
Problem: Hospitalized Older Adult  Goal: Fluid and Electrolyte Balance  Outcome: Progressing     Problem: Adult Inpatient Plan of Care  Goal: Absence of Hospital-Acquired Illness or Injury  Outcome: Progressing  Goal: Optimal Comfort and Wellbeing  Outcome: Progressing  Goal: Readiness for Transition of Care  Outcome: Progressing

## 2025-07-28 NOTE — PT/OT/SLP PROGRESS
Occupational Therapy   Treatment    Name: Indira Chauhan  MRN: [id number]  Admitting Diagnosis:  CVA (cerebral vascular accident)       Recommendations:     Discharge Recommendations: Moderate Intensity Therapy  Discharge Equipment Recommendations:  to be determined by next level of care  Barriers to discharge:   (Increased assist with ADLs, IADLs, and mobility)    Assessment:     Indira Chauhan is a 70 y.o. female with a medical diagnosis of CVA (cerebral vascular accident). Pt is agreeable to OT treatment session this AM. Performance deficits affecting function are weakness, impaired endurance, impaired self care skills, impaired functional mobility, gait instability, impaired balance, decreased upper extremity function, decreased lower extremity function, decreased safety awareness, impaired cardiopulmonary response to activity.     Rehab Prognosis:  Fair; patient would benefit from acute skilled OT services to address these deficits and reach maximum level of function.       Plan:     Patient to be seen 5 x/week to address the above listed problems via self-care/home management, therapeutic activities, therapeutic exercises  Plan of Care Expires: 08/26/25  Plan of Care Reviewed with: patient    Subjective     Chief Complaint: none stated  Patient/Family Comments/goals: improve function  Pain/Comfort:  Pain Rating 1: 0/10    Objective:     Communicated with: nursing prior to session.  Patient found supine with telemetry, bed alarm upon OT entry to room.    General Precautions: Standard, fall    Orthopedic Precautions:N/A  Braces: N/A  Respiratory Status: Room air     Occupational Performance:     Bed Mobility:    Patient completed Supine to Sit with stand by assistance     Functional Mobility/Transfers:  Patient completed Sit <> Stand Transfer with contact guard assistance  with  rolling walker   Patient completed BSC Transfer Step Transfer technique with contact guard assistance with  rolling walker  Functional  Mobility: Pt amb to sink with CGA and RW with no LOB or SOB.    Activities of Daily Living:  Grooming: stand by assistance to brush teeth and hair standing at sink  Upper Body Dressing: stand by assistance to don/doff gown  Lower Body Dressing: stand by assistance to don shorts seated on BSC; Total A for socks   Toileting: stand by assistance for hygiene and brief management after voiding    Treatment & Education:  Pt educated on role of OT/POC, importance of OOB/EOB activity, use of call bell, and safety during ADLs, transfers, and functional mobility.    Patient left sitting edge of bed with all lines intact, call button in reach, and spouse and nursing present    GOALS:   Multidisciplinary Problems       Occupational Therapy Goals          Problem: Occupational Therapy    Goal Priority Disciplines Outcome Interventions   Occupational Therapy Goal     OT, PT/OT     Description: Goals to be met by: 8/26/25     Patient will increase functional independence with ADLs by performing:    UE Dressing with [place].  LE Dressing with Modified [place].  Grooming while seated with Minimal Assistance.  Toileting from bedside commode with Minimal Assistance for hygiene and clothing management.   Toilet transfer to bedside commode with Modified [place].                         DME Justifications:  No DME recommended requiring DME justifications    Time Tracking:     OT Date of Treatment: 07/28/25  OT Start Time: 1102  OT Stop Time: 1125  OT Total Time (min): 23 min    Billable Minutes:Self Care/Home Management 23    OT/SLIME: OT          7/28/2025

## 2025-07-28 NOTE — PLAN OF CARE
07/28/25 0730   Patient Assessment/Suction   Level of Consciousness (AVPU) alert   Respiratory Effort Unlabored;Normal   Expansion/Accessory Muscles/Retractions no retractions;no use of accessory muscles   PRE-TX-O2   Device (Oxygen Therapy) room air   SpO2 100 %   Pulse Oximetry Type Intermittent   $ Pulse Oximetry - Single Charge Pulse Oximetry - Single   Pulse 76   Resp 18   Education   $ Education 15 min

## 2025-07-28 NOTE — PT/OT/SLP PROGRESS
"Physical Therapy Treatment    Patient Name:  Indira Chauhan   MRN:  [id number]    Recommendations:     Discharge Recommendations: Moderate Intensity Therapy  Discharge Equipment Recommendations: to be determined by next level of care  Barriers to discharge: awaiting placement    Assessment:     Indira Chauhan is a 70 y.o. female admitted with a medical diagnosis of CVA (cerebral vascular accident).  She presents with the following impairments/functional limitations: weakness, impaired endurance, impaired functional mobility, impaired balance, gait instability, decreased lower extremity function.    Pt agreeable on 2nd attempt to participate. Increased gait distance with RW support and w/c follow for safety. Required Kerwin throughout due to persistent R gait drift. No complaint of R calf cramping until final 30' of distance. Remained up in chair with needs at hand.     Rehab Prognosis: Good; patient would benefit from acute skilled PT services to address these deficits and reach maximum level of function.    Recent Surgery: * No surgery found *      Plan:     During this hospitalization, patient to be seen 6 x/week to address the identified rehab impairments via gait training, therapeutic activities, therapeutic exercises, neuromuscular re-education and progress toward the following goals:    Plan of Care Expires:  08/26/25    Subjective     Chief Complaint: tired, lack of sleep, frequent urination from lasix  Patient/Family Comments/goals: "Do I get to leave here today?"  Pain/Comfort:  Pain Rating 1: 0/10      Objective:     Communicated with nurse prior to session.  Patient found sitting edge of bed with telemetry, peripheral IV upon PT entry to room.     General Precautions: Standard, fall  Orthopedic Precautions:    Braces:    Respiratory Status: Room air     Functional Mobility:  Transfers:     Sit to Stand:  contact guard assistance with rolling walker  Gait: 60'x2,30'x2, RW and Kerwin, w/c follow; persistent R " drift and frequent contact with R-sided obstacles, requiring VC and occasional Kerwin to return to middle of fajardo      AM-PAC 6 CLICK MOBILITY          Treatment & Education:  -Pt education: benefits of participation with therapy, OOB to chair during the day and for all meals as tolerated, staff assist for mobility, fall prevention, call light, review of discharge recommendation      Patient left up in chair with all lines intact, call button in reach, nurse notified, and spouse Bill present..    GOALS:   Multidisciplinary Problems       Physical Therapy Goals          Problem: Physical Therapy    Goal Priority Disciplines Outcome Interventions   Physical Therapy Goal     PT, PT/OT     Description: Goals to be met by: 2025     Patient will increase functional independence with mobility by performin. Supine to sit with Contact Guard Assistance  2. Sit to supine with Contact Guard Assistance  3. Sit to stand transfer with Supervision  4. Gait  x 150 feet with Contact Guard Assistance using Rolling Walker.                            Time Tracking:     PT Received On: 25  PT Start Time: 1502     PT Stop Time: 1522  PT Total Time (min): 20 min     Billable Minutes: Therapeutic Activity 20    Treatment Type: Treatment  PT/PTA: PTA     Number of PTA visits since last PT visit: 2025

## 2025-07-28 NOTE — SUBJECTIVE & OBJECTIVE
Interval History:  Patient seen and examined.  Overnight notes reviewed.  Patient's  was at bedside.  Potassium improved on a.m. labs.  Patient reports continued fatigue and weakness to right lower extremity. PT/OT following and recommending moderate intensive therapy.  Case management consulted for discharge planning.     Review of Systems   Constitutional:  Positive for fatigue.   Respiratory:  Negative for shortness of breath.    Cardiovascular:  Negative for chest pain.   Gastrointestinal:  Negative for abdominal pain and nausea.   Neurological:  Positive for weakness.     Objective:     Vital Signs (Most Recent):  Temp: 97.5 °F (36.4 °C) (07/28/25 1124)  Pulse: 84 (07/28/25 1124)  Resp: 18 (07/28/25 1124)  BP: (!) 147/69 (07/28/25 1124)  SpO2: 99 % (07/28/25 1124) Vital Signs (24h Range):  Temp:  [97.4 °F (36.3 °C)-97.8 °F (36.6 °C)] 97.5 °F (36.4 °C)  Pulse:  [63-84] 84  Resp:  [18] 18  SpO2:  [95 %-100 %] 99 %  BP: (123-154)/(53-82) 147/69     Weight: 102.1 kg (225 lb)  Body mass index is 38.62 kg/m².    Intake/Output Summary (Last 24 hours) at 7/28/2025 6187  Last data filed at 7/28/2025 0900  Gross per 24 hour   Intake 150 ml   Output --   Net 150 ml         Physical Exam  Vitals and nursing note reviewed.   Constitutional:       General: She is not in acute distress.     Appearance: Normal appearance. She is normal weight.   HENT:      Head: Normocephalic and atraumatic.      Mouth/Throat:      Mouth: Mucous membranes are moist.   Eyes:      Extraocular Movements: Extraocular movements intact.      Conjunctiva/sclera: Conjunctivae normal.      Pupils: Pupils are equal, round, and reactive to light.   Cardiovascular:      Rate and Rhythm: Normal rate and regular rhythm.      Pulses: Normal pulses.      Heart sounds: Normal heart sounds.   Pulmonary:      Effort: Pulmonary effort is normal.      Breath sounds: Normal breath sounds.   Abdominal:      General: Abdomen is flat. Bowel sounds are normal.  There is no distension.      Palpations: Abdomen is soft.      Tenderness: There is no abdominal tenderness.   Musculoskeletal:         General: Normal range of motion.      Cervical back: Normal range of motion and neck supple.   Skin:     General: Skin is warm.   Neurological:      General: No focal deficit present.      Mental Status: She is alert and oriented to person, place, and time. Mental status is at baseline.      Comments: Patient is AAOx3.   Normal speech.    Psychiatric:         Mood and Affect: Mood normal.         Behavior: Behavior normal.               Significant Labs: All pertinent labs within the past 24 hours have been reviewed.  CBC:   Recent Labs   Lab 07/27/25  0512 07/28/25  0928   WBC 8.21 7.71   HGB 13.0 14.0   HCT 37.9 42.6    347     CMP:   Recent Labs   Lab 07/27/25 0512 07/28/25  0928    139   K 3.3* 3.8    101   CO2 31* 31*   * 147*   BUN 13 15   CREATININE 0.7 0.6   CALCIUM 9.0 9.6   PROT 6.7  --    ALBUMIN 4.0  --    BILITOT 0.5  --    ALKPHOS 86  --    AST 14  --    ALT 16  --    ANIONGAP 7* 7*       Significant Imaging: I have reviewed all pertinent imaging results/findings within the past 24 hours.    X-ray right knee:   No acute fracture or dislocation.

## 2025-07-29 VITALS
TEMPERATURE: 98 F | OXYGEN SATURATION: 95 % | BODY MASS INDEX: 38.41 KG/M2 | DIASTOLIC BLOOD PRESSURE: 77 MMHG | SYSTOLIC BLOOD PRESSURE: 135 MMHG | HEIGHT: 64 IN | WEIGHT: 225 LBS | HEART RATE: 82 BPM | RESPIRATION RATE: 18 BRPM

## 2025-07-29 LAB
ABSOLUTE EOSINOPHIL (SMH): 0.6 K/UL
ABSOLUTE MONOCYTE (SMH): 0.84 K/UL (ref 0.3–1)
ABSOLUTE NEUTROPHIL COUNT (SMH): 4.9 K/UL (ref 1.8–7.7)
ANION GAP (SMH): 9 MMOL/L (ref 8–16)
BASOPHILS # BLD AUTO: 0.07 K/UL
BASOPHILS NFR BLD AUTO: 0.9 %
BUN SERPL-MCNC: 17 MG/DL (ref 8–23)
CALCIUM SERPL-MCNC: 9.5 MG/DL (ref 8.7–10.5)
CHLORIDE SERPL-SCNC: 100 MMOL/L (ref 95–110)
CO2 SERPL-SCNC: 31 MMOL/L (ref 23–29)
CREAT SERPL-MCNC: 0.7 MG/DL (ref 0.5–1.4)
ERYTHROCYTE [DISTWIDTH] IN BLOOD BY AUTOMATED COUNT: 11.8 % (ref 11.5–14.5)
GFR SERPLBLD CREATININE-BSD FMLA CKD-EPI: >60 ML/MIN/1.73/M2
GLUCOSE SERPL-MCNC: 107 MG/DL (ref 70–110)
HCT VFR BLD AUTO: 40.2 % (ref 37–48.5)
HGB BLD-MCNC: 13.6 GM/DL (ref 12–16)
IMM GRANULOCYTES # BLD AUTO: 0.04 K/UL (ref 0–0.04)
IMM GRANULOCYTES NFR BLD AUTO: 0.5 % (ref 0–0.5)
LYMPHOCYTES # BLD AUTO: 1.68 K/UL (ref 1–4.8)
MCH RBC QN AUTO: 33 PG (ref 27–31)
MCHC RBC AUTO-ENTMCNC: 33.8 G/DL (ref 32–36)
MCV RBC AUTO: 98 FL (ref 82–98)
NUCLEATED RBC (/100WBC) (SMH): 0 /100 WBC
PLATELET # BLD AUTO: 342 K/UL (ref 150–450)
PMV BLD AUTO: 9.9 FL (ref 9.2–12.9)
POTASSIUM SERPL-SCNC: 3.8 MMOL/L (ref 3.5–5.1)
RBC # BLD AUTO: 4.12 M/UL (ref 4–5.4)
RELATIVE EOSINOPHIL (SMH): 7.4 % (ref 0–8)
RELATIVE LYMPHOCYTE (SMH): 20.6 % (ref 18–48)
RELATIVE MONOCYTE (SMH): 10.3 % (ref 4–15)
RELATIVE NEUTROPHIL (SMH): 60.3 % (ref 38–73)
SODIUM SERPL-SCNC: 140 MMOL/L (ref 136–145)
WBC # BLD AUTO: 8.14 K/UL (ref 3.9–12.7)

## 2025-07-29 PROCEDURE — 25000003 PHARM REV CODE 250: Performed by: STUDENT IN AN ORGANIZED HEALTH CARE EDUCATION/TRAINING PROGRAM

## 2025-07-29 PROCEDURE — 85025 COMPLETE CBC W/AUTO DIFF WBC: CPT

## 2025-07-29 PROCEDURE — 25000003 PHARM REV CODE 250: Performed by: INTERNAL MEDICINE

## 2025-07-29 PROCEDURE — 36415 COLL VENOUS BLD VENIPUNCTURE: CPT

## 2025-07-29 PROCEDURE — 25000003 PHARM REV CODE 250

## 2025-07-29 PROCEDURE — 82310 ASSAY OF CALCIUM: CPT

## 2025-07-29 RX ADMIN — ASPIRIN 81 MG: 81 TABLET ORAL at 09:07

## 2025-07-29 RX ADMIN — POTASSIUM BICARBONATE 50 MEQ: 391 TABLET, EFFERVESCENT ORAL at 09:07

## 2025-07-29 RX ADMIN — CLOPIDOGREL 75 MG: 75 TABLET ORAL at 09:07

## 2025-07-29 RX ADMIN — ATORVASTATIN CALCIUM 40 MG: 40 TABLET, FILM COATED ORAL at 09:07

## 2025-07-29 RX ADMIN — AMLODIPINE BESYLATE 5 MG: 5 TABLET ORAL at 09:07

## 2025-07-29 RX ADMIN — LEVOTHYROXINE SODIUM 150 MCG: 0.03 TABLET ORAL at 05:07

## 2025-07-29 RX ADMIN — FAMOTIDINE 20 MG: 20 TABLET, FILM COATED ORAL at 09:07

## 2025-07-29 NOTE — PLAN OF CARE
PAKO uploaded PASRR and 142 to pt's . PAKO sent 142, PASRR, chest xray, PPD, and clinicals updates to [facility] via [email or web address]. PAKO also notified them pt will be d/c today.     07/29/25 0912   Post-Acute Status   Post-Acute Authorization Placement   Post-Acute Placement Status Pending medical clearance/testing   Discharge Plan   Discharge Plan A Skilled Nursing Facility     1016[facility] [email or web address].     Noah6- Kelly called stating she was coming to do paperwork with pt.

## 2025-07-29 NOTE — PROGRESS NOTES
Report called to ARTEM Brown at [facility]. Printed AVS, facesheet, H&P to be given to transport on arrival. IV/tele monitoring discontinued. Pt awaiting transportation for discharge.

## 2025-07-29 NOTE — DISCHARGE SUMMARY
[facility] Medicine  Discharge Summary      Patient Name: Indira Chauhan  MRN: [id number]  [facility]: [id number]  Patient Class: IP- Inpatient  Admission Date: 7/25/2025  Hospital Length of Stay: 3 days  Discharge Date and Time: 07/29/2025 1:35 PM  Attending Physician: Ana Taylor MD   Discharging Provider: Kathleen Hidalgo PA-C  Primary Care Provider: Bela Lombardi NP    Primary Care Team: Networked reference to record PCT     HPI:   70-year-old female pMHx diffuse B-cell lymphoma, GERD, HTN, HLD, hypothyroidism, stroke, PVD presented for bilateral upper extremity numbness and tingling and right lower extremity weakness.  Patient was recently discharged 2 days ago from hospital after treatment for left frontal CVA.  At the time it was recommended for inpatient rehab, patient was denied by [facility] rehab.  Patient elected to go home on home health care rather than skilled nursing facility.  Patient reports no change in right lower extremity weakness, had a fall earlier today and was not able to get up off the floor.  Stated after fall she developed bilateral upper extremity numbness and tingling in her hands.  Denies any loss of consciousness, chest pain, shortness for breath, nausea, vomiting, diarrhea.  CTA head and neck and CT head showed redemonstration of the occlusion of the left A2 branch without additional evidence of large vessel occlusion or significant stenosis.  Discussed with patient, she is agreeable to skilled nursing facility placement.  Reports no worsening weakness in the right lower extremity says that it is about the same.  ED physician discussed with on-call neurologist recommended MRI brain and MRI C-spine.  Patient reports compliance with medication.    * No surgery found *      Hospital Course:   Patient is admitted to the hospital with right lower extremity weakness.  Patient is monitored closely throughout hospital stay.  Patient had recent hospitalization for  stroke where rehab placement was recommended but patient declined.  MRI brain obtained and redemonstrated prior stroke.  PT/OT were consulted and recommended moderate intensive therapy.  Case management was consulted for discharge planning.  Patient was accepted to [facility] and authorization was obtained.  Patient was seen on day of discharge.  Patient deemed appropriate for discharge.  Patient to continue DAPT with aspirin and Plavix for 90 days followed by aspirin monotherapy thereafter.  To continue Lipitor.  Patient to follow up with Neurology and PCP.  Patient discharged to [facility].     Goals of Care Treatment Preferences:  Code Status: Full Code         Consults:   Consults (From admission, onward)          Status Ordering Provider     Inpatient consult to Social Work/Case Management  Once        Provider:  (Not yet assigned)    Completed KENY MCCOY consult to case management/social work  Once        Provider:  (Not yet assigned)    Completed TIFFANIE HARRY            Assessment & Plan    Final Active Diagnoses:    Diagnosis Date Noted POA    PRINCIPAL PROBLEM:  CVA (cerebral vascular accident) [I63.9] 05/23/2025 Yes    Diffuse large B-cell lymphoma of lymph nodes of multiple regions [C83.38] 03/28/2018 Yes    Tobacco use [Z72.0] 11/02/2017 Yes    Acquired hypothyroidism [E03.9] 10/23/2017 Yes    Mixed hyperlipidemia [E78.2] 10/23/2017 Yes      Problems Resolved During this Admission:       Discharged Condition: good    Disposition: Skilled Nursing Facility    Follow Up:   Follow-up Information       Bela Lombardi NP Follow up.    Specialty: Family Medicine  Contact information:  [address] [place] [address]  [phone]                           Patient Instructions:      Ambulatory referral/consult to Neurology   Standing Status: Future   Referral Priority: Routine Referral Type: Consultation   Referral Reason:  Specialty Services Required   Requested Specialty: Neurology   Number of Visits Requested: 1     Notify your health care provider if you experience any of the following:  temperature >100.4     Notify your health care provider if you experience any of the following:  persistent nausea and vomiting or diarrhea     Notify your health care provider if you experience any of the following:  severe uncontrolled pain     Notify your health care provider if you experience any of the following:  redness, tenderness, or signs of infection (pain, swelling, redness, odor or green/yellow discharge around incision site)     Notify your health care provider if you experience any of the following:  difficulty breathing or increased cough     Notify your health care provider if you experience any of the following:  increased confusion or weakness     Activity as tolerated       Significant Diagnostic Studies: Labs: CMP   Recent Labs   Lab 07/28/25 0928 07/29/25  0452    140   K 3.8 3.8    100   CO2 31* 31*   * 107   BUN 15 17   CREATININE 0.6 0.7   CALCIUM 9.6 9.5   ANIONGAP 7* 9    and CBC   Recent Labs   Lab 07/28/25 0928 07/29/25  0548   WBC 7.71 8.14   HGB 14.0 13.6   HCT 42.6 40.2    342       Pending Diagnostic Studies:       Procedure Component Value Units Date/Time    EXTRA TUBES [[id number]] Collected: 07/25/25 1746    Order Status: Sent Lab Status: In process Updated: 07/25/25 1746    Specimen: Blood, Venous     Narrative:      The following orders were created for panel order EXTRA TUBES.  Procedure                               Abnormality         Status                     ---------                               -----------         ------                     Light Green Top Hold[[id number]]                            In process                   Please view results for these tests on the individual orders.           Medications:  Reconciled Home Medications:      Medication List         CONTINUE taking these medications      amLODIPine-benazepriL 5-40 mg per capsule  Commonly known as: LOTREL  Take 1 capsule by mouth once daily.     aspirin 81 MG EC tablet  Commonly known as: ECOTRIN  Take 1 tablet (81 mg total) by mouth once daily.     atorvastatin 40 MG tablet  Commonly known as: LIPITOR  Take 1 tablet (40 mg total) by mouth once daily.     clopidogreL 75 mg tablet  Commonly known as: PLAVIX  Take 1 tablet (75 mg total) by mouth once daily.     levothyroxine 150 MCG tablet  Commonly known as: SYNTHROID  Take 1 tablet (150 mcg total) by mouth once daily.              Indwelling Lines/Drains at time of discharge:   Lines/Drains/Airways       None                       Time spent on the discharge of patient: 35 minutes         Kathleen Hidalgo PA-C  Department of Hospital Medicine  [facility]

## 2025-07-29 NOTE — DISCHARGE INSTRUCTIONS
[facility]  Facility Transfer Orders        Admit to: snf    Diagnoses:   Active Hospital Problems    Diagnosis  POA    *CVA (cerebral vascular accident) [I63.9]  Yes    Diffuse large B-cell lymphoma of lymph nodes of multiple regions [C83.38]  Yes    Tobacco use [Z72.0]  Yes    Acquired hypothyroidism [E03.9]  Yes    Mixed hyperlipidemia [E78.2]  Yes      Resolved Hospital Problems   No resolved problems to display.     Allergies: Review of patient's allergies indicates:  No Known Allergies    Code Status: full    Vitals: Routine       Diet: cardiac diet    Activity: Activity as tolerated    Nursing Precautions: Fall    Bed/Surface: Low Air Loss    Consults: PT to evaluate and treat- 5 times a week and OT to evaluate and treat- 5 times a week    Oxygen: room air    Dialysis: Patient is not on dialysis.   Pending Diagnostic Studies:       Procedure Component Value Units Date/Time    EXTRA TUBES [[id number]] Collected: 07/25/25 1746    Order Status: Sent Lab Status: In process Updated: 07/25/25 1746    Specimen: Blood, Venous     Narrative:      The following orders were created for panel order EXTRA TUBES.  Procedure                               Abnormality         Status                     ---------                               -----------         ------                     Light Green Top Hold[[id number]]                            In process                   Please view results for these tests on the individual orders.                   Medications: Discontinue all previous medication orders, if any. See new list below.  Current Discharge Medication List        CONTINUE these medications which have NOT CHANGED    Details   amLODIPine-benazepriL (LOTREL) 5-40 mg per capsule Take 1 capsule by mouth once daily.  Qty: 90 capsule, Refills: 3    Associated Diagnoses: Primary hypertension      aspirin (ECOTRIN) 81 MG EC tablet Take 1 tablet (81 mg total) by mouth once daily.  Qty: 180 tablet, Refills:  0      atorvastatin (LIPITOR) 40 MG tablet Take 1 tablet (40 mg total) by mouth once daily.  Qty: 90 tablet, Refills: 1    Associated Diagnoses: Mixed hyperlipidemia      clopidogreL (PLAVIX) 75 mg tablet Take 1 tablet (75 mg total) by mouth once daily.  Qty: 90 tablet, Refills: 0    Associated Diagnoses: Cerebrovascular accident (CVA), unspecified mechanism; Carotid atherosclerosis, bilateral      levothyroxine (SYNTHROID) 150 MCG tablet Take 1 tablet (150 mcg total) by mouth once daily.  Qty: 90 tablet, Refills: 0    Associated Diagnoses: Acquired hypothyroidism           Follow up:    Follow-up Information       Bela Lombardi NP Follow up.    Specialty: Family Medicine  Contact information:  [address]  [place] [address]  [phone]                               Immunizations Administered as of 7/29/2025  Reviewed on 7/26/2025      Name Date Dose VIS Date Route Exp Date    COVID-19, MRNA, LN-S, PF (Pfizer) (Purple Cap) 9/1/2021 10:48 AM 0.3 mL 12/12/2020 Intramuscular 10/31/2021    Site: Right deltoid     Given By: Mary Jane Vaz LPN     : [employer]     Lot: [id number]     COVID-19, MRNA, LN-S, PF (Pfizer) (Purple Cap) 8/11/2021 10:57 AM 0.3 mL 12/12/2020 Intramuscular 10/31/2021    Site: Right deltoid     Given By: Mary Jane Vaz LPN     : [employer]     Lot: [id number]                 Some patients may experience side effects after vaccination.  These may include fever, headache, muscle or joint aches.  Most symptoms resolve with 24-48 hours and do not require urgent medical evaluation unless they persist for more than 72 hours or symptoms are concerning for an unrelated medical condition.

## 2025-07-29 NOTE — PT/OT/SLP PROGRESS
Occupational Therapy      Patient Name:  Indira Chauhan   MRN:  [id number]    Patient not seen today secondary to pending d/c.     7/29/2025

## 2025-07-29 NOTE — PLAN OF CARE
Charts and orders reviewed. Pt to discharge SNF @ [facility].  gave pt's floor nurse information to call report ([phone] Nurse Stephanie @ station 1). Pt has no other needs to be addressed by case management. Pt cleared to discharge from case management standpoint.    [facility] has set up transportation to pick pt up between [phone].     07/29/25 1356   Final Note   Assessment Type Final Discharge Note   Anticipated Discharge Disposition SNF   What phone number can be called within the next 1-3 days to see how you are doing after discharge? [phone]   Hospital Resources/Appts/Education Provided Post-Acute resouces added to AVS   Post-Acute Status   Post-Acute Authorization Placement   Post-Acute Placement Status Set-up Complete/Auth obtained   Patient choice form signed by patient/caregiver List with quality metrics by geographic area provided;List from CMS Compare   Discharge Delays None known at this time

## 2025-07-29 NOTE — PLAN OF CARE
Problem: Hospitalized Older Adult  Goal: Optimal Cognitive Function  Outcome: Ongoing  Goal: Effective Bowel Elimination  Outcome: Ongoing  Goal: Optimal Coping  Outcome: Ongoing  Goal: Fluid and Electrolyte Balance  Outcome: Ongoing  Goal: Optimal Functional Ability  Outcome: Ongoing  Goal: Improved Oral Intake  Outcome: Ongoing  Goal: Adequate Sleep/Rest  Outcome: Ongoing  Goal: Effective Urinary Elimination  Outcome: Ongoing     Problem: Adult Inpatient Plan of Care  Goal: Plan of Care Review  Outcome: Ongoing  Goal: Patient-Specific Goal (Individualized)  Outcome: Ongoing  Goal: Absence of Hospital-Acquired Illness or Injury  Outcome: Ongoing  Goal: Optimal Comfort and Wellbeing  Outcome: Ongoing  Goal: Readiness for Transition of Care  Outcome: Ongoing     Problem: Infection  Goal: Absence of Infection Signs and Symptoms  Outcome: Ongoing     Problem: Stroke, Ischemic (Includes Transient Ischemic Attack)  Goal: Optimal Coping  Outcome: Ongoing  Goal: Effective Bowel Elimination  Outcome: Ongoing  Goal: Optimal Cerebral Tissue Perfusion  Outcome: Ongoing  Goal: Optimal Cognitive Function  Outcome: Ongoing  Goal: Improved Communication Skills  Outcome: Ongoing  Goal: Optimal Functional Ability  Outcome: Ongoing  Goal: Optimal Nutrition Intake  Outcome: Ongoing  Goal: Effective Oxygenation and Ventilation  Outcome: Ongoing  Goal: Improved Sensorimotor Function  Outcome: Ongoing  Goal: Safe and Effective Swallow  Outcome: Ongoing  Goal: Effective Urinary Elimination  Outcome: Ongoing     Problem: Wound  Goal: Optimal Coping  Outcome: Ongoing  Goal: Optimal Functional Ability  Outcome: Ongoing  Goal: Absence of Infection Signs and Symptoms  Outcome: Ongoing  Goal: Improved Oral Intake  Outcome: Ongoing  Goal: Optimal Pain Control and Function  Outcome: Ongoing  Goal: Skin Health and Integrity  Outcome: Ongoing  Goal: Optimal Wound Healing  Outcome: Ongoing     Problem: Skin Injury Risk Increased  Goal: Skin Health  and Integrity  Outcome: Ongoing     Problem: Fall Injury Risk  Goal: Absence of Fall and Fall-Related Injury  Outcome: Ongoing

## 2025-08-14 ENCOUNTER — HOSPITAL ENCOUNTER (OUTPATIENT)
Dept: CARDIOLOGY | Facility: CLINIC | Age: 70
Discharge: HOME | End: 2025-08-14
Attending: GENERAL PRACTICE
Payer: MEDICARE

## 2025-08-14 ENCOUNTER — OFFICE VISIT (OUTPATIENT)
Dept: CARDIOLOGY | Facility: CLINIC | Age: 70
End: 2025-08-14
Payer: MEDICARE

## 2025-08-14 VITALS
HEART RATE: 90 BPM | SYSTOLIC BLOOD PRESSURE: 126 MMHG | BODY MASS INDEX: 38.62 KG/M2 | WEIGHT: 225 LBS | DIASTOLIC BLOOD PRESSURE: 69 MMHG | OXYGEN SATURATION: 96 %

## 2025-08-14 DIAGNOSIS — I63.9 CEREBRAL INFARCTION, UNSPECIFIED MECHANISM: ICD-10-CM

## 2025-08-14 DIAGNOSIS — I65.23 CAROTID ATHEROSCLEROSIS, BILATERAL: ICD-10-CM

## 2025-08-14 DIAGNOSIS — Z72.0 TOBACCO USE: ICD-10-CM

## 2025-08-14 DIAGNOSIS — I63.9 CEREBRAL INFARCTION, UNSPECIFIED MECHANISM: Primary | ICD-10-CM

## 2025-08-14 DIAGNOSIS — Z76.89 ENCOUNTER TO ESTABLISH CARE: ICD-10-CM

## 2025-08-14 DIAGNOSIS — I10 HYPERTENSION, UNSPECIFIED TYPE: ICD-10-CM

## 2025-08-14 DIAGNOSIS — E66.01 SEVERE OBESITY (BMI 35.0-39.9) WITH COMORBIDITY: ICD-10-CM

## 2025-08-14 PROCEDURE — 99999 PR PBB SHADOW E&M-EST. PATIENT-LVL III: CPT | Mod: PBBFAC,,, | Performed by: GENERAL PRACTICE

## 2025-08-14 PROCEDURE — 99213 OFFICE O/P EST LOW 20 MIN: CPT | Mod: PBBFAC,PN | Performed by: GENERAL PRACTICE

## 2025-08-15 DIAGNOSIS — R26.89 OTHER ABNORMALITIES OF GAIT AND MOBILITY: ICD-10-CM

## 2025-08-15 DIAGNOSIS — M62.81 MUSCLE WEAKNESS (GENERALIZED): Primary | ICD-10-CM

## 2025-08-15 DIAGNOSIS — R27.8 MUSCULAR INCOORDINATION: ICD-10-CM

## 2025-08-16 LAB
OHS QRS DURATION: 76 MS
OHS QTC CALCULATION: 435 MS

## 2025-08-18 ENCOUNTER — PATIENT OUTREACH (OUTPATIENT)
Dept: FAMILY MEDICINE | Facility: CLINIC | Age: 70
End: 2025-08-18
Payer: MEDICARE

## 2025-08-18 PROBLEM — I63.9 CEREBROVASCULAR ACCIDENT: Status: RESOLVED | Noted: 2025-05-23 | Resolved: 2025-08-18

## 2025-08-19 ENCOUNTER — TELEPHONE (OUTPATIENT)
Dept: FAMILY MEDICINE | Facility: CLINIC | Age: 70
End: 2025-08-19
Payer: MEDICARE

## 2025-08-21 ENCOUNTER — CLINICAL SUPPORT (OUTPATIENT)
Dept: REHABILITATION | Facility: HOSPITAL | Age: 70
End: 2025-08-21
Attending: STUDENT IN AN ORGANIZED HEALTH CARE EDUCATION/TRAINING PROGRAM
Payer: MEDICARE

## 2025-08-21 VITALS — DIASTOLIC BLOOD PRESSURE: 73 MMHG | SYSTOLIC BLOOD PRESSURE: 136 MMHG | HEART RATE: 90 BPM

## 2025-08-21 DIAGNOSIS — G81.91 RIGHT HEMIPARESIS: Primary | ICD-10-CM

## 2025-08-21 DIAGNOSIS — Z74.09 IMPAIRED FUNCTIONAL MOBILITY, BALANCE, GAIT, AND ENDURANCE: ICD-10-CM

## 2025-08-21 PROCEDURE — 97162 PT EVAL MOD COMPLEX 30 MIN: CPT | Mod: PO

## 2025-08-22 ENCOUNTER — PATIENT OUTREACH (OUTPATIENT)
Dept: FAMILY MEDICINE | Facility: CLINIC | Age: 70
End: 2025-08-22
Payer: MEDICARE

## 2025-08-22 ENCOUNTER — TELEPHONE (OUTPATIENT)
Dept: FAMILY MEDICINE | Facility: CLINIC | Age: 70
End: 2025-08-22
Payer: MEDICARE

## 2025-08-22 PROBLEM — Z74.09 IMPAIRED FUNCTIONAL MOBILITY, BALANCE, GAIT, AND ENDURANCE: Status: ACTIVE | Noted: 2025-08-22

## 2025-08-22 PROBLEM — G81.91 RIGHT HEMIPARESIS: Status: ACTIVE | Noted: 2025-08-22

## 2025-08-25 ENCOUNTER — OFFICE VISIT (OUTPATIENT)
Dept: FAMILY MEDICINE | Facility: CLINIC | Age: 70
End: 2025-08-25
Payer: MEDICARE

## 2025-08-25 VITALS
BODY MASS INDEX: 37.96 KG/M2 | DIASTOLIC BLOOD PRESSURE: 73 MMHG | WEIGHT: 221.13 LBS | OXYGEN SATURATION: 95 % | SYSTOLIC BLOOD PRESSURE: 122 MMHG | HEART RATE: 83 BPM

## 2025-08-25 DIAGNOSIS — I63.522 CEREBROVASCULAR ACCIDENT (CVA) DUE TO OCCLUSION OF LEFT ANTERIOR CEREBRAL ARTERY: ICD-10-CM

## 2025-08-25 DIAGNOSIS — Z09 HOSPITAL DISCHARGE FOLLOW-UP: Primary | ICD-10-CM

## 2025-08-25 DIAGNOSIS — E03.9 ACQUIRED HYPOTHYROIDISM: ICD-10-CM

## 2025-08-25 PROCEDURE — 99999 PR PBB SHADOW E&M-EST. PATIENT-LVL III: CPT | Mod: PBBFAC,,,

## 2025-08-25 PROCEDURE — 99496 TRANSJ CARE MGMT HIGH F2F 7D: CPT | Mod: S$PBB,,,

## 2025-08-25 PROCEDURE — 99213 OFFICE O/P EST LOW 20 MIN: CPT | Mod: PBBFAC,PN

## 2025-08-26 ENCOUNTER — PATIENT MESSAGE (OUTPATIENT)
Dept: ADMINISTRATIVE | Facility: HOSPITAL | Age: 70
End: 2025-08-26
Payer: MEDICARE

## 2025-08-28 ENCOUNTER — CLINICAL SUPPORT (OUTPATIENT)
Dept: REHABILITATION | Facility: HOSPITAL | Age: 70
End: 2025-08-28
Payer: MEDICARE

## 2025-08-28 DIAGNOSIS — G81.91 RIGHT HEMIPARESIS: Primary | ICD-10-CM

## 2025-08-28 DIAGNOSIS — Z74.09 IMPAIRED FUNCTIONAL MOBILITY, BALANCE, GAIT, AND ENDURANCE: ICD-10-CM

## 2025-08-28 PROCEDURE — 97110 THERAPEUTIC EXERCISES: CPT | Mod: PO

## 2025-08-28 PROCEDURE — 97530 THERAPEUTIC ACTIVITIES: CPT | Mod: PO

## 2025-08-28 PROCEDURE — 97112 NEUROMUSCULAR REEDUCATION: CPT | Mod: PO

## 2025-08-29 ENCOUNTER — TELEPHONE (OUTPATIENT)
Dept: CARDIOLOGY | Facility: CLINIC | Age: 70
End: 2025-08-29
Payer: MEDICARE

## 2025-09-03 ENCOUNTER — CLINICAL SUPPORT (OUTPATIENT)
Dept: REHABILITATION | Facility: HOSPITAL | Age: 70
End: 2025-09-03
Payer: MEDICARE

## 2025-09-03 DIAGNOSIS — Z74.09 IMPAIRED FUNCTIONAL MOBILITY, BALANCE, GAIT, AND ENDURANCE: ICD-10-CM

## 2025-09-03 DIAGNOSIS — G81.91 RIGHT HEMIPARESIS: Primary | ICD-10-CM

## 2025-09-03 PROCEDURE — 97110 THERAPEUTIC EXERCISES: CPT | Mod: PO

## 2025-09-03 PROCEDURE — 97530 THERAPEUTIC ACTIVITIES: CPT | Mod: PO

## 2025-09-03 PROCEDURE — 97112 NEUROMUSCULAR REEDUCATION: CPT | Mod: PO

## (undated) DEVICE — CLOSURE SKIN STERI STRIP 1/2X4

## (undated) DEVICE — DRAPE C-ARM FOR MOBILE XRAY

## (undated) DEVICE — SLEEVE SCD EXPRESS CALF MEDIUM

## (undated) DEVICE — NDL SAFETY 25G X 1.5 ECLIPSE

## (undated) DEVICE — PACK CUSTOM UNIV BASIN SLI

## (undated) DEVICE — GLOVE SURG ULTRA TOUCH 8

## (undated) DEVICE — SUT MONOCRYL 4-0 SH UND MON

## (undated) DEVICE — SUT 2-0 VICRYL / SH (J417)

## (undated) DEVICE — GAUZE SPONGE BULKEE 6X6.75IN

## (undated) DEVICE — SOL 9P NACL IRR PIC IL

## (undated) DEVICE — SYR DISP LL 5CC

## (undated) DEVICE — SYR 10CC LUER LOCK

## (undated) DEVICE — LINER SUCTION 3000CC

## (undated) DEVICE — DRESSING TRANS 4X4 TEGADERM

## (undated) DEVICE — ELECTRODE REM PLYHSV RETURN 9

## (undated) DEVICE — SEE MEDLINE ITEM 146292

## (undated) DEVICE — SEE MEDLINE ITEM 152622

## (undated) DEVICE — SEE MEDLINE ITEM 157117

## (undated) DEVICE — DRAPE LAP TIBURON 77X122IN

## (undated) DEVICE — STRAP OR TABLE 5IN X 72IN

## (undated) DEVICE — CONTAINER SPECIMEN STRL 4OZ

## (undated) DEVICE — SUT 3-0 VICRYL / SH (J416)

## (undated) DEVICE — APPLICATOR CHLORAPREP ORN 26ML